# Patient Record
Sex: FEMALE | Race: AMERICAN INDIAN OR ALASKA NATIVE
[De-identification: names, ages, dates, MRNs, and addresses within clinical notes are randomized per-mention and may not be internally consistent; named-entity substitution may affect disease eponyms.]

---

## 2017-10-27 NOTE — C.PDOC
History Of Present Illness


37 year old female with a Hx of sickle cell anemia presents to the ER with a 

complaint of bilateral leg, arm, and back pain for the past 2 days. Patient 

states the pain feels like her typical sickle cell crisis pain. Denies fever, 

cough, SOB, or chest pain. Patient is on 4mg dilaudid PO at home. 


Time Seen by Provider: 10/27/17 21:10


Chief Complaint (Nursing): Pain, Chronic


History Per: Patient


History/Exam Limitations: no limitations


Onset/Duration Of Symptoms: Days


Current Symptoms Are (Timing): Still Present


Recent travel outside of the [place]: No





Past Medical History


Reviewed: Historical Data, Nursing Documentation, Vital Signs


Vital Signs: 


 Last Vital Signs











Temp  98.2 F   10/27/17 20:22


 


Pulse  110 H  10/27/17 20:22


 


Resp  18   10/27/17 20:22


 


BP  124/78   10/27/17 20:22


 


Pulse Ox  100   10/27/17 21:32














- Medical History


PMH: Anemia, Migraine, Sickle Cell Disease


Surgical History: Cholecystectomy (2004)





- [facility] Procedures








PACKED CELL TRANSFUSION (06/04/13)


TETANUS TOXOID ADMINIST (09/23/13)








Family History: States: Unknown Family Hx





- Social History


Hx Tobacco Use: No


Hx Alcohol Use: No


Hx Substance Use: No





- Immunization History


Hx Tetanus Toxoid Vaccination: No


Hx Influenza Vaccination: Yes


Hx Pneumococcal Vaccination: Yes





Review Of Systems


Except As Marked, All Systems Reviewed And Found Negative.


Constitutional: Negative for: Fever


Cardiovascular: Negative for: Chest Pain


Respiratory: Negative for: Shortness of Breath


Musculoskeletal: Positive for: Arm Pain, Back Pain, Leg Pain





Physical Exam





- Physical Exam


Additional Physical Exam Comments: 





Constitutional: No acute distress.


Head: Normocephalic. Atraumatic.


Eyes: PERRL.


ENT: Moist mucous membranes.


Neck: Supple.


Cardiovascular: Regular rate. Radial pulse 2+ bilaterally.


Chest: No tenderness.


Respiratory: Clear to auscultation bilaterally.


GI: Soft. Nontender. Nondistended.


Back: No CVA tenderness.


Musculoskeletal: No tenderness or swelling of extremities.


Skin: No rash.


Neurologic: Alert, no focal deficit.





ED Course And Treatment


O2 Sat by Pulse Oximetry: 100 (Room air)


Pulse Ox Interpretation: Normal





Medical Decision Making


Medical Decision Making: 





Plan:


* Benadryl


* Dilaudid


* Motrin


* Reglan





PO pain protocol initiated. Patient stated pain from 8 to 7 after 1 hour. 

Additional 8mg dilaudid PO administered. After another hour, patient states she 

feels better, at least a 5. She feels comfortable to go home. I advised her 

that she may return to ED for any worsening pain and to definitely return for 

chest pain, dyspnea, cough, fever.





Disposition





- Disposition


Referrals: 


Shahab Etienne MD [Staff Provider] - 


Disposition: HOME/ ROUTINE


Disposition Time: 00:38


Condition: STABLE


Instructions:  Sickle Cell Crisis (ED)


Forms:  [facility] (English)





- Clinical Impression


Clinical Impression: 


 Sickle cell pain crisis








- Scribe Statement


The provider has reviewed the documentation as recorded by the Scribe





Enio Londono





All medical record entries made by the Scribe were at my direction and 

personally dictated by me. I have reviewed the chart and agree that the record 

accurately reflects my personal performance of the history, physical exam, 

medical decision making, and the department course for this patient. I have 

also personally directed, reviewed, and agree with the discharge instructions 

and disposition.

## 2018-01-16 ENCOUNTER — HOSPITAL ENCOUNTER (INPATIENT)
Dept: HOSPITAL 31 - C.ER | Age: 38
LOS: 4 days | Discharge: HOME | DRG: 812 | End: 2018-01-20
Attending: INTERNAL MEDICINE | Admitting: INTERNAL MEDICINE
Payer: MEDICARE

## 2018-01-16 VITALS — BODY MASS INDEX: 22.4 KG/M2

## 2018-01-16 DIAGNOSIS — D57.00: Primary | ICD-10-CM

## 2018-01-16 DIAGNOSIS — Z90.49: ICD-10-CM

## 2018-01-16 DIAGNOSIS — D72.829: ICD-10-CM

## 2018-01-16 DIAGNOSIS — N39.0: ICD-10-CM

## 2018-01-16 LAB
ALBUMIN SERPL-MCNC: 3.8 G/DL (ref 3.5–5)
ALBUMIN/GLOB SERPL: 0.7 {RATIO} (ref 1–2.1)
ALT SERPL-CCNC: 110 U/L (ref 9–52)
AST SERPL-CCNC: 189 U/L (ref 14–36)
BACTERIA #/AREA URNS HPF: (no result) /[HPF]
BASOPHILS # BLD AUTO: 0.4 K/UL (ref 0–0.2)
BASOPHILS NFR BLD: 1.5 % (ref 0–2)
BILIRUB UR-MCNC: NEGATIVE MG/DL
BUN SERPL-MCNC: 33 MG/DL (ref 7–17)
CALCIUM SERPL-MCNC: 8.1 MG/DL (ref 8.6–10.4)
EOSINOPHIL # BLD AUTO: 0.8 K/UL (ref 0–0.7)
EOSINOPHIL NFR BLD: 2.9 % (ref 0–4)
ERYTHROCYTE [DISTWIDTH] IN BLOOD BY AUTOMATED COUNT: 20.8 % (ref 11.5–14.5)
GFR NON-AFRICAN AMERICAN: 42
GLUCOSE UR STRIP-MCNC: NORMAL MG/DL
HCG,QUALITATIVE URINE: NEGATIVE
HGB BLD-MCNC: 6.1 G/DL (ref 11–16)
HYPHAE YEAST: (no result) /LPF
LEUKOCYTE ESTERASE UR-ACNC: (no result) LEU/UL
LIPASE: 275 U/L (ref 23–300)
LYMPHOCYTES # BLD AUTO: 7.1 K/UL (ref 1–4.3)
LYMPHOCYTES NFR BLD AUTO: 25.1 % (ref 20–40)
MCH RBC QN AUTO: 28.9 PG (ref 27–31)
MCHC RBC AUTO-ENTMCNC: 33.6 G/DL (ref 33–37)
MCV RBC AUTO: 85.9 FL (ref 81–99)
MONOCYTES # BLD: 2.7 K/UL (ref 0–0.8)
MONOCYTES NFR BLD: 9.6 % (ref 0–10)
NEUTROPHILS # BLD: 17.3 K/UL (ref 1.8–7)
NEUTROPHILS NFR BLD AUTO: 60.9 % (ref 50–75)
NRBC BLD AUTO-RTO: 1 % (ref 0–2)
PH UR STRIP: 6 [PH] (ref 5–8)
PLATELET # BLD: 245 K/UL (ref 130–400)
PMV BLD AUTO: 9 FL (ref 7.2–11.7)
PROT UR STRIP-MCNC: (no result) MG/DL
RBC # BLD AUTO: 2.12 MIL/UL (ref 3.8–5.2)
RBC # UR STRIP: (no result) /UL
SP GR UR STRIP: 1.01 (ref 1–1.03)
SQUAMOUS EPITHIAL: 21 /HPF (ref 0–5)
URINE NITRATE: NEGATIVE
UROBILINOGEN UR-MCNC: NORMAL MG/DL (ref 0.2–1)
WBC # BLD AUTO: 28.4 K/UL (ref 4.8–10.8)

## 2018-01-16 RX ADMIN — DIPHENHYDRAMINE HYDROCHLORIDE PRN MG: 50 INJECTION INTRAMUSCULAR; INTRAVENOUS at 21:36

## 2018-01-16 NOTE — C.PDOC
History Of Present Illness


37 year old female with PMHx of sickle cell anemia presents to the ED c/o 

generalized body pains. Patient states she is not taking any medications at 

this time. Patient denies fever, chills, nausea, vomit, diarrhea. Patient's PMD 

is Dr. Bienvenido Etienne.


Time Seen by Provider: 01/16/18 18:28


Chief Complaint (Nursing): Pain, Chronic


History Per: Patient


History/Exam Limitations: no limitations


Onset/Duration Of Symptoms: Days


Current Symptoms Are (Timing): Still Present


Recent travel outside of the [place]: No


Additional History Per: Patient





Past Medical History


Reviewed: Historical Data, Nursing Documentation, Vital Signs


Vital Signs: 


 Last Vital Signs











Temp  98.2 F   01/16/18 19:45


 


Pulse  103 H  01/16/18 19:45


 


Resp  18   01/16/18 19:45


 


BP  112/78   01/16/18 19:45


 


Pulse Ox  100   01/16/18 19:45














- Medical History


PMH: Anemia, Migraine, Sickle Cell Disease


Surgical History: Cholecystectomy (2004)





- [facility] Procedures








PACKED CELL TRANSFUSION (06/04/13)


TETANUS TOXOID ADMINIST (09/23/13)








Family History: States: Unknown Family Hx





- Social History


Hx Tobacco Use: No


Hx Alcohol Use: No


Hx Substance Use: No





- Immunization History


Hx Tetanus Toxoid Vaccination: No


Hx Influenza Vaccination: Yes


Hx Pneumococcal Vaccination: Yes





Review Of Systems


Constitutional: Positive for: Weakness.  Negative for: Fever, Chills


Cardiovascular: Negative for: Chest Pain, Palpitations


Respiratory: Negative for: Cough, Shortness of Breath


Gastrointestinal: Negative for: Nausea, Vomiting, Abdominal Pain


Genitourinary: Negative for: Dysuria, Hematuria


Musculoskeletal: Negative for: Back Pain


Skin: Negative for: Rash


Neurological: Negative for: Weakness, Numbness, Headache





Physical Exam





- Physical Exam


Appears: Non-toxic, No Acute Distress


Skin: Normal Color, Warm, Dry


Head: Atraumatic, Normacephalic


Eye(s): bilateral: Normal Inspection


Nose: No Discharge, No Deformity


Oral Mucosa: Moist


Neck: Normal ROM, Supple


Chest: Symmetrical


Cardiovascular: Rhythm Regular, No Murmur


Respiratory: Normal Breath Sounds, No Rales, No Rhonchi, No Wheezing


Gastrointestinal/Abdominal: Soft, No Tenderness, No Guarding, No Rebound


Extremity: Normal ROM, No Pedal Edema, No Calf Tenderness, No Deformity, No 

Swelling


Neurological/Psych: Oriented x3, Normal Speech, Normal Cognition


Gait: Steady





ED Course And Treatment





- Laboratory Results


Result Diagrams: 


 01/16/18 18:53





 01/16/18 18:53


O2 Sat by Pulse Oximetry: 100 (On RA)


Pulse Ox Interpretation: Normal





Medical Decision Making


Medical Decision Making: 


Impression : generalized body pains


Plan:


* Labs


* EKG


* Blood culture


* UA


* IV fluids


* Rocephin 1 gm in 100 ml


* Toradol 30 mg IVP


* Tylenol/codein 1 ea PO





Spoke with Dr. Lou regarding the patient and he agreed with the 

admission. 





Disposition


Discussed With : Heber Lou


Doctor Will See Patient In The: Hospital


Counseled Patient/Family Regarding: Studies Performed, Diagnosis





- Disposition


Disposition: HOSPITALIZED


Disposition Time: 19:26


Condition: FAIR


Forms:  [facility] (English)





- Clinical Impression


Clinical Impression: 


 Sickle cell anemia with pain, UTI (urinary tract infection)








- Scribe Statement


The provider has reviewed the documentation as recorded by the Scribe





Mario Ybarra





All medical record entries made by the Scribe were at my direction and 

personally dictated by me. I have reviewed the chart and agree that the record 

accurately reflects my personal performance of the history, physical exam, 

medical decision making, and the department course for this patient. I have 

also personally directed, reviewed, and agree with the discharge instructions 

and disposition.

## 2018-01-17 LAB
BASOPHILS # BLD AUTO: 0.3 K/UL (ref 0–0.2)
BASOPHILS NFR BLD: 1.2 % (ref 0–2)
EOSINOPHIL # BLD AUTO: 1.2 K/UL (ref 0–0.7)
EOSINOPHIL NFR BLD: 4.5 % (ref 0–4)
ERYTHROCYTE [DISTWIDTH] IN BLOOD BY AUTOMATED COUNT: 21.5 % (ref 11.5–14.5)
HGB BLD-MCNC: 5.2 G/DL (ref 11–16)
LYMPHOCYTES # BLD AUTO: 9.4 K/UL (ref 1–4.3)
LYMPHOCYTES NFR BLD AUTO: 34.8 % (ref 20–40)
MCH RBC QN AUTO: 31 PG (ref 27–31)
MCHC RBC AUTO-ENTMCNC: 35.7 G/DL (ref 33–37)
MCV RBC AUTO: 86.8 FL (ref 81–99)
MONOCYTES # BLD: 2.5 K/UL (ref 0–0.8)
MONOCYTES NFR BLD: 9.1 % (ref 0–10)
NEUTROPHILS # BLD: 13.6 K/UL (ref 1.8–7)
NEUTROPHILS NFR BLD AUTO: 50.4 % (ref 50–75)
NRBC BLD AUTO-RTO: 1 % (ref 0–2)
PLATELET # BLD: 202 K/UL (ref 130–400)
PMV BLD AUTO: 9.5 FL (ref 7.2–11.7)
RBC # BLD AUTO: 1.68 MIL/UL (ref 3.8–5.2)
WBC # BLD AUTO: 27 K/UL (ref 4.8–10.8)

## 2018-01-17 PROCEDURE — 30233N1 TRANSFUSION OF NONAUTOLOGOUS RED BLOOD CELLS INTO PERIPHERAL VEIN, PERCUTANEOUS APPROACH: ICD-10-PCS | Performed by: INTERNAL MEDICINE

## 2018-01-17 RX ADMIN — DIPHENHYDRAMINE HYDROCHLORIDE PRN MG: 50 INJECTION INTRAMUSCULAR; INTRAVENOUS at 03:27

## 2018-01-17 RX ADMIN — DIPHENHYDRAMINE HYDROCHLORIDE PRN MG: 50 INJECTION INTRAMUSCULAR; INTRAVENOUS at 00:33

## 2018-01-17 RX ADMIN — DIPHENHYDRAMINE HYDROCHLORIDE PRN MG: 50 INJECTION INTRAMUSCULAR; INTRAVENOUS at 19:20

## 2018-01-17 RX ADMIN — DIPHENHYDRAMINE HYDROCHLORIDE PRN MG: 50 INJECTION INTRAMUSCULAR; INTRAVENOUS at 16:29

## 2018-01-17 RX ADMIN — DIPHENHYDRAMINE HYDROCHLORIDE PRN MG: 50 INJECTION INTRAMUSCULAR; INTRAVENOUS at 13:22

## 2018-01-17 RX ADMIN — DIPHENHYDRAMINE HYDROCHLORIDE PRN MG: 50 INJECTION INTRAMUSCULAR; INTRAVENOUS at 22:19

## 2018-01-17 RX ADMIN — DIPHENHYDRAMINE HYDROCHLORIDE PRN MG: 50 INJECTION INTRAMUSCULAR; INTRAVENOUS at 06:47

## 2018-01-17 RX ADMIN — DIPHENHYDRAMINE HYDROCHLORIDE PRN MG: 50 INJECTION INTRAMUSCULAR; INTRAVENOUS at 10:15

## 2018-01-17 NOTE — RAD
HISTORY:

cough  



COMPARISON:

Portable chest 10/12/2013.



TECHNIQUE:

Chest PA and lateral



FINDINGS:

Prior left-sided MediPort is been exchanged for a right-sided 

MediPort with the tip of the catheter terminating at the region of 

the right atrium. Stabilizing thoracic spinal rods are unchanged in 

apparent position and configuration at the thoracic spine. 



LUNGS:

No acute infiltrate is identified bilaterally. Linear atelectasis or 

fibrosis in the right base. Overall inspiratory volume is improved.



PLEURA:

No significant pleural effusion identified. No pneumothorax apparent.



CARDIOVASCULAR:

Cardiac silhouette appears upper limits normal size.  No pulmonary 

vascular derangement appreciated.



OSSEOUS STRUCTURES:

Thoracic spinal hardware as described above.



VISUALIZED UPPER ABDOMEN:

Apparent bilateral nephrostomy catheters are in identified in the 

abdomen.



OTHER FINDINGS:

None.



IMPRESSION:

One minimal linear atelectasis or fibrosis in the right base in the 

interval and MediPort axis is been exchanged for left to the right 

sides as discussed above in the interval. No acute infiltrate, 

pleural effusion or pneumothorax identified this time.  Borderline 

cardiomegaly is noted.

## 2018-01-17 NOTE — CP.PCM.CON
History of Present Illness





- History of Present Illness


History of Present Illness: 





37 year old female with a history of sickle cell anemia, admitted with sickle 

cell pain crisis.  The patient reports to increasing diffuse bone pain which 

did not improve with her oral pain meds.  She denies fevers and chills.  She 

does report to progressive fatigue but no shortness of breath.  She denies 

abnormal bleeding and bruising.





Past medical history:  Sickle cell anemia





Past surgical history:  Portacath





Family history:  Parents have the trait.





Social history:  Denies tobacco, alcohol, and illicit drug use.





Allergies:  Droperidol, tramadol





Review of systems:  All remaining review of systems including HEENT, 

cardiovascular, respiratory, gastrointestinal, genitourinary, musculoskeletal, 

dermatologic, neurologic, and psychiatric are negative unless mentioned in the 

HPI.





Past Patient History





- Past Medical History & Family History


Past Medical History?: Yes





- Past Social History


Smoking Status: Never Smoked





- NEUROLOGICAL


Hx Migraine: Yes





- HEMATOLOGICAL/ONCOLOGICAL


Hx Anemia: Yes


Hx Sickle Cell Disease: Yes





- MUSCULOSKELETAL/RHEUMATOLOGICAL


Hx Falls: No





- PSYCHIATRIC


Hx Substance Use: No





- SURGICAL HISTORY


Hx Cholecystectomy: Yes (2004)





- ANESTHESIA


Hx Anesthesia: Yes


Hx Anesthesia Reactions: No





Meds


Allergies/Adverse Reactions: 


 Allergies











Allergy/AdvReac Type Severity Reaction Status Date / Time


 


droperidol Allergy   Verified 01/16/18 16:48


 


tramadol Allergy   Verified 01/16/18 16:48


 


inapsine Allergy  RASH Uncoded 01/16/18 16:48














- Medications


Medications: 


 Current Medications





Acetaminophen (Tylenol 325mg Tab)  650 mg PO ONCE PRN


   PRN Reason: give pre-transfusion


Diphenhydramine HCl (Benadryl)  50 mg IVP Q3H PRN


   PRN Reason: given with dilaudid


   Last Admin: 01/17/18 10:15 Dose:  50 mg


Diphenhydramine HCl (Benadryl)  25 mg PO ONCE PRN


   PRN Reason: pre-transfusion


Hydromorphone HCl (Dilaudid)  2 mg IVP Q3H PRN


   PRN Reason: Pain, severe (8-10)


   Last Admin: 01/17/18 10:15 Dose:  2 mg


Sodium Chloride (Sodium Chloride 0.45%)  1,000 mls @ 150 mls/hr IV .Q6H40M CHENG


   Last Admin: 01/17/18 04:11 Dose:  150 mls/hr


Ceftriaxone Sodium 1 gm/ (Sodium Chloride)  50 mls @ 100 mls/hr IVPB DAILY CHENG


   Last Admin: 01/17/18 11:47 Dose:  100 mls/hr











Physical Exam





- Head Exam


Head Exam: ATRAUMATIC





- Eye Exam


Eye Exam: Normal appearance





- ENT Exam


ENT Exam: Mucous Membranes Dry





- Respiratory Exam


Respiratory Exam: NORMAL BREATHING PATTERN





- Cardiovascular Exam


Cardiovascular Exam: +S1, +S2





- GI/Abdominal Exam


GI & Abdominal Exam: Normal Bowel Sounds





- Extremities Exam


Extremities exam: Positive for: normal inspection





- Neurological Exam


Neurological exam: Oriented x3





- Psychiatric Exam


Psychiatric exam: Normal Affect, Normal Mood





- Skin


Skin Exam: Warm





Results





- Vital Signs


Recent Vital Signs: 


 Last Vital Signs











Temp  98.5 F   01/17/18 07:45


 


Pulse  96 H  01/17/18 07:45


 


Resp  18   01/17/18 07:45


 


BP  112/72   01/17/18 07:45


 


Pulse Ox  96   01/17/18 07:45














- Labs


Result Diagrams: 


 01/18/18 06:24





 01/18/18 06:24


Labs: 


 Laboratory Results - last 24 hr











  01/16/18 01/16/18 01/16/18





  17:58 18:53 18:53


 


WBC   28.4 H D 


 


RBC   2.12 L 


 


Hgb   6.1 L* D 


 


Hct   18.2 L 


 


MCV   85.9  D 


 


MCH   28.9 


 


MCHC   33.6 


 


RDW   20.8 H 


 


Plt Count   245 


 


MPV   9.0 


 


Neut % (Auto)   60.9 


 


Lymph % (Auto)   25.1 


 


Mono % (Auto)   9.6 


 


Eos % (Auto)   2.9 


 


Baso % (Auto)   1.5 


 


Neut #   17.3 H 


 


Lymph #   7.1 H 


 


Mono #   2.7 H 


 


Eos #   0.8 H 


 


Baso #   0.4 H 


 


Retic Count   


 


Sodium    133


 


Potassium    5.0


 


Chloride    105


 


Carbon Dioxide    20 L


 


Anion Gap    13


 


BUN    33 H


 


Creatinine    1.4 H


 


Est GFR ( Amer)    51


 


Est GFR (Non-Af Amer)    42


 


Random Glucose    87


 


Calcium    8.1 L


 


Ferritin   


 


Total Bilirubin    3.4 H


 


AST    189 H


 


ALT    110 H D


 


Alkaline Phosphatase    194 H


 


Total Protein    9.4 H


 


Albumin    3.8


 


Globulin    5.6 H


 


Albumin/Globulin Ratio    0.7 L


 


Lipase    275


 


Urine Color  Yellow  


 


Urine Clarity  Hazy  


 


Urine pH  6.0  


 


Ur Specific Gravity  1.008  


 


Urine Protein  1+ H  


 


Urine Glucose (UA)  Normal  


 


Urine Ketones  Negative  


 


Urine Blood  2+ H  


 


Urine Nitrate  Negative  


 


Urine Bilirubin  Negative  


 


Urine Urobilinogen  Normal  


 


Ur Leukocyte Esterase  3+ H  


 


Urine WBC (Auto)  862 H  


 


Urine RBC (Auto)  6 H  


 


Ur Squamous Epith Cells  21 H  


 


Urine Bacteria  Mod H  


 


Ur Yeast w Hyphae  Rare H  


 


Urine HCG, Qual  Negative  


 


Blood Type   


 


Antibody Screen   














  01/16/18 01/17/18 01/17/18





  19:37 06:18 06:18


 


WBC   27.0 H 


 


RBC   1.68 L 


 


Hgb   5.2 L* 


 


Hct   14.6 L 


 


MCV   86.8 


 


MCH   31.0 


 


MCHC   35.7 


 


RDW   21.5 H 


 


Plt Count   202 


 


MPV   9.5 


 


Neut % (Auto)   50.4 


 


Lymph % (Auto)   34.8 


 


Mono % (Auto)   9.1 


 


Eos % (Auto)   4.5 H 


 


Baso % (Auto)   1.2 


 


Neut #   13.6 H 


 


Lymph #   9.4 H 


 


Mono #   2.5 H 


 


Eos #   1.2 H 


 


Baso #   0.3 H 


 


Retic Count  11.4 H D  11.0 H 


 


Sodium   


 


Potassium   


 


Chloride   


 


Carbon Dioxide   


 


Anion Gap   


 


BUN   


 


Creatinine   


 


Est GFR ( Amer)   


 


Est GFR (Non-Af Amer)   


 


Random Glucose   


 


Calcium   


 


Ferritin    75913.0


 


Total Bilirubin   


 


AST   


 


ALT   


 


Alkaline Phosphatase   


 


Total Protein   


 


Albumin   


 


Globulin   


 


Albumin/Globulin Ratio   


 


Lipase   


 


Urine Color   


 


Urine Clarity   


 


Urine pH   


 


Ur Specific Gravity   


 


Urine Protein   


 


Urine Glucose (UA)   


 


Urine Ketones   


 


Urine Blood   


 


Urine Nitrate   


 


Urine Bilirubin   


 


Urine Urobilinogen   


 


Ur Leukocyte Esterase   


 


Urine WBC (Auto)   


 


Urine RBC (Auto)   


 


Ur Squamous Epith Cells   


 


Urine Bacteria   


 


Ur Yeast w Hyphae   


 


Urine HCG, Qual   


 


Blood Type   


 


Antibody Screen   














  01/17/18





  08:04


 


WBC 


 


RBC 


 


Hgb 


 


Hct 


 


MCV 


 


MCH 


 


MCHC 


 


RDW 


 


Plt Count 


 


MPV 


 


Neut % (Auto) 


 


Lymph % (Auto) 


 


Mono % (Auto) 


 


Eos % (Auto) 


 


Baso % (Auto) 


 


Neut # 


 


Lymph # 


 


Mono # 


 


Eos # 


 


Baso # 


 


Retic Count 


 


Sodium 


 


Potassium 


 


Chloride 


 


Carbon Dioxide 


 


Anion Gap 


 


BUN 


 


Creatinine 


 


Est GFR ( Amer) 


 


Est GFR (Non-Af Amer) 


 


Random Glucose 


 


Calcium 


 


Ferritin 


 


Total Bilirubin 


 


AST 


 


ALT 


 


Alkaline Phosphatase 


 


Total Protein 


 


Albumin 


 


Globulin 


 


Albumin/Globulin Ratio 


 


Lipase 


 


Urine Color 


 


Urine Clarity 


 


Urine pH 


 


Ur Specific Gravity 


 


Urine Protein 


 


Urine Glucose (UA) 


 


Urine Ketones 


 


Urine Blood 


 


Urine Nitrate 


 


Urine Bilirubin 


 


Urine Urobilinogen 


 


Ur Leukocyte Esterase 


 


Urine WBC (Auto) 


 


Urine RBC (Auto) 


 


Ur Squamous Epith Cells 


 


Urine Bacteria 


 


Ur Yeast w Hyphae 


 


Urine HCG, Qual 


 


Blood Type  O POSITIVE


 


Antibody Screen  Negative














Assessment & Plan


(1) Sickle cell pain crisis


Assessment and Plan: 


IV fluids, 02 via NC, folic acid, pain meds


to receive 2U PRBC


Status: Acute   





(2) Leukocytosis


Assessment and Plan: 


on antibiotics


may be reactive to sickle cell


Status: Acute   





(3) Sickle cell anemia


Assessment and Plan: 


folic acid daily


reports hydrea did not work for her


Status: Acute

## 2018-01-17 NOTE — CP.PCM.HP
Past Patient History





- Past Medical History & Family History


Past Medical History?: Yes





- Past Social History


Smoking Status: Never Smoked





- NEUROLOGICAL


Hx Migraine: Yes





- HEMATOLOGICAL/ONCOLOGICAL


Hx Anemia: Yes


Hx Sickle Cell Disease: Yes





- MUSCULOSKELETAL/RHEUMATOLOGICAL


Hx Falls: No





- PSYCHIATRIC


Hx Substance Use: No





- SURGICAL HISTORY


Hx Cholecystectomy: Yes (2004)





- ANESTHESIA


Hx Anesthesia: Yes


Hx Anesthesia Reactions: No





Meds


Allergies/Adverse Reactions: 


 Allergies











Allergy/AdvReac Type Severity Reaction Status Date / Time


 


droperidol Allergy   Verified 01/16/18 16:48


 


tramadol Allergy   Verified 01/16/18 16:48


 


inapsine Allergy  RASH Uncoded 01/16/18 16:48














Results





- Vital Signs


Recent Vital Signs: 





 Last Vital Signs











Temp  98.5 F   01/17/18 07:45


 


Pulse  96 H  01/17/18 07:45


 


Resp  18   01/17/18 07:45


 


BP  112/72   01/17/18 07:45


 


Pulse Ox  96   01/17/18 07:45














- Labs


Result Diagrams: 


 01/17/18 06:18





 01/16/18 18:53


Labs: 





 Laboratory Results - last 24 hr











  01/16/18 01/16/18 01/16/18





  17:58 18:53 18:53


 


WBC   28.4 H D 


 


RBC   2.12 L 


 


Hgb   6.1 L* D 


 


Hct   18.2 L 


 


MCV   85.9  D 


 


MCH   28.9 


 


MCHC   33.6 


 


RDW   20.8 H 


 


Plt Count   245 


 


MPV   9.0 


 


Neut % (Auto)   60.9 


 


Lymph % (Auto)   25.1 


 


Mono % (Auto)   9.6 


 


Eos % (Auto)   2.9 


 


Baso % (Auto)   1.5 


 


Neut #   17.3 H 


 


Lymph #   7.1 H 


 


Mono #   2.7 H 


 


Eos #   0.8 H 


 


Baso #   0.4 H 


 


Retic Count   


 


Sodium    133


 


Potassium    5.0


 


Chloride    105


 


Carbon Dioxide    20 L


 


Anion Gap    13


 


BUN    33 H


 


Creatinine    1.4 H


 


Est GFR ( Amer)    51


 


Est GFR (Non-Af Amer)    42


 


Random Glucose    87


 


Calcium    8.1 L


 


Ferritin   


 


Total Bilirubin    3.4 H


 


AST    189 H


 


ALT    110 H D


 


Alkaline Phosphatase    194 H


 


Total Protein    9.4 H


 


Albumin    3.8


 


Globulin    5.6 H


 


Albumin/Globulin Ratio    0.7 L


 


Lipase    275


 


Urine Color  Yellow  


 


Urine Clarity  Hazy  


 


Urine pH  6.0  


 


Ur Specific Gravity  1.008  


 


Urine Protein  1+ H  


 


Urine Glucose (UA)  Normal  


 


Urine Ketones  Negative  


 


Urine Blood  2+ H  


 


Urine Nitrate  Negative  


 


Urine Bilirubin  Negative  


 


Urine Urobilinogen  Normal  


 


Ur Leukocyte Esterase  3+ H  


 


Urine WBC (Auto)  862 H  


 


Urine RBC (Auto)  6 H  


 


Ur Squamous Epith Cells  21 H  


 


Urine Bacteria  Mod H  


 


Ur Yeast w Hyphae  Rare H  


 


Urine HCG, Qual  Negative  


 


Blood Type   


 


Antibody Screen   














  01/16/18 01/17/18 01/17/18





  19:37 06:18 06:18


 


WBC   27.0 H 


 


RBC   1.68 L 


 


Hgb   5.2 L* 


 


Hct   14.6 L 


 


MCV   86.8 


 


MCH   31.0 


 


MCHC   35.7 


 


RDW   21.5 H 


 


Plt Count   202 


 


MPV   9.5 


 


Neut % (Auto)   50.4 


 


Lymph % (Auto)   34.8 


 


Mono % (Auto)   9.1 


 


Eos % (Auto)   4.5 H 


 


Baso % (Auto)   1.2 


 


Neut #   13.6 H 


 


Lymph #   9.4 H 


 


Mono #   2.5 H 


 


Eos #   1.2 H 


 


Baso #   0.3 H 


 


Retic Count  11.4 H D  11.0 H 


 


Sodium   


 


Potassium   


 


Chloride   


 


Carbon Dioxide   


 


Anion Gap   


 


BUN   


 


Creatinine   


 


Est GFR ( Amer)   


 


Est GFR (Non-Af Amer)   


 


Random Glucose   


 


Calcium   


 


Ferritin    86716.0


 


Total Bilirubin   


 


AST   


 


ALT   


 


Alkaline Phosphatase   


 


Total Protein   


 


Albumin   


 


Globulin   


 


Albumin/Globulin Ratio   


 


Lipase   


 


Urine Color   


 


Urine Clarity   


 


Urine pH   


 


Ur Specific Gravity   


 


Urine Protein   


 


Urine Glucose (UA)   


 


Urine Ketones   


 


Urine Blood   


 


Urine Nitrate   


 


Urine Bilirubin   


 


Urine Urobilinogen   


 


Ur Leukocyte Esterase   


 


Urine WBC (Auto)   


 


Urine RBC (Auto)   


 


Ur Squamous Epith Cells   


 


Urine Bacteria   


 


Ur Yeast w Hyphae   


 


Urine HCG, Qual   


 


Blood Type   


 


Antibody Screen   














  01/17/18





  08:04


 


WBC 


 


RBC 


 


Hgb 


 


Hct 


 


MCV 


 


MCH 


 


MCHC 


 


RDW 


 


Plt Count 


 


MPV 


 


Neut % (Auto) 


 


Lymph % (Auto) 


 


Mono % (Auto) 


 


Eos % (Auto) 


 


Baso % (Auto) 


 


Neut # 


 


Lymph # 


 


Mono # 


 


Eos # 


 


Baso # 


 


Retic Count 


 


Sodium 


 


Potassium 


 


Chloride 


 


Carbon Dioxide 


 


Anion Gap 


 


BUN 


 


Creatinine 


 


Est GFR ( Amer) 


 


Est GFR (Non-Af Amer) 


 


Random Glucose 


 


Calcium 


 


Ferritin 


 


Total Bilirubin 


 


AST 


 


ALT 


 


Alkaline Phosphatase 


 


Total Protein 


 


Albumin 


 


Globulin 


 


Albumin/Globulin Ratio 


 


Lipase 


 


Urine Color 


 


Urine Clarity 


 


Urine pH 


 


Ur Specific Gravity 


 


Urine Protein 


 


Urine Glucose (UA) 


 


Urine Ketones 


 


Urine Blood 


 


Urine Nitrate 


 


Urine Bilirubin 


 


Urine Urobilinogen 


 


Ur Leukocyte Esterase 


 


Urine WBC (Auto) 


 


Urine RBC (Auto) 


 


Ur Squamous Epith Cells 


 


Urine Bacteria 


 


Ur Yeast w Hyphae 


 


Urine HCG, Qual 


 


Blood Type  O POSITIVE


 


Antibody Screen  Negative

## 2018-01-17 NOTE — CP.PCM.PN
Subjective





- Date & Time of Evaluation


Date of Evaluation: 01/17/18


Time of Evaluation: 10:16





- Subjective


Subjective: 


PT SEEN AND TO REC 2 UNITS PRBC TODAY FOR HGB 5.2.  PT STATES SHE HAS REC'D 

TRANSFUSIONS BEFORE WITHOUT ANY REACTIONS.  LAST TRANSFUSIONS GIVEN APPROX 1-2 

MONTHS AGO.  DISCUSSED RISKS AND BENEFITS OF TRANSFUSION AND PT IN AGREEMENT TO 

HAVE IT DONE HERE.  WILL PRE-MEDICATE WITH TYLENOL AND BENADRYL.  PT VERBALIZES 

UNDERSTANDING OF ALL POSS REACTIONS.  TO CONTINUE ROCEPHIN IV QD. NO FURTHER 

ORDERS AT THIS TIME.  WILL CONTINUE TO MONITOR.





Objective





- Vital Signs/Intake and Output


Vital Signs (last 24 hours): 


 











Temp Pulse Resp BP Pulse Ox


 


 98.5 F   96 H  18   112/72   96 


 


 01/17/18 07:45  01/17/18 07:45  01/17/18 07:45  01/17/18 07:45  01/17/18 07:45











- Medications


Medications: 


 Current Medications





Acetaminophen (Tylenol 325mg Tab)  650 mg PO ONCE PRN


   PRN Reason: give pre-transfusion


Diphenhydramine HCl (Benadryl)  50 mg IVP Q3H PRN


   PRN Reason: given with dilaudid


   Last Admin: 01/17/18 06:47 Dose:  50 mg


Diphenhydramine HCl (Benadryl)  25 mg PO ONCE PRN


   PRN Reason: pre-transfusion


Hydromorphone HCl (Dilaudid)  2 mg IVP Q3H PRN


   PRN Reason: Pain, severe (8-10)


   Last Admin: 01/17/18 06:47 Dose:  2 mg


Sodium Chloride (Sodium Chloride 0.45%)  1,000 mls @ 150 mls/hr IV .[id number] CHENG


   Last Admin: 01/17/18 04:11 Dose:  150 mls/hr


Ceftriaxone Sodium 1 gm/ (Sodium Chloride)  50 mls @ 100 mls/hr IVPB DAILY CHENG











- Labs


Labs: 


 





 01/17/18 06:18 





 01/16/18 18:53

## 2018-01-18 VITALS — RESPIRATION RATE: 20 BRPM

## 2018-01-18 LAB
ALBUMIN SERPL-MCNC: 3.6 G/DL (ref 3.5–5)
ALBUMIN/GLOB SERPL: 0.8 {RATIO} (ref 1–2.1)
ALT SERPL-CCNC: 78 U/L (ref 9–52)
AST SERPL-CCNC: 136 U/L (ref 14–36)
BUN SERPL-MCNC: 26 MG/DL (ref 7–17)
CALCIUM SERPL-MCNC: 7.4 MG/DL (ref 8.6–10.4)
ERYTHROCYTE [DISTWIDTH] IN BLOOD BY AUTOMATED COUNT: 17.9 % (ref 11.5–14.5)
GFR NON-AFRICAN AMERICAN: 46
HGB BLD-MCNC: 8.2 G/DL (ref 11–16)
MCH RBC QN AUTO: 29.5 PG (ref 27–31)
MCHC RBC AUTO-ENTMCNC: 34.8 G/DL (ref 33–37)
MCV RBC AUTO: 84.6 FL (ref 81–99)
PLATELET # BLD: 221 K/UL (ref 130–400)
PMV BLD AUTO: 9.4 FL (ref 7.2–11.7)
RBC # BLD AUTO: 2.77 MIL/UL (ref 3.8–5.2)
WBC # BLD AUTO: 24.9 K/UL (ref 4.8–10.8)

## 2018-01-18 RX ADMIN — DIPHENHYDRAMINE HYDROCHLORIDE PRN MG: 50 INJECTION INTRAMUSCULAR; INTRAVENOUS at 01:37

## 2018-01-18 RX ADMIN — DIPHENHYDRAMINE HYDROCHLORIDE PRN MG: 50 INJECTION INTRAMUSCULAR; INTRAVENOUS at 08:02

## 2018-01-18 RX ADMIN — DIPHENHYDRAMINE HYDROCHLORIDE PRN MG: 50 INJECTION INTRAMUSCULAR; INTRAVENOUS at 23:58

## 2018-01-18 RX ADMIN — DIPHENHYDRAMINE HYDROCHLORIDE PRN MG: 50 INJECTION INTRAMUSCULAR; INTRAVENOUS at 20:30

## 2018-01-18 RX ADMIN — DIPHENHYDRAMINE HYDROCHLORIDE PRN MG: 50 INJECTION INTRAMUSCULAR; INTRAVENOUS at 11:24

## 2018-01-18 RX ADMIN — DIPHENHYDRAMINE HYDROCHLORIDE PRN MG: 50 INJECTION INTRAMUSCULAR; INTRAVENOUS at 17:29

## 2018-01-18 RX ADMIN — DIPHENHYDRAMINE HYDROCHLORIDE PRN MG: 50 INJECTION INTRAMUSCULAR; INTRAVENOUS at 14:24

## 2018-01-18 RX ADMIN — DIPHENHYDRAMINE HYDROCHLORIDE PRN MG: 50 INJECTION INTRAMUSCULAR; INTRAVENOUS at 04:47

## 2018-01-18 NOTE — CP.PCM.PN
Subjective





- Date & Time of Evaluation


Date of Evaluation: 01/18/18


Time of Evaluation: 18:47





Objective





- Vital Signs/Intake and Output


Vital Signs (last 24 hours): 


 











Temp Pulse Resp BP Pulse Ox


 


 97.3 F L  112 H  20   112/78   96 


 


 01/18/18 08:40  01/18/18 08:40  01/18/18 08:40  01/18/18 17:29  01/18/18 08:40








Intake and Output: 


 











 01/18/18 01/18/18





 06:59 18:59


 


Intake Total 2225 2500


 


Balance 2225 2500














- Medications


Medications: 


 Current Medications





Acetaminophen (Tylenol 325mg Tab)  650 mg PO ONCE PRN


   PRN Reason: give pre-transfusion


   Last Admin: 01/17/18 13:07 Dose:  650 mg


Diphenhydramine HCl (Benadryl)  50 mg IVP Q3H PRN


   PRN Reason: given with dilaudid


   Last Admin: 01/18/18 17:29 Dose:  50 mg


Diphenhydramine HCl (Benadryl)  25 mg PO ONCE PRN


   PRN Reason: pre-transfusion


Folic Acid (Folic Acid)  1 mg PO DAILY [facility]


   Last Admin: 01/18/18 17:29 Dose:  1 mg


Hydromorphone HCl (Dilaudid)  2 mg IVP Q3H PRN


   PRN Reason: Pain, severe (8-10)


   Last Admin: 01/18/18 17:30 Dose:  2 mg


Sodium Chloride (Sodium Chloride 0.45%)  1,000 mls @ 150 mls/hr IV .Q6H40M [facility]


   Last Admin: 01/18/18 17:35 Dose:  150 mls/hr


Ceftriaxone Sodium 1 gm/ (Sodium Chloride)  50 mls @ 100 mls/hr IVPB DAILY [facility]


   Last Admin: 01/18/18 11:00 Dose:  100 mls/hr











- Labs


Labs: 


 





 01/18/18 06:24 





 01/18/18 06:24

## 2018-01-18 NOTE — CP.PCM.PN
Subjective





- Date & Time of Evaluation


Date of Evaluation: 01/18/18


Time of Evaluation: 16:00





- Subjective


Subjective: 





Has bone pain but improving





Objective





- Vital Signs/Intake and Output


Vital Signs (last 24 hours): 


 











Temp Pulse Resp BP Pulse Ox


 


 97.3 F L  112 H  20   100/65   96 


 


 01/18/18 08:40  01/18/18 08:40  01/18/18 08:40  01/18/18 08:40  01/18/18 08:40








Intake and Output: 


 











 01/18/18 01/18/18





 06:59 18:59


 


Intake Total 2225 


 


Balance 2225 














- Medications


Medications: 


 Current Medications





Acetaminophen (Tylenol 325mg Tab)  650 mg PO ONCE PRN


   PRN Reason: give pre-transfusion


   Last Admin: 01/17/18 13:07 Dose:  650 mg


Diphenhydramine HCl (Benadryl)  50 mg IVP Q3H PRN


   PRN Reason: given with dilaudid


   Last Admin: 01/18/18 14:24 Dose:  50 mg


Diphenhydramine HCl (Benadryl)  25 mg PO ONCE PRN


   PRN Reason: pre-transfusion


Folic Acid (Folic Acid)  1 mg PO DAILY [facility]


Hydromorphone HCl (Dilaudid)  2 mg IVP Q3H PRN


   PRN Reason: Pain, severe (8-10)


   Last Admin: 01/18/18 14:24 Dose:  2 mg


Sodium Chloride (Sodium Chloride 0.45%)  1,000 mls @ 150 mls/hr IV .Q6H40M [facility]


   Last Admin: 01/18/18 08:08 Dose:  150 mls/hr


Ceftriaxone Sodium 1 gm/ (Sodium Chloride)  50 mls @ 100 mls/hr IVPB DAILY [facility]


   Last Admin: 01/18/18 11:00 Dose:  100 mls/hr











- Labs


Labs: 


 





 01/18/18 06:24 





 01/18/18 06:24 











- Head Exam


Head Exam: ATRAUMATIC





- Eye Exam


Eye Exam: Normal appearance





- ENT Exam


ENT Exam: Mucous Membranes Dry





- Respiratory Exam


Respiratory Exam: NORMAL BREATHING PATTERN





- Cardiovascular Exam


Cardiovascular Exam: +S1, +S2





- GI/Abdominal Exam


GI & Abdominal Exam: Normal Bowel Sounds





- Extremities Exam


Extremities Exam: Normal Inspection





Assessment and Plan


(1) Sickle cell pain crisis


Assessment & Plan: 


IV fluids, pain meds, 02 via NC, folic acid


s/p 2U PRBC


Status: Acute   





(2) Leukocytosis


Assessment & Plan: 


on antibiotics


may be reactive to sickle cell


Status: Acute   





(3) Sickle cell anemia


Assessment & Plan: 


outpatient folic acid


Status: Acute

## 2018-01-19 LAB
ALBUMIN SERPL-MCNC: 3.6 G/DL (ref 3.5–5)
ALBUMIN/GLOB SERPL: 0.8 {RATIO} (ref 1–2.1)
ALT SERPL-CCNC: 73 U/L (ref 9–52)
AST SERPL-CCNC: 132 U/L (ref 14–36)
BASOPHILS # BLD AUTO: 0.2 K/UL (ref 0–0.2)
BASOPHILS NFR BLD: 1 % (ref 0–2)
BUN SERPL-MCNC: 28 MG/DL (ref 7–17)
CALCIUM SERPL-MCNC: 7.9 MG/DL (ref 8.6–10.4)
EOSINOPHIL # BLD AUTO: 1.2 K/UL (ref 0–0.7)
EOSINOPHIL NFR BLD: 5 % (ref 0–4)
ERYTHROCYTE [DISTWIDTH] IN BLOOD BY AUTOMATED COUNT: 18.6 % (ref 11.5–14.5)
GFR NON-AFRICAN AMERICAN: 46
HGB BLD-MCNC: 7.8 G/DL (ref 11–16)
LYMPHOCYTES # BLD AUTO: 3.9 K/UL (ref 1–4.3)
LYMPHOCYTES NFR BLD AUTO: 16.3 % (ref 20–40)
MAGNESIUM SERPL-MCNC: 1.5 MG/DL (ref 1.6–2.3)
MCH RBC QN AUTO: 29.5 PG (ref 27–31)
MCHC RBC AUTO-ENTMCNC: 34.4 G/DL (ref 33–37)
MCV RBC AUTO: 85.7 FL (ref 81–99)
MONOCYTES # BLD: 2.5 K/UL (ref 0–0.8)
MONOCYTES NFR BLD: 10.5 % (ref 0–10)
NEUTROPHILS # BLD: 16 K/UL (ref 1.8–7)
NEUTROPHILS NFR BLD AUTO: 67.2 % (ref 50–75)
NRBC BLD AUTO-RTO: 1 % (ref 0–2)
PLATELET # BLD: 152 K/UL (ref 130–400)
PMV BLD AUTO: 10.2 FL (ref 7.2–11.7)
RBC # BLD AUTO: 2.64 MIL/UL (ref 3.8–5.2)
WBC # BLD AUTO: 23.8 K/UL (ref 4.8–10.8)

## 2018-01-19 RX ADMIN — DIPHENHYDRAMINE HYDROCHLORIDE PRN MG: 50 INJECTION INTRAMUSCULAR; INTRAVENOUS at 12:29

## 2018-01-19 RX ADMIN — DIPHENHYDRAMINE HYDROCHLORIDE PRN MG: 50 INJECTION INTRAMUSCULAR; INTRAVENOUS at 18:40

## 2018-01-19 RX ADMIN — DIPHENHYDRAMINE HYDROCHLORIDE PRN MG: 50 INJECTION INTRAMUSCULAR; INTRAVENOUS at 06:00

## 2018-01-19 RX ADMIN — DIPHENHYDRAMINE HYDROCHLORIDE PRN MG: 50 INJECTION INTRAMUSCULAR; INTRAVENOUS at 09:21

## 2018-01-19 RX ADMIN — DIPHENHYDRAMINE HYDROCHLORIDE PRN MG: 50 INJECTION INTRAMUSCULAR; INTRAVENOUS at 03:00

## 2018-01-19 RX ADMIN — DIPHENHYDRAMINE HYDROCHLORIDE PRN MG: 50 INJECTION INTRAMUSCULAR; INTRAVENOUS at 21:31

## 2018-01-19 RX ADMIN — DIPHENHYDRAMINE HYDROCHLORIDE PRN MG: 50 INJECTION INTRAMUSCULAR; INTRAVENOUS at 15:32

## 2018-01-19 NOTE — CP.PCM.PN
Subjective





- Date & Time of Evaluation


Date of Evaluation: 01/19/18


Time of Evaluation: 17:44





- Subjective


Subjective: 





PATIENT WAS ADMITTED FOR SICKLE CELL CRISIS; AAOX3 DENIES PAIN; JUST 

COMPLAINING OF DISCOMFORT; DENIES CHEST PAIN, NAUSEA OR VOMITING NO SIGN OF 

DISTRESS NOTED 





Objective





- Vital Signs/Intake and Output


Vital Signs (last 24 hours): 


 











Temp Pulse Resp BP Pulse Ox


 


 98.1 F   102 H  20   125/74   95 


 


 01/19/18 16:22  01/19/18 16:22  01/19/18 16:22  01/19/18 16:22  01/19/18 16:22








Intake and Output: 


 











 01/19/18 01/19/18





 06:59 18:59


 


Intake Total 1860 1600


 


Output Total  300


 


Balance 1860 1300














- Medications


Medications: 


 Current Medications





Acetaminophen (Tylenol 325mg Tab)  650 mg PO ONCE PRN


   PRN Reason: give pre-transfusion


   Last Admin: 01/17/18 13:07 Dose:  650 mg


Diphenhydramine HCl (Benadryl)  50 mg IVP Q3H PRN


   PRN Reason: given with dilaudid


   Last Admin: 01/19/18 15:32 Dose:  50 mg


Diphenhydramine HCl (Benadryl)  25 mg PO ONCE PRN


   PRN Reason: pre-transfusion


Folic Acid (Folic Acid)  1 mg PO DAILY [facility]


   Last Admin: 01/19/18 09:25 Dose:  1 mg


Hydromorphone HCl (Dilaudid)  2 mg IVP Q3H PRN


   PRN Reason: Pain, severe (8-10)


   Last Admin: 01/19/18 15:31 Dose:  2 mg


Sodium Chloride (Sodium Chloride 0.45%)  1,000 mls @ 150 mls/hr IV .Q6H40M [facility]


   Last Admin: 01/19/18 10:00 Dose:  150 mls/hr


Ceftriaxone Sodium 1 gm/ (Sodium Chloride)  100 mls @ 100 mls/hr IVPB DAILY [facility]


   Last Admin: 01/19/18 09:27 Dose:  100 mls/hr


Magnesium Sulfate/Dextrose (Magnesium Sulfate 1 Gm/100 Ml D5w)  1 gm in 100 mls 

@ 200 mls/hr IVPB ONCE ONE


   Stop: 01/19/18 18:05











- Labs


Labs: 


 





 01/19/18 07:25 





 01/19/18 07:25 











Assessment and Plan





- Assessment and Plan (Free Text)


Assessment: 


MG WAS 1.5 REPLACE 


POTASSIUM WAS 5.2 LASIX CAUSE PATIENT REFUSE KAYEXALATE


REPEAT LAB FOR TOMORROW FOR NP ON DUTY TO FOLLOW 


DISCUSS WITH DR GUZMAN AND DR JEAN WHO AGREE WITH THE PLAN 


FOLLOW UP WITH DR MARIVEL FORBES AT HIS OFFICE FOR F/U APPOINTMENT IN 1-2 WEEKS ---

CALL FOR APPOINTMENT


OR FOLLOW UP WITH DR JEAN COVERING PHYSICIAN IF UNABLE TO REACH DR MARIVEL FORBES 

IN HIS OFFICE --CALL FOR APPOINTMENT


FOLLOW UP WITH YOUR ONCOLOGY AS OUT PATIENT 1-2 WEEKS AT HIS OFFICE ---CALL FOR 

APPOINTMENT


CONTINUE ALL YOR HOME MEDICATION AS DIRECTED 


NEW PRESCRIPTION GIVEN:


   BACTRIM DS FOR 5 DAYS 


   DILAUDID 2 MG BY MOUTH EVERY 6 HOURS FOR 5 DAYS FOR PAIN


CALL DR MARIVEL FORBES OR DR JEAN OR GO TO THE EMERGENCY ROOM IF SYMPTOMS RETURN 

OR WORSENING

## 2018-01-19 NOTE — CP.PCM.PN
Subjective





- Date & Time of Evaluation


Date of Evaluation: 01/19/18


Time of Evaluation: 15:51





Objective





- Vital Signs/Intake and Output


Vital Signs (last 24 hours): 


 











Temp Pulse Resp BP Pulse Ox


 


 99.4 F   111 H  20   112/76   95 


 


 01/19/18 07:25  01/19/18 07:25  01/19/18 07:25  01/19/18 07:25  01/19/18 07:25








Intake and Output: 


 











 01/19/18 01/19/18





 06:59 18:59


 


Intake Total 1860 1600


 


Output Total  300


 


Balance 1860 1300














- Medications


Medications: 


 Current Medications





Acetaminophen (Tylenol 325mg Tab)  650 mg PO ONCE PRN


   PRN Reason: give pre-transfusion


   Last Admin: 01/17/18 13:07 Dose:  650 mg


Diphenhydramine HCl (Benadryl)  50 mg IVP Q3H PRN


   PRN Reason: given with dilaudid


   Last Admin: 01/19/18 15:32 Dose:  50 mg


Diphenhydramine HCl (Benadryl)  25 mg PO ONCE PRN


   PRN Reason: pre-transfusion


Folic Acid (Folic Acid)  1 mg PO DAILY [facility]


   Last Admin: 01/19/18 09:25 Dose:  1 mg


Hydromorphone HCl (Dilaudid)  2 mg IVP Q3H PRN


   PRN Reason: Pain, severe (8-10)


   Last Admin: 01/19/18 15:31 Dose:  2 mg


Sodium Chloride (Sodium Chloride 0.45%)  1,000 mls @ 150 mls/hr IV .Q6H40M [facility]


   Last Admin: 01/19/18 10:00 Dose:  150 mls/hr


Ceftriaxone Sodium 1 gm/ (Sodium Chloride)  100 mls @ 100 mls/hr IVPB DAILY [facility]


   Last Admin: 01/19/18 09:27 Dose:  100 mls/hr











- Labs


Labs: 


 





 01/19/18 07:25 





 01/19/18 07:25

## 2018-01-19 NOTE — CP.PCM.PN
Subjective





- Date & Time of Evaluation


Date of Evaluation: 01/19/18


Time of Evaluation: 12:00





- Subjective


Subjective: 





Still has some bone pain but improved.





Objective





- Vital Signs/Intake and Output


Vital Signs (last 24 hours): 


 











Temp Pulse Resp BP Pulse Ox


 


 98.1 F   102 H  20   125/74   95 


 


 01/19/18 16:22  01/19/18 16:22  01/19/18 16:22  01/19/18 16:22  01/19/18 16:22








Intake and Output: 


 











 01/19/18 01/20/18





 18:59 06:59


 


Intake Total 1600 


 


Output Total 300 


 


Balance 1300 














- Medications


Medications: 


 Current Medications





Acetaminophen (Tylenol 325mg Tab)  650 mg PO ONCE PRN


   PRN Reason: give pre-transfusion


   Last Admin: 01/17/18 13:07 Dose:  650 mg


Diphenhydramine HCl (Benadryl)  50 mg IVP Q3H PRN


   PRN Reason: given with dilaudid


   Last Admin: 01/19/18 18:40 Dose:  50 mg


Diphenhydramine HCl (Benadryl)  25 mg PO ONCE PRN


   PRN Reason: pre-transfusion


Folic Acid (Folic Acid)  1 mg PO DAILY [facility]


   Last Admin: 01/19/18 09:25 Dose:  1 mg


Hydromorphone HCl (Dilaudid)  2 mg IVP Q3H PRN


   PRN Reason: Pain, severe (8-10)


   Last Admin: 01/19/18 18:40 Dose:  2 mg


Sodium Chloride (Sodium Chloride 0.45%)  1,000 mls @ 150 mls/hr IV .Q6H40M [facility]


   Last Admin: 01/19/18 10:00 Dose:  150 mls/hr


Ceftriaxone Sodium 1 gm/ (Sodium Chloride)  100 mls @ 100 mls/hr IVPB DAILY [facility]


   Last Admin: 01/19/18 09:27 Dose:  100 mls/hr











- Labs


Labs: 


 





 01/19/18 07:25 





 01/19/18 07:25 











- Head Exam


Head Exam: ATRAUMATIC





- Eye Exam


Eye Exam: Normal appearance





- ENT Exam


ENT Exam: Mucous Membranes Dry





- Respiratory Exam


Respiratory Exam: NORMAL BREATHING PATTERN





- Cardiovascular Exam


Cardiovascular Exam: +S1, +S2





- GI/Abdominal Exam


GI & Abdominal Exam: Normal Bowel Sounds





- Extremities Exam


Extremities Exam: Normal Inspection





Assessment and Plan


(1) Sickle cell pain crisis


Assessment & Plan: 


IVF, 02 via NC, folic acid, pain meds


s/p 2U PRBC


Status: Acute   





(2) Leukocytosis


Assessment & Plan: 


likely reactive to sickle cell 


on empiric antibiotics


Status: Acute   





(3) Sickle cell anemia


Assessment & Plan: 


outpatient folic acid.


Status: Acute

## 2018-01-20 VITALS
HEART RATE: 102 BPM | OXYGEN SATURATION: 96 % | SYSTOLIC BLOOD PRESSURE: 125 MMHG | DIASTOLIC BLOOD PRESSURE: 83 MMHG | TEMPERATURE: 98.6 F

## 2018-01-20 LAB
ALBUMIN SERPL-MCNC: 3.7 G/DL (ref 3.5–5)
ALBUMIN/GLOB SERPL: 0.7 {RATIO} (ref 1–2.1)
ALT SERPL-CCNC: 86 U/L (ref 9–52)
AST SERPL-CCNC: 144 U/L (ref 14–36)
BASOPHILS # BLD AUTO: 0.3 K/UL (ref 0–0.2)
BASOPHILS NFR BLD: 1.2 % (ref 0–2)
BUN SERPL-MCNC: 28 MG/DL (ref 7–17)
CALCIUM SERPL-MCNC: 7.7 MG/DL (ref 8.6–10.4)
EOSINOPHIL # BLD AUTO: 1.2 K/UL (ref 0–0.7)
EOSINOPHIL NFR BLD: 5.8 % (ref 0–4)
ERYTHROCYTE [DISTWIDTH] IN BLOOD BY AUTOMATED COUNT: 19.4 % (ref 11.5–14.5)
GFR NON-AFRICAN AMERICAN: 46
HGB BLD-MCNC: 7.7 G/DL (ref 11–16)
LYMPHOCYTES # BLD AUTO: 4.4 K/UL (ref 1–4.3)
LYMPHOCYTES NFR BLD AUTO: 21 % (ref 20–40)
MAGNESIUM SERPL-MCNC: 1.8 MG/DL (ref 1.6–2.3)
MCH RBC QN AUTO: 28.9 PG (ref 27–31)
MCHC RBC AUTO-ENTMCNC: 33.6 G/DL (ref 33–37)
MCV RBC AUTO: 86.1 FL (ref 81–99)
MONOCYTES # BLD: 1.6 K/UL (ref 0–0.8)
MONOCYTES NFR BLD: 7.6 % (ref 0–10)
NEUTROPHILS # BLD: 13.6 K/UL (ref 1.8–7)
NEUTROPHILS NFR BLD AUTO: 64.4 % (ref 50–75)
NRBC BLD AUTO-RTO: 0.3 % (ref 0–2)
PLATELET # BLD: 151 K/UL (ref 130–400)
PMV BLD AUTO: 10.4 FL (ref 7.2–11.7)
RBC # BLD AUTO: 2.65 MIL/UL (ref 3.8–5.2)
WBC # BLD AUTO: 21.2 K/UL (ref 4.8–10.8)

## 2018-01-20 RX ADMIN — DIPHENHYDRAMINE HYDROCHLORIDE PRN MG: 50 INJECTION INTRAMUSCULAR; INTRAVENOUS at 09:37

## 2018-01-20 RX ADMIN — DIPHENHYDRAMINE HYDROCHLORIDE PRN MG: 50 INJECTION INTRAMUSCULAR; INTRAVENOUS at 03:34

## 2018-01-20 RX ADMIN — DIPHENHYDRAMINE HYDROCHLORIDE PRN MG: 50 INJECTION INTRAMUSCULAR; INTRAVENOUS at 16:06

## 2018-01-20 RX ADMIN — DIPHENHYDRAMINE HYDROCHLORIDE PRN MG: 50 INJECTION INTRAMUSCULAR; INTRAVENOUS at 00:34

## 2018-01-20 RX ADMIN — DIPHENHYDRAMINE HYDROCHLORIDE PRN MG: 50 INJECTION INTRAMUSCULAR; INTRAVENOUS at 06:44

## 2018-01-20 RX ADMIN — DIPHENHYDRAMINE HYDROCHLORIDE PRN MG: 50 INJECTION INTRAMUSCULAR; INTRAVENOUS at 12:48

## 2018-01-20 NOTE — CP.PCM.PN
Subjective





- Date & Time of Evaluation


Date of Evaluation: 01/20/18


Time of Evaluation: 15:55





Objective





- Vital Signs/Intake and Output


Vital Signs (last 24 hours): 


 











Temp Pulse Resp BP Pulse Ox


 


 98.4 F   104 H  20   121/74   95 


 


 01/20/18 07:35  01/20/18 07:35  01/20/18 07:35  01/20/18 07:35  01/20/18 07:35








Intake and Output: 


 











 01/20/18 01/20/18





 06:59 18:59


 


Intake Total 1500 


 


Balance 1500 














- Medications


Medications: 


 Current Medications





Acetaminophen (Tylenol 325mg Tab)  650 mg PO ONCE PRN


   PRN Reason: give pre-transfusion


   Last Admin: 01/17/18 13:07 Dose:  650 mg


Diphenhydramine HCl (Benadryl)  50 mg IVP Q3H PRN


   PRN Reason: given with dilaudid


   Last Admin: 01/20/18 12:48 Dose:  50 mg


Diphenhydramine HCl (Benadryl)  25 mg PO ONCE PRN


   PRN Reason: pre-transfusion


Folic Acid (Folic Acid)  [facility]ILY [facility]


   Last Admin: 01/20/18 09:39 Dose:  1 mg


Hydromorphone HCl (Dilaudid)  2 mg IVP Q3H PRN


   PRN Reason: Pain, severe (8-10)


   Last Admin: 01/20/18 12:48 Dose:  2 mg


Ceftriaxone Sodium 1 gm/ (Sodium Chloride)  100 mls @ 100 mls/hr IVPB [facility]ILY [facility]


   Last Admin: 01/20/18 09:39 Dose:  100 mls/hr











- Labs


Labs: 


 





 01/20/18 07:17 





 01/20/18 07:17

## 2018-01-23 ENCOUNTER — HOSPITAL ENCOUNTER (INPATIENT)
Dept: HOSPITAL 31 - C.ER | Age: 38
LOS: 3 days | Discharge: HOME | DRG: 812 | End: 2018-01-26
Attending: INTERNAL MEDICINE | Admitting: INTERNAL MEDICINE
Payer: MEDICARE

## 2018-01-23 VITALS — BODY MASS INDEX: 22.4 KG/M2

## 2018-01-23 DIAGNOSIS — N39.0: ICD-10-CM

## 2018-01-23 DIAGNOSIS — D57.00: Primary | ICD-10-CM

## 2018-01-23 DIAGNOSIS — N31.9: ICD-10-CM

## 2018-01-23 LAB
ALBUMIN SERPL-MCNC: 3.5 G/DL (ref 3.5–5)
ALBUMIN/GLOB SERPL: 0.7 {RATIO} (ref 1–2.1)
ALT SERPL-CCNC: 72 U/L (ref 9–52)
AST SERPL-CCNC: 115 U/L (ref 14–36)
BASOPHILS # BLD AUTO: 0.4 K/UL (ref 0–0.2)
BASOPHILS NFR BLD: 2.5 % (ref 0–2)
BUN SERPL-MCNC: 22 MG/DL (ref 7–17)
CALCIUM SERPL-MCNC: 8.1 MG/DL (ref 8.6–10.4)
EOSINOPHIL # BLD AUTO: 0.6 K/UL (ref 0–0.7)
EOSINOPHIL NFR BLD: 3.8 % (ref 0–4)
ERYTHROCYTE [DISTWIDTH] IN BLOOD BY AUTOMATED COUNT: 17.8 % (ref 11.5–14.5)
GFR NON-AFRICAN AMERICAN: 56
HGB BLD-MCNC: 7.2 G/DL (ref 11–16)
LYMPHOCYTES # BLD AUTO: 3.8 K/UL (ref 1–4.3)
LYMPHOCYTES NFR BLD AUTO: 22.1 % (ref 20–40)
MCH RBC QN AUTO: 28.3 PG (ref 27–31)
MCHC RBC AUTO-ENTMCNC: 33.7 G/DL (ref 33–37)
MCV RBC AUTO: 84 FL (ref 81–99)
MONOCYTES # BLD: 1.6 K/UL (ref 0–0.8)
MONOCYTES NFR BLD: 9.4 % (ref 0–10)
NEUTROPHILS # BLD: 10.6 K/UL (ref 1.8–7)
NEUTROPHILS NFR BLD AUTO: 62.2 % (ref 50–75)
NRBC BLD AUTO-RTO: 0.1 % (ref 0–2)
PLATELET # BLD: 201 K/UL (ref 130–400)
PMV BLD AUTO: 9.3 FL (ref 7.2–11.7)
RBC # BLD AUTO: 2.54 MIL/UL (ref 3.8–5.2)
WBC # BLD AUTO: 17 K/UL (ref 4.8–10.8)

## 2018-01-23 RX ADMIN — DIPHENHYDRAMINE HYDROCHLORIDE PRN MG: 50 INJECTION INTRAMUSCULAR; INTRAVENOUS at 19:44

## 2018-01-23 RX ADMIN — DIPHENHYDRAMINE HYDROCHLORIDE PRN MG: 50 INJECTION INTRAMUSCULAR; INTRAVENOUS at 13:31

## 2018-01-23 RX ADMIN — DIPHENHYDRAMINE HYDROCHLORIDE PRN MG: 50 INJECTION INTRAMUSCULAR; INTRAVENOUS at 22:47

## 2018-01-23 RX ADMIN — DIPHENHYDRAMINE HYDROCHLORIDE PRN MG: 50 INJECTION INTRAMUSCULAR; INTRAVENOUS at 10:19

## 2018-01-23 RX ADMIN — DIPHENHYDRAMINE HYDROCHLORIDE PRN MG: 50 INJECTION INTRAMUSCULAR; INTRAVENOUS at 16:32

## 2018-01-23 NOTE — C.PDOC
History Of Present Illness





37 year old female presents to ED with complaints of sickle cell vaso-occlusive 

crisis and has a past medical history of sickle cell disease. (+) back pain and 

bilateral leg pain. (-) chest pain or SOB. Patient was recently discharged from 

[facility] x2 days ago and admits to not filling any of the prescriptions given at 

that time. Denies seeing her hematologist (Dr. VINCE Etienne). Notes that pain never 

fully resolved from this last visit.


Time Seen by Provider: 18 03:38


Chief Complaint (Nursing): Back Pain


History Per: Patient


History/Exam Limitations: no limitations


Onset/Duration Of Symptoms: Days (approximately 3 days)


Current Symptoms Are (Timing): Still Present


Quality Of Discomfort: "Pain"


Previous Symptoms: Back Pain, Other (bilateral leg pain)





Past Medical History


Reviewed: Historical Data, Nursing Documentation, Vital Signs


Vital Signs: 


 Last Vital Signs











Temp  98.2 F   18 06:06


 


Pulse  72   18 06:06


 


Resp  20   18 06:06


 


BP  119/82   18 06:06


 


Pulse Ox  100   18 07:17














- Medical History


PMH: Anemia, Migraine, Sickle Cell Disease


Surgical History: Cholecystectomy ()





- [facility] Procedures








PACKED CELL TRANSFUSION (13)


TETANUS TOXOID ADMINIST (13)


TRANSFUSE NONAUT RED BLOOD CELLS IN PERIPH VEIN, PERC (18)








Family History: States: Unknown Family Hx





- Social History


Hx Tobacco Use: No


Hx Alcohol Use: No


Hx Substance Use: No





- Immunization History


Hx Tetanus Toxoid Vaccination: No


Hx Influenza Vaccination: Yes


Hx Pneumococcal Vaccination: Yes





Review Of Systems


Except As Marked, All Systems Reviewed And Found Negative.


Cardiovascular: Negative for: Chest Pain


Respiratory: Negative for: Shortness of Breath


Musculoskeletal: Positive for: Back Pain, Leg Pain (bilateral)





Physical Exam





- Physical Exam


Appears: Well, No Acute Distress


Skin: Warm, Dry, Jaundice


Eye(s): bilateral: PERRL, EOMI, Scleral Icterus


Nose: Normal


Throat: Normal


Neck: Normal


Cardiovascular: Rhythm Regular


Respiratory: Normal Breath Sounds


Gastrointestinal/Abdominal: Normal Exam


Back: Normal Inspection


Extremity: Normal ROM, No Deformity, No Swelling


Neurological/Psych: Oriented x3, Normal Motor, Normal Sensation





ED Course And Treatment





- Laboratory Results


Result Diagrams: 


 18 05:43





 18 05:43


O2 Sat by Pulse Oximetry: 100 (RA)


Pulse Ox Interpretation: Normal





Medical Decision Making


Medical Decision Makin


Initial plan:


* Dilaudid 2mg IVP


* labs





0543


Labs reviewed: 


white count 68189


Hemoglobin 7.2





Upon re-evaluation patient is still in pain. Will administer more pain 

medication.pt turned over to Dr. Gayle at change of shift. 





Disposition


Counseled Patient/Family Regarding: Diagnosis, Need For Followup





- Disposition


Disposition: HOSPITALIZED


Disposition Time: 07:18


Condition: STABLE


Forms:  [employer] (English)





- Clinical Impression


Clinical Impression: 


 Sickle cell anemia with crisis








- Scribe Statement


Kaylyn Ugalde


Provider Attestation: 


Scribe Attestation:


Documented by Kaylyn Ugalde acting as a scribe for Lesli Hernandez MD.





MD Scribe Attestation: 


All medical record entries made by the Scribe were at my direction and 

personally dictated by me. I have reviewed the chart and agree that the record 

accurately reflects my personal performance of the history, physical exam, 

medical decision making, and the department course for this patient. I have 

also personally directed, reviewed, and agree with the discharge instructions 

and disposition.

## 2018-01-23 NOTE — CP.PCM.HP
Past Patient History





- Past Medical History & Family History


Past Medical History?: Yes





- Past Social History


Smoking Status: Never Smoked





- NEUROLOGICAL


Hx Migraine: Yes





- HEMATOLOGICAL/ONCOLOGICAL


Hx Anemia: Yes


Hx Sickle Cell Disease: Yes





- MUSCULOSKELETAL/RHEUMATOLOGICAL


Hx Falls: No





- PSYCHIATRIC


Hx Substance Use: No





- SURGICAL HISTORY


Hx Cholecystectomy: Yes (2004)





- ANESTHESIA


Hx Anesthesia: Yes


Hx Anesthesia Reactions: No





Meds


Allergies/Adverse Reactions: 


 Allergies











Allergy/AdvReac Type Severity Reaction Status Date / Time


 


droperidol Allergy   Verified 01/16/18 16:48


 


tramadol Allergy   Verified 01/16/18 16:48


 


inapsine Allergy  RASH Uncoded 01/16/18 16:48














Physical Exam





- Constitutional


Appears: Well





- Head Exam


Head Exam: ATRAUMATIC, NORMAL INSPECTION, NORMOCEPHALIC





- Eye Exam


Eye Exam: EOMI, Normal appearance, PERRL


Pupil Exam: NORMAL ACCOMODATION, PERRL





- ENT Exam


ENT Exam: Mucous Membranes Moist, Normal Exam





- Neck Exam


Neck exam: Positive for: Normal Inspection





- Respiratory Exam


Respiratory Exam: Decreased Breath Sounds





- Cardiovascular Exam


Cardiovascular Exam: REGULAR RHYTHM, +S1, +S2





- GI/Abdominal Exam


GI & Abdominal Exam: Diminished Bowel Sounds, Soft





- Rectal Exam


Rectal Exam: Deferred





Results





- Vital Signs


Recent Vital Signs: 





 Last Vital Signs











Temp  98.8 F   01/23/18 15:35


 


Pulse  107 H  01/23/18 15:35


 


Resp  18   01/23/18 15:35


 


BP  106/69   01/23/18 15:35


 


Pulse Ox  98   01/23/18 15:35














- Labs


Result Diagrams: 


 01/23/18 05:43





 01/23/18 05:43


Labs: 





 Laboratory Results - last 24 hr











  01/23/18 01/23/18 01/23/18





  05:43 05:43 05:43


 


WBC  17.0 H  


 


RBC  2.54 L  


 


Hgb  7.2 L  


 


Hct  21.3 L  


 


MCV  84.0  D  


 


MCH  28.3  


 


MCHC  33.7  


 


RDW  17.8 H  


 


Plt Count  201  


 


MPV  9.3  


 


Neut % (Auto)  62.2  


 


Lymph % (Auto)  22.1  


 


Mono % (Auto)  9.4  


 


Eos % (Auto)  3.8  


 


Baso % (Auto)  2.5 H  


 


Neut #  10.6 H  


 


Lymph #  3.8  


 


Mono #  1.6 H  


 


Eos #  0.6  


 


Baso #  0.4 H  


 


Retic Count    5.3 H D


 


Sodium   133 


 


Potassium   4.1 


 


Chloride   105 


 


Carbon Dioxide   22 


 


Anion Gap   11 


 


BUN   22 H 


 


Creatinine   1.1 


 


Est GFR ( Amer)   > 60 


 


Est GFR (Non-Af Amer)   56 


 


Random Glucose   95 


 


Calcium   8.1 L 


 


Total Bilirubin   2.2 H 


 


AST   115 H D 


 


ALT   72 H 


 


Alkaline Phosphatase   156 H 


 


Lactate Dehydrogenase   928 H 


 


Total Protein   8.9 H 


 


Albumin   3.5 


 


Globulin   5.4 H 


 


Albumin/Globulin Ratio   0.7 L

## 2018-01-24 LAB
BACTERIA #/AREA URNS HPF: (no result) /[HPF]
BASOPHILS # BLD AUTO: 0.2 K/UL (ref 0–0.2)
BASOPHILS NFR BLD: 1.2 % (ref 0–2)
BILIRUB UR-MCNC: NEGATIVE MG/DL
EOSINOPHIL # BLD AUTO: 1.1 K/UL (ref 0–0.7)
EOSINOPHIL NFR BLD: 5.8 % (ref 0–4)
ERYTHROCYTE [DISTWIDTH] IN BLOOD BY AUTOMATED COUNT: 18.1 % (ref 11.5–14.5)
GLUCOSE UR STRIP-MCNC: NORMAL MG/DL
HGB BLD-MCNC: 6.2 G/DL (ref 11–16)
LEUKOCYTE ESTERASE UR-ACNC: (no result) LEU/UL
LYMPHOCYTES # BLD AUTO: 4.6 K/UL (ref 1–4.3)
LYMPHOCYTES NFR BLD AUTO: 24 % (ref 20–40)
MCH RBC QN AUTO: 28.5 PG (ref 27–31)
MCHC RBC AUTO-ENTMCNC: 34 G/DL (ref 33–37)
MCV RBC AUTO: 83.7 FL (ref 81–99)
MONOCYTES # BLD: 2.3 K/UL (ref 0–0.8)
MONOCYTES NFR BLD: 12.1 % (ref 0–10)
NEUTROPHILS # BLD: 10.9 K/UL (ref 1.8–7)
NEUTROPHILS NFR BLD AUTO: 56.9 % (ref 50–75)
NRBC BLD AUTO-RTO: 0.1 % (ref 0–2)
PH UR STRIP: 6 [PH] (ref 5–8)
PLATELET # BLD: 185 K/UL (ref 130–400)
PMV BLD AUTO: 9 FL (ref 7.2–11.7)
PROT UR STRIP-MCNC: (no result) MG/DL
RBC # BLD AUTO: 2.19 MIL/UL (ref 3.8–5.2)
RBC # UR STRIP: (no result) /UL
SP GR UR STRIP: 1.01 (ref 1–1.03)
SQUAMOUS EPITHIAL: 2 /HPF (ref 0–5)
URINE NITRATE: NEGATIVE
UROBILINOGEN UR-MCNC: NORMAL MG/DL (ref 0.2–1)
WBC # BLD AUTO: 19.1 K/UL (ref 4.8–10.8)
WBC CLUMPS # UR AUTO: (no result) /HPF

## 2018-01-24 RX ADMIN — DIPHENHYDRAMINE HYDROCHLORIDE PRN MG: 50 INJECTION INTRAMUSCULAR; INTRAVENOUS at 04:46

## 2018-01-24 RX ADMIN — DIPHENHYDRAMINE HYDROCHLORIDE PRN MG: 50 INJECTION INTRAMUSCULAR; INTRAVENOUS at 07:49

## 2018-01-24 RX ADMIN — PANTOPRAZOLE SODIUM SCH MG: 40 TABLET, DELAYED RELEASE ORAL at 10:58

## 2018-01-24 RX ADMIN — DIPHENHYDRAMINE HYDROCHLORIDE PRN MG: 50 INJECTION INTRAMUSCULAR; INTRAVENOUS at 11:03

## 2018-01-24 RX ADMIN — ENOXAPARIN SODIUM SCH: 40 INJECTION SUBCUTANEOUS at 11:00

## 2018-01-24 RX ADMIN — DIPHENHYDRAMINE HYDROCHLORIDE PRN MG: 50 INJECTION INTRAMUSCULAR; INTRAVENOUS at 17:17

## 2018-01-24 RX ADMIN — DIPHENHYDRAMINE HYDROCHLORIDE PRN MG: 50 INJECTION INTRAMUSCULAR; INTRAVENOUS at 01:42

## 2018-01-24 RX ADMIN — DIPHENHYDRAMINE HYDROCHLORIDE PRN MG: 50 INJECTION INTRAMUSCULAR; INTRAVENOUS at 14:09

## 2018-01-24 RX ADMIN — DIPHENHYDRAMINE HYDROCHLORIDE PRN MG: 50 INJECTION INTRAMUSCULAR; INTRAVENOUS at 23:27

## 2018-01-24 RX ADMIN — DIPHENHYDRAMINE HYDROCHLORIDE PRN MG: 50 INJECTION INTRAMUSCULAR; INTRAVENOUS at 20:30

## 2018-01-24 NOTE — CP.PCM.CON
History of Present Illness





- History of Present Illness


History of Present Illness: 





admitted with sickle cell crisis


cultures sent


iv rx in progress 





Review of Systems





- Constitutional


Constitutional: As Per HPI, Anorexia





- EENT


Eyes: absent: As Per HPI, Blind Spots, Blurred Vision, Change in Vision, 

Decreased Night Vision, Diplopia, Discharge, Dry Eye, Exophthalmos, Floaters, 

Irritation, Itchy Eyes, Loss of Peripheral Vision, Pain, Photophobia, Requires 

Corrective Lenses, Sees Flashes, Spots in Vision, Tunnel Vision, Other Visual 

Disturbances, Loss of Vision, Other


Ears: absent: As Per HPI, Decreased Hearing, Ear Discharge, Ear Pain, Tinnitus, 

Abnormal Hearing, Disequilibrium, Dizziness, Other


Nose/Mouth/Throat: absent: As Per HPI, Epistaxis, Nasal Congestion, Nasal 

Discharge, Nasal Obstruction, Nasal Trauma, Nose Pain, Post Nasal Drip, Sinus 

Pain, Sinus Pressure, Bleeding Gums, Change in Voice, Dental Pain, Dry Mouth, 

Dysphagia, Halitosis, Hoarsness, Lip Swelling, Mouth Lesions, Mouth Pain, 

Odynophagia, Sore Throat, Throat Swelling, Tongue Swelling, Facial Pain, Neck 

Pain, Neck Mass, Other





- Breasts


Breasts: absent: As Per HPI, Change in Shape, Mass, Pain, Nipple Discharge, 

Nipple Inversion, Skin Changes, Swelling, Other





- Cardiovascular


Cardiovascular: absent: As Per HPI, Acrocyanosis, Chest Pain, Chest Pain at Rest

, Chest Pain with Activity, Claudication, Diaphoresis, Dyspnea, Dyspnea on 

Exertion, Edema, Irregular Heart Rhythm, Pain Radiating to Arm/Neck/Jaw, Leg 

Edema, Leg Ulcers, Lightheadedness, Orthopnea, Palpitations, Paroxysmal 

Nocturnal Dyspnea, Pedal Edema, Radiating Pain, Rapid Heart Rate, Slow Heart 

Rate, Syncope, Other





- Respiratory


Respiratory: Cough





- Gastrointestinal


Gastrointestinal: absent: As Per HPI, Abdominal Pain, Belching, Bloating, 

Change in Bowel Habits, Change in Stool Character, Coffee Ground Emesis, 

Constipation, Cramping, Diarrhea, Dyspepsia, Dysphagia, Early Satiety, 

Excessive Flatus, Fecal Incontinence, Heartburn, Hematemesis, Hematochezia, 

Loose Stools, Melena, Nausea, Odynophagia, Temesmus, Vomiting, Other





- Reproductive: Female


Reproductive:Female: absent: As Per HPI, Amenorrhea, Amenorrhea/Birth Control, 

Currently Menstual, Cycle <21 Days, Cycle >35 Days, Cycle Variable, Menses 1-7 

Days, Menses >/= 8 Days, Menses Variable, Cycle > 4 Weeks Between, No Menses 

for 6 Months, Heavy Menses, Light Menses, Normal Menses, Spotting Between Cycles

, S/P Hysterectomy, Menopausal, Post Menopausal, Premenarche, Abnormal Vaginal 

Bleeding, Dysmenorrhea, Dyspareunia, Genital Lesions, Genital Pruritis, Pelvic 

Pain, Prolapse Symptoms, Sexual Dysfunction, Vaginal Discharge, Vaginal Dryness

, Vaginal Odor, Vaginal Pruritis, Other





- Menstruation


Menstruation: absent: As Per HPI, Amenorrhea, Amenorrhea/Birth Control, 

Currently Menstual, Cycle <21 Days, Cycle >35 Days, Cycle Variable, Menses 1-7 

Days, Menses >/= 8 Days, Menses Variable, Cycle > 4 Weeks Between, No Menses 

for 6 Months, Heavy Menses, Light Menses, Normal Menses, Spotting Between Cycles

, S/P Hysterectomy, Menopausal, Post Menopausal, Premenarche, Abnormal Vaginal 

Bleeding, Dysmenorrhea, Other





- Musculoskeletal


Musculoskeletal: As Per HPI





- Integumentary


Integumentary: absent: As Per HPI, Acne, Alopecia, Bleeding Lesions, Change in 

Hair, Change in Nails, Change in Pigmentation, Changing Lesions, Dry Skin, 

Erythema, Furuncle, Hirsutism, Lesions, New Lesions, Non-Healing Lesions, 

Photosensitivity, Pruritus, Rash, Skin Pain, Skin Ulcer, Sores, Striae, Swelling

, Unusual Bruising, Wounds, Jaundice, Other





- Neurological


Neurological: absent: As Per HPI, Abnormal Gait, Abnormal Hearing, Abnormal 

Movements, Abnormal Speech, Behavioral Changes, Burning Sensations, Confusion, 

Convulsions, Disequilibrium, Dizziness, Numbness, Focal Weakness, Frequent Falls

, Headaches, Lack of Coordination, Loss of Vision, Memory Loss, Paresthesias, 

Radicular Pain, Restless Legs, Sensory Deficit, Syncope, Tingling, Tremor, 

Vertigo, Weakness, Other Visual Disturbances, Other





- Psychiatric


Psychiatric: absent: As Per HPI, Abnormal Sleep Pattern, Anhedonia, Anxiety, 

Auditory Hallucinations, Behavioral Changes, Change in Appetite, Change in 

Libido, Confusion, Depression, Difficulty Concentrating, Hallucinations, 

Homicidal Ideation, Hopelessness, Irritability, Memory Loss, Mood Swings, Panic 

Attacks, Paranoia, Suicidal Ideation, Visual Hallucinations, Tactile 

Hallucinations, Other





Past Patient History





- Past Medical History & Family History


Past Medical History?: Yes





- Past Social History


Smoking Status: Never Smoked





- NEUROLOGICAL


Hx Migraine: Yes





- HEMATOLOGICAL/ONCOLOGICAL


Hx Anemia: Yes


Hx Sickle Cell Disease: Yes





- MUSCULOSKELETAL/RHEUMATOLOGICAL


Hx Falls: No





- PSYCHIATRIC


Hx Substance Use: No





- SURGICAL HISTORY


Hx Cholecystectomy: Yes (2004)





- ANESTHESIA


Hx Anesthesia: Yes


Hx Anesthesia Reactions: No





Meds


Allergies/Adverse Reactions: 


 Allergies











Allergy/AdvReac Type Severity Reaction Status Date / Time


 


droperidol Allergy   Verified 01/16/18 16:48


 


tramadol Allergy   Verified 01/16/18 16:48


 


inapsine Allergy  RASH Uncoded 01/16/18 16:48














- Medications


Medications: 


 Current Medications





Diphenhydramine HCl (Benadryl)  25 mg IVP Q3 PRN


   PRN Reason: Itching / Pruritus


   Last Admin: 01/24/18 17:17 Dose:  25 mg


Enoxaparin Sodium (Lovenox)  40 mg SC DAILY [facility], [facility]


   Last Admin: 01/24/18 11:00 Dose:  Not Given


Folic Acid (Folic Acid)  1 mg PO DAILY [facility]


   Last Admin: 01/24/18 10:58 Dose:  1 mg


Hydromorphone HCl (Dilaudid)  2 mg IVP Q3 PRN


   PRN Reason: Pain, severe (8-10)


   Last Admin: 01/24/18 17:18 Dose:  2 mg


Pantoprazole Sodium (Protonix Ec Tab)  40 mg PO DAILY [facility]


   Last Admin: 01/24/18 10:58 Dose:  40 mg











Physical Exam





- Constitutional


Appears: Non-toxic, Chronically Ill





- Head Exam


Head Exam: NORMOCEPHALIC





- Eye Exam


Eye Exam: PERRL.  absent: Scleral icterus





- ENT Exam


ENT Exam: Mucous Membranes Dry, Normal Oropharynx





- Neck Exam


Neck exam: Negative for: Lymphadenopathy





- Respiratory Exam


Respiratory Exam: Decreased Breath Sounds





- Cardiovascular Exam


Cardiovascular Exam: REGULAR RHYTHM





- GI/Abdominal Exam


GI & Abdominal Exam: Diminished Bowel Sounds, Soft.  absent: Tenderness





- Rectal Exam


Rectal Exam: Deferred





-  Exam


 Exam: NORMAL INSPECTION





- Extremities Exam


Extremities exam: Negative for: pedal edema





- Back Exam


Back exam: absent: CVA tenderness (L), CVA tenderness (R)





- Neurological Exam


Neurological exam: Alert, CN II-XII Intact, Oriented x3, Reflexes Normal





- Psychiatric Exam


Psychiatric exam: Normal Mood





- Skin


Skin Exam: Dry





Results





- Vital Signs


Recent Vital Signs: 


 Last Vital Signs











Temp  98.3 F   01/24/18 15:16


 


Pulse  77   01/24/18 15:16


 


Resp  18   01/24/18 15:16


 


BP  115/75   01/24/18 15:16


 


Pulse Ox  98   01/24/18 15:16














- Labs


Result Diagrams: 


 01/24/18 11:00





 01/23/18 05:43


Labs: 


 Laboratory Results - last 24 hr











  01/24/18





  11:00


 


WBC  19.1 H


 


RBC  2.19 L


 


Hgb  6.2 L*


 


Hct  18.3 L


 


MCV  83.7


 


MCH  28.5


 


MCHC  34.0


 


RDW  18.1 H


 


Plt Count  185


 


MPV  9.0


 


Neut % (Auto)  56.9


 


Lymph % (Auto)  24.0


 


Mono % (Auto)  12.1 H


 


Eos % (Auto)  5.8 H


 


Baso % (Auto)  1.2


 


Neut #  10.9 H


 


Lymph #  4.6 H


 


Mono #  2.3 H


 


Eos #  1.1 H


 


Baso #  0.2














Assessment & Plan


(1) Sickle cell anemia with pain


Status: Acute   





(2) Leukocytosis


Status: Acute   





- Assessment and Plan (Free Text)


Assessment: 





send cultures


iv rocephin added

## 2018-01-24 NOTE — CP.PCM.PN
Subjective





- Date & Time of Evaluation


Date of Evaluation: 01/24/18


Time of Evaluation: 11:00





- Subjective


Subjective: 


clinically same





Objective





- Vital Signs/Intake and Output


Vital Signs (last 24 hours): 


 











Temp Pulse Resp BP Pulse Ox


 


 98.3 F   77   18   115/75   98 


 


 01/24/18 15:16  01/24/18 15:16  01/24/18 15:16  01/24/18 15:16  01/24/18 15:16











- Medications


Medications: 


 Current Medications





Diphenhydramine HCl (Benadryl)  25 mg IVP Q3 PRN


   PRN Reason: Itching / Pruritus


   Last Admin: 01/24/18 17:17 Dose:  25 mg


Enoxaparin Sodium (Lovenox)  40 mg SC DAILY [facility]


   Last Admin: 01/24/18 11:00 Dose:  Not Given


Folic Acid (Folic Acid)  1 mg PO DAILY [facility]


   Last Admin: 01/24/18 10:58 Dose:  1 mg


Hydromorphone HCl (Dilaudid)  2 mg IVP Q3 PRN


   PRN Reason: Pain, severe (8-10)


   Last Admin: 01/24/18 17:18 Dose:  2 mg


Ceftriaxone Sodium 1 gm/ (Sodium Chloride)  100 mls @ 200 mls/hr IVPB Q24H [facility]


Pantoprazole Sodium (Protonix Ec Tab)  40 mg PO DAILY [facility]


   Last Admin: 01/24/18 10:58 Dose:  40 mg











- Labs


Labs: 


 





 01/24/18 11:00 





 01/23/18 05:43

## 2018-01-24 NOTE — CP.PCM.PCO
Physician Communication Note





- Physician Communication Note


Physician Communication Note: as per Dr. MARIVEL matt no transfusion today, if hgb >

5 will transfuse

## 2018-01-25 LAB
ALBUMIN SERPL-MCNC: 3.5 G/DL (ref 3.5–5)
ALBUMIN/GLOB SERPL: 0.7 {RATIO} (ref 1–2.1)
ALT SERPL-CCNC: 68 U/L (ref 9–52)
APTT BLD: 32 SECONDS (ref 21–34)
AST SERPL-CCNC: 107 U/L (ref 14–36)
BASOPHILS # BLD AUTO: 0.3 K/UL (ref 0–0.2)
BASOPHILS NFR BLD: 1.4 % (ref 0–2)
BUN SERPL-MCNC: 26 MG/DL (ref 7–17)
CALCIUM SERPL-MCNC: 8 MG/DL (ref 8.6–10.4)
EOSINOPHIL # BLD AUTO: 1.4 K/UL (ref 0–0.7)
EOSINOPHIL NFR BLD: 6 % (ref 0–4)
ERYTHROCYTE [DISTWIDTH] IN BLOOD BY AUTOMATED COUNT: 17.9 % (ref 11.5–14.5)
GFR NON-AFRICAN AMERICAN: 46
HGB BLD-MCNC: 6.3 G/DL (ref 11–16)
INR PPP: 1.2
LYMPHOCYTES # BLD AUTO: 4.3 K/UL (ref 1–4.3)
LYMPHOCYTES NFR BLD AUTO: 18.9 % (ref 20–40)
MCH RBC QN AUTO: 29.3 PG (ref 27–31)
MCHC RBC AUTO-ENTMCNC: 34.8 G/DL (ref 33–37)
MCV RBC AUTO: 84.3 FL (ref 81–99)
MONOCYTES # BLD: 2.6 K/UL (ref 0–0.8)
MONOCYTES NFR BLD: 11.4 % (ref 0–10)
NEUTROPHILS # BLD: 14.3 K/UL (ref 1.8–7)
NEUTROPHILS NFR BLD AUTO: 62.3 % (ref 50–75)
NRBC BLD AUTO-RTO: 0.3 % (ref 0–2)
PLATELET # BLD: 180 K/UL (ref 130–400)
PMV BLD AUTO: 9.6 FL (ref 7.2–11.7)
PROTHROMBIN TIME: 14.1 SECONDS (ref 9.7–12.2)
RBC # BLD AUTO: 2.14 MIL/UL (ref 3.8–5.2)
WBC # BLD AUTO: 22.9 K/UL (ref 4.8–10.8)

## 2018-01-25 RX ADMIN — DIPHENHYDRAMINE HYDROCHLORIDE PRN MG: 50 INJECTION INTRAMUSCULAR; INTRAVENOUS at 20:58

## 2018-01-25 RX ADMIN — DIPHENHYDRAMINE HYDROCHLORIDE PRN MG: 50 INJECTION INTRAMUSCULAR; INTRAVENOUS at 02:30

## 2018-01-25 RX ADMIN — DIPHENHYDRAMINE HYDROCHLORIDE PRN MG: 50 INJECTION INTRAMUSCULAR; INTRAVENOUS at 05:45

## 2018-01-25 RX ADMIN — DIPHENHYDRAMINE HYDROCHLORIDE PRN MG: 50 INJECTION INTRAMUSCULAR; INTRAVENOUS at 08:52

## 2018-01-25 RX ADMIN — DIPHENHYDRAMINE HYDROCHLORIDE PRN MG: 50 INJECTION INTRAMUSCULAR; INTRAVENOUS at 14:51

## 2018-01-25 RX ADMIN — DIPHENHYDRAMINE HYDROCHLORIDE PRN MG: 50 INJECTION INTRAMUSCULAR; INTRAVENOUS at 17:44

## 2018-01-25 RX ADMIN — ENOXAPARIN SODIUM SCH: 40 INJECTION SUBCUTANEOUS at 09:28

## 2018-01-25 RX ADMIN — DIPHENHYDRAMINE HYDROCHLORIDE PRN MG: 50 INJECTION INTRAMUSCULAR; INTRAVENOUS at 11:54

## 2018-01-25 RX ADMIN — PANTOPRAZOLE SODIUM SCH MG: 40 TABLET, DELAYED RELEASE ORAL at 09:48

## 2018-01-25 NOTE — CP.PCM.PN
Subjective





- Date & Time of Evaluation


Date of Evaluation: 01/25/18


Time of Evaluation: 07:40





- Subjective


Subjective: 


clinically same





Objective





- Vital Signs/Intake and Output


Vital Signs (last 24 hours): 


 











Temp Pulse Resp BP Pulse Ox


 


 98.5 F   105 H  98 H  113/81   20 L


 


 01/25/18 08:00  01/25/18 08:00  01/25/18 08:00  01/25/18 08:00  01/25/18 08:00








Intake and Output: 


 











 01/25/18 01/25/18





 06:59 18:59


 


Intake Total 460 


 


Balance 460 














- Medications


Medications: 


 Current Medications





Diphenhydramine HCl (Benadryl)  25 mg IVP Q3 PRN


   PRN Reason: Itching / Pruritus


   Last Admin: 01/25/18 08:52 Dose:  25 mg


Enoxaparin Sodium (Lovenox)  40 mg SC DAILY [facility]


   Last Admin: 01/25/18 09:28 Dose:  Not Given


Folic Acid (Folic Acid)  1 mg PO DAILY [facility]


   Last Admin: 01/25/18 09:48 Dose:  1 mg


Hydromorphone HCl (Dilaudid)  2 mg IVP Q3 PRN


   PRN Reason: Pain, severe (8-10)


   Last Admin: 01/25/18 08:52 Dose:  2 mg


Ceftriaxone Sodium 1 gm/ (Sodium Chloride)  100 mls @ 200 mls/hr IVPB Q24H [facility]


   Last Admin: 01/24/18 20:47 Dose:  200 mls/hr


Pantoprazole Sodium (Protonix Ec Tab)  40 mg PO DAILY [facility]


   Last Admin: 01/25/18 09:48 Dose:  40 mg











- Labs


Labs: 


 





 01/25/18 10:07 





 01/25/18 10:07 





 











PT  14.1 SECONDS (9.7-12.2)  H  01/25/18  10:07    


 


INR  1.2   01/25/18  10:07    


 


APTT  32 SECONDS (21-34)   01/25/18  10:07    














- Constitutional


Appears: Well





- Head Exam


Head Exam: ATRAUMATIC, NORMAL INSPECTION, NORMOCEPHALIC





- Eye Exam


Eye Exam: EOMI, Normal appearance, PERRL


Pupil Exam: NORMAL ACCOMODATION, PERRL





- ENT Exam


ENT Exam: Mucous Membranes Moist, Normal Exam





- Neck Exam


Neck Exam: Full ROM, Normal Inspection.  absent: Lymphadenopathy





- Respiratory Exam


Respiratory Exam: Decreased Breath Sounds





- Cardiovascular Exam


Cardiovascular Exam: REGULAR RHYTHM, +S1, +S2





- GI/Abdominal Exam


GI & Abdominal Exam: Soft, Diminished Bowel Sounds





- Rectal Exam


Rectal Exam: Deferred

## 2018-01-25 NOTE — CP.PCM.PN
Subjective





- Date & Time of Evaluation


Date of Evaluation: 01/25/18


Time of Evaluation: 09:52





- Subjective


Subjective: 





PGY2 progress note for Dr. Etienne





37 year old female with PMHx of sickle cell anemia was admitted to hospital for 

intractable pain due to sickle cell crisis. Pt seen and examined at bedside.  

No acute events overnight.  Pt is seen walking around.  Still complaining of 

low back pain. Denies having any CP, SOB, abd pain, N/V/D/C, F/C.  Patient 

states that she has a neurogenic bladder and often requires straight cath. 

Tolerating diet.  12 point ROS negative except for the above mentioned.     





Objective





- Vital Signs/Intake and Output


Vital Signs (last 24 hours): 


 











Temp Pulse Resp BP Pulse Ox


 


 98.5 F   105 H  98 H  113/81   20 L


 


 01/25/18 08:00  01/25/18 08:00  01/25/18 08:00  01/25/18 08:00  01/25/18 08:00








Intake and Output: 


 











 01/25/18 01/25/18





 06:59 18:59


 


Intake Total 460 


 


Balance 460 














- Medications


Medications: 


 Current Medications





Diphenhydramine HCl (Benadryl)  25 mg IVP Q3 PRN


   PRN Reason: Itching / Pruritus


   Last Admin: 01/25/18 08:52 Dose:  25 mg


Enoxaparin Sodium (Lovenox)  40 mg SC DAILY [facility]


   Last Admin: 01/25/18 09:28 Dose:  Not Given


Folic Acid (Folic Acid)  1 mg PO DAILY [facility]


   Last Admin: 01/25/18 09:48 Dose:  1 mg


Hydromorphone HCl (Dilaudid)  2 mg IVP Q3 PRN


   PRN Reason: Pain, severe (8-10)


   Last Admin: 01/25/18 08:52 Dose:  2 mg


Ceftriaxone Sodium 1 gm/ (Sodium Chloride)  100 mls @ 200 mls/hr IVPB Q24H [facility]


   Last Admin: 01/24/18 20:47 Dose:  200 mls/hr


Pantoprazole Sodium (Protonix Ec Tab)  40 mg PO DAILY [facility]


   Last Admin: 01/25/18 09:48 Dose:  40 mg











- Labs


Labs: 


 





 01/24/18 11:00 





 01/23/18 05:43 











- Constitutional


Appears: Well, Non-toxic, No Acute Distress





- Head Exam


Head Exam: ATRAUMATIC





- ENT Exam


ENT Exam: Mucous Membranes Moist





- Respiratory Exam


Respiratory Exam: Clear to Ausculation Bilateral, NORMAL BREATHING PATTERN.  

absent: Rales, Rhonchi, Wheezes





- Cardiovascular Exam


Cardiovascular Exam: REGULAR RHYTHM, +S1, +S2.  absent: Gallop, Rubs, Murmur





- GI/Abdominal Exam


GI & Abdominal Exam: Soft, Normal Bowel Sounds.  absent: Distended, Firm, 

Guarding, Rigid, Tenderness, Organomegaly





- Neurological Exam


Neurological Exam: Alert, Awake, Oriented x3





- Psychiatric Exam


Psychiatric exam: Normal Affect, Normal Mood





- Skin


Skin Exam: Dry, Intact, Normal Color, Warm





Assessment and Plan





- Assessment and Plan (Free Text)


Assessment: 





37 year old female with past medical history of sickle cell anemia is admitted 

for sickle cell pain 





Sickle cell crisis


Pt is noted to have Hgb of 6.2 yesterday.  CBC from this morning is pending.  

Retic count yesterday was 5.3.  Will repeat value today


Type and scree ordered.  Patient's last transfusion of PRBC was on last 

admission 1 week ago.  


Will consider consulting heme/onc


On admission, CXR was negative


Pain management with dilaudid 1 mg IVP q3 prn


Continue folic acid 





UTI


Urinalysis on presentation was positive


ID is consulted


Pt started on rocephin 


urine and blood cultures pending 





Prophylaxis


Protonix


Lovenox 





All managements and orders per Dr. Etienne

## 2018-01-25 NOTE — CP.PCM.PN
Subjective





- Date & Time of Evaluation


Date of Evaluation: 01/25/18


Time of Evaluation: 07:00





- Subjective


Subjective: 





cultures so far neg


wbc higher


rx ordered





Objective





- Vital Signs/Intake and Output


Vital Signs (last 24 hours): 


 











Temp Pulse Resp BP Pulse Ox


 


 98.8 F   100 H  97 H  109/72   20 L


 


 01/25/18 16:00  01/25/18 16:00  01/25/18 16:00  01/25/18 16:00  01/25/18 16:00








Intake and Output: 


 











 01/25/18 01/26/18





 18:59 06:59


 


Intake Total 450 


 


Balance 450 














- Medications


Medications: 


 Current Medications





Diphenhydramine HCl (Benadryl)  25 mg IVP Q3 PRN


   PRN Reason: Itching / Pruritus


   Last Admin: 01/25/18 17:44 Dose:  25 mg


Enoxaparin Sodium (Lovenox)  40 mg SC DAILY [facility]


   Last Admin: 01/25/18 09:28 Dose:  Not Given


Folic Acid (Folic Acid)  1 mg PO DAILY [facility]


   Last Admin: 01/25/18 09:48 Dose:  1 mg


Hydromorphone HCl (Dilaudid)  1 mg IVP Q3 PRN


   PRN Reason: Pain, severe (8-10)


   Last Admin: 01/25/18 17:46 Dose:  1 mg


Ceftriaxone Sodium 1 gm/ (Sodium Chloride)  100 mls @ 200 mls/hr IVPB Q24H [facility]


   Last Admin: 01/25/18 18:06 Dose:  200 mls/hr


Sodium Chloride (Sodium Chloride 0.45%)  500 mls @ 75 mls/hr IV .Q6H40M [facility]


   Last Admin: 01/25/18 17:52 Dose:  75 mls/hr


Pantoprazole Sodium (Protonix Ec Tab)  40 mg PO DAILY [facility]


   Last Admin: 01/25/18 09:48 Dose:  40 mg











- Labs


Labs: 


 





 01/25/18 10:07 





 01/25/18 10:07 





 











PT  14.1 SECONDS (9.7-12.2)  H  01/25/18  10:07    


 


INR  1.2   01/25/18  10:07    


 


APTT  32 SECONDS (21-34)   01/25/18  10:07    














- Constitutional


Appears: Non-toxic, Chronically Ill





- Head Exam


Head Exam: NORMOCEPHALIC





- Eye Exam


Eye Exam: PERRL





- ENT Exam


ENT Exam: Mucous Membranes Dry





- Neck Exam


Neck Exam: absent: Lymphadenopathy





- Respiratory Exam


Respiratory Exam: Decreased Breath Sounds





- Cardiovascular Exam


Cardiovascular Exam: REGULAR RHYTHM





- GI/Abdominal Exam


GI & Abdominal Exam: Distended, Soft





Assessment and Plan


(1) Sickle cell anemia with pain


Status: Acute   





(2) Leukocytosis


Status: Acute

## 2018-01-25 NOTE — RAD
Chest x-ray single frontal view 



History: Pneumonia. 



Comparison: 01/16/2008 



Findings: 



Right chest wall port in stable position. 



Spinal hardware in place. 



Bilateral nephroureteral stents in place. 



Mild venous congestion. 



Right paratracheal prominence may be related to prominent 

vasculature.  Clinical correlation. 



Patchy increased markings at the right lung base. 



Deformities of several right lateral ribs. 



Tortuous aorta. 



Top normal heart size. 



Impression: 



Right chest wall port in stable position. 



Spinal hardware in place. 



Bilateral nephroureteral stents in place. 



Mild venous congestion. 



Right paratracheal prominence may be related to prominent 

vasculature.  Clinical correlation. 



Patchy increased markings at the right lung base. 



Deformities of several right lateral ribs. 



Tortuous aorta. 



Top normal heart size.

## 2018-01-26 VITALS
OXYGEN SATURATION: 97 % | HEART RATE: 106 BPM | TEMPERATURE: 98.1 F | SYSTOLIC BLOOD PRESSURE: 108 MMHG | DIASTOLIC BLOOD PRESSURE: 70 MMHG

## 2018-01-26 VITALS — RESPIRATION RATE: 20 BRPM

## 2018-01-26 LAB
ALBUMIN SERPL-MCNC: 3.5 G/DL (ref 3.5–5)
ALBUMIN/GLOB SERPL: 0.7 {RATIO} (ref 1–2.1)
ALT SERPL-CCNC: 58 U/L (ref 9–52)
AST SERPL-CCNC: 97 U/L (ref 14–36)
BASOPHILS # BLD AUTO: 0.2 K/UL (ref 0–0.2)
BASOPHILS NFR BLD: 0.8 % (ref 0–2)
BUN SERPL-MCNC: 22 MG/DL (ref 7–17)
CALCIUM SERPL-MCNC: 7.9 MG/DL (ref 8.6–10.4)
EOSINOPHIL # BLD AUTO: 1.2 K/UL (ref 0–0.7)
EOSINOPHIL NFR BLD: 5.3 % (ref 0–4)
ERYTHROCYTE [DISTWIDTH] IN BLOOD BY AUTOMATED COUNT: 18.4 % (ref 11.5–14.5)
GFR NON-AFRICAN AMERICAN: 50
HGB BLD-MCNC: 6.1 G/DL (ref 11–16)
LYMPHOCYTES # BLD AUTO: 6.1 K/UL (ref 1–4.3)
LYMPHOCYTES NFR BLD AUTO: 25.8 % (ref 20–40)
MCH RBC QN AUTO: 29.9 PG (ref 27–31)
MCHC RBC AUTO-ENTMCNC: 35.1 G/DL (ref 33–37)
MCV RBC AUTO: 85.4 FL (ref 81–99)
MONOCYTES # BLD: 2.2 K/UL (ref 0–0.8)
MONOCYTES NFR BLD: 9.4 % (ref 0–10)
NEUTROPHILS # BLD: 13.9 K/UL (ref 1.8–7)
NEUTROPHILS NFR BLD AUTO: 58.7 % (ref 50–75)
NRBC BLD AUTO-RTO: 0.3 % (ref 0–2)
PLATELET # BLD: 133 K/UL (ref 130–400)
PMV BLD AUTO: 9.8 FL (ref 7.2–11.7)
RBC # BLD AUTO: 2.04 MIL/UL (ref 3.8–5.2)
WBC # BLD AUTO: 23.7 K/UL (ref 4.8–10.8)

## 2018-01-26 RX ADMIN — ENOXAPARIN SODIUM SCH: 40 INJECTION SUBCUTANEOUS at 09:52

## 2018-01-26 RX ADMIN — PANTOPRAZOLE SODIUM SCH MG: 40 TABLET, DELAYED RELEASE ORAL at 09:51

## 2018-01-26 RX ADMIN — DIPHENHYDRAMINE HYDROCHLORIDE PRN MG: 50 INJECTION INTRAMUSCULAR; INTRAVENOUS at 09:51

## 2018-01-26 RX ADMIN — DIPHENHYDRAMINE HYDROCHLORIDE PRN MG: 50 INJECTION INTRAMUSCULAR; INTRAVENOUS at 00:25

## 2018-01-26 RX ADMIN — DIPHENHYDRAMINE HYDROCHLORIDE PRN MG: 50 INJECTION INTRAMUSCULAR; INTRAVENOUS at 03:35

## 2018-01-26 RX ADMIN — DIPHENHYDRAMINE HYDROCHLORIDE PRN MG: 50 INJECTION INTRAMUSCULAR; INTRAVENOUS at 06:40

## 2018-01-26 NOTE — CP.PCM.PN
Subjective





- Date & Time of Evaluation


Date of Evaluation: 01/26/18


Time of Evaluation: 12:02





- Subjective


Subjective: 





PGY2 progress note for Dr. Etienne





Pt seen and examined at bedside.  Pt is seen walking around without difficulty.

  Pt denies having any back pain, leg pain, CP, SOB, abd pain, N/V/D/C.  Pt 

tolerating diet.  12 point ROS negative except for above mentioned.  





Objective





- Vital Signs/Intake and Output


Vital Signs (last 24 hours): 


 











Temp Pulse Resp BP Pulse Ox


 


 98.1 F   106 H  20   108/70   97 


 


 01/26/18 08:00  01/26/18 08:00  01/26/18 08:00  01/26/18 08:00  01/26/18 08:00








Intake and Output: 


 











 01/26/18 01/26/18





 06:59 18:59


 


Intake Total 1910 


 


Balance 1910 














- Medications


Medications: 


 Current Medications





Diphenhydramine HCl (Benadryl)  25 mg IVP Q3 PRN


   PRN Reason: Itching / Pruritus


   Last Admin: 01/26/18 09:51 Dose:  25 mg


Enoxaparin Sodium (Lovenox)  40 mg SC DAILY [facility]


   Last Admin: 01/26/18 09:52 Dose:  Not Given


Folic Acid (Folic Acid)  1 mg PO DAILY [facility]


   Last Admin: 01/26/18 09:51 Dose:  1 mg


Hydromorphone HCl (Dilaudid)  1 mg IVP Q3 PRN


   PRN Reason: Pain, severe (8-10)


   Last Admin: 01/26/18 09:52 Dose:  1 mg


Ceftriaxone Sodium 1 gm/ (Sodium Chloride)  100 mls @ 200 mls/hr IVPB Q24H [facility]


   Last Admin: 01/25/18 18:06 Dose:  200 mls/hr


Sodium Chloride (Sodium Chloride 0.45%)  500 mls @ 75 mls/hr IV .Q6H40M [facility]


   Last Admin: 01/26/18 09:58 Dose:  Not Given


Pantoprazole Sodium (Protonix Ec Tab)  40 mg PO DAILY [facility]


   Last Admin: 01/26/18 09:51 Dose:  40 mg











- Labs


Labs: 


 





 01/26/18 07:12 





 01/26/18 07:12 





 











PT  14.1 SECONDS (9.7-12.2)  H  01/25/18  10:07    


 


INR  1.2   01/25/18  10:07    


 


APTT  32 SECONDS (21-34)   01/25/18  10:07    














- Constitutional


Appears: Non-toxic, No Acute Distress





- Head Exam


Head Exam: ATRAUMATIC





- ENT Exam


ENT Exam: Mucous Membranes Moist





- Respiratory Exam


Respiratory Exam: Clear to Ausculation Bilateral.  absent: Accessory Muscle Use

, Rales, Rhonchi, Wheezes, Respiratory Distress





- Cardiovascular Exam


Cardiovascular Exam: REGULAR RHYTHM, +S1, +S2.  absent: Gallop, Rubs, Murmur





- GI/Abdominal Exam


GI & Abdominal Exam: Soft, Normal Bowel Sounds.  absent: Distended, Firm, 

Guarding, Rigid, Tenderness, Organomegaly





- Extremities Exam


Extremities Exam: absent: Pedal Edema, Tenderness





- Neurological Exam


Neurological Exam: Alert, Awake, Oriented x3





- Psychiatric Exam


Psychiatric exam: Normal Affect, Normal Mood





- Skin


Skin Exam: Dry, Intact, Normal Color, Warm





Assessment and Plan





- Assessment and Plan (Free Text)


Assessment: 





37 year old female with past medical history of sickle cell anemia is admitted 

for sickle cell pain 





Sickle cell crisis


Hgb stable this morning at 6.1.  Retic count slightly increased to 6.2


Type and scree ordered.  Patient's last transfusion of PRBC was on last 

admission 1 week ago.  


On admission, CXR was negative


Pain management with dilaudid 1 mg IVP q3 prn


Continue folic acid 





UTI


Urinalysis on presentation was positive


ID is consulted


Pt started on rocephin 


urine and blood cultures pending 





Leukocytosis


Likely due to UTI


Will continue to monitor





Prophylaxis


Protonix


Lovenox 





All managements and orders per Dr. Etienne 





Patient is stable for discharge per Dr. Etienne.  


Patient is to follow up with PMD upon discharge.


Patient is to continue taking home medications as prescribed. Patient will be 

given refill for Folic acid 1 mg PO QD.


Patient will also be given Augmentin 875/125 mg PO BID x 5 days #10 tabs.

## 2018-02-02 ENCOUNTER — HOSPITAL ENCOUNTER (INPATIENT)
Dept: HOSPITAL 31 - C.ER | Age: 38
LOS: 4 days | Discharge: HOME | DRG: 812 | End: 2018-02-06
Attending: INTERNAL MEDICINE | Admitting: INTERNAL MEDICINE
Payer: MEDICARE

## 2018-02-02 VITALS — BODY MASS INDEX: 22.4 KG/M2

## 2018-02-02 DIAGNOSIS — D72.829: ICD-10-CM

## 2018-02-02 DIAGNOSIS — N31.9: ICD-10-CM

## 2018-02-02 DIAGNOSIS — N17.9: ICD-10-CM

## 2018-02-02 DIAGNOSIS — D57.00: Primary | ICD-10-CM

## 2018-02-02 LAB
ALBUMIN SERPL-MCNC: 3.4 G/DL (ref 3.5–5)
ALBUMIN/GLOB SERPL: 0.7 {RATIO} (ref 1–2.1)
ALT SERPL-CCNC: 70 U/L (ref 9–52)
AST SERPL-CCNC: 99 U/L (ref 14–36)
BACTERIA #/AREA URNS HPF: (no result) /[HPF]
BASOPHILS # BLD AUTO: 0.3 K/UL (ref 0–0.2)
BASOPHILS NFR BLD: 1.1 % (ref 0–2)
BILIRUB UR-MCNC: NEGATIVE MG/DL
BUN SERPL-MCNC: 18 MG/DL (ref 7–17)
CALCIUM SERPL-MCNC: 7.9 MG/DL (ref 8.6–10.4)
COLOR UR: YELLOW
EOSINOPHIL # BLD AUTO: 0.7 K/UL (ref 0–0.7)
EOSINOPHIL NFR BLD: 2.4 % (ref 0–4)
ERYTHROCYTE [DISTWIDTH] IN BLOOD BY AUTOMATED COUNT: 18.9 % (ref 11.5–14.5)
GFR NON-AFRICAN AMERICAN: > 60
GLUCOSE UR STRIP-MCNC: NEGATIVE MG/DL
HCG,QUALITATIVE URINE: NEGATIVE
HGB BLD-MCNC: 5.5 G/DL (ref 11–16)
LEUKOCYTE ESTERASE UR-ACNC: (no result) LEU/UL
LYMPHOCYTES # BLD AUTO: 4.9 K/UL (ref 1–4.3)
LYMPHOCYTES NFR BLD AUTO: 17.3 % (ref 20–40)
MCH RBC QN AUTO: 28.6 PG (ref 27–31)
MCHC RBC AUTO-ENTMCNC: 32.8 G/DL (ref 33–37)
MCV RBC AUTO: 87.2 FL (ref 81–99)
MONOCYTES # BLD: 2.4 K/UL (ref 0–0.8)
MONOCYTES NFR BLD: 8.6 % (ref 0–10)
NEUTROPHILS # BLD: 19.8 K/UL (ref 1.8–7)
NEUTROPHILS NFR BLD AUTO: 70.6 % (ref 50–75)
NRBC BLD AUTO-RTO: 0.5 % (ref 0–2)
PH UR STRIP: 7 [PH] (ref 5–8)
PLATELET # BLD: 220 K/UL (ref 130–400)
PMV BLD AUTO: 9 FL (ref 7.2–11.7)
PROT UR STRIP-MCNC: 30 MG/DL
RBC # BLD AUTO: 1.91 MIL/UL (ref 3.8–5.2)
RBC # UR STRIP: (no result) /UL
SP GR UR STRIP: 1.01 (ref 1–1.03)
SQUAMOUS EPITHIAL: 4 /HPF (ref 0–5)
URINE NITRATE: NEGATIVE
UROBILINOGEN UR-MCNC: 0.2 MG/DL (ref 0.2–1)
WBC # BLD AUTO: 28 K/UL (ref 4.8–10.8)
WBC CLUMPS # UR AUTO: (no result) /HPF

## 2018-02-02 PROCEDURE — 30233N1 TRANSFUSION OF NONAUTOLOGOUS RED BLOOD CELLS INTO PERIPHERAL VEIN, PERCUTANEOUS APPROACH: ICD-10-PCS | Performed by: INTERNAL MEDICINE

## 2018-02-02 RX ADMIN — DIPHENHYDRAMINE HYDROCHLORIDE PRN MG: 50 INJECTION INTRAMUSCULAR; INTRAVENOUS at 21:27

## 2018-02-02 RX ADMIN — CEFTRIAXONE SCH MLS/HR: 1 INJECTION, SOLUTION INTRAVENOUS at 17:00

## 2018-02-02 RX ADMIN — DIPHENHYDRAMINE HYDROCHLORIDE PRN MG: 50 INJECTION INTRAMUSCULAR; INTRAVENOUS at 15:12

## 2018-02-02 RX ADMIN — DIPHENHYDRAMINE HYDROCHLORIDE PRN MG: 50 INJECTION INTRAMUSCULAR; INTRAVENOUS at 18:10

## 2018-02-02 NOTE — C.PDOC
History Of Present Illness


37 year old female presents to the ER with a complaint of lower back pain and 

leg pain for the past 3 days. Patient has a Hx of sick cell anemia and reports 

the pain is similar to prior crisis. Denies chest pain, SOB, cough, fever, or 

abdominal pain.


Time Seen by Provider: 02/02/18 02:47


Chief Complaint (Nursing): Back Pain


History Per: Patient


History/Exam Limitations: no limitations


Onset/Duration Of Symptoms: Days


Current Symptoms Are (Timing): Still Present


Quality Of Discomfort: Unable To Describe


Previous Symptoms: Other (Hx of sickle cell anemia)


Associated Symptoms: None


Recent travel outside of the [place]: No





Past Medical History


Reviewed: Historical Data, Nursing Documentation, Vital Signs


Vital Signs: 


 Last Vital Signs











Temp  98.2 F   02/02/18 02:16


 


Pulse  112 H  02/02/18 02:16


 


Resp  22   02/02/18 02:16


 


BP  111/88   02/02/18 02:16


 


Pulse Ox  98   02/02/18 05:04














- Medical History


PMH: Anemia, Migraine, Sickle Cell Disease


Surgical History: Cholecystectomy (2004)





- [facility] Procedures








PACKED CELL TRANSFUSION (06/04/13)


TETANUS TOXOID ADMINIST (09/23/13)


TRANSFUSE NONAUT RED BLOOD CELLS IN PERIPH VEIN, PERC (01/16/18)








Family History: States: Unknown Family Hx





- Social History


Hx Tobacco Use: No


Hx Alcohol Use: No


Hx Substance Use: No





- Immunization History


Hx Tetanus Toxoid Vaccination: No


Hx Influenza Vaccination: Yes


Hx Pneumococcal Vaccination: Yes





Review Of Systems


Except As Marked, All Systems Reviewed And Found Negative.


Musculoskeletal: Positive for: Back Pain, Leg Pain





Physical Exam





- Physical Exam


Appears: Other (Mildly uncomfortable, Thin)


Skin: Normal Color, Warm, Dry


Head: Atraumatic, Normacephalic


Eye(s): bilateral: Normal Inspection


Oral Mucosa: Moist


Neck: Normal, Supple


Chest: No Tenderness, Other (Portacath to right upper chest)


Cardiovascular: Rhythm Regular


Respiratory: Normal Breath Sounds, No Rales, No Rhonchi, No Wheezing


Gastrointestinal/Abdominal: Soft, No Tenderness


Back: No CVA Tenderness, No Vertebral Tenderness, Paraspinal Tenderness (Lumbar)


Extremity: Normal ROM (x4), No Tenderness, Capillary Refill (<2 seconds)


Pulses: Left Dorsalis Pedis: Normal, Right Dorsalis Pedis: Normal


Neurological/Psych: Oriented x3, Normal Speech





ED Course And Treatment





- Laboratory Results


Result Diagrams: 


 02/02/18 04:09





 02/02/18 03:32


O2 Sat by Pulse Oximetry: 98 (room air)


Pulse Ox Interpretation: Normal


Progress Note: Blood work, urinalysis, and CXR ordered. IV fluids, benadryl, 

and dilaudid administered. Plan is to transfuse and admit.





- Physician Consult Information


Physician Contacted: Shahab Etienne


Outcome Of Conversation: Discussed patient with Dr. MARIVEL Etienne, patient admitted 

under his service - will be cared for by Dr. Lou because he is away for 

the weekend.  Dr. Lou aware and in agreement.





Disposition





- Disposition


Forms:  [facility] (English)





- Scribe Statement


The provider has reviewed the documentation as recorded by the Scribe





Enio Londono





All medical record entries made by the Scribe were at my direction and 

personally dictated by me. I have reviewed the chart and agree that the record 

accurately reflects my personal performance of the history, physical exam, 

medical decision making, and the department course for this patient. I have 

also personally directed, reviewed, and agree with the discharge instructions 

and disposition.

## 2018-02-02 NOTE — CP.PCM.PN
Subjective





- Date & Time of Evaluation


Date of Evaluation: 02/02/18


Time of Evaluation: 15:37





Objective





- Vital Signs/Intake and Output


Vital Signs (last 24 hours): 


 











Temp Pulse Resp BP Pulse Ox


 


 98.6 F   98 H  20   107/75   98 


 


 02/02/18 14:32  02/02/18 15:10  02/02/18 14:32  02/02/18 15:10  02/02/18 11:00








Intake and Output: 


 











 02/02/18 02/02/18





 06:59 18:59


 


Intake Total  925


 


Balance  925














- Medications


Medications: 


 Current Medications





Diphenhydramine HCl (Benadryl)  25 mg IVP Q3 PRN


   PRN Reason: Itching / Pruritus


   Last Admin: 02/02/18 15:12 Dose:  25 mg


Folic Acid (Folic Acid)  1 mg PO DAILY [facility]


   Last Admin: 02/02/18 11:01 Dose:  1 mg


Hydromorphone HCl (Dilaudid)  1 mg IVP Q3 PRN


   PRN Reason: Pain, moderate (4-7)


   Last Admin: 02/02/18 15:11 Dose:  1 mg


Sodium Chloride (Sodium Chloride 0.9%)  1,000 mls @ 100 mls/hr IV .Q10H [facility]


   Last Admin: 02/02/18 10:30 Dose:  100 mls/hr


Ceftriaxone Sodium (Rocephin Iv 1 Gm Duplex)  50 mls @ 100 mls/hr IVPB DAILY [facility]











- Labs


Labs: 


 





 02/02/18 04:09 





 02/02/18 03:32

## 2018-02-02 NOTE — RAD
PROCEDURE:  CHEST RADIOGRAPH, 1 VIEW



HISTORY:

admission



COMPARISON:

1/24/2018



FINDINGS:



LUNGS:

Opacity at right lateral base.  Possible pneumonia.  Followup 

advised. . .



PLEURA:

Blunting of right costophrenic angle may reflect pleural effusion or 

chronic pleural thickening.



CARDIOVASCULAR:

Right central venous infusion port.



OSSEOUS STRUCTURES:

[place] rods.



VISUALIZED UPPER ABDOMEN:

Normal.



OTHER FINDINGS:

None. 



IMPRESSION:

Opacity at right lateral base, possible early pneumonia.  Followup 

advised.

## 2018-02-03 RX ADMIN — DIPHENHYDRAMINE HYDROCHLORIDE PRN MG: 50 INJECTION INTRAMUSCULAR; INTRAVENOUS at 09:55

## 2018-02-03 RX ADMIN — DIPHENHYDRAMINE HYDROCHLORIDE PRN MG: 50 INJECTION INTRAMUSCULAR; INTRAVENOUS at 03:36

## 2018-02-03 RX ADMIN — CEFTRIAXONE SCH MLS/HR: 1 INJECTION, SOLUTION INTRAVENOUS at 10:00

## 2018-02-03 RX ADMIN — DIPHENHYDRAMINE HYDROCHLORIDE PRN MG: 50 INJECTION INTRAMUSCULAR; INTRAVENOUS at 22:15

## 2018-02-03 RX ADMIN — DIPHENHYDRAMINE HYDROCHLORIDE PRN MG: 50 INJECTION INTRAMUSCULAR; INTRAVENOUS at 19:15

## 2018-02-03 RX ADMIN — DIPHENHYDRAMINE HYDROCHLORIDE PRN MG: 50 INJECTION INTRAMUSCULAR; INTRAVENOUS at 13:02

## 2018-02-03 RX ADMIN — DIPHENHYDRAMINE HYDROCHLORIDE PRN MG: 50 INJECTION INTRAMUSCULAR; INTRAVENOUS at 16:14

## 2018-02-03 RX ADMIN — DIPHENHYDRAMINE HYDROCHLORIDE PRN MG: 50 INJECTION INTRAMUSCULAR; INTRAVENOUS at 00:37

## 2018-02-03 RX ADMIN — DIPHENHYDRAMINE HYDROCHLORIDE PRN MG: 50 INJECTION INTRAMUSCULAR; INTRAVENOUS at 06:34

## 2018-02-03 NOTE — CP.PCM.PN
Subjective





- Date & Time of Evaluation


Date of Evaluation: 02/03/18


Time of Evaluation: 17:47





Objective





- Vital Signs/Intake and Output


Vital Signs (last 24 hours): 


 











Temp Pulse Resp BP Pulse Ox


 


 99.5 F   100 H  20   109/72   100 


 


 02/03/18 16:00  02/03/18 16:00  02/03/18 16:00  02/03/18 16:00  02/03/18 16:00








Intake and Output: 


 











 02/03/18 02/03/18





 06:59 18:59


 


Intake Total 1280 1040


 


Balance 1280 1040














- Medications


Medications: 


 Current Medications





Diphenhydramine HCl (Benadryl)  25 mg IVP Q3 PRN


   PRN Reason: Itching / Pruritus


   Last Admin: 02/03/18 16:14 Dose:  25 mg


Folic Acid (Folic Acid)  1 mg PO DAILY [facility]


   Last Admin: 02/03/18 09:56 Dose:  1 mg


Hydromorphone HCl (Dilaudid)  1 mg IVP Q3 PRN


   PRN Reason: Pain, moderate (4-7)


   Last Admin: 02/03/18 16:14 Dose:  1 mg


Sodium Chloride (Sodium Chloride 0.9%)  1,000 mls @ 100 mls/hr IV .Q10H [facility]


   Last Admin: 02/03/18 16:43 Dose:  100 mls/hr


Ceftriaxone Sodium (Rocephin Iv 1 Gm Duplex)  50 mls @ 100 mls/hr IVPB DAILY [facility]


   Last Admin: 02/03/18 10:00 Dose:  100 mls/hr











- Labs


Labs: 


 





 02/02/18 04:09 





 02/02/18 03:32

## 2018-02-04 LAB
ERYTHROCYTE [DISTWIDTH] IN BLOOD BY AUTOMATED COUNT: 18.2 % (ref 11.5–14.5)
HGB BLD-MCNC: 6.6 G/DL (ref 11–16)
MCH RBC QN AUTO: 28.6 PG (ref 27–31)
MCHC RBC AUTO-ENTMCNC: 33.3 G/DL (ref 33–37)
MCV RBC AUTO: 86 FL (ref 81–99)
PLATELET # BLD: 118 K/UL (ref 130–400)
PMV BLD AUTO: 9.3 FL (ref 7.2–11.7)
RBC # BLD AUTO: 2.32 MIL/UL (ref 3.8–5.2)
WBC # BLD AUTO: 25.8 K/UL (ref 4.8–10.8)

## 2018-02-04 RX ADMIN — DIPHENHYDRAMINE HYDROCHLORIDE PRN MG: 50 INJECTION INTRAMUSCULAR; INTRAVENOUS at 17:30

## 2018-02-04 RX ADMIN — DIPHENHYDRAMINE HYDROCHLORIDE PRN MG: 50 INJECTION INTRAMUSCULAR; INTRAVENOUS at 01:41

## 2018-02-04 RX ADMIN — DIPHENHYDRAMINE HYDROCHLORIDE PRN MG: 50 INJECTION INTRAMUSCULAR; INTRAVENOUS at 04:53

## 2018-02-04 RX ADMIN — DIPHENHYDRAMINE HYDROCHLORIDE PRN MG: 50 INJECTION INTRAMUSCULAR; INTRAVENOUS at 14:23

## 2018-02-04 RX ADMIN — DIPHENHYDRAMINE HYDROCHLORIDE PRN MG: 50 INJECTION INTRAMUSCULAR; INTRAVENOUS at 20:30

## 2018-02-04 RX ADMIN — DIPHENHYDRAMINE HYDROCHLORIDE PRN MG: 50 INJECTION INTRAMUSCULAR; INTRAVENOUS at 08:00

## 2018-02-04 RX ADMIN — DIPHENHYDRAMINE HYDROCHLORIDE PRN MG: 50 INJECTION INTRAMUSCULAR; INTRAVENOUS at 11:13

## 2018-02-04 RX ADMIN — CEFTRIAXONE SCH MLS/HR: 1 INJECTION, SOLUTION INTRAVENOUS at 10:08

## 2018-02-04 NOTE — CP.PCM.PN
Subjective





- Date & Time of Evaluation


Date of Evaluation: 02/04/18


Time of Evaluation: 14:21





Objective





- Vital Signs/Intake and Output


Vital Signs (last 24 hours): 


 











Temp Pulse Resp BP Pulse Ox


 


 98.6 F   106 H  20   117/80   100 


 


 02/04/18 08:25  02/04/18 08:25  02/04/18 08:25  02/04/18 08:25  02/04/18 08:25








Intake and Output: 


 











 02/04/18 02/04/18





 06:59 18:59


 


Intake Total 2080 


 


Balance 2080 














- Medications


Medications: 


 Current Medications





Diphenhydramine HCl (Benadryl)  25 mg IVP Q3 PRN


   PRN Reason: Itching / Pruritus


   Last Admin: 02/04/18 11:13 Dose:  25 mg


Folic Acid (Folic Acid)  1 mg PO DAILY [facility]


   Last Admin: 02/04/18 10:09 Dose:  1 mg


Hydromorphone HCl (Dilaudid)  1 mg IVP Q3 PRN


   PRN Reason: Pain, moderate (4-7)


   Last Admin: 02/04/18 11:15 Dose:  1 mg


Sodium Chloride (Sodium Chloride 0.9%)  1,000 mls @ 100 mls/hr IV .Q10H [facility]


   Last Admin: 02/04/18 06:27 Dose:  100 mls/hr


Ceftriaxone Sodium (Rocephin Iv 1 Gm Duplex)  50 mls @ 100 mls/hr IVPB DAILY [facility]


   Last Admin: 02/04/18 10:08 Dose:  100 mls/hr











- Labs


Labs: 


 





 02/04/18 07:23 





 02/02/18 03:32

## 2018-02-05 LAB
BUN SERPL-MCNC: 28 MG/DL (ref 7–17)
CALCIUM SERPL-MCNC: 8.1 MG/DL (ref 8.6–10.4)
ERYTHROCYTE [DISTWIDTH] IN BLOOD BY AUTOMATED COUNT: 17.3 % (ref 11.5–14.5)
GFR NON-AFRICAN AMERICAN: 42
HGB BLD-MCNC: 7.8 G/DL (ref 11–16)
MCH RBC QN AUTO: 29.2 PG (ref 27–31)
MCHC RBC AUTO-ENTMCNC: 34.5 G/DL (ref 33–37)
MCV RBC AUTO: 84.7 FL (ref 81–99)
PLATELET # BLD: 191 K/UL (ref 130–400)
PMV BLD AUTO: 9.1 FL (ref 7.2–11.7)
RBC # BLD AUTO: 2.66 MIL/UL (ref 3.8–5.2)
WBC # BLD AUTO: 20.8 K/UL (ref 4.8–10.8)

## 2018-02-05 RX ADMIN — DIPHENHYDRAMINE HYDROCHLORIDE PRN MG: 50 INJECTION INTRAMUSCULAR; INTRAVENOUS at 16:29

## 2018-02-05 RX ADMIN — DIPHENHYDRAMINE HYDROCHLORIDE PRN MG: 50 INJECTION INTRAMUSCULAR; INTRAVENOUS at 10:00

## 2018-02-05 RX ADMIN — DIPHENHYDRAMINE HYDROCHLORIDE PRN MG: 50 INJECTION INTRAMUSCULAR; INTRAVENOUS at 00:21

## 2018-02-05 RX ADMIN — DIPHENHYDRAMINE HYDROCHLORIDE PRN MG: 50 INJECTION INTRAMUSCULAR; INTRAVENOUS at 22:34

## 2018-02-05 RX ADMIN — DIPHENHYDRAMINE HYDROCHLORIDE PRN MG: 50 INJECTION INTRAMUSCULAR; INTRAVENOUS at 03:17

## 2018-02-05 RX ADMIN — CEFTRIAXONE SCH MLS/HR: 1 INJECTION, SOLUTION INTRAVENOUS at 10:01

## 2018-02-05 RX ADMIN — DIPHENHYDRAMINE HYDROCHLORIDE PRN MG: 50 INJECTION INTRAMUSCULAR; INTRAVENOUS at 06:17

## 2018-02-05 RX ADMIN — DIPHENHYDRAMINE HYDROCHLORIDE PRN MG: 50 INJECTION INTRAMUSCULAR; INTRAVENOUS at 19:35

## 2018-02-05 RX ADMIN — DIPHENHYDRAMINE HYDROCHLORIDE PRN MG: 50 INJECTION INTRAMUSCULAR; INTRAVENOUS at 13:08

## 2018-02-05 NOTE — CP.PCM.PN
Subjective





- Date & Time of Evaluation


Date of Evaluation: 02/05/18


Time of Evaluation: 12:55





- Subjective


Subjective: 





PT SEEN IN BED WATCHING TV.  APPEARS COMFORTABLE, SLEEPY BUT AROUSABLE.   PT 

COMPLAINS THAT SHE STILL HAS GEN BODY PAIN.  ADMITS THAT SHE DISCONNECTS THE 

IVF FROM HER IVIS CATH 2/2 URINARY FREQUENCY AND DUE TO HER H/O NEUROGENIC 

BLADDER.  PT STATES SHE STRAIGHT CATHS HERSELF AT HOME AND IS COMFORTABLE DOING 

IT HERE.  EXAM NORMAL; AAOX3, BS CTA, HR REG, ABD SOFT, NT, ND.   LABS REVIEWED 

TODAY AND HGB 7.8  PT STATES BETWEEN 6-8 IS HER NORMAL.  


PLAN FOR TODAY IS TO CONTINUE IVFS; CONTINUE PAIN MEDICATION PRN; STRAIGHT CATH 

PRN.  WILL RE-EVAL PT TOMORROW FOR POSS D/C IF PAIN IMPROVES.  PT IN AGREEMENT 

W PLAN. 





Objective





- Vital Signs/Intake and Output


Vital Signs (last 24 hours): 


 











Temp Pulse Resp BP Pulse Ox


 


 98.9 F   105 H  20   119/78   96 


 


 02/05/18 08:35  02/05/18 09:59  02/05/18 08:35  02/05/18 09:59  02/05/18 08:35








Intake and Output: 


 











 02/05/18 02/05/18





 06:59 18:59


 


Intake Total 2125 


 


Output Total 4 


 


Balance 2121 














- Medications


Medications: 


 Current Medications





Diphenhydramine HCl (Benadryl)  25 mg IVP Q3 PRN


   PRN Reason: Itching / Pruritus


   Last Admin: 02/05/18 10:00 Dose:  25 mg


Folic Acid (Folic Acid)  1 mg PO DAILY [facility]


   Last Admin: 02/05/18 10:00 Dose:  1 mg


Hydromorphone HCl (Dilaudid)  1 mg IVP Q3 PRN


   PRN Reason: Pain, moderate (4-7)


   Last Admin: 02/05/18 10:00 Dose:  1 mg


Ceftriaxone Sodium (Rocephin Iv 1 Gm Duplex)  50 mls @ 100 mls/hr IVPB DAILY [facility]


   Last Admin: 02/05/18 10:01 Dose:  100 mls/hr


Sodium Chloride (Sodium Chloride 0.9%)  1,000 mls @ 100 mls/hr IV .Q10H [facility]











- Labs


Labs: 


 





 02/05/18 12:33 





 02/02/18 03:32

## 2018-02-05 NOTE — CP.PCM.HP
Past Patient History





- Past Medical History & Family History


Past Medical History?: Yes





- Past Social History


Smoking Status: Never Smoked





- CARDIAC


Hx Cardiac Disorders: No


Hx Angina: No


Hx Atrial Fibrillation: No


Hx Cardia Arrhythmia: No


Hx Circulatory Problems: No


Hx Congestive Heart Failure: No


Hx Heart Attack: No


Hx Heart Murmur: No


Hx Heart Transplant: No


Hx Hypercholesterolemia: No


Hx Hypertension: No


Hx Hypotension: No


Hx Internal Defibrillator: No


Hx Mitral Valve Prolapse: No


Hx Pacemaker: No


Hx Peripheral Edema: No


Hx Peripheral Vascular Disease: No





- PULMONARY


Hx Respiratory Disorders: No


Hx Asthma: No


Hx Bronchitis: No


Hx Chronic Obstructive Pulmonary Disease (COPD): No


Hx Emphysema: No


Hx Lung Cancer: No


Hx Pneumonia: No


Hx Pulmonary Edema: No


Hx Pulmonary Embolism: No


Hx Respiratory Aspiration: No


Hx Respiratory Tract Infection: No


Hx Sleep Apnea: No


Hx Tuberculosis: No





- NEUROLOGICAL


Hx Neurological Disorder: No


Hx Alzheimer's Disease: No


HX Cerebrovascular Accident: No


Hx Dementia: No


Hx Dizziness: No


Hx Meningitis: No


Hx Migraine: Yes


Hx Multiple Sclerosis: No


Hx Paralysis: No


Hx Parkinson's Disease: No


Hx Seizures: No


Hx Syncope: No


Hx Transient Ischemic Attacks (TIA): No


Hx Vertigo: No





- HEENT


Hx HEENT Problems: No


Hx Blind: No


Hx Cataracts: No


Hx Deafness: No


Hx Difficulty Chewing: No


Hx Epistaxis: No


Hx Glaucoma: No


Hx Macular Degeneration: No


Hx Sinusitis: No





- RENAL


Hx Chronic Kidney Disease: No


Hx Dialysis: No


Hx Kidney Stones: No


Hx Neurogenic Bladder: No


Hx Pyelonephritis: No


Hx Renal (Kidney) Cancer: No


Hx Renal Failure: No





- ENDOCRINE/METABOLIC


Hx Endocrine Disorders: No


Hx Adrenal Cancer: No


Hx Diabetes Insipidus: No


Hx Diabetes Mellitus Type 1: No


Hx Diabetes Mellitus Type 2: No


Hx Hyperthyroidism: No


Hx Hypothyroidism: No


Hx Systemic Lupus Erythematosus: No





- HEMATOLOGICAL/ONCOLOGICAL


Hx Blood Disorders: No


Hx AIDS: No


Hx Anemia: Yes


Hx Blood Transfusions: Yes


Hx Blood Transfusion Reaction: No


Hx Bruising: No


Hx Cancer: No


Hx Chemotherapy: No


Hx Cirrhosis: No


Hx Gum Bleeding: No


Hx Hemophilia: No


Hx Hepatitis A: No


Hx Hepatitis B: No


Hx Hepatitis C: No


Hx Human Immunodeficiency Virus (HIV): No


Hx Leukemia: No


Hx Metastesis: No


Hx Shingles: No


Hx Sickle Cell Disease: Yes


Hx Unexplained Bleeding: No


Hx von Willebrand's Disease: No





- MUSCULOSKELETAL/RHEUMATOLOGICAL


Hx Falls: No





- PSYCHIATRIC


Hx Substance Use: No





- SURGICAL HISTORY


Hx Cholecystectomy: Yes (2004)





- ANESTHESIA


Hx Anesthesia: Yes


Hx Anesthesia Reactions: No





Meds


Allergies/Adverse Reactions: 


 Allergies











Allergy/AdvReac Type Severity Reaction Status Date / Time


 


droperidol Allergy   Verified 01/16/18 16:48


 


tramadol Allergy   Verified 01/16/18 16:48


 


inapsine Allergy  RASH Uncoded 01/16/18 16:48














Results





- Vital Signs


Recent Vital Signs: 





 Last Vital Signs











Temp  98.1 F   02/05/18 15:05


 


Pulse  101 H  02/05/18 15:05


 


Resp  20   02/05/18 15:05


 


BP  114/80   02/05/18 15:05


 


Pulse Ox  100   02/05/18 15:05














- Labs


Result Diagrams: 


 02/05/18 12:33





 02/05/18 12:33


Labs: 





 Laboratory Results - last 24 hr











  02/05/18 02/05/18





  12:33 12:33


 


WBC  20.8 H 


 


RBC  2.66 L 


 


Hgb  7.8 L 


 


Hct  22.5 L 


 


MCV  84.7 


 


MCH  29.2 


 


MCHC  34.5 


 


RDW  17.3 H 


 


Plt Count  191 


 


MPV  9.1 


 


Sodium   137


 


Potassium   5.0


 


Chloride   109 H


 


Carbon Dioxide   19 L


 


Anion Gap   15


 


BUN   28 H


 


Creatinine   1.4 H


 


Est GFR ( Amer)   51


 


Est GFR (Non-Af Amer)   42


 


Random Glucose   90


 


Calcium   8.1 L

## 2018-02-06 VITALS
OXYGEN SATURATION: 100 % | SYSTOLIC BLOOD PRESSURE: 113 MMHG | HEART RATE: 77 BPM | DIASTOLIC BLOOD PRESSURE: 73 MMHG | TEMPERATURE: 97.9 F

## 2018-02-06 VITALS — RESPIRATION RATE: 18 BRPM

## 2018-02-06 RX ADMIN — DIPHENHYDRAMINE HYDROCHLORIDE PRN MG: 50 INJECTION INTRAMUSCULAR; INTRAVENOUS at 18:14

## 2018-02-06 RX ADMIN — DIPHENHYDRAMINE HYDROCHLORIDE PRN MG: 50 INJECTION INTRAMUSCULAR; INTRAVENOUS at 11:22

## 2018-02-06 RX ADMIN — DIPHENHYDRAMINE HYDROCHLORIDE PRN MG: 50 INJECTION INTRAMUSCULAR; INTRAVENOUS at 04:43

## 2018-02-06 RX ADMIN — DIPHENHYDRAMINE HYDROCHLORIDE PRN MG: 50 INJECTION INTRAMUSCULAR; INTRAVENOUS at 07:55

## 2018-02-06 RX ADMIN — CEFTRIAXONE SCH MLS/HR: 1 INJECTION, SOLUTION INTRAVENOUS at 10:31

## 2018-02-06 RX ADMIN — DIPHENHYDRAMINE HYDROCHLORIDE PRN MG: 50 INJECTION INTRAMUSCULAR; INTRAVENOUS at 14:44

## 2018-02-06 RX ADMIN — DIPHENHYDRAMINE HYDROCHLORIDE PRN MG: 50 INJECTION INTRAMUSCULAR; INTRAVENOUS at 01:34

## 2018-02-06 NOTE — CP.PCM.PN
Subjective





- Date & Time of Evaluation


Date of Evaluation: 02/06/18


Time of Evaluation: 07:23





- Subjective


Subjective: 





Medicine Progress Note- Dr. CAROL Etienne's service





Patient seen and examined in no immediate acute distress Patient states that 

she is a patient of the covering physician. Patient admits to generalized 

diffuse aches in her lower extremities bilaterally, back and stomach. Patient 

denies chest pain, sujective fevers or chills, nausea, vomiting or diarrhea at 

this time.





Objective





- Vital Signs/Intake and Output


Vital Signs (last 24 hours): 


 











Temp Pulse Resp BP Pulse Ox


 


 98.2 F   86   18   111/68   97 


 


 02/06/18 07:45  02/06/18 07:45  02/06/18 07:45  02/06/18 07:45  02/06/18 07:45








Intake and Output: 


 











 02/06/18 02/06/18





 06:59 18:59


 


Intake Total 1400 


 


Balance 1400 














- Medications


Medications: 


 Current Medications





Diphenhydramine HCl (Benadryl)  25 mg IVP Q3 PRN


   PRN Reason: Itching / Pruritus


   Last Admin: 02/06/18 07:55 Dose:  25 mg


Folic Acid (Folic Acid)  1 mg PO DAILY [facility]


   Last Admin: 02/05/18 10:00 Dose:  1 mg


Hydromorphone HCl (Dilaudid)  1 mg IVP Q3 PRN


   PRN Reason: Pain, moderate (4-7)


   Last Admin: 02/06/18 07:57 Dose:  1 mg


Ceftriaxone Sodium (Rocephin Iv 1 Gm Duplex)  50 mls @ 100 mls/hr IVPB DAILY [facility]


   Last Admin: 02/05/18 10:01 Dose:  100 mls/hr


Sodium Chloride (Sodium Chloride 0.9%)  1,000 mls @ 100 mls/hr IV .Q10H [facility]











- Labs


Labs: 


 





 02/05/18 12:33 





 02/05/18 12:33 











- Constitutional


Appears: Non-toxic, No Acute Distress





- Head Exam


Head Exam: ATRAUMATIC, NORMAL INSPECTION, NORMOCEPHALIC





- Eye Exam


Eye Exam: Normal appearance





- ENT Exam


ENT Exam: Mucous Membranes Moist





- Neck Exam


Neck Exam: Full ROM





- Respiratory Exam


Respiratory Exam: NORMAL BREATHING PATTERN.  absent: Wheezes





- Cardiovascular Exam


Cardiovascular Exam: +S1, +S2





- GI/Abdominal Exam


GI & Abdominal Exam: Soft, Normal Bowel Sounds





- Back Exam


Back Exam: Full ROM, tenderness





- Neurological Exam


Neurological Exam: Alert, Awake, Oriented x3





- Psychiatric Exam


Psychiatric exam: Normal Affect, Normal Mood





- Skin


Skin Exam: Dry, Normal Color, Warm





Assessment and Plan





- Assessment and Plan (Free Text)


Assessment: 





Sickle cell pain crisis


Assessment and Plan: 


Patient on Folic acid


IV fluids,Dilaudid, folic acid, 


oxygen therapy with sats above 92


Patient should try to eat and stay hydrated


Hgb 6.9.  Patient's Hgb is usually at a baseline of 6-8 as per patient.


Transfusions are generally not always indicated in acute pain crises; and thus 

will hold at this time.


Status: Acute   





JONATHAN


Assessment and Plan: 


Cont NS @ 100 mls/hr


Monitor


Status: Acute 





Leukocytosis


Assessment and Plan: 


Decreasing


Likely reactive to sickle cell


Status: Acute  





Prophylactic Measure


Assessment and Plan: 


Influenza and Pneumonia vaccines


SCDs 





Kindly refer to discharge planning in the chart.





Discussed with attending. Management and planning per Dr. CAROL Etienne.

## 2018-02-06 NOTE — CP.PCM.PN
Subjective





- Date & Time of Evaluation


Date of Evaluation: 02/06/18


Time of Evaluation: 15:57





- Subjective


Subjective: 





PT SEEN BY DR FORBES.  LABS REVIEWED; HGB WITHIN PT'S BASELINE.  NO NEED FOR 

FURTHER TRANSFUSION AT THIS TIME.  CLEARED FOR D/C PER DR. FORBES.  PT TAKES 

FOIC ACID AT HOME AND NEEDS NO REFILLS.  RX REFILL GIVEN FOR DILAUDID PRN.   TO 

ALSO COMPLETE AUGMENTIN RX GIVEN TO HER LAST WEEK FOR UTI.  PT TO F/U WITH DR. FORBES IN THE OFFICE ON FRIDAY.  PT ALSO GIVEN RX FOR REFILL FOR STRAIGHT CATH 

EQUIPMENT AND CATHETER ([employer] [place])--PT STRAIGHT CATHS HERSELF 2/2 NEUROGENIC 

BLADDER.  DISCUSSED ALL D/C INFORMATION, PT VERBALIZES UNDERSTANDING.  NO 

FURTHER ORDERS. 





Objective





- Vital Signs/Intake and Output


Vital Signs (last 24 hours): 


 











Temp Pulse Resp BP Pulse Ox


 


 98.2 F   93 H  18   111/73   97 


 


 02/06/18 07:45  02/06/18 14:43  02/06/18 07:45  02/06/18 14:43  02/06/18 07:45








Intake and Output: 


 











 02/06/18 02/06/18





 06:59 18:59


 


Intake Total 1400 1250


 


Balance 1400 1250














- Medications


Medications: 


 Current Medications





Diphenhydramine HCl (Benadryl)  25 mg IVP Q3 PRN


   PRN Reason: Itching / Pruritus


   Last Admin: 02/06/18 14:44 Dose:  25 mg


Folic Acid (Folic Acid)  1 mg PO DAILY [facility]


   Last Admin: 02/06/18 10:31 Dose:  1 mg


Heparin Sodium (Porcine) (Heparin Lock Flush)  100 units IVF ONCE ONE


   Stop: 02/06/18 15:57


Hydromorphone HCl (Dilaudid)  1 mg IVP Q3 PRN


   PRN Reason: Pain, moderate (4-7)


   Last Admin: 02/06/18 14:43 Dose:  1 mg


Ceftriaxone Sodium (Rocephin Iv 1 Gm Duplex)  50 mls @ 100 mls/hr IVPB DAILY [facility]


   Last Admin: 02/06/18 10:31 Dose:  100 mls/hr


Sodium Chloride (Sodium Chloride 0.9%)  1,000 mls @ 100 mls/hr IV .Q10H [facility]


   Last Admin: 02/06/18 10:33 Dose:  100 mls/hr











- Labs


Labs: 


 





 02/05/18 12:33 





 02/05/18 12:33

## 2018-02-06 NOTE — CP.PCM.PN
Subjective





- Date & Time of Evaluation


Date of Evaluation: 02/06/18


Time of Evaluation: 12:20





- Subjective


Subjective: 


clinically same





Objective





- Vital Signs/Intake and Output


Vital Signs (last 24 hours): 


 











Temp Pulse Resp BP Pulse Ox


 


 98.2 F   93 H  18   111/73   97 


 


 02/06/18 07:45  02/06/18 14:43  02/06/18 07:45  02/06/18 14:43  02/06/18 07:45








Intake and Output: 


 











 02/06/18 02/06/18





 06:59 18:59


 


Intake Total 1400 850


 


Balance 1400 850














- Medications


Medications: 


 Current Medications





Diphenhydramine HCl (Benadryl)  25 mg IVP Q3 PRN


   PRN Reason: Itching / Pruritus


   Last Admin: 02/06/18 14:44 Dose:  25 mg


Folic Acid (Folic Acid)  1 mg PO DAILY [facility]


   Last Admin: 02/06/18 10:31 Dose:  1 mg


Hydromorphone HCl (Dilaudid)  1 mg IVP Q3 PRN


   PRN Reason: Pain, moderate (4-7)


   Last Admin: 02/06/18 14:43 Dose:  1 mg


Ceftriaxone Sodium (Rocephin Iv 1 Gm Duplex)  50 mls @ 100 mls/hr IVPB DAILY [facility]


   Last Admin: 02/06/18 10:31 Dose:  100 mls/hr


Sodium Chloride (Sodium Chloride 0.9%)  1,000 mls @ 100 mls/hr IV .Q10H [facility]


   Last Admin: 02/06/18 10:33 Dose:  100 mls/hr











- Labs


Labs: 


 





 02/05/18 12:33 





 02/05/18 12:33 











- Constitutional


Appears: Well





- Head Exam


Head Exam: ATRAUMATIC, NORMAL INSPECTION, NORMOCEPHALIC





- Eye Exam


Eye Exam: EOMI, Normal appearance, PERRL


Pupil Exam: NORMAL ACCOMODATION, PERRL





- ENT Exam


ENT Exam: Mucous Membranes Moist, Normal Exam





- Neck Exam


Neck Exam: Full ROM, Normal Inspection.  absent: Lymphadenopathy





- Respiratory Exam


Respiratory Exam: Decreased Breath Sounds





- Cardiovascular Exam


Cardiovascular Exam: REGULAR RHYTHM, +S1, +S2





- GI/Abdominal Exam


GI & Abdominal Exam: Soft, Diminished Bowel Sounds





- Rectal Exam


Rectal Exam: Deferred

## 2018-02-15 ENCOUNTER — HOSPITAL ENCOUNTER (INPATIENT)
Dept: HOSPITAL 31 - C.ER | Age: 38
LOS: 4 days | Discharge: HOME | DRG: 812 | End: 2018-02-19
Attending: INTERNAL MEDICINE | Admitting: INTERNAL MEDICINE
Payer: MEDICARE

## 2018-02-15 VITALS — RESPIRATION RATE: 20 BRPM

## 2018-02-15 VITALS — BODY MASS INDEX: 22.4 KG/M2

## 2018-02-15 DIAGNOSIS — D57.00: Primary | ICD-10-CM

## 2018-02-15 DIAGNOSIS — N39.0: ICD-10-CM

## 2018-02-15 DIAGNOSIS — Z90.49: ICD-10-CM

## 2018-02-15 DIAGNOSIS — G43.909: ICD-10-CM

## 2018-02-15 LAB
ALBUMIN SERPL-MCNC: 3.5 G/DL (ref 3.5–5)
ALBUMIN/GLOB SERPL: 0.7 {RATIO} (ref 1–2.1)
ALT SERPL-CCNC: 79 U/L (ref 9–52)
APTT BLD: 44 SECONDS (ref 21–34)
AST SERPL-CCNC: 102 U/L (ref 14–36)
BACTERIA #/AREA URNS HPF: (no result) /[HPF]
BASOPHILS # BLD AUTO: 0.2 K/UL (ref 0–0.2)
BASOPHILS NFR BLD: 1.1 % (ref 0–2)
BILIRUB UR-MCNC: NEGATIVE MG/DL
BUN SERPL-MCNC: 19 MG/DL (ref 7–17)
CALCIUM SERPL-MCNC: 8.2 MG/DL (ref 8.6–10.4)
EOSINOPHIL # BLD AUTO: 0.6 K/UL (ref 0–0.7)
EOSINOPHIL NFR BLD: 3.3 % (ref 0–4)
ERYTHROCYTE [DISTWIDTH] IN BLOOD BY AUTOMATED COUNT: 17.2 % (ref 11.5–14.5)
GFR NON-AFRICAN AMERICAN: 56
GLUCOSE UR STRIP-MCNC: NORMAL MG/DL
HCG,QUALITATIVE URINE: NEGATIVE
HGB BLD-MCNC: 6.9 G/DL (ref 11–16)
INR PPP: 1.3
LEUKOCYTE ESTERASE UR-ACNC: (no result) LEU/UL
LYMPHOCYTES # BLD AUTO: 3.9 K/UL (ref 1–4.3)
LYMPHOCYTES NFR BLD AUTO: 20.2 % (ref 20–40)
MCH RBC QN AUTO: 27.7 PG (ref 27–31)
MCHC RBC AUTO-ENTMCNC: 33 G/DL (ref 33–37)
MCV RBC AUTO: 83.9 FL (ref 81–99)
MONOCYTES # BLD: 1.2 K/UL (ref 0–0.8)
MONOCYTES NFR BLD: 5.9 % (ref 0–10)
NEUTROPHILS # BLD: 13.5 K/UL (ref 1.8–7)
NEUTROPHILS NFR BLD AUTO: 69.5 % (ref 50–75)
NRBC BLD AUTO-RTO: 0.1 % (ref 0–2)
PH UR STRIP: 7 [PH] (ref 5–8)
PLATELET # BLD: 248 K/UL (ref 130–400)
PMV BLD AUTO: 9.3 FL (ref 7.2–11.7)
PROT UR STRIP-MCNC: (no result) MG/DL
PROTHROMBIN TIME: 14.8 SECONDS (ref 9.7–12.2)
RBC # BLD AUTO: 2.49 MIL/UL (ref 3.8–5.2)
RBC # UR STRIP: (no result) /UL
SP GR UR STRIP: 1.01 (ref 1–1.03)
SQUAMOUS EPITHIAL: 4 /HPF (ref 0–5)
URINE NITRATE: NEGATIVE
UROBILINOGEN UR-MCNC: 2 MG/DL (ref 0.2–1)
WBC # BLD AUTO: 19.5 K/UL (ref 4.8–10.8)
WBC CLUMPS # UR AUTO: (no result) /HPF

## 2018-02-15 RX ADMIN — DIPHENHYDRAMINE HYDROCHLORIDE PRN MG: 50 INJECTION INTRAMUSCULAR; INTRAVENOUS at 21:45

## 2018-02-15 RX ADMIN — HYDROMORPHONE HYDROCHLORIDE PRN MG: 1 INJECTION, SOLUTION INTRAMUSCULAR; INTRAVENOUS; SUBCUTANEOUS at 18:03

## 2018-02-15 RX ADMIN — HYDROMORPHONE HYDROCHLORIDE PRN MG: 1 INJECTION, SOLUTION INTRAMUSCULAR; INTRAVENOUS; SUBCUTANEOUS at 21:46

## 2018-02-15 RX ADMIN — HYDROMORPHONE HYDROCHLORIDE PRN MG: 1 INJECTION, SOLUTION INTRAMUSCULAR; INTRAVENOUS; SUBCUTANEOUS at 12:36

## 2018-02-15 NOTE — C.PDOC
History Of Present Illness


The patient, whose PMHx includes sickle cell crisis, presents to the ED for 

evaluation of generalized body aches which began around 3 days ago. Patient was 

evaluated for similar complaints two weeks ago and underwent transfusion for 

hemoglobin level of 5.5 g/dL. She denies fever, chills. 


Time Seen by Provider: 02/15/18 04:22


Chief Complaint (Nursing): Pain, Chronic


History Per: Patient


History/Exam Limitations: no limitations


Onset/Duration Of Symptoms: Days (3)


Current Symptoms Are (Timing): Still Present


Severity: Mild


Pain Scale Rating Of: 3


Recent travel outside of the [place]: No


Additional History Per: Patient





Past Medical History


Reviewed: Historical Data, Nursing Documentation, Vital Signs


Vital Signs: 


 Last Vital Signs











Temp  98.2 F   02/15/18 09:10


 


Pulse  98 H  02/15/18 09:10


 


Resp  20   02/15/18 09:10


 


BP  105/69   02/15/18 09:10


 


Pulse Ox  98   02/15/18 09:10














- Medical History


PMH: Anemia, Migraine, Sickle Cell Disease


   Denies: Alzheimer's Disease, Asthma, Atrial Fibrillation, Bronchitis, Cardia 

Arrhythmia, CHF, COPD, Dementia, Emphysema, HIV, HTN, Hypercholesterolemia, 

Hyperthyroidism, Hypothyroidism, Kidney Stones, Mitral Valve Prolapse, Multiple 

Sclerosis, Parkinson's Disease, Peripheral Edema, Pneumonia, Pulmonary Embolism

, Chronic Kidney Disease, Seizures, Sleep Apnea, TIA


Surgical History: Cholecystectomy (2004)


   Denies: Pacemaker





- [facility] Procedures








PACKED CELL TRANSFUSION (06/04/13)


TETANUS TOXOID ADMINIST (09/23/13)


TRANSFUSE NONAUT RED BLOOD CELLS IN PERIPH VEIN, PERC (02/02/18)








Family History: States: Unknown Family Hx





- Social History


Hx Tobacco Use: No


Hx Alcohol Use: No


Hx Substance Use: No





- Immunization History


Hx Tetanus Toxoid Vaccination: No


Hx Influenza Vaccination: Yes


Hx Pneumococcal Vaccination: Yes





Review Of Systems


Constitutional: Negative for: Fever, Chills


Cardiovascular: Negative for: Chest Pain, Palpitations


Respiratory: Negative for: Cough, Shortness of Breath


Gastrointestinal: Negative for: Nausea, Vomiting, Abdominal Pain, Diarrhea


Musculoskeletal: Positive for: Other (generalized body aches )


Skin: Negative for: Rash, Lesions, Bruising


Neurological: Negative for: Weakness, Numbness





Physical Exam





- Physical Exam


Appears: Non-toxic, No Acute Distress


Skin: Warm, Dry


Head: Normacephalic


Eye(s): bilateral: Normal Inspection


Oral Mucosa: Moist


Neck: Supple


Chest: Symmetrical, No Deformity, No Tenderness, Other (port-a-cath in place in 

right chest wall )


Cardiovascular: Rhythm Regular, No Murmur


Respiratory: No Rales, No Rhonchi, No Wheezing


Gastrointestinal/Abdominal: Soft, No Tenderness, No Guarding, No Rebound


Extremity: Normal ROM, Capillary Refill (less than 2 seconds )


Neurological/Psych: Oriented x3


Gait: Steady





ED Course And Treatment





- Laboratory Results


Result Diagrams: 


 02/15/18 05:54





 02/15/18 05:54


O2 Sat by Pulse Oximetry: 100 (on RA)


Pulse Ox Interpretation: Normal


Progress Note: Bloodwork and UA ordered and reviewed.  IV Fluids administered.





Disposition


Discussed With DrJan: Shahab Etienne


Comment: acceted the pt on his service and took over the care at 6:11 AM


Doctor Will See Patient In The: Hospital


Counseled Patient/Family Regarding: Studies Performed, Diagnosis





- Disposition


Disposition: HOSPITALIZED


Disposition Time: 04:22


Condition: FAIR





- POA


Present On Arrival: None





- Clinical Impression


Clinical Impression: 


 Sickle cell anemia with pain, Sickle cell pain crisis








- Scribe Statement


The provider has reviewed the documentation as recorded by the Scribe (Leela Etienne)


Provider Attestation: 








All medical record entries made by the Scribe were at my direction and 

personally dictated by me. I have reviewed the chart and agree that the record 

accurately reflects my personal performance of the history, physical exam, 

medical decision making, and the department course for this patient. I have 

also personally directed, reviewed, and agree with the discharge instructions 

and disposition.





Decision To Admit





- Pt Status Changed To:


Hospital Disposition Of: Inpatient





- Admit Certification


Admit to Inpatient:: After my assessment, the patient will require 

hospitalization for at least two midnights.  This is because of the severity of 

symptoms shown, intensity of services needed, and/or the medical risk in this 

patient being treated as an outpatient.





- InPatient:


Physician Admission Certification: I certify that this patient requires 2 or 

more midnights of care for the following reason:: After my assessment, the 

patient will require hospitalization for at least two midnights.  This is 

because of the severity of symptoms shown, intensity of services needed, and/or 

the medical risk in this patient being treated as an outpatient.





- .


Bed Request Type: Regular


Admitting Physician: Shahab Etienne


Patient Diagnosis: 


 Sickle cell anemia with pain, Sickle cell pain crisis

## 2018-02-15 NOTE — CP.PCM.CON
History of Present Illness





- History of Present Illness


History of Present Illness: 





37 year old female with a history of sickle cell anemia, admitted with sickle 

cell pain crisis.  The patient reports to increasing diffuse bone pain which 

did not improve with her oral pain meds.  She denies fevers and chills.  She 

does report to progressive fatigue but no shortness of breath.  She denies 

abnormal bleeding and bruising.





Past medical history:  Sickle cell anemia





Past surgical history:  Portacath





Family history:  Parents have the trait.





Social history:  Denies tobacco, alcohol, and illicit drug use.





Allergies:  Droperidol, tramadol





Review of systems:  All remaining review of systems including HEENT, 

cardiovascular, respiratory, gastrointestinal, genitourinary, musculoskeletal, 

dermatologic, neurologic, and psychiatric are negative unless mentioned in the 

HPI.








Past Patient History





- Past Medical History & Family History


Past Medical History?: Yes





- Past Social History


Smoking Status: Never Smoked





- CARDIAC


Hx Atrial Fibrillation: No


Hx Cardia Arrhythmia: No


Hx Congestive Heart Failure: No


Hx Hypercholesterolemia: No


Hx Hypertension: No


Hx Mitral Valve Prolapse: No


Hx Pacemaker: No


Hx Peripheral Edema: No





- PULMONARY


Hx Asthma: No


Hx Bronchitis: No


Hx Chronic Obstructive Pulmonary Disease (COPD): No


Hx Emphysema: No


Hx Pneumonia: No


Hx Pulmonary Embolism: No


Hx Sleep Apnea: No





- NEUROLOGICAL


Hx Alzheimer's Disease: No


Hx Dementia: No


Hx Migraine: Yes


Hx Multiple Sclerosis: No


Hx Parkinson's Disease: No


Hx Seizures: No


Hx Transient Ischemic Attacks (TIA): No





- HEENT


Hx HEENT Problems: No


Hx Blind: No


Hx Cataracts: No


Hx Deafness: No


Hx Difficulty Chewing: No


Hx Epistaxis: No


Hx Glaucoma: No


Hx Macular Degeneration: No





- RENAL


Hx Chronic Kidney Disease: No


Hx Kidney Stones: No





- ENDOCRINE/METABOLIC


Hx Hyperthyroidism: No


Hx Hypothyroidism: No





- HEMATOLOGICAL/ONCOLOGICAL


Hx Anemia: Yes


Hx Human Immunodeficiency Virus (HIV): No


Hx Sickle Cell Disease: Yes





- MUSCULOSKELETAL/RHEUMATOLOGICAL


Hx Falls: No





- PSYCHIATRIC


Hx Substance Use: No





- SURGICAL HISTORY


Hx Cholecystectomy: Yes (2004)





- ANESTHESIA


Hx Anesthesia: Yes


Hx Anesthesia Reactions: No





Meds


Allergies/Adverse Reactions: 


 Allergies











Allergy/AdvReac Type Severity Reaction Status Date / Time


 


droperidol Allergy   Verified 02/15/18 04:11


 


tramadol Allergy   Verified 02/15/18 04:11


 


inapsine Allergy  RASH Uncoded 02/15/18 04:11














- Medications


Medications: 


 Current Medications





Folic Acid (Folic Acid)  1 mg PO DAILY [facility]


Heparin Sodium (Porcine) (Heparin)  5,000 units SC Q12 [facility]


Hydromorphone HCl (Dilaudid)  1 mg IVP Q4H PRN


   PRN Reason: Pain, severe (8-10)


Sodium Chloride (Sodium Chloride 0.9%)  1,000 mls @ 100 mls/hr IV .Q10H [facility]











Physical Exam





- Head Exam


Head Exam: ATRAUMATIC





- Eye Exam


Eye Exam: Normal appearance





- ENT Exam


ENT Exam: Mucous Membranes Dry





- Respiratory Exam


Respiratory Exam: NORMAL BREATHING PATTERN





- Cardiovascular Exam


Cardiovascular Exam: +S1, +S2





- GI/Abdominal Exam


GI & Abdominal Exam: Normal Bowel Sounds





- Extremities Exam


Extremities exam: Positive for: normal inspection





- Neurological Exam


Neurological exam: Oriented x3





- Psychiatric Exam


Psychiatric exam: Normal Affect, Normal Mood





- Skin


Skin Exam: Warm





Results





- Vital Signs


Recent Vital Signs: 


 Last Vital Signs











Temp  98.2 F   02/15/18 09:10


 


Pulse  98 H  02/15/18 09:10


 


Resp  20   02/15/18 09:10


 


BP  105/69   02/15/18 09:10


 


Pulse Ox  98   02/15/18 09:10














- Labs


Result Diagrams: 


 02/15/18 05:54





 02/15/18 05:54


Labs: 


 Laboratory Results - last 24 hr











  02/15/18 02/15/18 02/15/18





  04:47 05:54 05:54


 


WBC   19.5 H 


 


RBC   2.49 L 


 


Hgb   6.9 L 


 


Hct   20.9 L 


 


MCV   83.9 


 


MCH   27.7 


 


MCHC   33.0 


 


RDW   17.2 H 


 


Plt Count   248 


 


MPV   9.3 


 


Neut % (Auto)   69.5 


 


Lymph % (Auto)   20.2 


 


Mono % (Auto)   5.9 


 


Eos % (Auto)   3.3 


 


Baso % (Auto)   1.1 


 


Neut # (Auto)   13.5 H 


 


Lymph # (Auto)   3.9 


 


Mono # (Auto)   1.2 H 


 


Eos # (Auto)   0.6 


 


Baso # (Auto)   0.2 


 


Retic Count   4.8 H D 


 


PT    14.8 H


 


INR    1.3


 


APTT    44 H


 


Sodium   


 


Potassium   


 


Chloride   


 


Carbon Dioxide   


 


Anion Gap   


 


BUN   


 


Creatinine   


 


Est GFR ( Amer)   


 


Est GFR (Non-Af Amer)   


 


Random Glucose   


 


Calcium   


 


Total Bilirubin   


 


AST   


 


ALT   


 


Alkaline Phosphatase   


 


Total Creatine Kinase   


 


Total Protein   


 


Albumin   


 


Globulin   


 


Albumin/Globulin Ratio   


 


Urine Color  Yellow  


 


Urine Clarity  Hazy  


 


Urine pH  7.0  


 


Ur Specific Gravity  1.006  


 


Urine Protein  1+ H  


 


Urine Glucose (UA)  Normal  


 


Urine Ketones  Negative  


 


Urine Blood  1+ H  


 


Urine Nitrate  Negative  


 


Urine Bilirubin  Negative  


 


Urine Urobilinogen  2.0 H  


 


Ur Leukocyte Esterase  3+ H  


 


Urine WBC (Auto)  796 H  


 


Urine RBC (Auto)  6 H  


 


Urine WBC Clumps (Auto)  Few H  


 


Ur Squamous Epith Cells  4  


 


Urine Bacteria  Mod H  


 


Urine Yeast (Budding)  Few H  


 


Urine HCG, Qual  Negative  














  02/15/18





  05:54


 


WBC 


 


RBC 


 


Hgb 


 


Hct 


 


MCV 


 


MCH 


 


MCHC 


 


RDW 


 


Plt Count 


 


MPV 


 


Neut % (Auto) 


 


Lymph % (Auto) 


 


Mono % (Auto) 


 


Eos % (Auto) 


 


Baso % (Auto) 


 


Neut # (Auto) 


 


Lymph # (Auto) 


 


Mono # (Auto) 


 


Eos # (Auto) 


 


Baso # (Auto) 


 


Retic Count 


 


PT 


 


INR 


 


APTT 


 


Sodium  133


 


Potassium  4.5


 


Chloride  103


 


Carbon Dioxide  23


 


Anion Gap  12


 


BUN  19 H


 


Creatinine  1.1


 


Est GFR ( Amer)  > 60


 


Est GFR (Non-Af Amer)  56


 


Random Glucose  86


 


Calcium  8.2 L


 


Total Bilirubin  2.0 H


 


AST  102 H


 


ALT  79 H


 


Alkaline Phosphatase  204 H


 


Total Creatine Kinase  27 L


 


Total Protein  9.0 H


 


Albumin  3.5


 


Globulin  5.4 H


 


Albumin/Globulin Ratio  0.7 L


 


Urine Color 


 


Urine Clarity 


 


Urine pH 


 


Ur Specific Gravity 


 


Urine Protein 


 


Urine Glucose (UA) 


 


Urine Ketones 


 


Urine Blood 


 


Urine Nitrate 


 


Urine Bilirubin 


 


Urine Urobilinogen 


 


Ur Leukocyte Esterase 


 


Urine WBC (Auto) 


 


Urine RBC (Auto) 


 


Urine WBC Clumps (Auto) 


 


Ur Squamous Epith Cells 


 


Urine Bacteria 


 


Urine Yeast (Budding) 


 


Urine HCG, Qual 














Assessment & Plan


(1) Sickle cell pain crisis


Assessment and Plan: 


IV fluids, pain meds, folic acid, 02 via NC


no current transfusion indication


Status: Acute   





(2) Leukocytosis


Assessment and Plan: 


on antibiotics


Status: Acute   





(3) Sickle cell anemia


Assessment and Plan: 


folic acid and hydrea





Thank you for this interesting consult.


Status: Acute

## 2018-02-15 NOTE — RAD
HISTORY:

LEUCOCYTOSIS  



COMPARISON:

Chest x-ray performed 2/2/18 



TECHNIQUE:

Chest PA and lateral



FINDINGS:

Right-sided MediPort with distal tip obscured by [place] tomi.



LUNGS:

Hypoinflation. Mild interstitial prominence may reflect infection or 

edema. Patchy right lower lobe infiltrate. 



Please note that chest x-ray has limited sensitivity for the 

detection of pulmonary masses.



PLEURA:

No significant pleural effusion identified. No definite pneumothorax .



CARDIOVASCULAR:

Borderline cardiomegaly.



OSSEOUS STRUCTURES:

 [place] rods. Osseous demineralization.  Kyphosis. Multilevel 

degenerative changes. 



VISUALIZED UPPER ABDOMEN:

Partially imaged bilateral ureteral stents. Right upper quadrant 

surgical clips. 



OTHER FINDINGS:

None.



IMPRESSION:

Right-sided MediPort. Hypoinflation. Mild interstitial prominence may 

reflect infection or edema. Patchy infiltrate, right lung base.  

Additional findings as above.

## 2018-02-16 LAB
ALBUMIN SERPL-MCNC: 3 G/DL (ref 3.5–5)
ALBUMIN/GLOB SERPL: 0.7 {RATIO} (ref 1–2.1)
ALT SERPL-CCNC: 63 U/L (ref 9–52)
AST SERPL-CCNC: 104 U/L (ref 14–36)
BASOPHILS # BLD AUTO: 0.2 K/UL (ref 0–0.2)
BASOPHILS NFR BLD: 1.1 % (ref 0–2)
BUN SERPL-MCNC: 15 MG/DL (ref 7–17)
CALCIUM SERPL-MCNC: 8 MG/DL (ref 8.6–10.4)
EOSINOPHIL # BLD AUTO: 0.7 K/UL (ref 0–0.7)
EOSINOPHIL NFR BLD: 4 % (ref 0–4)
ERYTHROCYTE [DISTWIDTH] IN BLOOD BY AUTOMATED COUNT: 16.9 % (ref 11.5–14.5)
GFR NON-AFRICAN AMERICAN: > 60
HGB BLD-MCNC: 5.8 G/DL (ref 11–16)
LYMPHOCYTES # BLD AUTO: 4.8 K/UL (ref 1–4.3)
LYMPHOCYTES NFR BLD AUTO: 29.2 % (ref 20–40)
MCH RBC QN AUTO: 28.7 PG (ref 27–31)
MCHC RBC AUTO-ENTMCNC: 33.9 G/DL (ref 33–37)
MCV RBC AUTO: 84.6 FL (ref 81–99)
MONOCYTES # BLD: 1.1 K/UL (ref 0–0.8)
MONOCYTES NFR BLD: 6.6 % (ref 0–10)
NEUTROPHILS # BLD: 9.7 K/UL (ref 1.8–7)
NEUTROPHILS NFR BLD AUTO: 59.1 % (ref 50–75)
NRBC BLD AUTO-RTO: 0.1 % (ref 0–2)
PLATELET # BLD: 178 K/UL (ref 130–400)
PMV BLD AUTO: 9 FL (ref 7.2–11.7)
RBC # BLD AUTO: 2.04 MIL/UL (ref 3.8–5.2)
WBC # BLD AUTO: 16.4 K/UL (ref 4.8–10.8)

## 2018-02-16 RX ADMIN — HYDROMORPHONE HYDROCHLORIDE PRN MG: 1 INJECTION, SOLUTION INTRAMUSCULAR; INTRAVENOUS; SUBCUTANEOUS at 19:31

## 2018-02-16 RX ADMIN — DIPHENHYDRAMINE HYDROCHLORIDE PRN MG: 50 INJECTION INTRAMUSCULAR; INTRAVENOUS at 00:49

## 2018-02-16 RX ADMIN — HYDROMORPHONE HYDROCHLORIDE PRN MG: 1 INJECTION, SOLUTION INTRAMUSCULAR; INTRAVENOUS; SUBCUTANEOUS at 10:00

## 2018-02-16 RX ADMIN — HYDROMORPHONE HYDROCHLORIDE PRN MG: 1 INJECTION, SOLUTION INTRAMUSCULAR; INTRAVENOUS; SUBCUTANEOUS at 22:47

## 2018-02-16 RX ADMIN — HYDROMORPHONE HYDROCHLORIDE PRN MG: 1 INJECTION, SOLUTION INTRAMUSCULAR; INTRAVENOUS; SUBCUTANEOUS at 04:03

## 2018-02-16 RX ADMIN — DIPHENHYDRAMINE HYDROCHLORIDE PRN MG: 50 INJECTION INTRAMUSCULAR; INTRAVENOUS at 06:55

## 2018-02-16 RX ADMIN — DIPHENHYDRAMINE HYDROCHLORIDE PRN MG: 50 INJECTION INTRAMUSCULAR; INTRAVENOUS at 16:30

## 2018-02-16 RX ADMIN — HYDROMORPHONE HYDROCHLORIDE PRN MG: 1 INJECTION, SOLUTION INTRAMUSCULAR; INTRAVENOUS; SUBCUTANEOUS at 00:49

## 2018-02-16 RX ADMIN — DIPHENHYDRAMINE HYDROCHLORIDE PRN MG: 50 INJECTION INTRAMUSCULAR; INTRAVENOUS at 22:50

## 2018-02-16 RX ADMIN — DIPHENHYDRAMINE HYDROCHLORIDE PRN MG: 50 INJECTION INTRAMUSCULAR; INTRAVENOUS at 19:30

## 2018-02-16 RX ADMIN — DIPHENHYDRAMINE HYDROCHLORIDE PRN MG: 50 INJECTION INTRAMUSCULAR; INTRAVENOUS at 09:58

## 2018-02-16 RX ADMIN — HYDROMORPHONE HYDROCHLORIDE PRN MG: 1 INJECTION, SOLUTION INTRAMUSCULAR; INTRAVENOUS; SUBCUTANEOUS at 16:27

## 2018-02-16 RX ADMIN — DIPHENHYDRAMINE HYDROCHLORIDE PRN MG: 50 INJECTION INTRAMUSCULAR; INTRAVENOUS at 13:06

## 2018-02-16 RX ADMIN — HYDROMORPHONE HYDROCHLORIDE PRN MG: 1 INJECTION, SOLUTION INTRAMUSCULAR; INTRAVENOUS; SUBCUTANEOUS at 13:07

## 2018-02-16 RX ADMIN — HYDROMORPHONE HYDROCHLORIDE PRN MG: 1 INJECTION, SOLUTION INTRAMUSCULAR; INTRAVENOUS; SUBCUTANEOUS at 07:00

## 2018-02-16 NOTE — CP.PCM.PN
Subjective





- Date & Time of Evaluation


Date of Evaluation: 02/16/18


Time of Evaluation: 19:00





- Subjective


Subjective: 





Has pain in legs


s/p 1U PRBC





Objective





- Vital Signs/Intake and Output


Vital Signs (last 24 hours): 


 











Temp Pulse Resp BP Pulse Ox


 


 98.9 F   92 H  20   125/81   98 


 


 02/16/18 20:14  02/16/18 20:14  02/16/18 20:14  02/16/18 20:14  02/16/18 17:07








Intake and Output: 


 











 02/16/18 02/17/18





 18:59 06:59


 


Intake Total 2780 350


 


Balance 2780 350














- Medications


Medications: 


 Current Medications





Diphenhydramine HCl (Benadryl)  25 mg IVP Q3 PRN


   PRN Reason: Itching / Pruritus


   Last Admin: 02/16/18 19:30 Dose:  25 mg


Folic Acid (Folic Acid)  1 mg PO DAILY [facility]


   Last Admin: 02/16/18 10:05 Dose:  1 mg


Heparin Sodium (Porcine) (Heparin)  5,000 units SC Q12 [facility]


   Last Admin: 02/16/18 10:07 Dose:  Not Given


Hydromorphone HCl (Dilaudid)  1 mg IVP Q3 PRN


   PRN Reason: Pain, severe (8-10)


   Last Admin: 02/16/18 19:31 Dose:  1 mg


Sodium Chloride (Sodium Chloride 0.9%)  1,000 mls @ 100 mls/hr IV .Q10H [facility]


   Last Admin: 02/16/18 19:33 Dose:  Not Given


Ceftriaxone Sodium 1 gm/ (Sodium Chloride)  100 mls @ 100 mls/hr IVPB Q24H [facility]


   Last Admin: 02/16/18 13:11 Dose:  100 mls/hr











- Labs


Labs: 


 





 02/16/18 07:50 





 02/16/18 07:50 





 











PT  14.8 SECONDS (9.7-12.2)  H  02/15/18  05:54    


 


INR  1.3   02/15/18  05:54    


 


APTT  44 SECONDS (21-34)  H  02/15/18  05:54    














- Head Exam


Head Exam: ATRAUMATIC





- ENT Exam


ENT Exam: Mucous Membranes Dry





- Respiratory Exam


Respiratory Exam: NORMAL BREATHING PATTERN





- Cardiovascular Exam


Cardiovascular Exam: +S1, +S2





- GI/Abdominal Exam


GI & Abdominal Exam: Normal Bowel Sounds





Assessment and Plan


(1) Sickle cell pain crisis


Assessment & Plan: 


IV fluids, pain meds, folic acid, 02 via NC


s/p 1 U PRBC


Status: Acute   





(2) Leukocytosis


Assessment & Plan: 


on antibiotics


Status: Acute   





(3) Sickle cell anemia


Status: Acute

## 2018-02-16 NOTE — CP.PCM.HP
Past Patient History





- Past Medical History & Family History


Past Medical History?: Yes





- Past Social History


Smoking Status: Never Smoked





- CARDIAC


Hx Atrial Fibrillation: No


Hx Cardia Arrhythmia: No


Hx Congestive Heart Failure: No


Hx Hypercholesterolemia: No


Hx Hypertension: No


Hx Mitral Valve Prolapse: No


Hx Pacemaker: No


Hx Peripheral Edema: No





- PULMONARY


Hx Asthma: No


Hx Bronchitis: No


Hx Chronic Obstructive Pulmonary Disease (COPD): No


Hx Emphysema: No


Hx Pneumonia: No


Hx Pulmonary Embolism: No


Hx Sleep Apnea: No





- NEUROLOGICAL


Hx Alzheimer's Disease: No


Hx Dementia: No


Hx Migraine: Yes


Hx Multiple Sclerosis: No


Hx Parkinson's Disease: No


Hx Seizures: No


Hx Transient Ischemic Attacks (TIA): No





- HEENT


Hx HEENT Problems: No


Hx Blind: No


Hx Cataracts: No


Hx Deafness: No


Hx Difficulty Chewing: No


Hx Epistaxis: No


Hx Glaucoma: No


Hx Macular Degeneration: No





- RENAL


Hx Chronic Kidney Disease: No


Hx Kidney Stones: No





- ENDOCRINE/METABOLIC


Hx Hyperthyroidism: No


Hx Hypothyroidism: No





- HEMATOLOGICAL/ONCOLOGICAL


Hx Anemia: Yes


Hx Human Immunodeficiency Virus (HIV): No


Hx Sickle Cell Disease: Yes





- MUSCULOSKELETAL/RHEUMATOLOGICAL


Hx Falls: No





- PSYCHIATRIC


Hx Substance Use: No





- SURGICAL HISTORY


Hx Cholecystectomy: Yes (2004)





- ANESTHESIA


Hx Anesthesia: Yes


Hx Anesthesia Reactions: No





Meds


Allergies/Adverse Reactions: 


 Allergies











Allergy/AdvReac Type Severity Reaction Status Date / Time


 


droperidol Allergy   Verified 02/15/18 04:11


 


tramadol Allergy   Verified 02/15/18 04:11


 


inapsine Allergy  RASH Uncoded 02/15/18 04:11














Physical Exam





- Constitutional


Appears: Well





- Head Exam


Head Exam: ATRAUMATIC, NORMAL INSPECTION, NORMOCEPHALIC





- Eye Exam


Eye Exam: EOMI, Normal appearance, PERRL


Pupil Exam: NORMAL ACCOMODATION, PERRL





- ENT Exam


ENT Exam: Mucous Membranes Moist, Normal Exam





- Neck Exam


Neck exam: Positive for: Normal Inspection





- Respiratory Exam


Respiratory Exam: Decreased Breath Sounds





- Cardiovascular Exam


Cardiovascular Exam: REGULAR RHYTHM, +S1, +S2





- GI/Abdominal Exam


GI & Abdominal Exam: Diminished Bowel Sounds, Soft





- Rectal Exam


Rectal Exam: Deferred





Results





- Vital Signs


Recent Vital Signs: 





 Last Vital Signs











Temp  98.5 F   02/16/18 17:22


 


Pulse  94 H  02/16/18 17:22


 


Resp  20   02/16/18 17:22


 


BP  122/84   02/16/18 17:22


 


Pulse Ox  98   02/16/18 17:07














- Labs


Result Diagrams: 


 02/16/18 07:50





 02/16/18 07:50


Labs: 





 Laboratory Results - last 24 hr











  02/16/18 02/16/18 02/16/18





  07:50 07:50 07:50


 


WBC  16.4 H  


 


RBC  2.04 L  


 


Hgb  5.8 L*  


 


Hct  17.2 L  


 


MCV  84.6  


 


MCH  28.7  


 


MCHC  33.9  


 


RDW  16.9 H  


 


Plt Count  178  


 


MPV  9.0  


 


Neut % (Auto)  59.1  


 


Lymph % (Auto)  29.2  


 


Mono % (Auto)  6.6  


 


Eos % (Auto)  4.0  


 


Baso % (Auto)  1.1  


 


Neut # (Auto)  9.7 H  


 


Lymph # (Auto)  4.8 H  


 


Mono # (Auto)  1.1 H  


 


Eos # (Auto)  0.7  


 


Baso # (Auto)  0.2  


 


Retic Count    3.6 H D


 


Sodium   137 


 


Potassium   4.3 


 


Chloride   107 


 


Carbon Dioxide   23 


 


Anion Gap   11 


 


BUN   15 


 


Creatinine   1.0 


 


Est GFR ( Amer)   > 60 


 


Est GFR (Non-Af Amer)   > 60 


 


Random Glucose   90 


 


Calcium   8.0 L 


 


Total Bilirubin   1.6 H 


 


AST   104 H 


 


ALT   63 H D 


 


Alkaline Phosphatase   167 H 


 


Total Protein   7.7 


 


Albumin   3.0 L 


 


Globulin   4.6 H 


 


Albumin/Globulin Ratio   0.7 L 


 


Blood Type   


 


Antibody Screen   














  02/16/18





  13:25


 


WBC 


 


RBC 


 


Hgb 


 


Hct 


 


MCV 


 


MCH 


 


MCHC 


 


RDW 


 


Plt Count 


 


MPV 


 


Neut % (Auto) 


 


Lymph % (Auto) 


 


Mono % (Auto) 


 


Eos % (Auto) 


 


Baso % (Auto) 


 


Neut # (Auto) 


 


Lymph # (Auto) 


 


Mono # (Auto) 


 


Eos # (Auto) 


 


Baso # (Auto) 


 


Retic Count 


 


Sodium 


 


Potassium 


 


Chloride 


 


Carbon Dioxide 


 


Anion Gap 


 


BUN 


 


Creatinine 


 


Est GFR ( Amer) 


 


Est GFR (Non-Af Amer) 


 


Random Glucose 


 


Calcium 


 


Total Bilirubin 


 


AST 


 


ALT 


 


Alkaline Phosphatase 


 


Total Protein 


 


Albumin 


 


Globulin 


 


Albumin/Globulin Ratio 


 


Blood Type  O POSITIVE


 


Antibody Screen  Negative

## 2018-02-17 LAB
BASOPHILS # BLD AUTO: 0.2 K/UL (ref 0–0.2)
BASOPHILS NFR BLD: 0.9 % (ref 0–2)
EOSINOPHIL # BLD AUTO: 0.9 K/UL (ref 0–0.7)
EOSINOPHIL NFR BLD: 4.1 % (ref 0–4)
ERYTHROCYTE [DISTWIDTH] IN BLOOD BY AUTOMATED COUNT: 16.3 % (ref 11.5–14.5)
HGB BLD-MCNC: 6.9 G/DL (ref 11–16)
LYMPHOCYTES # BLD AUTO: 4.3 K/UL (ref 1–4.3)
LYMPHOCYTES NFR BLD AUTO: 19.7 % (ref 20–40)
MCH RBC QN AUTO: 29.1 PG (ref 27–31)
MCHC RBC AUTO-ENTMCNC: 33.8 G/DL (ref 33–37)
MCV RBC AUTO: 86 FL (ref 81–99)
MONOCYTES # BLD: 1.8 K/UL (ref 0–0.8)
MONOCYTES NFR BLD: 8 % (ref 0–10)
NEUTROPHILS # BLD: 14.8 K/UL (ref 1.8–7)
NEUTROPHILS NFR BLD AUTO: 67.3 % (ref 50–75)
NRBC BLD AUTO-RTO: 0.2 % (ref 0–2)
PLATELET # BLD: 145 K/UL (ref 130–400)
PMV BLD AUTO: 9.5 FL (ref 7.2–11.7)
RBC # BLD AUTO: 2.39 MIL/UL (ref 3.8–5.2)
WBC # BLD AUTO: 22 K/UL (ref 4.8–10.8)

## 2018-02-17 PROCEDURE — 30233N1 TRANSFUSION OF NONAUTOLOGOUS RED BLOOD CELLS INTO PERIPHERAL VEIN, PERCUTANEOUS APPROACH: ICD-10-PCS | Performed by: INTERNAL MEDICINE

## 2018-02-17 RX ADMIN — DIPHENHYDRAMINE HYDROCHLORIDE PRN MG: 50 INJECTION INTRAMUSCULAR; INTRAVENOUS at 23:24

## 2018-02-17 RX ADMIN — DIPHENHYDRAMINE HYDROCHLORIDE PRN MG: 50 INJECTION INTRAMUSCULAR; INTRAVENOUS at 08:07

## 2018-02-17 RX ADMIN — DIPHENHYDRAMINE HYDROCHLORIDE PRN MG: 50 INJECTION INTRAMUSCULAR; INTRAVENOUS at 20:27

## 2018-02-17 RX ADMIN — DIPHENHYDRAMINE HYDROCHLORIDE PRN MG: 50 INJECTION INTRAMUSCULAR; INTRAVENOUS at 04:58

## 2018-02-17 RX ADMIN — DIPHENHYDRAMINE HYDROCHLORIDE PRN MG: 50 INJECTION INTRAMUSCULAR; INTRAVENOUS at 11:06

## 2018-02-17 RX ADMIN — DIPHENHYDRAMINE HYDROCHLORIDE PRN MG: 50 INJECTION INTRAMUSCULAR; INTRAVENOUS at 17:23

## 2018-02-17 RX ADMIN — DIPHENHYDRAMINE HYDROCHLORIDE PRN MG: 50 INJECTION INTRAMUSCULAR; INTRAVENOUS at 14:18

## 2018-02-17 RX ADMIN — DIPHENHYDRAMINE HYDROCHLORIDE PRN MG: 50 INJECTION INTRAMUSCULAR; INTRAVENOUS at 01:46

## 2018-02-17 NOTE — CP.PCM.PN
Subjective





- Date & Time of Evaluation


Date of Evaluation: 02/17/18


Time of Evaluation: 08:00





- Subjective


Subjective: 


clinically same





Objective





- Vital Signs/Intake and Output


Vital Signs (last 24 hours): 


 











Temp Pulse Resp BP Pulse Ox


 


 98.4 F   100 H  20   132/70   100 


 


 02/17/18 16:00  02/17/18 16:00  02/17/18 16:00  02/17/18 16:00  02/17/18 16:00








Intake and Output: 


 











 02/17/18 02/17/18





 06:59 18:59


 


Intake Total 1150 2200


 


Balance 1150 2200














- Medications


Medications: 


 Current Medications





Diphenhydramine HCl (Benadryl)  25 mg IVP Q3 PRN


   PRN Reason: Itching / Pruritus


   Last Admin: 02/17/18 17:23 Dose:  25 mg


Folic Acid (Folic Acid)  1 mg PO DAILY [facility]


   Last Admin: 02/17/18 11:05 Dose:  1 mg


Heparin Sodium (Porcine) (Heparin)  5,000 units SC Q12 [facility]


   Last Admin: 02/17/18 11:05 Dose:  Not Given


Hydromorphone HCl (Dilaudid)  2 mg IVP Q3 PRN


   PRN Reason: Pain, severe (8-10)


   Last Admin: 02/17/18 17:24 Dose:  2 mg


Sodium Chloride (Sodium Chloride 0.9%)  1,000 mls @ 100 mls/hr IV .Q10H [facility]


   Last Admin: 02/16/18 19:33 Dose:  Not Given


Ceftriaxone Sodium 1 gm/ (Sodium Chloride)  100 mls @ 100 mls/hr IVPB Q24H [facility]


   Last Admin: 02/17/18 12:26 Dose:  100 mls/hr











- Labs


Labs: 


 





 02/17/18 07:09 





 02/16/18 07:50 





 











PT  14.8 SECONDS (9.7-12.2)  H  02/15/18  05:54    


 


INR  1.3   02/15/18  05:54    


 


APTT  44 SECONDS (21-34)  H  02/15/18  05:54    














- Constitutional


Appears: Well





- Head Exam


Head Exam: ATRAUMATIC, NORMAL INSPECTION, NORMOCEPHALIC





- Eye Exam


Eye Exam: EOMI, Normal appearance, PERRL


Pupil Exam: NORMAL ACCOMODATION, PERRL





- ENT Exam


ENT Exam: Mucous Membranes Moist, Normal Exam





- Neck Exam


Neck Exam: Full ROM, Normal Inspection.  absent: Lymphadenopathy





- Respiratory Exam


Respiratory Exam: Decreased Breath Sounds





- Cardiovascular Exam


Cardiovascular Exam: REGULAR RHYTHM, +S1, +S2





- GI/Abdominal Exam


GI & Abdominal Exam: Soft, Diminished Bowel Sounds





- Rectal Exam


Rectal Exam: Deferred

## 2018-02-18 RX ADMIN — DIPHENHYDRAMINE HYDROCHLORIDE PRN MG: 50 INJECTION INTRAMUSCULAR; INTRAVENOUS at 18:05

## 2018-02-18 RX ADMIN — DIPHENHYDRAMINE HYDROCHLORIDE PRN MG: 50 INJECTION INTRAMUSCULAR; INTRAVENOUS at 15:05

## 2018-02-18 RX ADMIN — DIPHENHYDRAMINE HYDROCHLORIDE PRN MG: 50 INJECTION INTRAMUSCULAR; INTRAVENOUS at 21:36

## 2018-02-18 RX ADMIN — DIPHENHYDRAMINE HYDROCHLORIDE PRN MG: 50 INJECTION INTRAMUSCULAR; INTRAVENOUS at 02:29

## 2018-02-18 RX ADMIN — DIPHENHYDRAMINE HYDROCHLORIDE PRN MG: 50 INJECTION INTRAMUSCULAR; INTRAVENOUS at 08:34

## 2018-02-18 RX ADMIN — DIPHENHYDRAMINE HYDROCHLORIDE PRN MG: 50 INJECTION INTRAMUSCULAR; INTRAVENOUS at 05:32

## 2018-02-18 RX ADMIN — DIPHENHYDRAMINE HYDROCHLORIDE PRN MG: 50 INJECTION INTRAMUSCULAR; INTRAVENOUS at 11:39

## 2018-02-18 NOTE — CP.PCM.PN
Subjective





- Date & Time of Evaluation


Date of Evaluation: 02/18/18


Time of Evaluation: 08:00





- Subjective


Subjective: 


clinically same





Objective





- Vital Signs/Intake and Output


Vital Signs (last 24 hours): 


 











Temp Pulse Resp BP Pulse Ox


 


 98.3 F   97 H  20   117/75   97 


 


 02/18/18 08:40  02/18/18 08:40  02/18/18 08:40  02/18/18 08:40  02/18/18 08:40








Intake and Output: 


 











 02/18/18 02/18/18





 06:59 18:59


 


Intake Total 540 


 


Balance 540 














- Medications


Medications: 


 Current Medications





Diphenhydramine HCl (Benadryl)  25 mg IVP Q3 PRN


   PRN Reason: Itching / Pruritus


   Last Admin: 02/18/18 11:39 Dose:  25 mg


Folic Acid (Folic Acid)  1 mg PO DAILY [facility]


   Last Admin: 02/18/18 09:30 Dose:  1 mg


Heparin Sodium (Porcine) (Heparin)  5,000 units SC Q12 [facility]


   Last Admin: 02/18/18 09:24 Dose:  Not Given


Hydromorphone HCl (Dilaudid)  2 mg IVP Q3 PRN


   PRN Reason: Pain, severe (8-10)


   Last Admin: 02/18/18 11:38 Dose:  2 mg


Sodium Chloride (Sodium Chloride 0.9%)  1,000 mls @ 100 mls/hr IV .Q10H [facility]


   Last Admin: 02/18/18 09:24 Dose:  Not Given


Ceftriaxone Sodium 1 gm/ (Sodium Chloride)  100 mls @ 100 mls/hr IVPB Q24H [facility]


   Last Admin: 02/17/18 12:26 Dose:  100 mls/hr











- Labs


Labs: 


 





 02/17/18 07:09 





 02/16/18 07:50 





 











PT  14.8 SECONDS (9.7-12.2)  H  02/15/18  05:54    


 


INR  1.3   02/15/18  05:54    


 


APTT  44 SECONDS (21-34)  H  02/15/18  05:54    














- Constitutional


Appears: Well





- Head Exam


Head Exam: ATRAUMATIC, NORMAL INSPECTION, NORMOCEPHALIC





- Eye Exam


Eye Exam: EOMI, Normal appearance, PERRL


Pupil Exam: NORMAL ACCOMODATION, PERRL





- ENT Exam


ENT Exam: Mucous Membranes Moist, Normal Exam





- Neck Exam


Neck Exam: Full ROM, Normal Inspection.  absent: Lymphadenopathy





- Respiratory Exam


Respiratory Exam: Decreased Breath Sounds





- Cardiovascular Exam


Cardiovascular Exam: REGULAR RHYTHM, +S1, +S2





- GI/Abdominal Exam


GI & Abdominal Exam: Soft, Diminished Bowel Sounds





- Rectal Exam


Rectal Exam: Deferred

## 2018-02-19 VITALS
SYSTOLIC BLOOD PRESSURE: 108 MMHG | OXYGEN SATURATION: 98 % | TEMPERATURE: 98.6 F | HEART RATE: 91 BPM | DIASTOLIC BLOOD PRESSURE: 71 MMHG

## 2018-02-19 LAB
ALBUMIN SERPL-MCNC: 3.3 G/DL (ref 3.5–5)
ALBUMIN/GLOB SERPL: 0.7 {RATIO} (ref 1–2.1)
ALT SERPL-CCNC: 52 U/L (ref 9–52)
AST SERPL-CCNC: 71 U/L (ref 14–36)
BASOPHILS # BLD AUTO: 0.2 K/UL (ref 0–0.2)
BASOPHILS NFR BLD: 1.1 % (ref 0–2)
BUN SERPL-MCNC: 20 MG/DL (ref 7–17)
CALCIUM SERPL-MCNC: 8.2 MG/DL (ref 8.6–10.4)
EOSINOPHIL # BLD AUTO: 0.7 K/UL (ref 0–0.7)
EOSINOPHIL NFR BLD: 4.1 % (ref 0–4)
ERYTHROCYTE [DISTWIDTH] IN BLOOD BY AUTOMATED COUNT: 17.3 % (ref 11.5–14.5)
GFR NON-AFRICAN AMERICAN: 56
HGB BLD-MCNC: 6.9 G/DL (ref 11–16)
LYMPHOCYTES # BLD AUTO: 2.7 K/UL (ref 1–4.3)
LYMPHOCYTES NFR BLD AUTO: 15.1 % (ref 20–40)
MCH RBC QN AUTO: 28.6 PG (ref 27–31)
MCHC RBC AUTO-ENTMCNC: 33.5 G/DL (ref 33–37)
MCV RBC AUTO: 85.5 FL (ref 81–99)
MONOCYTES # BLD: 1.3 K/UL (ref 0–0.8)
MONOCYTES NFR BLD: 7.4 % (ref 0–10)
NEUTROPHILS # BLD: 13 K/UL (ref 1.8–7)
NEUTROPHILS NFR BLD AUTO: 72.3 % (ref 50–75)
NRBC BLD AUTO-RTO: 0.1 % (ref 0–2)
PLATELET # BLD: 188 K/UL (ref 130–400)
PMV BLD AUTO: 8.4 FL (ref 7.2–11.7)
RBC # BLD AUTO: 2.42 MIL/UL (ref 3.8–5.2)
WBC # BLD AUTO: 18 K/UL (ref 4.8–10.8)

## 2018-02-19 RX ADMIN — DIPHENHYDRAMINE HYDROCHLORIDE PRN MG: 50 INJECTION INTRAMUSCULAR; INTRAVENOUS at 08:56

## 2018-02-19 RX ADMIN — DIPHENHYDRAMINE HYDROCHLORIDE PRN MG: 50 INJECTION INTRAMUSCULAR; INTRAVENOUS at 05:57

## 2018-02-19 RX ADMIN — DIPHENHYDRAMINE HYDROCHLORIDE PRN MG: 50 INJECTION INTRAMUSCULAR; INTRAVENOUS at 18:48

## 2018-02-19 RX ADMIN — HYDROMORPHONE HYDROCHLORIDE PRN MG: 1 INJECTION, SOLUTION INTRAMUSCULAR; INTRAVENOUS; SUBCUTANEOUS at 18:48

## 2018-02-19 RX ADMIN — DIPHENHYDRAMINE HYDROCHLORIDE PRN MG: 50 INJECTION INTRAMUSCULAR; INTRAVENOUS at 12:13

## 2018-02-19 RX ADMIN — DIPHENHYDRAMINE HYDROCHLORIDE PRN MG: 50 INJECTION INTRAMUSCULAR; INTRAVENOUS at 00:47

## 2018-02-19 RX ADMIN — DIPHENHYDRAMINE HYDROCHLORIDE PRN MG: 50 INJECTION INTRAMUSCULAR; INTRAVENOUS at 15:24

## 2018-02-19 RX ADMIN — HYDROMORPHONE HYDROCHLORIDE PRN MG: 1 INJECTION, SOLUTION INTRAMUSCULAR; INTRAVENOUS; SUBCUTANEOUS at 15:25

## 2018-02-19 NOTE — CP.PCM.PN
Subjective





- Date & Time of Evaluation


Date of Evaluation: 02/19/18


Time of Evaluation: 11:00





- Subjective


Subjective: 





Alert, orientedx3, NAD.





Objective





- Vital Signs/Intake and Output


Vital Signs (last 24 hours): 


 











Temp Pulse Resp BP Pulse Ox


 


 98.6 F   91 H  20   108/71   98 


 


 02/19/18 16:00  02/19/18 16:00  02/19/18 16:00  02/19/18 16:00  02/19/18 16:00








Intake and Output: 


 











 02/19/18 02/19/18





 06:59 18:59


 


Intake Total 1450 1000


 


Balance 1450 1000














- Medications


Medications: 


 Current Medications





Diphenhydramine HCl (Benadryl)  25 mg IVP Q3 PRN


   PRN Reason: Itching / Pruritus


   Last Admin: 02/19/18 15:24 Dose:  25 mg


Folic Acid (Folic Acid)  1 mg PO DAILY [facility]


   Last Admin: 02/19/18 09:00 Dose:  1 mg


Hydromorphone HCl (Dilaudid)  1 mg IVP Q3H PRN


   PRN Reason: Pain, severe (8-10)


   Last Admin: 02/19/18 15:25 Dose:  1 mg


Sodium Chloride (Sodium Chloride 0.9%)  1,000 mls @ 100 mls/hr IV .Q10H [facility]


   Last Admin: 02/19/18 15:50 Dose:  Not Given


Ceftriaxone Sodium 1 gm/ (Sodium Chloride)  100 mls @ 100 mls/hr IVPB Q24H [facility]


   Last Admin: 02/19/18 12:49 Dose:  100 mls/hr


Meropenem 500 mg/ Sodium (Chloride)  100 mls @ 100 mls/hr IVPB Q8 [facility]


   Last Admin: 02/19/18 13:54 Dose:  100 mls/hr











- Labs


Labs: 


 





 02/19/18 12:38 





 02/19/18 12:38 





 











PT  14.8 SECONDS (9.7-12.2)  H  02/15/18  05:54    


 


INR  1.3   02/15/18  05:54    


 


APTT  44 SECONDS (21-34)  H  02/15/18  05:54    














Assessment and Plan





- Assessment and Plan (Free Text)


Assessment: 





Patient is seen and examined. Feeling much better today. Plan to discharge home 

by DR MARIVEL Etienne on macrobid po for UTI and her home medications. Advised to 

follow up in the office this week.

## 2018-02-19 NOTE — CP.PCM.PN
Subjective





- Date & Time of Evaluation


Date of Evaluation: 02/19/18





Objective





- Vital Signs/Intake and Output


Vital Signs (last 24 hours): 


 











Temp Pulse Resp BP Pulse Ox


 


 98.6 F   91 H  20   108/71   98 


 


 02/19/18 16:00  02/19/18 16:00  02/19/18 16:00  02/19/18 16:00  02/19/18 16:00








Intake and Output: 


 











 02/19/18 02/20/18





 18:59 06:59


 


Intake Total 1000 


 


Balance 1000 














- Medications


Medications: 


 Current Medications





Diphenhydramine HCl (Benadryl)  25 mg IVP Q3 PRN


   PRN Reason: Itching / Pruritus


   Last Admin: 02/19/18 18:48 Dose:  25 mg


Folic Acid (Folic Acid)  1 mg PO DAILY [facility]


   Last Admin: 02/19/18 09:00 Dose:  1 mg


Hydromorphone HCl (Dilaudid)  1 mg IVP Q3H PRN


   PRN Reason: Pain, severe (8-10)


   Last Admin: 02/19/18 18:48 Dose:  1 mg


Sodium Chloride (Sodium Chloride 0.9%)  1,000 mls @ 100 mls/hr IV .Q10H [facility]


   Last Admin: 02/19/18 15:50 Dose:  Not Given


Ceftriaxone Sodium 1 gm/ (Sodium Chloride)  100 mls @ 100 mls/hr IVPB Q24H [facility]


   Last Admin: 02/19/18 12:49 Dose:  100 mls/hr


Meropenem 500 mg/ Sodium (Chloride)  100 mls @ 100 mls/hr IVPB Q8 [facility]


   Last Admin: 02/19/18 13:54 Dose:  100 mls/hr











- Labs


Labs: 


 





 02/19/18 12:38 





 02/19/18 12:38 





 











PT  14.8 SECONDS (9.7-12.2)  H  02/15/18  05:54    


 


INR  1.3   02/15/18  05:54    


 


APTT  44 SECONDS (21-34)  H  02/15/18  05:54

## 2018-02-19 NOTE — CP.PCM.PN
Subjective





- Date & Time of Evaluation


Date of Evaluation: 02/19/18


Time of Evaluation: 18:30





- Subjective


Subjective: 





Feeling better





Objective





- Vital Signs/Intake and Output


Vital Signs (last 24 hours): 


 











Temp Pulse Resp BP Pulse Ox


 


 98.6 F   91 H  20   108/71   98 


 


 02/19/18 16:00  02/19/18 16:00  02/19/18 16:00  02/19/18 16:00  02/19/18 16:00








Intake and Output: 


 











 02/19/18 02/20/18





 18:59 06:59


 


Intake Total 1000 700


 


Balance 1000 700














- Labs


Labs: 


 





 02/19/18 12:38 





 02/19/18 12:38 





 











PT  14.8 SECONDS (9.7-12.2)  H  02/15/18  05:54    


 


INR  1.3   02/15/18  05:54    


 


APTT  44 SECONDS (21-34)  H  02/15/18  05:54    














- Head Exam


Head Exam: ATRAUMATIC





- Eye Exam


Eye Exam: Normal appearance





- ENT Exam


ENT Exam: Mucous Membranes Dry





- Respiratory Exam


Respiratory Exam: NORMAL BREATHING PATTERN





- Cardiovascular Exam


Cardiovascular Exam: +S1, +S2





- GI/Abdominal Exam


GI & Abdominal Exam: Normal Bowel Sounds





- Extremities Exam


Extremities Exam: Normal Inspection





Assessment and Plan


(1) Sickle cell pain crisis


Assessment & Plan: 


IV fluids, pain meds, folic acid, 02 via NC


s/p PRBC transfusion


Status: Acute   





(2) Leukocytosis


Assessment & Plan: 


likely reactive to sickle cell


Status: Acute   





(3) Sickle cell anemia


Assessment & Plan: 


folic acid and hydrea


Status: Acute

## 2018-02-26 ENCOUNTER — HOSPITAL ENCOUNTER (INPATIENT)
Dept: HOSPITAL 31 - C.ER | Age: 38
LOS: 10 days | Discharge: HOME | DRG: 812 | End: 2018-03-08
Attending: INTERNAL MEDICINE | Admitting: INTERNAL MEDICINE
Payer: MEDICAID

## 2018-02-26 VITALS — BODY MASS INDEX: 22.4 KG/M2

## 2018-02-26 DIAGNOSIS — J44.9: ICD-10-CM

## 2018-02-26 DIAGNOSIS — G43.909: ICD-10-CM

## 2018-02-26 DIAGNOSIS — N31.9: ICD-10-CM

## 2018-02-26 DIAGNOSIS — Z90.49: ICD-10-CM

## 2018-02-26 DIAGNOSIS — N39.0: ICD-10-CM

## 2018-02-26 DIAGNOSIS — D57.00: Primary | ICD-10-CM

## 2018-02-26 LAB
ALBUMIN SERPL-MCNC: 3.3 G/DL (ref 3.5–5)
ALBUMIN/GLOB SERPL: 0.7 {RATIO} (ref 1–2.1)
ALT SERPL-CCNC: 73 U/L (ref 9–52)
APTT BLD: 39 SECONDS (ref 21–34)
AST SERPL-CCNC: 97 U/L (ref 14–36)
BACTERIA #/AREA URNS HPF: (no result) /[HPF]
BASOPHILS # BLD AUTO: 0.3 K/UL (ref 0–0.2)
BASOPHILS NFR BLD: 1.3 % (ref 0–2)
BILIRUB UR-MCNC: NEGATIVE MG/DL
BUN SERPL-MCNC: 19 MG/DL (ref 7–17)
CALCIUM SERPL-MCNC: 7.4 MG/DL (ref 8.6–10.4)
EOSINOPHIL # BLD AUTO: 0.6 K/UL (ref 0–0.7)
EOSINOPHIL NFR BLD: 2.9 % (ref 0–4)
ERYTHROCYTE [DISTWIDTH] IN BLOOD BY AUTOMATED COUNT: 17 % (ref 11.5–14.5)
GFR NON-AFRICAN AMERICAN: 56
GLUCOSE UR STRIP-MCNC: NORMAL MG/DL
HCG,QUALITATIVE URINE: NEGATIVE
HGB BLD-MCNC: 6.5 G/DL (ref 11–16)
INR PPP: 1.3
LEUKOCYTE ESTERASE UR-ACNC: (no result) LEU/UL
LYMPHOCYTES # BLD AUTO: 4.2 K/UL (ref 1–4.3)
LYMPHOCYTES NFR BLD AUTO: 19.7 % (ref 20–40)
MCH RBC QN AUTO: 28.2 PG (ref 27–31)
MCHC RBC AUTO-ENTMCNC: 33.3 G/DL (ref 33–37)
MCV RBC AUTO: 84.7 FL (ref 81–99)
MONOCYTES # BLD: 2.2 K/UL (ref 0–0.8)
MONOCYTES NFR BLD: 10.2 % (ref 0–10)
NEUTROPHILS # BLD: 14.1 K/UL (ref 1.8–7)
NEUTROPHILS NFR BLD AUTO: 65.9 % (ref 50–75)
NRBC BLD AUTO-RTO: 0.1 % (ref 0–2)
PH UR STRIP: 6 [PH] (ref 5–8)
PLATELET # BLD: 246 K/UL (ref 130–400)
PMV BLD AUTO: 9 FL (ref 7.2–11.7)
PROT UR STRIP-MCNC: (no result) MG/DL
PROTHROMBIN TIME: 14.6 SECONDS (ref 9.7–12.2)
RBC # BLD AUTO: 2.3 MIL/UL (ref 3.8–5.2)
RBC # UR STRIP: (no result) /UL
SP GR UR STRIP: 1.01 (ref 1–1.03)
SQUAMOUS EPITHIAL: 12 /HPF (ref 0–5)
UROBILINOGEN UR-MCNC: NORMAL MG/DL (ref 0.2–1)
WBC # BLD AUTO: 21.4 K/UL (ref 4.8–10.8)

## 2018-02-26 RX ADMIN — DIPHENHYDRAMINE HYDROCHLORIDE PRN MG: 50 INJECTION INTRAMUSCULAR; INTRAVENOUS at 15:59

## 2018-02-26 RX ADMIN — ENOXAPARIN SODIUM SCH: 40 INJECTION SUBCUTANEOUS at 11:11

## 2018-02-26 RX ADMIN — DIPHENHYDRAMINE HYDROCHLORIDE PRN MG: 50 INJECTION INTRAMUSCULAR; INTRAVENOUS at 20:03

## 2018-02-26 RX ADMIN — DIPHENHYDRAMINE HYDROCHLORIDE PRN MG: 50 INJECTION INTRAMUSCULAR; INTRAVENOUS at 06:49

## 2018-02-26 RX ADMIN — DEXTROSE AND SODIUM CHLORIDE SCH MLS/HR: 5; 450 INJECTION, SOLUTION INTRAVENOUS at 04:17

## 2018-02-26 RX ADMIN — DIPHENHYDRAMINE HYDROCHLORIDE PRN MG: 50 INJECTION INTRAMUSCULAR; INTRAVENOUS at 11:16

## 2018-02-26 RX ADMIN — DEXTROSE AND SODIUM CHLORIDE SCH MLS/HR: 5; 450 INJECTION, SOLUTION INTRAVENOUS at 20:52

## 2018-02-26 NOTE — C.PDOC
History Of Present Illness





37 year old female with history of sickle cell disease presents to the ED 

complaining of generalized body aches and weakness x2 days. Patient reports 

that symptoms are typical of past sickle cell crises. Patient was admitted 10 

days ago for similar complaints. 


Chief Complaint (Nursing): Medical Clearance


History Per: Patient


History/Exam Limitations: no limitations


Onset/Duration Of Symptoms: Days


Current Symptoms Are (Timing): Still Present


Reports Recently: Hospitalized


Recent travel outside of the [place]: No





Past Medical History


Reviewed: Historical Data, Nursing Documentation, Vital Signs


Vital Signs: 


 Last Vital Signs











Temp  98.2 F   02/26/18 01:34


 


Pulse  92 H  02/26/18 02:42


 


Resp  18   02/26/18 02:42


 


BP  122/69   02/26/18 02:42


 


Pulse Ox  98   02/26/18 04:04














- Medical History


PMH: Anemia, Migraine, Sickle Cell Disease


   Denies: Alzheimer's Disease, Asthma, Atrial Fibrillation, Bronchitis, Cardia 

Arrhythmia, CHF, COPD, Dementia, Emphysema, HIV, HTN, Hypercholesterolemia, 

Hyperthyroidism, Hypothyroidism, Kidney Stones, Mitral Valve Prolapse, Multiple 

Sclerosis, Parkinson's Disease, Peripheral Edema, Pneumonia, Pulmonary Embolism

, Chronic Kidney Disease, Seizures, Sleep Apnea, TIA


Surgical History: Cholecystectomy (2004)


   Denies: Pacemaker





- [facility] Procedures








PACKED CELL TRANSFUSION (06/04/13)


TETANUS TOXOID ADMINIST (09/23/13)


TRANSFUSE NONAUT RED BLOOD CELLS IN PERIPH VEIN, PERC (02/15/18)








Family History: States: Unknown Family Hx





- Social History


Hx Tobacco Use: No


Hx Alcohol Use: No


Hx Substance Use: No





- Immunization History


Hx Tetanus Toxoid Vaccination: No


Hx Influenza Vaccination: Yes


Hx Pneumococcal Vaccination: Yes





Review Of Systems


Except As Marked, All Systems Reviewed And Found Negative.


Constitutional: Positive for: Weakness


Musculoskeletal: Positive for: Other (Generalized body aches )





Physical Exam





- Physical Exam


Appears: Non-toxic, No Acute Distress


Skin: Warm, Dry


Eye(s): bilateral: Conjunctiva Pale


Oral Mucosa: Moist


Throat: Normal


Neck: Normal, Normal ROM, Supple


Chest: Symmetrical


Cardiovascular: Rhythm Regular (Rate Regular )


Respiratory: Normal Breath Sounds, No Rales, No Rhonchi, No Wheezing


Gastrointestinal/Abdominal: Soft, No Tenderness


Back: Normal Inspection


Extremity: Normal ROM, No Deformity


Extremity: Bilateral: Atraumatic


Neurological/Psych: Oriented x3, Normal Speech


Gait: Steady





ED Course And Treatment





- Laboratory Results


Result Diagrams: 


 02/26/18 02:26





 02/26/18 03:24


O2 Sat by Pulse Oximetry: 98





Disposition


Discussed With : Shahab Etienne


Doctor Will See Patient In The: Hospital


Counseled Patient/Family Regarding: Diagnosis





- Disposition


Disposition: HOSPITALIZED


Disposition Time: 03:43


Condition: STABLE


Forms:  [facility] (English)





- POA


Present On Arrival: None





- Clinical Impression


Clinical Impression: 


 Sickle cell crisis, Severe anemia








- Scribe Statement


The provider has reviewed the documentation as recorded by the Scribe (Issac Engel)


Provider Attestation: 





All medical record entries made by the Scribe were at my direction and 

personally dictated by me. I have reviewed the chart and agree that the record 

accurately reflects my personal performance of the history, physical exam, 

medical decision making, and the department course for this patient. I have 

also personally directed, reviewed, and agree with the discharge instructions 

and disposition.

## 2018-02-26 NOTE — CP.PCM.HP
History of Present Illness





- History of Present Illness


History of Present Illness: 





38 yo F with PMH of Sickle cell disease, anemia, migraines presents to ED with c

/o generalized bodyaches and weakness since 2 days. Pt with similar complaints 

10 days prior and s/p recent admission for sickle cell crisis. Pt reports 

symptoms are similar to past episodes of sickle cell crises. Pt admitted for 

management of sickle cell crisis and also noted to have UTI on presentation. 

Denies fever, chills, dyspnea, n/v/d. 





Present on Admission





- Present on Admission


Any Indicators Present on Admission: No





Review of Systems





- Constitutional


Constitutional: Weakness





- Genitourinary


Genitourinary: Dysuria





- Musculoskeletal


Musculoskeletal: As Per HPI





Past Patient History





- Past Medical History & Family History


Past Medical History?: Yes





- Past Social History


Smoking Status: Never Smoked





- CARDIAC


Hx Atrial Fibrillation: No


Hx Cardia Arrhythmia: No


Hx Congestive Heart Failure: No


Hx Hypercholesterolemia: No


Hx Hypertension: No


Hx Mitral Valve Prolapse: No


Hx Pacemaker: No


Hx Peripheral Edema: No





- PULMONARY


Hx Asthma: No


Hx Bronchitis: No


Hx Chronic Obstructive Pulmonary Disease (COPD): No


Hx Emphysema: No


Hx Pneumonia: No


Hx Pulmonary Embolism: No


Hx Sleep Apnea: No





- NEUROLOGICAL


Hx Alzheimer's Disease: No


Hx Dementia: No


Hx Migraine: Yes


Hx Multiple Sclerosis: No


Hx Parkinson's Disease: No


Hx Seizures: No


Hx Transient Ischemic Attacks (TIA): No





- HEENT


Hx HEENT Problems: No


Hx Blind: No


Hx Cataracts: No


Hx Deafness: No


Hx Difficulty Chewing: No


Hx Epistaxis: No


Hx Glaucoma: No


Hx Macular Degeneration: No





- RENAL


Hx Chronic Kidney Disease: No


Hx Kidney Stones: No





- ENDOCRINE/METABOLIC


Hx Hyperthyroidism: No


Hx Hypothyroidism: No





- HEMATOLOGICAL/ONCOLOGICAL


Hx Anemia: Yes


Hx Human Immunodeficiency Virus (HIV): No


Hx Sickle Cell Disease: Yes





- MUSCULOSKELETAL/RHEUMATOLOGICAL


Hx Falls: No





- PSYCHIATRIC


Hx Substance Use: No





- SURGICAL HISTORY


Hx Cholecystectomy: Yes (2004)





- ANESTHESIA


Hx Anesthesia: Yes


Hx Anesthesia Reactions: No





Meds


Allergies/Adverse Reactions: 


 Allergies











Allergy/AdvReac Type Severity Reaction Status Date / Time


 


droperidol Allergy   Verified 02/26/18 01:43


 


tramadol Allergy   Verified 02/26/18 01:43


 


inapsine Allergy  RASH Uncoded 02/26/18 01:43














Physical Exam





- Constitutional


Appears: No Acute Distress





- Head Exam


Head Exam: ATRAUMATIC, NORMAL INSPECTION, NORMOCEPHALIC





- Eye Exam


Eye Exam: EOMI, Normal appearance, PERRL


Pupil Exam: NORMAL ACCOMODATION, PERRL





- ENT Exam


ENT Exam: Mucous Membranes Moist





- Neck Exam


Neck exam: Positive for: Full Rom





- Respiratory Exam


Respiratory Exam: Decreased Breath Sounds





- Cardiovascular Exam


Cardiovascular Exam: REGULAR RHYTHM, +S1, +S2





- GI/Abdominal Exam


GI & Abdominal Exam: Diminished Bowel Sounds, Soft





- Rectal Exam


Rectal Exam: Deferred





- Neurological Exam


Neurological exam: Alert, Oriented x3





Results





- Vital Signs


Recent Vital Signs: 





 Last Vital Signs











Temp  98.5 F   02/26/18 15:09


 


Pulse  95 H  02/26/18 15:09


 


Resp  18   02/26/18 15:09


 


BP  120/79   02/26/18 15:09


 


Pulse Ox  100   02/26/18 15:09














- Labs


Result Diagrams: 


 02/26/18 02:26





 02/26/18 03:24


Labs: 





 Laboratory Results - last 24 hr











  02/26/18 02/26/18 02/26/18





  02:26 02:26 02:26


 


WBC  21.4 H  


 


RBC  2.30 L  


 


Hgb  6.5 L*  


 


Hct  19.5 L  


 


MCV  84.7  


 


MCH  28.2  


 


MCHC  33.3  


 


RDW  17.0 H  


 


Plt Count  246  


 


MPV  9.0  


 


Neut % (Auto)  65.9  


 


Lymph % (Auto)  19.7 L  


 


Mono % (Auto)  10.2 H  


 


Eos % (Auto)  2.9  


 


Baso % (Auto)  1.3  


 


Neut # (Auto)  14.1 H  


 


Lymph # (Auto)  4.2  


 


Mono # (Auto)  2.2 H  


 


Eos # (Auto)  0.6  


 


Baso # (Auto)  0.3 H  


 


Retic Count   


 


PT   14.6 H 


 


INR   1.3 


 


APTT   39 H 


 


Sodium    Cancelled


 


Potassium    Cancelled


 


Chloride    Cancelled


 


Carbon Dioxide    Cancelled


 


Anion Gap    Cancelled


 


BUN    Cancelled


 


Creatinine    Cancelled


 


Est GFR ( Amer)    Cancelled


 


Est GFR (Non-Af Amer)    Cancelled


 


Random Glucose    Cancelled


 


Calcium    Cancelled


 


Total Bilirubin    Cancelled


 


AST    Cancelled


 


ALT    Cancelled


 


Alkaline Phosphatase    Cancelled


 


Total Protein    Cancelled


 


Albumin    Cancelled


 


Globulin    Cancelled


 


Albumin/Globulin Ratio    Cancelled


 


Urine Color   


 


Urine Clarity   


 


Urine pH   


 


Ur Specific Gravity   


 


Urine Protein   


 


Urine Glucose (UA)   


 


Urine Ketones   


 


Urine Blood   


 


Urine Nitrate   


 


Urine Bilirubin   


 


Urine Urobilinogen   


 


Ur Leukocyte Esterase   


 


Urine WBC (Auto)   


 


Urine RBC (Auto)   


 


Ur Squamous Epith Cells   


 


Urine Bacteria   


 


Urine HCG, Qual   


 


Blood Type   


 


Antibody Screen   














  02/26/18 02/26/18 02/26/18





  02:26 02:26 03:24


 


WBC   


 


RBC   


 


Hgb   


 


Hct   


 


MCV   


 


MCH   


 


MCHC   


 


RDW   


 


Plt Count   


 


MPV   


 


Neut % (Auto)   


 


Lymph % (Auto)   


 


Mono % (Auto)   


 


Eos % (Auto)   


 


Baso % (Auto)   


 


Neut # (Auto)   


 


Lymph # (Auto)   


 


Mono # (Auto)   


 


Eos # (Auto)   


 


Baso # (Auto)   


 


Retic Count  3.5 H D  


 


PT   


 


INR   


 


APTT   


 


Sodium   


 


Potassium   


 


Chloride   


 


Carbon Dioxide   


 


Anion Gap   


 


BUN   


 


Creatinine   


 


Est GFR ( Amer)   


 


Est GFR (Non-Af Amer)   


 


Random Glucose   


 


Calcium   


 


Total Bilirubin   


 


AST   


 


ALT   


 


Alkaline Phosphatase   


 


Total Protein   


 


Albumin   


 


Globulin   


 


Albumin/Globulin Ratio   


 


Urine Color    Yellow


 


Urine Clarity    Turbid


 


Urine pH    6.0


 


Ur Specific Gravity    1.010


 


Urine Protein    2+ H


 


Urine Glucose (UA)    Normal


 


Urine Ketones    Negative


 


Urine Blood    2+ H


 


Urine Nitrate    Positive H


 


Urine Bilirubin    Negative


 


Urine Urobilinogen    Normal


 


Ur Leukocyte Esterase    3+ H


 


Urine WBC (Auto)    4586 H


 


Urine RBC (Auto)    15 H


 


Ur Squamous Epith Cells    12 H


 


Urine Bacteria    Many H


 


Urine HCG, Qual    Negative


 


Blood Type   O POSITIVE 


 


Antibody Screen   Negative 














  02/26/18





  03:24


 


WBC 


 


RBC 


 


Hgb 


 


Hct 


 


MCV 


 


MCH 


 


MCHC 


 


RDW 


 


Plt Count 


 


MPV 


 


Neut % (Auto) 


 


Lymph % (Auto) 


 


Mono % (Auto) 


 


Eos % (Auto) 


 


Baso % (Auto) 


 


Neut # (Auto) 


 


Lymph # (Auto) 


 


Mono # (Auto) 


 


Eos # (Auto) 


 


Baso # (Auto) 


 


Retic Count 


 


PT 


 


INR 


 


APTT 


 


Sodium  136


 


Potassium  3.9


 


Chloride  109 H


 


Carbon Dioxide  18 L


 


Anion Gap  13


 


BUN  19 H


 


Creatinine  1.1


 


Est GFR ( Amer)  > 60


 


Est GFR (Non-Af Amer)  56


 


Random Glucose  88


 


Calcium  7.4 L


 


Total Bilirubin  1.7 H


 


AST  97 H D


 


ALT  73 H D


 


Alkaline Phosphatase  175 H


 


Total Protein  8.3


 


Albumin  3.3 L


 


Globulin  5.0 H


 


Albumin/Globulin Ratio  0.7 L


 


Urine Color 


 


Urine Clarity 


 


Urine pH 


 


Ur Specific Gravity 


 


Urine Protein 


 


Urine Glucose (UA) 


 


Urine Ketones 


 


Urine Blood 


 


Urine Nitrate 


 


Urine Bilirubin 


 


Urine Urobilinogen 


 


Ur Leukocyte Esterase 


 


Urine WBC (Auto) 


 


Urine RBC (Auto) 


 


Ur Squamous Epith Cells 


 


Urine Bacteria 


 


Urine HCG, Qual 


 


Blood Type 


 


Antibody Screen 














Assessment & Plan


(1) Severe anemia


Status: Acute   





(2) Sickle cell anemia with pain


Status: Acute   





(3) Animal bite wound


Status: Acute   





(4) Leg pain


Status: Acute   





(5) Leukocytosis


Status: Acute   





(6) Myalgia


Status: Acute   





(7) Pain


Status: Acute   





(8) Sickle cell anemia


Status: Acute   





(9) Sickle cell pain crisis


Status: Acute   





(10) UTI (urinary tract infection)


Status: Acute   





- Assessment and Plan (Free Text)


Plan: 





pain medicine


dilaudid


protonix


lovenox


IVF


monitor cbc


admitted for sickle cell crisis


pt also with UTI started on nitrofurantoin

## 2018-02-27 RX ADMIN — DEXTROSE AND SODIUM CHLORIDE SCH: 5; 450 INJECTION, SOLUTION INTRAVENOUS at 12:54

## 2018-02-27 RX ADMIN — DEXTROSE AND SODIUM CHLORIDE SCH MLS/HR: 5; 450 INJECTION, SOLUTION INTRAVENOUS at 16:50

## 2018-02-27 RX ADMIN — DIPHENHYDRAMINE HYDROCHLORIDE PRN MG: 50 INJECTION INTRAMUSCULAR; INTRAVENOUS at 18:15

## 2018-02-27 RX ADMIN — DIPHENHYDRAMINE HYDROCHLORIDE PRN MG: 50 INJECTION INTRAMUSCULAR; INTRAVENOUS at 07:58

## 2018-02-27 RX ADMIN — ENOXAPARIN SODIUM SCH: 40 INJECTION SUBCUTANEOUS at 09:13

## 2018-02-27 RX ADMIN — DIPHENHYDRAMINE HYDROCHLORIDE PRN MG: 50 INJECTION INTRAMUSCULAR; INTRAVENOUS at 04:23

## 2018-02-27 RX ADMIN — DIPHENHYDRAMINE HYDROCHLORIDE PRN MG: 50 INJECTION INTRAMUSCULAR; INTRAVENOUS at 12:04

## 2018-02-27 RX ADMIN — DIPHENHYDRAMINE HYDROCHLORIDE PRN MG: 50 INJECTION INTRAMUSCULAR; INTRAVENOUS at 00:35

## 2018-02-27 RX ADMIN — DIPHENHYDRAMINE HYDROCHLORIDE PRN MG: 50 INJECTION INTRAMUSCULAR; INTRAVENOUS at 21:18

## 2018-02-27 RX ADMIN — DIPHENHYDRAMINE HYDROCHLORIDE PRN MG: 50 INJECTION INTRAMUSCULAR; INTRAVENOUS at 15:09

## 2018-02-27 NOTE — CP.PCM.CON
History of Present Illness





- History of Present Illness


History of Present Illness: 


Infectious Disease Consult'


HPI;


36 yo F with PMH of Sickle cell disease, anemia, migraines presents to ED with c

/o generalized bodyaches and weakness since 2 days. Pt with similar complaints 

10 days prior and s/p recent admission for sickle cell crisis. Pt reports 

symptoms are similar to past episodes of sickle cell crises. Pt admitted for 

management of sickle cell crisis and also noted to have UTI on presentation. 

Denies fever, chills, dyspnea, n/v/d. 


ON ADMISSION PATIENT WAS FOUND TO HAVE LEUKOCYTOSIS WITH wbc COUNT OF 21.4, 

HEMOGLOBIN OF 6.5, AND RETICULOCYTE COUNT OF 3.5.


pATIENT IS PRESENTLY GETTING mACROBID 1 TABLET BY MOUTH TWICE A DAY.


U/A IS HAZY WITH 3+ LEUKOCYTE ESTRASE WBC  OF 4000 AND MORE AND MANY BACTERIA.


Patient admits to frequency and dysuria.DENIES ANY HISTORY OF KIDNEY STONES.





PMH: Anemia, Migraine, Sickle Cell Disease


   


Surgical History: Cholecystectomy (2004)


   Denies: Pacemaker


Allergy; droperidol, tramadol.





- [facility] Procedures








PACKED CELL TRANSFUSION (06/04/13)


TETANUS TOXOID ADMINIST (09/23/13)


TRANSFUSE NON AUT RED BLOOD CELLS IN PERIPH VEIN, PERC (02/15/18)








Family History: States: Unknown Family Hx





- Social History


Hx Tobacco Use: No


Hx Alcohol Use: No


Hx Substance Use: No





- Immunization History


Hx Tetanus Toxoid Vaccination: No


Hx Influenza Vaccination: Yes


Hx Pneumococcal Vaccination: Yes








Review of Systems





- Constitutional


Constitutional: As Per HPI.  absent: Chills, Fever, Headache





- EENT


Eyes: Photophobia.  absent: Discharge


Nose/Mouth/Throat: absent: Nasal Congestion, Mouth Lesions





- Breasts


Breasts: absent: Change in Shape





- Cardiovascular


Cardiovascular: absent: Chest Pain





- Respiratory


Respiratory: Cough, Wheezing





- Gastrointestinal


Gastrointestinal: Abdominal Pain (LOWER ABDOMINAL DISCOMFORT.).  absent: 

Constipation, Diarrhea





- Genitourinary


Genitourinary: Dysuria, Flank Pain, Freq UTI





- Musculoskeletal


Musculoskeletal: Arthralgias, Back Pain, Myalgias, Radiating Pain into Limb





- Neurological


Neurological: absent: Paresthesias





- Psychiatric


Psychiatric: Anxiety





- Hematologic/Lymphatic


Hematologic: As Per HPI.  absent: Lymphadenopathy





Past Patient History





- Past Medical History & Family History


Past Medical History?: Yes





- Past Social History


Smoking Status: Never Smoked





- CARDIAC


Hx Atrial Fibrillation: No


Hx Cardia Arrhythmia: No


Hx Congestive Heart Failure: No


Hx Hypercholesterolemia: No


Hx Hypertension: No


Hx Mitral Valve Prolapse: No


Hx Pacemaker: No


Hx Peripheral Edema: No





- PULMONARY


Hx Asthma: No


Hx Bronchitis: No


Hx Chronic Obstructive Pulmonary Disease (COPD): No


Hx Emphysema: No


Hx Pneumonia: No


Hx Pulmonary Embolism: No


Hx Sleep Apnea: No





- NEUROLOGICAL


Hx Alzheimer's Disease: No


Hx Dementia: No


Hx Migraine: Yes


Hx Multiple Sclerosis: No


Hx Parkinson's Disease: No


Hx Seizures: No


Hx Transient Ischemic Attacks (TIA): No





- HEENT


Hx HEENT Problems: No


Hx Blind: No


Hx Cataracts: No


Hx Deafness: No


Hx Difficulty Chewing: No


Hx Epistaxis: No


Hx Glaucoma: No


Hx Macular Degeneration: No





- RENAL


Hx Chronic Kidney Disease: No


Hx Kidney Stones: No





- ENDOCRINE/METABOLIC


Hx Hyperthyroidism: No


Hx Hypothyroidism: No





- HEMATOLOGICAL/ONCOLOGICAL


Hx Anemia: Yes


Hx Human Immunodeficiency Virus (HIV): No


Hx Sickle Cell Disease: Yes





- INTEGUMENTARY


Hx Dermatological Problems: No





- MUSCULOSKELETAL/RHEUMATOLOGICAL


Hx Falls: No





- GASTROINTESTINAL


Hx Gastrointestinal Disorders: No





- GENITOURINARY/GYNECOLOGICAL


Hx Genitourinary Disorders: No





- PSYCHIATRIC


Hx Substance Use: No





- SURGICAL HISTORY


Hx Surgeries: Yes


Hx Cholecystectomy: Yes (2004)





- ANESTHESIA


Hx Anesthesia: Yes


Hx Anesthesia Reactions: No


Hx Malignant Hyperthermia: No


Has any member of the family had a problem w/ anesthesia?: No





Meds


Allergies/Adverse Reactions: 


 Allergies











Allergy/AdvReac Type Severity Reaction Status Date / Time


 


droperidol Allergy   Verified 02/26/18 01:43


 


tramadol Allergy   Verified 02/26/18 01:43


 


inapsine Allergy  RASH Uncoded 02/26/18 01:43














- Medications


Medications: 


 Current Medications





Diphenhydramine HCl (Benadryl)  25 mg IVP Q3 PRN


   PRN Reason: Pain, moderate (4-7)


   Last Admin: 02/27/18 15:09 Dose:  25 mg


Enoxaparin Sodium (Lovenox)  40 mg SC DAILY [facility]


   Last Admin: 02/27/18 09:13 Dose:  Not Given


Folic Acid (Folic Acid)  2 mg PO DAILY [facility]


   Last Admin: 02/27/18 09:12 Dose:  2 mg


Hydromorphone HCl (Dilaudid)  2 mg IVP Q3 PRN


   PRN Reason: Pain, moderate (4-7)


   Last Admin: 02/27/18 15:09 Dose:  2 mg


Dextrose/Sodium Chloride (Dextrose 5%/0.45% Ns 1000 Ml)  1,000 mls @ 60 mls/hr 

IV .[id number] [facility]


   Last Admin: 02/27/18 16:50 Dose:  60 mls/hr


Cefepime HCl 1 gm/ Sodium (Chloride)  100 mls @ 100 mls/hr IVPB Q12H [facility]


   Last Admin: 02/27/18 16:51 Dose:  100 mls/hr











Physical Exam





- Constitutional


Appears: No Acute Distress





- Head Exam


Head Exam: NORMAL INSPECTION





- Eye Exam


Eye Exam: EOMI, PERRL





- ENT Exam


ENT Exam: Normal Oropharynx





- Neck Exam


Neck exam: Positive for: Normal Inspection





- Respiratory Exam


Respiratory Exam: Clear to Auscultation Bilateral, NORMAL BREATHING PATTERN





- Cardiovascular Exam


Cardiovascular Exam: Tachycardia, REGULAR RHYTHM, +S1, +S2





- GI/Abdominal Exam


GI & Abdominal Exam: Normal Bowel Sounds, Soft.  absent: Tenderness





- Extremities Exam


Extremities exam: Positive for: pedal pulses present.  Negative for: calf 

tenderness, pedal edema





- Neurological Exam


Neurological exam: Alert, CN II-XII Intact, Oriented x3, Reflexes Normal





- Psychiatric Exam


Psychiatric exam: Normal Mood





- Skin


Skin Exam: Pallor





Results





- Vital Signs


Recent Vital Signs: 


 Last Vital Signs











Temp  98.9 F   02/27/18 16:50


 


Pulse  111 H  02/27/18 16:50


 


Resp  20   02/27/18 16:50


 


BP  107/73   02/27/18 16:50


 


Pulse Ox  97   02/27/18 16:50














- Labs


Result Diagrams: 


 02/26/18 02:26





 02/26/18 03:24





Assessment & Plan


(1) UTI (urinary tract infection)


Status: Acute   





(2) Leukocytosis


Status: Acute   





(3) Severe anemia


Status: Acute   





(4) Sickle cell anemia with pain


Status: Acute   





- Assessment and Plan (Free Text)


Plan: 





PLAN;


PANCULTURES


UA/ URINE CULTURES


DC PO MACROBID.


START IV MERREM 1GM IVPB  MG EVERY 8 HOURLY.2/27/18


ANALGESICS AS PER PMD.


HEMATOLOGY EVALUATION FOR SEVERE ANEMIA.


FOLLOW CULTURES TO ADJUST ANTIBIOTICS.


THANK YOU VERY MUCH FOR ALLOWING ME TO PARTICIPATE IN THE CARE OF YOUR PATIENT.

## 2018-02-27 NOTE — CP.PCM.PN
Subjective





- Date & Time of Evaluation


Date of Evaluation: 02/27/18


Time of Evaluation: 09:00





- Subjective


Subjective: 


clinically same





Objective





- Vital Signs/Intake and Output


Vital Signs (last 24 hours): 


 











Temp Pulse Resp BP Pulse Ox


 


 98.9 F   111 H  20   107/73   97 


 


 02/27/18 16:50  02/27/18 16:50  02/27/18 16:50  02/27/18 16:50  02/27/18 16:50








Intake and Output: 


 











 02/27/18 02/27/18





 06:59 18:59


 


Intake Total  880


 


Balance  880














- Medications


Medications: 


 Current Medications





Diphenhydramine HCl (Benadryl)  25 mg IVP Q3 PRN


   PRN Reason: Pain, moderate (4-7)


   Last Admin: 02/27/18 15:09 Dose:  25 mg


Enoxaparin Sodium (Lovenox)  40 mg SC DAILY [facility]


   Last Admin: 02/27/18 09:13 Dose:  Not Given


Folic Acid (Folic Acid)  2 mg PO DAILY [facility]


   Last Admin: 02/27/18 09:12 Dose:  2 mg


Hydromorphone HCl (Dilaudid)  2 mg IVP Q3 PRN


   PRN Reason: Pain, moderate (4-7)


   Last Admin: 02/27/18 15:09 Dose:  2 mg


Dextrose/Sodium Chloride (Dextrose 5%/0.45% Ns 1000 Ml)  1,000 mls @ 60 mls/hr 

IV .[id number] [facility]


   Last Admin: 02/27/18 16:50 Dose:  60 mls/hr


Cefepime HCl 1 gm/ Sodium (Chloride)  100 mls @ 100 mls/hr IVPB Q12H [facility]


   Last Admin: 02/27/18 16:51 Dose:  100 mls/hr











- Labs


Labs: 


 





 02/26/18 02:26 





 02/26/18 03:24 





 











PT  14.6 SECONDS (9.7-12.2)  H  02/26/18  02:26    


 


INR  1.3   02/26/18  02:26    


 


APTT  39 SECONDS (21-34)  H  02/26/18  02:26    














- Constitutional


Appears: Well





- Head Exam


Head Exam: ATRAUMATIC, NORMAL INSPECTION, NORMOCEPHALIC





- Eye Exam


Eye Exam: EOMI, Normal appearance, PERRL


Pupil Exam: NORMAL ACCOMODATION, PERRL





- ENT Exam


ENT Exam: Mucous Membranes Moist, Normal Exam





- Neck Exam


Neck Exam: Full ROM, Normal Inspection.  absent: Lymphadenopathy





- Respiratory Exam


Respiratory Exam: Decreased Breath Sounds





- Cardiovascular Exam


Cardiovascular Exam: REGULAR RHYTHM, +S1, +S2





- GI/Abdominal Exam


GI & Abdominal Exam: Soft, Diminished Bowel Sounds





- Rectal Exam


Rectal Exam: Deferred





Assessment and Plan


(1) Severe anemia


Status: Acute   





(2) Sickle cell anemia with pain


Status: Acute   





(3) Animal bite wound


Status: Acute   





(4) Leg pain


Status: Acute   





(5) Leukocytosis


Status: Acute   





(6) Myalgia


Status: Acute   





(7) Pain


Status: Acute   





(8) Sickle cell anemia


Status: Acute   





(9) Sickle cell pain crisis


Status: Acute   





(10) UTI (urinary tract infection)


Status: Acute

## 2018-02-28 LAB
ERYTHROCYTE [DISTWIDTH] IN BLOOD BY AUTOMATED COUNT: 17.3 % (ref 11.5–14.5)
HGB BLD-MCNC: 5.8 G/DL (ref 11–16)
MCH RBC QN AUTO: 28.6 PG (ref 27–31)
MCHC RBC AUTO-ENTMCNC: 33.2 G/DL (ref 33–37)
MCV RBC AUTO: 86 FL (ref 81–99)
PLATELET # BLD: 223 K/UL (ref 130–400)
PMV BLD AUTO: 9.2 FL (ref 7.2–11.7)
RBC # BLD AUTO: 2.03 MIL/UL (ref 3.8–5.2)
WBC # BLD AUTO: 23 K/UL (ref 4.8–10.8)

## 2018-02-28 RX ADMIN — DIPHENHYDRAMINE HYDROCHLORIDE PRN MG: 50 INJECTION INTRAMUSCULAR; INTRAVENOUS at 00:16

## 2018-02-28 RX ADMIN — DIPHENHYDRAMINE HYDROCHLORIDE PRN MG: 50 INJECTION INTRAMUSCULAR; INTRAVENOUS at 21:22

## 2018-02-28 RX ADMIN — DIPHENHYDRAMINE HYDROCHLORIDE PRN MG: 50 INJECTION INTRAMUSCULAR; INTRAVENOUS at 15:16

## 2018-02-28 RX ADMIN — DEXTROSE AND SODIUM CHLORIDE SCH MLS/HR: 5; 450 INJECTION, SOLUTION INTRAVENOUS at 22:42

## 2018-02-28 RX ADMIN — DIPHENHYDRAMINE HYDROCHLORIDE PRN MG: 50 INJECTION INTRAMUSCULAR; INTRAVENOUS at 12:20

## 2018-02-28 RX ADMIN — DIPHENHYDRAMINE HYDROCHLORIDE PRN MG: 50 INJECTION INTRAMUSCULAR; INTRAVENOUS at 03:17

## 2018-02-28 RX ADMIN — DIPHENHYDRAMINE HYDROCHLORIDE PRN MG: 50 INJECTION INTRAMUSCULAR; INTRAVENOUS at 06:15

## 2018-02-28 RX ADMIN — ENOXAPARIN SODIUM SCH: 40 INJECTION SUBCUTANEOUS at 12:08

## 2018-02-28 RX ADMIN — DIPHENHYDRAMINE HYDROCHLORIDE PRN MG: 50 INJECTION INTRAMUSCULAR; INTRAVENOUS at 09:21

## 2018-02-28 RX ADMIN — DIPHENHYDRAMINE HYDROCHLORIDE PRN MG: 50 INJECTION INTRAMUSCULAR; INTRAVENOUS at 18:20

## 2018-02-28 RX ADMIN — DEXTROSE AND SODIUM CHLORIDE SCH: 5; 450 INJECTION, SOLUTION INTRAVENOUS at 06:13

## 2018-02-28 NOTE — CP.PCM.PN
Subjective





- Date & Time of Evaluation


Date of Evaluation: 02/28/18


Time of Evaluation: 21:57





- Subjective


Subjective: 





CHIEF COMPLAINTS TODAY :


AFEBRILE.


C/O generalized pain


Less dysuria.


tachycardic





Seen by hematology for sickle cell crisis.


and severe anemia h/h dropped to 5.8.








ROS.


HEENT :  N.


Resp :       No cough, wheezing ,pleuritic CP ,or hemoptysis 


Cardio :     No anginal  CP, PND, orthopnea, palpitation 


GI :           No abd.pain, n/v ,diarrhea or GI bleeding .


CNS : No headache, vertigo, focal deficit.


Musculoskel :  No joint swelling ,


Derm :        No rash 


Psych :     Normal affect.


Ext :  No  swelling ,calf pain 





PE.


Pt. is alert awake in no distress.


V.S  As noted in the chart 


Head ,ear nose,throat and eyes : Normal.


Neck : Supple with normal carotids.


Lungs: Clear air entry.


Heart : S1 & S2 normal with S4. No murmur.


Abd : Soft non tender with normal bowel sounds.


Neuro : Moves all ext. with no localized deficit.


Ext : No edema with intact pulses.Non tender calves 


Derm : No rashes or decubitus ulcer.





LABS/RADIOLOGY:


wbc 23.0 increasing


urine culture 2/26/18 +ve GNR.





ASSESSMENT.


LEUKOCYTOSIS /SEPSIS ? UROSEPSIS


SEVERE ANEMIA


SICKLE CELL CRISIS


GENERALIZED PAIN





/PLAN : 


CONTINUE iv MERREM 1 G EVERY 8  HOURLY 2/27/18


 ADD iv GENTAMICIN 130 MILLIGRAMS iv PIGGYBACK ONE DOSE 2/28/18


ANALGESICS


aS PER HEMATOLOGY PATIENT FOR BLOOD TRANSFUSION.





Objective





- Vital Signs/Intake and Output


Vital Signs (last 24 hours): 


 











Temp Pulse Resp BP Pulse Ox


 


 97.4 F L  101 H  20   112/65   96 


 


 02/28/18 15:15  02/28/18 15:15  02/28/18 15:15  02/28/18 15:15  02/28/18 15:15











- Medications


Medications: 


 Current Medications





Diphenhydramine HCl (Benadryl)  25 mg IVP Q3 PRN


   PRN Reason: Pain, moderate (4-7)


   Last Admin: 02/28/18 21:22 Dose:  25 mg


Enoxaparin Sodium (Lovenox)  40 mg SC DAILY [facility]


   Last Admin: 02/28/18 12:08 Dose:  Not Given


Folic Acid (Folic Acid)  2 mg PO DAILY [facility]


   Last Admin: 02/28/18 09:28 Dose:  2 mg


Hydromorphone HCl (Dilaudid)  2 mg IVP Q3 PRN


   PRN Reason: Pain, moderate (4-7)


   Last Admin: 02/28/18 21:23 Dose:  2 mg


Dextrose/Sodium Chloride (Dextrose 5%/0.45% Ns 1000 Ml)  1,000 mls @ 60 mls/hr 

IV .[id number] [facility]


   Last Admin: 02/28/18 06:13 Dose:  Not Given


Meropenem 1 gm/ Sodium (Chloride)  100 mls @ 100 mls/hr IVPB Q8 [facility]


   Last Admin: 02/28/18 21:22 Dose:  100 mls/hr











- Labs


Labs: 


 





 02/28/18 06:57 





 02/26/18 03:24 





 











PT  14.6 SECONDS (9.7-12.2)  H  02/26/18  02:26    


 


INR  1.3   02/26/18  02:26    


 


APTT  39 SECONDS (21-34)  H  02/26/18  02:26    














Assessment and Plan


(1) UTI (urinary tract infection)


Status: Acute   





(2) Leukocytosis


Status: Acute   





(3) Severe anemia


Status: Acute   





(4) Sickle cell anemia with pain


Status: Acute

## 2018-02-28 NOTE — CP.PCM.CON
History of Present Illness





- History of Present Illness


History of Present Illness: 





37 year old female with a history of sickle cell anemia, admitted with sickle 

cell pain crisis.  The patient reports to increasing diffuse bone pain which 

did not improve with her oral pain meds.  She denies fevers and chills.  She 

does report to progressive fatigue but no shortness of breath.  She denies 

abnormal bleeding and bruising.





Past medical history:  Sickle cell anemia





Past surgical history:  Portacath





Family history:  Parents have the trait.





Social history:  Denies tobacco, alcohol, and illicit drug use.





Allergies:  Droperidol, tramadol





Review of systems:  All remaining review of systems including HEENT, 

cardiovascular, respiratory, gastrointestinal, genitourinary, musculoskeletal, 

dermatologic, neurologic, and psychiatric are negative unless mentioned in the 

HPI.








Past Patient History





- Past Medical History & Family History


Past Medical History?: Yes





- Past Social History


Smoking Status: Never Smoked





- CARDIAC


Hx Atrial Fibrillation: No


Hx Cardia Arrhythmia: No


Hx Congestive Heart Failure: No


Hx Hypercholesterolemia: No


Hx Hypertension: No


Hx Mitral Valve Prolapse: No


Hx Pacemaker: No


Hx Peripheral Edema: No





- PULMONARY


Hx Asthma: No


Hx Bronchitis: No


Hx Chronic Obstructive Pulmonary Disease (COPD): No


Hx Emphysema: No


Hx Pneumonia: No


Hx Pulmonary Embolism: No


Hx Sleep Apnea: No





- NEUROLOGICAL


Hx Alzheimer's Disease: No


Hx Dementia: No


Hx Migraine: Yes


Hx Multiple Sclerosis: No


Hx Parkinson's Disease: No


Hx Seizures: No


Hx Transient Ischemic Attacks (TIA): No





- HEENT


Hx HEENT Problems: No


Hx Blind: No


Hx Cataracts: No


Hx Deafness: No


Hx Difficulty Chewing: No


Hx Epistaxis: No


Hx Glaucoma: No


Hx Macular Degeneration: No





- RENAL


Hx Chronic Kidney Disease: No


Hx Kidney Stones: No





- ENDOCRINE/METABOLIC


Hx Hyperthyroidism: No


Hx Hypothyroidism: No





- HEMATOLOGICAL/ONCOLOGICAL


Hx Anemia: Yes


Hx Human Immunodeficiency Virus (HIV): No


Hx Sickle Cell Disease: Yes





- INTEGUMENTARY


Hx Dermatological Problems: No





- MUSCULOSKELETAL/RHEUMATOLOGICAL


Hx Falls: No





- GASTROINTESTINAL


Hx Gastrointestinal Disorders: No





- GENITOURINARY/GYNECOLOGICAL


Hx Genitourinary Disorders: No





- PSYCHIATRIC


Hx Substance Use: No





- SURGICAL HISTORY


Hx Surgeries: Yes


Hx Cholecystectomy: Yes (2004)





- ANESTHESIA


Hx Anesthesia: Yes


Hx Anesthesia Reactions: No


Hx Malignant Hyperthermia: No


Has any member of the family had a problem w/ anesthesia?: No





Meds


Allergies/Adverse Reactions: 


 Allergies











Allergy/AdvReac Type Severity Reaction Status Date / Time


 


droperidol Allergy   Verified 02/26/18 01:43


 


tramadol Allergy   Verified 02/26/18 01:43


 


inapsine Allergy  RASH Uncoded 02/26/18 01:43














- Medications


Medications: 


 Current Medications





Diphenhydramine HCl (Benadryl)  25 mg IVP Q3 PRN


   PRN Reason: Pain, moderate (4-7)


   Last Admin: 02/28/18 12:20 Dose:  25 mg


Enoxaparin Sodium (Lovenox)  40 mg SC DAILY [facility]


   Last Admin: 02/28/18 12:08 Dose:  Not Given


Folic Acid (Folic Acid)  2 mg PO DAILY [facility]


   Last Admin: 02/28/18 09:28 Dose:  2 mg


Hydromorphone HCl (Dilaudid)  2 mg IVP Q3 PRN


   PRN Reason: Pain, moderate (4-7)


   Last Admin: 02/28/18 12:21 Dose:  2 mg


Dextrose/Sodium Chloride (Dextrose 5%/0.45% Ns 1000 Ml)  1,000 mls @ 60 mls/hr 

IV .[id number] [facility]


   Last Admin: 02/28/18 06:13 Dose:  Not Given


Meropenem 1 gm/ Sodium (Chloride)  100 mls @ 100 mls/hr IVPB Q8 [facility]


   Last Admin: 02/28/18 06:14 Dose:  100 mls/hr











Physical Exam





- Head Exam


Head Exam: ATRAUMATIC





- Eye Exam


Eye Exam: Normal appearance





- ENT Exam


ENT Exam: Mucous Membranes Dry





- Respiratory Exam


Respiratory Exam: NORMAL BREATHING PATTERN





- Cardiovascular Exam


Cardiovascular Exam: +S1, +S2





- GI/Abdominal Exam


GI & Abdominal Exam: Normal Bowel Sounds





- Extremities Exam


Extremities exam: Positive for: normal inspection





- Neurological Exam


Neurological exam: Oriented x3





- Psychiatric Exam


Psychiatric exam: Normal Affect, Normal Mood





- Skin


Skin Exam: Warm





Results





- Vital Signs


Recent Vital Signs: 


 Last Vital Signs











Temp  98.3 F   02/28/18 09:09


 


Pulse  97 H  02/28/18 09:09


 


Resp  20   02/28/18 09:09


 


BP  109/70   02/28/18 09:09


 


Pulse Ox  100   02/28/18 09:09














- Labs


Result Diagrams: 


 02/28/18 06:57





 02/26/18 03:24


Labs: 


 Laboratory Results - last 24 hr











  02/28/18





  06:57


 


WBC  23.0 H


 


RBC  2.03 L


 


Hgb  5.8 L*


 


Hct  17.5 L


 


MCV  86.0


 


MCH  28.6


 


MCHC  33.2


 


RDW  17.3 H


 


Plt Count  223


 


MPV  9.2


 


Differential Comment  














Assessment & Plan


(1) Sickle cell anemia with pain


Assessment and Plan: 


IV fluids, pain meds, folic acid, 02 via NC


recommend PRBC transfusion


Status: Acute   





(2) Leukocytosis


Assessment and Plan: 


on antibiotics


may have reactive component from sickle cell 


Status: Acute   





(3) Sickle cell anemia


Assessment and Plan: 


folic acid, outpatient f/u with primary hematologist





Thank you for this interesting consult.


Status: Acute

## 2018-03-01 LAB
ALBUMIN SERPL-MCNC: 3.2 G/DL (ref 3.5–5)
ALBUMIN/GLOB SERPL: 0.7 {RATIO} (ref 1–2.1)
ALT SERPL-CCNC: 69 U/L (ref 9–52)
AST SERPL-CCNC: 78 U/L (ref 14–36)
BASOPHILS # BLD AUTO: 0.2 K/UL (ref 0–0.2)
BASOPHILS NFR BLD: 1.1 % (ref 0–2)
BILIRUB DIRECT SERPL-MCNC: 0.4 MG/DL (ref 0–0.4)
BUN SERPL-MCNC: 18 MG/DL (ref 7–17)
CALCIUM SERPL-MCNC: 7.7 MG/DL (ref 8.6–10.4)
EOSINOPHIL # BLD AUTO: 0.9 K/UL (ref 0–0.7)
EOSINOPHIL NFR BLD: 4.3 % (ref 0–4)
ERYTHROCYTE [DISTWIDTH] IN BLOOD BY AUTOMATED COUNT: 17.3 % (ref 11.5–14.5)
GFR NON-AFRICAN AMERICAN: 56
HGB BLD-MCNC: 5.4 G/DL (ref 11–16)
LYMPHOCYTES # BLD AUTO: 4.5 K/UL (ref 1–4.3)
LYMPHOCYTES NFR BLD AUTO: 21.6 % (ref 20–40)
MCH RBC QN AUTO: 28.4 PG (ref 27–31)
MCHC RBC AUTO-ENTMCNC: 32.9 G/DL (ref 33–37)
MCV RBC AUTO: 86.2 FL (ref 81–99)
MONOCYTES # BLD: 1.7 K/UL (ref 0–0.8)
MONOCYTES NFR BLD: 8.2 % (ref 0–10)
NEUTROPHILS # BLD: 13.4 K/UL (ref 1.8–7)
NEUTROPHILS NFR BLD AUTO: 64.8 % (ref 50–75)
NRBC BLD AUTO-RTO: 0.5 % (ref 0–2)
PLATELET # BLD: 205 K/UL (ref 130–400)
PMV BLD AUTO: 9.1 FL (ref 7.2–11.7)
RBC # BLD AUTO: 1.9 MIL/UL (ref 3.8–5.2)
WBC # BLD AUTO: 20.8 K/UL (ref 4.8–10.8)

## 2018-03-01 RX ADMIN — DIPHENHYDRAMINE HYDROCHLORIDE PRN MG: 50 INJECTION INTRAMUSCULAR; INTRAVENOUS at 19:39

## 2018-03-01 RX ADMIN — DIPHENHYDRAMINE HYDROCHLORIDE PRN MG: 50 INJECTION INTRAMUSCULAR; INTRAVENOUS at 06:50

## 2018-03-01 RX ADMIN — DIPHENHYDRAMINE HYDROCHLORIDE PRN MG: 50 INJECTION INTRAMUSCULAR; INTRAVENOUS at 13:03

## 2018-03-01 RX ADMIN — ENOXAPARIN SODIUM SCH: 40 INJECTION SUBCUTANEOUS at 10:03

## 2018-03-01 RX ADMIN — DIPHENHYDRAMINE HYDROCHLORIDE PRN MG: 50 INJECTION INTRAMUSCULAR; INTRAVENOUS at 00:35

## 2018-03-01 RX ADMIN — DIPHENHYDRAMINE HYDROCHLORIDE PRN MG: 50 INJECTION INTRAMUSCULAR; INTRAVENOUS at 22:41

## 2018-03-01 RX ADMIN — DIPHENHYDRAMINE HYDROCHLORIDE PRN MG: 50 INJECTION INTRAMUSCULAR; INTRAVENOUS at 09:40

## 2018-03-01 RX ADMIN — DIPHENHYDRAMINE HYDROCHLORIDE PRN MG: 50 INJECTION INTRAMUSCULAR; INTRAVENOUS at 16:12

## 2018-03-01 RX ADMIN — DIPHENHYDRAMINE HYDROCHLORIDE PRN MG: 50 INJECTION INTRAMUSCULAR; INTRAVENOUS at 03:45

## 2018-03-01 RX ADMIN — DEXTROSE AND SODIUM CHLORIDE SCH MLS/HR: 5; 450 INJECTION, SOLUTION INTRAVENOUS at 22:41

## 2018-03-01 NOTE — CP.PCM.PN
Subjective





- Date & Time of Evaluation


Date of Evaluation: 03/01/18


Time of Evaluation: 23:48





- Subjective


Subjective: 





CHIEF COMPLAINTS TODAY :


AFEBRILE.


C/O generalized pain


Less dysuria








ROS.


HEENT :  N.


Resp :       No cough, wheezing ,pleuritic CP ,or hemoptysis 


Cardio :     No anginal  CP, PND, orthopnea, palpitation 


GI :           No abd.pain, n/v ,diarrhea or GI bleeding .


CNS : No headache, vertigo, focal deficit.


Musculoskel :  No joint swelling ,


Derm :        No rash 


Psych :     Normal affect.


Ext :  No  swelling ,calf pain 





PE.


Pt. is alert awake in no distress.


V.S  As noted in the chart 


Head ,ear nose,throat and eyes : Normal.


Neck : Supple with normal carotids.


Lungs: Clear air entry.


Heart : S1 & S2 normal with S4. No murmur.


Abd : Soft non tender with normal bowel sounds.


Neuro : Moves all ext. with no localized deficit.


Ext : No edema with intact pulses.Non tender calves 


Derm : No rashes or decubitus ulcer.





LABS/RADIOLOGY:


wbc 20.8  H/H 5.4


RETIC 6.3


urine culture 2/26/18 +ve GNR.-P IDENTIFICATION





ASSESSMENT.


LEUKOCYTOSIS /SEPSIS ? UROSEPSIS


SEVERE ANEMIA


SICKLE CELL CRISIS


GENERALIZED PAIN





/PLAN : 


CONTINUE iv MERREM 1 G EVERY 8  HOURLY 2/27/18


 GOT  iv GENTAMICIN 130 MILLIGRAMS iv PIGGYBACK ONE DOSE 2/28/18


F/U CULTURES TO ADJUST ABX.


ANALGESICS


aS PER HEMATOLOGY.





Objective





- Vital Signs/Intake and Output


Vital Signs (last 24 hours): 


 











Temp Pulse Resp BP Pulse Ox


 


 98.4 F   101 H  20   115/66   97 


 


 03/01/18 18:34  03/01/18 18:34  03/01/18 18:34  03/01/18 18:34  03/01/18 18:34








Intake and Output: 


 











 03/01/18 03/02/18





 18:59 06:59


 


Intake Total  770


 


Balance  770














- Medications


Medications: 


 Current Medications





Diphenhydramine HCl (Benadryl)  25 mg IVP Q3 PRN


   PRN Reason: Pain, moderate (4-7)


   Last Admin: 03/01/18 22:41 Dose:  25 mg


Enoxaparin Sodium (Lovenox)  40 mg SC DAILY [facility]


   Last Admin: 03/01/18 10:03 Dose:  Not Given


Folic Acid (Folic Acid)  2 mg PO DAILY [facility]


   Last Admin: 03/01/18 10:01 Dose:  2 mg


Hydromorphone HCl (Dilaudid)  2 mg IVP Q3 PRN


   PRN Reason: Pain, moderate (4-7)


   Last Admin: 03/01/18 22:42 Dose:  2 mg


Dextrose/Sodium Chloride (Dextrose 5%/0.45% Ns 1000 Ml)  1,000 mls @ 60 mls/hr 

IV .[id number] [facility]


   Last Admin: 03/01/18 22:41 Dose:  60 mls/hr


Meropenem 1 gm/ Sodium (Chloride)  100 mls @ 100 mls/hr IVPB Q8 [facility]


   Last Admin: 03/01/18 21:07 Dose:  100 mls/hr











- Labs


Labs: 


 





 03/01/18 07:16 





 03/01/18 07:16 





 











PT  14.6 SECONDS (9.7-12.2)  H  02/26/18  02:26    


 


INR  1.3   02/26/18  02:26    


 


APTT  39 SECONDS (21-34)  H  02/26/18  02:26    














Assessment and Plan


(1) UTI (urinary tract infection)


Status: Acute   





(2) Leukocytosis


Status: Acute   





(3) Severe anemia


Status: Acute   





(4) Sickle cell anemia with pain


Status: Acute

## 2018-03-01 NOTE — CP.PCM.PN
Subjective





- Date & Time of Evaluation


Date of Evaluation: 03/01/18


Time of Evaluation: 12:30





- Subjective


Subjective: 





Has pain in legs





Objective





- Vital Signs/Intake and Output


Vital Signs (last 24 hours): 


 











Temp Pulse Resp BP Pulse Ox


 


 98.4 F   101 H  20   115/66   97 


 


 03/01/18 18:34  03/01/18 18:34  03/01/18 18:34  03/01/18 18:34  03/01/18 18:34











- Medications


Medications: 


 Current Medications





Diphenhydramine HCl (Benadryl)  25 mg IVP Q3 PRN


   PRN Reason: Pain, moderate (4-7)


   Last Admin: 03/01/18 19:39 Dose:  25 mg


Enoxaparin Sodium (Lovenox)  40 mg SC DAILY [facility]


   Last Admin: 03/01/18 10:03 Dose:  Not Given


Folic Acid (Folic Acid)  2 mg PO DAILY [facility]


   Last Admin: 03/01/18 10:01 Dose:  2 mg


Hydromorphone HCl (Dilaudid)  2 mg IVP Q3 PRN


   PRN Reason: Pain, moderate (4-7)


   Last Admin: 03/01/18 19:40 Dose:  2 mg


Dextrose/Sodium Chloride (Dextrose 5%/0.45% Ns 1000 Ml)  1,000 mls @ 60 mls/hr 

IV .[id number] [facility]


   Last Admin: 02/28/18 22:42 Dose:  60 mls/hr


Meropenem 1 gm/ Sodium (Chloride)  100 mls @ 100 mls/hr IVPB Q8 [facility]


   Last Admin: 03/01/18 21:07 Dose:  100 mls/hr











- Labs


Labs: 


 





 03/01/18 07:16 





 03/01/18 07:16 





 











PT  14.6 SECONDS (9.7-12.2)  H  02/26/18  02:26    


 


INR  1.3   02/26/18  02:26    


 


APTT  39 SECONDS (21-34)  H  02/26/18  02:26    














- Head Exam


Head Exam: ATRAUMATIC





- Eye Exam


Eye Exam: Normal appearance





- ENT Exam


ENT Exam: Mucous Membranes Dry





- Respiratory Exam


Respiratory Exam: NORMAL BREATHING PATTERN





- Cardiovascular Exam


Cardiovascular Exam: +S1, +S2





- GI/Abdominal Exam


GI & Abdominal Exam: Normal Bowel Sounds





- Extremities Exam


Extremities Exam: Normal Inspection





Assessment and Plan


(1) Sickle cell anemia with pain


Assessment & Plan: 


IV fluids, pain meds, folic acid, 02 via NC


recommend transfusion support


Status: Acute   





(2) Leukocytosis


Assessment & Plan: 


on antibiotics


Status: Acute   





(3) Sickle cell anemia


Assessment & Plan: 


outpatient folic acid


Status: Acute

## 2018-03-01 NOTE — CP.PCM.PN
Subjective





- Date & Time of Evaluation


Date of Evaluation: 03/01/18


Time of Evaluation: 07:50





- Subjective


Subjective: 


clinically same





Objective





- Vital Signs/Intake and Output


Vital Signs (last 24 hours): 


 











Temp Pulse Resp BP Pulse Ox


 


 98.9 F   88   20   120/60   99 


 


 03/01/18 09:02  03/01/18 09:02  03/01/18 09:02  03/01/18 09:02  03/01/18 09:02








Intake and Output: 


 











 03/01/18 03/01/18





 06:59 18:59


 


Intake Total 1630 


 


Balance 1630 














- Medications


Medications: 


 Current Medications





Diphenhydramine HCl (Benadryl)  25 mg IVP Q3 PRN


   PRN Reason: Pain, moderate (4-7)


   Last Admin: 03/01/18 13:03 Dose:  25 mg


Enoxaparin Sodium (Lovenox)  40 mg SC DAILY [facility]


   Last Admin: 03/01/18 10:03 Dose:  Not Given


Folic Acid (Folic Acid)  2 mg PO DAILY [facility]


   Last Admin: 03/01/18 10:01 Dose:  2 mg


Hydromorphone HCl (Dilaudid)  2 mg IVP Q3 PRN


   PRN Reason: Pain, moderate (4-7)


   Last Admin: 03/01/18 13:03 Dose:  2 mg


Dextrose/Sodium Chloride (Dextrose 5%/0.45% Ns 1000 Ml)  1,000 mls @ 60 mls/hr 

IV .[id number] [facility]


   Last Admin: 02/28/18 22:42 Dose:  60 mls/hr


Meropenem 1 gm/ Sodium (Chloride)  100 mls @ 100 mls/hr IVPB Q8 [facility]


   Last Admin: 03/01/18 13:10 Dose:  100 mls/hr











- Labs


Labs: 


 





 03/01/18 07:16 





 03/01/18 07:16 





 











PT  14.6 SECONDS (9.7-12.2)  H  02/26/18  02:26    


 


INR  1.3   02/26/18  02:26    


 


APTT  39 SECONDS (21-34)  H  02/26/18  02:26    














- Constitutional


Appears: Well





- Head Exam


Head Exam: ATRAUMATIC, NORMAL INSPECTION, NORMOCEPHALIC





- Eye Exam


Eye Exam: EOMI, Normal appearance, PERRL


Pupil Exam: NORMAL ACCOMODATION, PERRL





- ENT Exam


ENT Exam: Mucous Membranes Moist, Normal Exam





- Neck Exam


Neck Exam: Full ROM, Normal Inspection.  absent: Lymphadenopathy





- Respiratory Exam


Respiratory Exam: Decreased Breath Sounds





- Cardiovascular Exam


Cardiovascular Exam: REGULAR RHYTHM, +S1, +S2





- GI/Abdominal Exam


GI & Abdominal Exam: Soft, Diminished Bowel Sounds





- Rectal Exam


Rectal Exam: Deferred





Assessment and Plan


(1) Severe anemia


Status: Acute   





(2) Sickle cell anemia with pain


Status: Acute   





(3) Animal bite wound


Status: Acute   





(4) Leg pain


Status: Acute   





(5) Leukocytosis


Status: Acute   





(6) Myalgia


Status: Acute   





(7) Pain


Status: Acute   





(8) Sickle cell anemia


Status: Acute   





(9) Sickle cell pain crisis


Status: Acute   





(10) UTI (urinary tract infection)


Status: Acute

## 2018-03-02 LAB
ALBUMIN SERPL-MCNC: 3.6 G/DL (ref 3.5–5)
ALBUMIN/GLOB SERPL: 0.7 {RATIO} (ref 1–2.1)
ALT SERPL-CCNC: 62 U/L (ref 9–52)
AST SERPL-CCNC: 80 U/L (ref 14–36)
BASOPHILS # BLD AUTO: 0.3 K/UL (ref 0–0.2)
BASOPHILS NFR BLD: 1.2 % (ref 0–2)
BUN SERPL-MCNC: 19 MG/DL (ref 7–17)
CALCIUM SERPL-MCNC: 7.9 MG/DL (ref 8.6–10.4)
EOSINOPHIL # BLD AUTO: 1.3 K/UL (ref 0–0.7)
EOSINOPHIL NFR BLD: 6 % (ref 0–4)
ERYTHROCYTE [DISTWIDTH] IN BLOOD BY AUTOMATED COUNT: 17.2 % (ref 11.5–14.5)
GFR NON-AFRICAN AMERICAN: 50
HGB BLD-MCNC: 5.7 G/DL (ref 11–16)
LYMPHOCYTES # BLD AUTO: 5.1 K/UL (ref 1–4.3)
LYMPHOCYTES NFR BLD AUTO: 23.5 % (ref 20–40)
MCH RBC QN AUTO: 29.6 PG (ref 27–31)
MCHC RBC AUTO-ENTMCNC: 34.4 G/DL (ref 33–37)
MCV RBC AUTO: 86 FL (ref 81–99)
MONOCYTES # BLD: 1.7 K/UL (ref 0–0.8)
MONOCYTES NFR BLD: 7.6 % (ref 0–10)
NEUTROPHILS # BLD: 13.4 K/UL (ref 1.8–7)
NEUTROPHILS NFR BLD AUTO: 61.7 % (ref 50–75)
NRBC BLD AUTO-RTO: 0.3 % (ref 0–2)
PLATELET # BLD: 217 K/UL (ref 130–400)
PMV BLD AUTO: 9.1 FL (ref 7.2–11.7)
RBC # BLD AUTO: 1.91 MIL/UL (ref 3.8–5.2)
WBC # BLD AUTO: 21.7 K/UL (ref 4.8–10.8)

## 2018-03-02 RX ADMIN — DIPHENHYDRAMINE HYDROCHLORIDE PRN MG: 50 INJECTION INTRAMUSCULAR; INTRAVENOUS at 04:40

## 2018-03-02 RX ADMIN — DIPHENHYDRAMINE HYDROCHLORIDE PRN MG: 50 INJECTION INTRAMUSCULAR; INTRAVENOUS at 20:57

## 2018-03-02 RX ADMIN — DIPHENHYDRAMINE HYDROCHLORIDE PRN MG: 50 INJECTION INTRAMUSCULAR; INTRAVENOUS at 07:40

## 2018-03-02 RX ADMIN — ENOXAPARIN SODIUM SCH: 40 INJECTION SUBCUTANEOUS at 09:44

## 2018-03-02 RX ADMIN — DIPHENHYDRAMINE HYDROCHLORIDE PRN MG: 50 INJECTION INTRAMUSCULAR; INTRAVENOUS at 11:40

## 2018-03-02 RX ADMIN — DEXTROSE AND SODIUM CHLORIDE SCH MLS/HR: 5; 450 INJECTION, SOLUTION INTRAVENOUS at 23:50

## 2018-03-02 RX ADMIN — DIPHENHYDRAMINE HYDROCHLORIDE PRN MG: 50 INJECTION INTRAMUSCULAR; INTRAVENOUS at 23:50

## 2018-03-02 RX ADMIN — DEXTROSE AND SODIUM CHLORIDE SCH: 5; 450 INJECTION, SOLUTION INTRAVENOUS at 08:43

## 2018-03-02 RX ADMIN — DIPHENHYDRAMINE HYDROCHLORIDE PRN MG: 50 INJECTION INTRAMUSCULAR; INTRAVENOUS at 01:40

## 2018-03-02 RX ADMIN — DIPHENHYDRAMINE HYDROCHLORIDE PRN MG: 50 INJECTION INTRAMUSCULAR; INTRAVENOUS at 14:40

## 2018-03-02 RX ADMIN — DIPHENHYDRAMINE HYDROCHLORIDE PRN MG: 50 INJECTION INTRAMUSCULAR; INTRAVENOUS at 17:48

## 2018-03-02 NOTE — CP.PCM.PN
Subjective





- Date & Time of Evaluation


Date of Evaluation: 03/02/18


Time of Evaluation: 13:00





- Subjective


Subjective: 





Has pain.





Objective





- Vital Signs/Intake and Output


Vital Signs (last 24 hours): 


 











Temp Pulse Resp BP Pulse Ox


 


 98.1 F   105 H  20   107/73   99 


 


 03/02/18 16:00  03/02/18 16:00  03/02/18 16:00  03/02/18 16:00  03/02/18 16:00








Intake and Output: 


 











 03/02/18 03/03/18





 18:59 06:59


 


Intake Total 1240 


 


Balance 1240 














- Medications


Medications: 


 Current Medications





Diphenhydramine HCl (Benadryl)  25 mg IVP Q3 PRN


   PRN Reason: Pain, moderate (4-7)


   Last Admin: 03/02/18 20:57 Dose:  25 mg


Enoxaparin Sodium (Lovenox)  40 mg SC DAILY [facility]


   Last Admin: 03/02/18 09:44 Dose:  Not Given


Folic Acid (Folic Acid)  2 mg PO DAILY [facility]


   Last Admin: 03/02/18 09:44 Dose:  2 mg


Hydromorphone HCl (Dilaudid)  2 mg IVP Q3 PRN


   PRN Reason: Pain, moderate (4-7)


   Last Admin: 03/02/18 20:57 Dose:  2 mg


Dextrose/Sodium Chloride (Dextrose 5%/0.45% Ns 1000 Ml)  1,000 mls @ 60 mls/hr 

IV .[id number] [facility]


   Last Admin: 03/02/18 08:43 Dose:  Not Given


Meropenem 1 gm/ Sodium (Chloride)  100 mls @ 100 mls/hr IVPB Q8 [facility]


   Last Admin: 03/02/18 21:01 Dose:  100 mls/hr


Gentamicin Sulfate 80 mg/ (Sodium Chloride)  102 mls @ 100 mls/hr IVPB Q24H [facility]


   Stop: 03/03/18 14:32


   Last Admin: 03/02/18 13:56 Dose:  100 mls/hr











- Labs


Labs: 


 





 03/02/18 07:47 





 03/02/18 07:47 





 











PT  14.6 SECONDS (9.7-12.2)  H  02/26/18  02:26    


 


INR  1.3   02/26/18  02:26    


 


APTT  39 SECONDS (21-34)  H  02/26/18  02:26    














- Head Exam


Head Exam: ATRAUMATIC





- Eye Exam


Eye Exam: Normal appearance





- ENT Exam


ENT Exam: Mucous Membranes Dry





- Respiratory Exam


Respiratory Exam: NORMAL BREATHING PATTERN





- Cardiovascular Exam


Cardiovascular Exam: +S1, +S2





- GI/Abdominal Exam


GI & Abdominal Exam: Normal Bowel Sounds





Assessment and Plan


(1) Sickle cell anemia with pain


Assessment & Plan: 


IV fluids, pain meds, folic acid, 02 via NC


Status: Acute   





(2) Leukocytosis


Assessment & Plan: 


on antibiotics


likely reactive component from sickle cell


Status: Acute   





(3) Sickle cell anemia


Assessment & Plan: 


folic acid


Status: Acute

## 2018-03-02 NOTE — [email or web address]
Subjective





- Date & Time of Evaluation


Date of Evaluation: 03/02/18


Time of Evaluation: 07:20





- Subjective


Subjective: 


clinically same





Objective





- Vital Signs/Intake and Output


Vital Signs (last 24 hours): 


 











Temp Pulse Resp BP Pulse Ox


 


 98.4 F   110 H  22   117/74   97 


 


 03/02/18 08:00  03/02/18 08:00  03/02/18 08:00  03/02/18 08:00  03/02/18 08:00








Intake and Output: 


 











 03/02/18 03/02/18





 06:59 18:59


 


Intake Total 1850 1240


 


Balance 1850 1240














- Medications


Medications: 


 Current Medications





Diphenhydramine HCl (Benadryl)  25 mg IVP Q3 PRN


   PRN Reason: Pain, moderate (4-7)


   Last Admin: 03/02/18 14:40 Dose:  25 mg


Enoxaparin Sodium (Lovenox)  40 mg SC DAILY [facility]


   Last Admin: 03/02/18 09:44 Dose:  Not Given


Folic Acid (Folic Acid)  2 mg PO DAILY [facility]


   Last Admin: 03/02/18 09:44 Dose:  2 mg


Hydromorphone HCl (Dilaudid)  2 mg IVP Q3 PRN


   PRN Reason: Pain, moderate (4-7)


   Last Admin: 03/02/18 14:40 Dose:  2 mg


Dextrose/Sodium Chloride (Dextrose 5%/0.45% Ns 1000 Ml)  1,000 mls @ 60 mls/hr 

IV .[id number] [facility]


   Last Admin: 03/02/18 08:43 Dose:  Not Given


Meropenem 1 gm/ Sodium (Chloride)  100 mls @ 100 mls/hr IVPB Q8 [facility]


   Last Admin: 03/02/18 13:53 Dose:  100 mls/hr


Gentamicin Sulfate 80 mg/ (Sodium Chloride)  102 mls @ 100 mls/hr IVPB Q24H [facility]


   Stop: 03/03/18 14:32


   Last Admin: 03/02/18 13:56 Dose:  100 mls/hr











- Labs


Labs: 


 





 03/02/18 07:47 





 03/02/18 07:47 





 











PT  14.6 SECONDS (9.7-12.2)  H  02/26/18  02:26    


 


INR  1.3   02/26/18  02:26    


 


APTT  39 SECONDS (21-34)  H  02/26/18  02:26    














- Constitutional


Appears: Well





- Head Exam


Head Exam: ATRAUMATIC, NORMAL INSPECTION, NORMOCEPHALIC





- Eye Exam


Eye Exam: EOMI, Normal appearance, PERRL


Pupil Exam: NORMAL ACCOMODATION, PERRL





- ENT Exam


ENT Exam: Mucous Membranes Moist, Normal Exam





- Neck Exam


Neck Exam: Full ROM, Normal Inspection.  absent: Lymphadenopathy





- Respiratory Exam


Respiratory Exam: Decreased Breath Sounds





- Cardiovascular Exam


Cardiovascular Exam: REGULAR RHYTHM, +S1, +S2





- GI/Abdominal Exam


GI & Abdominal Exam: Soft, Diminished Bowel Sounds





- Rectal Exam


Rectal Exam: Deferred





Assessment and Plan


(1) Severe anemia


Status: Acute   





(2) Sickle cell anemia with pain


Status: Acute   





(3) Animal bite wound


Status: Acute   





(4) Leg pain


Status: Acute   





(5) Leukocytosis


Status: Acute   





(6) Myalgia


Status: Acute   





(7) Pain


Status: Acute   





(8) Sickle cell anemia


Status: Acute   





(9) Sickle cell pain crisis


Status: Acute   





(10) UTI (urinary tract infection)


Status: Acute

## 2018-03-02 NOTE — CP.PCM.PN
Subjective





- Date & Time of Evaluation


Date of Evaluation: 03/02/18


Time of Evaluation: 12:40





- Subjective


Subjective: 





CHIEF COMPLAINTS TODAY :


AFEBRILE.


C/O generalized pain


Less dysuria








ROS.


HEENT :  N.


Resp :       No cough, wheezing ,pleuritic CP ,or hemoptysis 


Cardio :     No anginal  CP, PND, orthopnea, palpitation 


GI :           No abd.pain, n/v ,diarrhea or GI bleeding .


CNS : No headache, vertigo, focal deficit.


Musculoskel :  No joint swelling ,


Derm :        No rash 


Psych :     Normal affect.


Ext :  No  swelling ,calf pain 





PE.


Pt. is alert awake in no distress.


V.S  As noted in the chart 


Head ,ear nose,throat and eyes : Normal.


Neck : Supple with normal carotids.


Lungs: Clear air entry.


Heart : S1 & S2 normal with S4. No murmur.


Abd : Soft non tender with normal bowel sounds.


Neuro : Moves all ext. with no localized deficit.


Ext : No edema with intact pulses.Non tender calves 


Derm : No rashes or decubitus ulcer.





LABS/RADIOLOGY:


wbc 20.8  H/H 5.4


RETIC 6.3


urine culture 2/26/18 +veESBL positive Escherichia coli s-2 Merrem and 

gentamicin.





ASSESSMENT.


LEUKOCYTOSIS /SEPSIS ? UROSEPSIS


SEVERE ANEMIA


SICKLE CELL CRISIS


GENERALIZED PAIN





/PLAN : 


CONTINUE iv MERREM 1 G EVERY 8  HOURLY 2/27/18


 GOT  iv GENTAMICIN 130 MILLIGRAMS iv PIGGYBACK ONE DOSE 2/28/18


add iv gentamicin  80 mg IV piggyback to 24 hourly 2 doses for synergy 3/2/18, 

3/3/18.


F/U RENAL FUNCTION CLOSELY.


ANALGESICS


aS PER HEMATOLOGY.








Objective





- Vital Signs/Intake and Output


Vital Signs (last 24 hours): 


 











Temp Pulse Resp BP Pulse Ox


 


 98.4 F   110 H  22   117/74   97 


 


 03/02/18 08:00  03/02/18 08:00  03/02/18 08:00  03/02/18 08:00  03/02/18 08:00








Intake and Output: 


 











 03/02/18 03/02/18





 06:59 18:59


 


Intake Total 1850 


 


Balance 1850 














- Medications


Medications: 


 Current Medications





Diphenhydramine HCl (Benadryl)  25 mg IVP Q3 PRN


   PRN Reason: Pain, moderate (4-7)


   Last Admin: 03/02/18 11:40 Dose:  25 mg


Enoxaparin Sodium (Lovenox)  40 mg SC DAILY [facility]


   Last Admin: 03/02/18 09:44 Dose:  Not Given


Folic Acid (Folic Acid)  2 mg PO DAILY [facility]


   Last Admin: 03/02/18 09:44 Dose:  2 mg


Hydromorphone HCl (Dilaudid)  2 mg IVP Q3 PRN


   PRN Reason: Pain, moderate (4-7)


   Last Admin: 03/02/18 11:40 Dose:  2 mg


Dextrose/Sodium Chloride (Dextrose 5%/0.45% Ns 1000 Ml)  1,000 mls @ 60 mls/hr 

IV .[id number] [facility]


   Last Admin: 03/02/18 08:43 Dose:  Not Given


Meropenem 1 gm/ Sodium (Chloride)  100 mls @ 100 mls/hr IVPB Q8 [facility]


   Last Admin: 03/02/18 04:59 Dose:  100 mls/hr


Gentamicin Sulfate 80 mg/ (Sodium Chloride)  102 mls @ 100 mls/hr IVPB Q24H [facility]


   Stop: 03/03/18 14:32











- Labs


Labs: 


 





 03/02/18 07:47 





 03/02/18 07:47 





 











PT  14.6 SECONDS (9.7-12.2)  H  02/26/18  02:26    


 


INR  1.3   02/26/18  02:26    


 


APTT  39 SECONDS (21-34)  H  02/26/18  02:26    














Assessment and Plan


(1) UTI (urinary tract infection)


Status: Acute   





(2) Leukocytosis


Status: Acute   





(3) Severe anemia


Status: Acute   





(4) Sickle cell anemia with pain


Status: Acute

## 2018-03-03 LAB
BASOPHILS # BLD AUTO: 0.2 K/UL (ref 0–0.2)
BASOPHILS NFR BLD: 1 % (ref 0–2)
EOSINOPHIL # BLD AUTO: 1.4 K/UL (ref 0–0.7)
EOSINOPHIL NFR BLD: 5.9 % (ref 0–4)
ERYTHROCYTE [DISTWIDTH] IN BLOOD BY AUTOMATED COUNT: 17.5 % (ref 11.5–14.5)
HGB BLD-MCNC: 5.5 G/DL (ref 11–16)
LYMPHOCYTES # BLD AUTO: 6.1 K/UL (ref 1–4.3)
LYMPHOCYTES NFR BLD AUTO: 24.9 % (ref 20–40)
MCH RBC QN AUTO: 28.6 PG (ref 27–31)
MCHC RBC AUTO-ENTMCNC: 33.3 G/DL (ref 33–37)
MCV RBC AUTO: 86 FL (ref 81–99)
MONOCYTES # BLD: 2 K/UL (ref 0–0.8)
MONOCYTES NFR BLD: 8.2 % (ref 0–10)
NEUTROPHILS # BLD: 14.6 K/UL (ref 1.8–7)
NEUTROPHILS NFR BLD AUTO: 60 % (ref 50–75)
NRBC BLD AUTO-RTO: 0.5 % (ref 0–2)
PLATELET # BLD: 165 K/UL (ref 130–400)
PMV BLD AUTO: 9.3 FL (ref 7.2–11.7)
RBC # BLD AUTO: 1.91 MIL/UL (ref 3.8–5.2)
WBC # BLD AUTO: 24.3 K/UL (ref 4.8–10.8)

## 2018-03-03 RX ADMIN — DIPHENHYDRAMINE HYDROCHLORIDE PRN MG: 50 INJECTION INTRAMUSCULAR; INTRAVENOUS at 05:58

## 2018-03-03 RX ADMIN — DIPHENHYDRAMINE HYDROCHLORIDE PRN MG: 50 INJECTION INTRAMUSCULAR; INTRAVENOUS at 23:06

## 2018-03-03 RX ADMIN — HYDROMORPHONE HYDROCHLORIDE PRN MG: 1 INJECTION, SOLUTION INTRAMUSCULAR; INTRAVENOUS; SUBCUTANEOUS at 23:07

## 2018-03-03 RX ADMIN — DEXTROSE AND SODIUM CHLORIDE SCH: 5; 450 INJECTION, SOLUTION INTRAVENOUS at 20:10

## 2018-03-03 RX ADMIN — HYDROMORPHONE HYDROCHLORIDE PRN MG: 1 INJECTION, SOLUTION INTRAMUSCULAR; INTRAVENOUS; SUBCUTANEOUS at 20:07

## 2018-03-03 RX ADMIN — HYDROMORPHONE HYDROCHLORIDE PRN MG: 1 INJECTION, SOLUTION INTRAMUSCULAR; INTRAVENOUS; SUBCUTANEOUS at 15:59

## 2018-03-03 RX ADMIN — DIPHENHYDRAMINE HYDROCHLORIDE PRN MG: 50 INJECTION INTRAMUSCULAR; INTRAVENOUS at 15:58

## 2018-03-03 RX ADMIN — DIPHENHYDRAMINE HYDROCHLORIDE PRN MG: 50 INJECTION INTRAMUSCULAR; INTRAVENOUS at 03:09

## 2018-03-03 RX ADMIN — DIPHENHYDRAMINE HYDROCHLORIDE PRN MG: 50 INJECTION INTRAMUSCULAR; INTRAVENOUS at 09:35

## 2018-03-03 RX ADMIN — HYDROMORPHONE HYDROCHLORIDE PRN MG: 1 INJECTION, SOLUTION INTRAMUSCULAR; INTRAVENOUS; SUBCUTANEOUS at 09:36

## 2018-03-03 RX ADMIN — DIPHENHYDRAMINE HYDROCHLORIDE PRN MG: 50 INJECTION INTRAMUSCULAR; INTRAVENOUS at 12:47

## 2018-03-03 RX ADMIN — DIPHENHYDRAMINE HYDROCHLORIDE PRN MG: 50 INJECTION INTRAMUSCULAR; INTRAVENOUS at 20:06

## 2018-03-03 RX ADMIN — HYDROMORPHONE HYDROCHLORIDE PRN MG: 1 INJECTION, SOLUTION INTRAMUSCULAR; INTRAVENOUS; SUBCUTANEOUS at 12:47

## 2018-03-03 RX ADMIN — ENOXAPARIN SODIUM SCH: 40 INJECTION SUBCUTANEOUS at 09:35

## 2018-03-03 NOTE — CP.PCM.PN
Subjective





- Date & Time of Evaluation


Date of Evaluation: 03/03/18


Time of Evaluation: 23:32





- Subjective


Subjective: 





CHIEF COMPLAINTS TODAY :


AFEBRILE.


C/O  BACK PAIN











ROS.


HEENT :  N.


Resp :       No cough, wheezing ,pleuritic CP ,or hemoptysis 


Cardio :     No anginal  CP, PND, orthopnea, palpitation 


GI :           No abd.pain, n/v ,diarrhea or GI bleeding .


CNS : No headache, vertigo, focal deficit.


Musculoskel :  No joint swelling ,


Derm :        No rash 


Psych :     Normal affect.


Ext :  No  swelling ,calf pain 





PE.


Pt. is alert awake in no distress.


V.S  As noted in the chart 


Head ,ear nose,throat and eyes : Normal.


Neck : Supple with normal carotids.


Lungs: Clear air entry.


Heart : S1 & S2 normal with S4. No murmur.


Abd : Soft non tender with normal bowel sounds.


Neuro : Moves all ext. with no localized deficit.


Ext : No edema with intact pulses.Non tender calves 


Derm : No rashes or decubitus ulcer.





LABS/RADIOLOGY:


REVIEWED


LEUKOCYTOSIS 


urine culture 2/26/18 +veESBL positive Escherichia coli s-2 Merrem and 

gentamicin.





ASSESSMENT.


LEUKOCYTOSIS /SEPSIS  UROSEPSIS/REACTIVE 


SEVERE ANEMIA


SICKLE CELL CRISIS


GENERALIZED PAIN





/PLAN : 


CONTINUE iv MERREM 1 G EVERY 8  HOURLY 2/27/18


 GOT  iv GENTAMICIN 130 MILLIGRAMS iv PIGGYBACK ONE DOSE 2/28/18


add iv gentamicin  80 mg IV piggyback to 24 hourly 2 doses for synergy 3/2/18, 

3/3/18.


F/U RENAL FUNCTION CLOSELY.


ANALGESICS


aS PER HEMATOLOGY.





Objective





- Vital Signs/Intake and Output


Vital Signs (last 24 hours): 


 











Temp Pulse Resp BP Pulse Ox


 


 98.0 F   108 H  20   110/75   99 


 


 03/03/18 16:46  03/03/18 16:46  03/03/18 16:46  03/03/18 16:46  03/03/18 16:46








Intake and Output: 


 











 03/03/18 03/04/18





 18:59 06:59


 


Intake Total 650 920


 


Balance 650 920














- Medications


Medications: 


 Current Medications





Diphenhydramine HCl (Benadryl)  25 mg IVP Q3 PRN


   PRN Reason: Pain, moderate (4-7)


   Last Admin: 03/03/18 23:06 Dose:  25 mg


Enoxaparin Sodium (Lovenox)  40 mg SC DAILY [facility]


   Last Admin: 03/03/18 09:35 Dose:  Not Given


Folic Acid (Folic Acid)  2 mg PO DAILY [facility]


   Last Admin: 03/03/18 09:35 Dose:  2 mg


Hydromorphone HCl (Dilaudid)  2 mg IVP Q3 PRN


   PRN Reason: Pain, moderate (4-7)


   Last Admin: 03/03/18 23:07 Dose:  2 mg


Dextrose/Sodium Chloride (Dextrose 5%/0.45% Ns 1000 Ml)  1,000 mls @ 60 mls/hr 

IV .[id number] [facility]


   Last Admin: 03/03/18 20:10 Dose:  Not Given


Meropenem 1 gm/ Sodium (Chloride)  100 mls @ 100 mls/hr IVPB Q8 [facility]


   Last Admin: 03/03/18 21:38 Dose:  100 mls/hr











- Labs


Labs: 


 





 03/03/18 13:56 





 03/02/18 07:47 





 











PT  14.6 SECONDS (9.7-12.2)  H  02/26/18  02:26    


 


INR  1.3   02/26/18  02:26    


 


APTT  39 SECONDS (21-34)  H  02/26/18  02:26    














Assessment and Plan


(1) UTI (urinary tract infection)


Status: Acute   





(2) Leukocytosis


Status: Acute   





(3) Severe anemia


Status: Acute   





(4) Sickle cell anemia with pain


Status: Acute

## 2018-03-03 NOTE — CP.PCM.PN
Subjective





- Date & Time of Evaluation


Date of Evaluation: 03/03/18


Time of Evaluation: 07:20





- Subjective


Subjective: 


clinically same





Objective





- Vital Signs/Intake and Output


Vital Signs (last 24 hours): 


 











Temp Pulse Resp BP Pulse Ox


 


 98.0 F   108 H  20   110/75   99 


 


 03/03/18 16:46  03/03/18 16:46  03/03/18 16:46  03/03/18 16:46  03/03/18 16:46








Intake and Output: 


 











 03/03/18 03/03/18





 06:59 18:59


 


Intake Total 1400 650


 


Balance 1400 650














- Medications


Medications: 


 Current Medications





Diphenhydramine HCl (Benadryl)  25 mg IVP Q3 PRN


   PRN Reason: Pain, moderate (4-7)


   Last Admin: 03/03/18 15:58 Dose:  25 mg


Enoxaparin Sodium (Lovenox)  40 mg SC DAILY [facility]


   Last Admin: 03/03/18 09:35 Dose:  Not Given


Folic Acid (Folic Acid)  2 mg PO DAILY [facility]


   Last Admin: 03/03/18 09:35 Dose:  2 mg


Hydromorphone HCl (Dilaudid)  2 mg IVP Q3 PRN


   PRN Reason: Pain, moderate (4-7)


   Last Admin: 03/03/18 15:59 Dose:  2 mg


Dextrose/Sodium Chloride (Dextrose 5%/0.45% Ns 1000 Ml)  1,000 mls @ 60 mls/hr 

IV .[id number] [facility]


   Last Admin: 03/02/18 23:50 Dose:  60 mls/hr


Meropenem 1 gm/ Sodium (Chloride)  100 mls @ 100 mls/hr IVPB Q8 [facility]


   Last Admin: 03/03/18 15:18 Dose:  100 mls/hr











- Labs


Labs: 


 





 03/03/18 13:56 





 03/02/18 07:47 





 











PT  14.6 SECONDS (9.7-12.2)  H  02/26/18  02:26    


 


INR  1.3   02/26/18  02:26    


 


APTT  39 SECONDS (21-34)  H  02/26/18  02:26    














Assessment and Plan


(1) Severe anemia


Status: Acute   





(2) Sickle cell anemia with pain


Status: Acute   





(3) Animal bite wound


Status: Acute   





(4) Leg pain


Status: Acute   





(5) Leukocytosis


Status: Acute   





(6) Myalgia


Status: Acute   





(7) Pain


Status: Acute   





(8) Sickle cell anemia


Status: Acute   





(9) Sickle cell pain crisis


Status: Acute   





(10) UTI (urinary tract infection)


Status: Acute   





- Assessment and Plan (Free Text)


Plan: 





Continue IV pain medications


Continue meropenem


Continue ID consult


Monitor hemoglobin hematocrit


Follow-up with the hematology 


We will transfuse the patient if the hemoglobin goes below 5


Monitor the WBC

## 2018-03-03 NOTE — CP.PCM.PN
Subjective





- Date & Time of Evaluation


Date of Evaluation: 03/03/18


Time of Evaluation: 19:00





- Subjective


Subjective: 





Has back pain.





Objective





- Vital Signs/Intake and Output


Vital Signs (last 24 hours): 


 











Temp Pulse Resp BP Pulse Ox


 


 98.0 F   108 H  20   110/75   99 


 


 03/03/18 16:46  03/03/18 16:46  03/03/18 16:46  03/03/18 16:46  03/03/18 16:46








Intake and Output: 


 











 03/03/18 03/04/18





 18:59 06:59


 


Intake Total 650 


 


Balance 650 














- Medications


Medications: 


 Current Medications





Diphenhydramine HCl (Benadryl)  25 mg IVP Q3 PRN


   PRN Reason: Pain, moderate (4-7)


   Last Admin: 03/03/18 20:06 Dose:  25 mg


Enoxaparin Sodium (Lovenox)  40 [facility]


   Last Admin: 03/03/18 09:35 Dose:  Not Given


Folic Acid (Folic Acid)  2 mg PO DAILY [facility]


   Last Admin: 03/03/18 09:35 Dose:  2 mg


Hydromorphone HCl (Dilaudid)  2 mg IVP Q3 PRN


   PRN Reason: Pain, moderate (4-7)


   Last Admin: 03/03/18 20:07 Dose:  2 mg


Dextrose/Sodium Chloride (Dextrose 5%/0.45% Ns 1000 Ml)  1,000 mls @ 60 mls/hr 

IV .[id number] [facility]


   Last Admin: 03/03/18 20:10 Dose:  Not Given


Meropenem 1 gm/ Sodium (Chloride)  100 mls @ 100 mls/hr IVPB Q8 [facility]


   Last Admin: 03/03/18 15:18 Dose:  100 mls/hr











- Labs


Labs: 


 





 03/03/18 13:56 





 03/02/18 07:47 





 











PT  14.6 SECONDS (9.7-12.2)  H  02/26/18  02:26    


 


INR  1.3   02/26/18  02:26    


 


APTT  39 SECONDS (21-34)  H  02/26/18  02:26    














- Head Exam


Head Exam: ATRAUMATIC





- Eye Exam


Eye Exam: Normal appearance





- ENT Exam


ENT Exam: Mucous Membranes Dry





- Respiratory Exam


Respiratory Exam: NORMAL BREATHING PATTERN





- Cardiovascular Exam


Cardiovascular Exam: +S1, +S2





- GI/Abdominal Exam


GI & Abdominal Exam: Normal Bowel Sounds





Assessment and Plan


(1) Sickle cell anemia with pain


Assessment & Plan: 


IV fluids, pain meds, folic acid, 02 via NC


recommend PRBC transfusion


Status: Acute   





(2) Leukocytosis


Assessment & Plan: 


on antibiotics


likely reactive component to sickle cell


Status: Acute   





(3) Sickle cell anemia


Assessment & Plan: 


folic acid


Status: Acute

## 2018-03-04 LAB
BACTERIA #/AREA URNS HPF: (no result) /[HPF]
BASOPHILS # BLD AUTO: 0.2 K/UL (ref 0–0.2)
BASOPHILS NFR BLD: 0.9 % (ref 0–2)
BILIRUB UR-MCNC: NEGATIVE MG/DL
EOSINOPHIL # BLD AUTO: 1.7 K/UL (ref 0–0.7)
EOSINOPHIL NFR BLD: 6.8 % (ref 0–4)
ERYTHROCYTE [DISTWIDTH] IN BLOOD BY AUTOMATED COUNT: 17.2 % (ref 11.5–14.5)
GLUCOSE UR STRIP-MCNC: NORMAL MG/DL
HGB BLD-MCNC: 5.4 G/DL (ref 11–16)
LEUKOCYTE ESTERASE UR-ACNC: (no result) LEU/UL
LYMPHOCYTES # BLD AUTO: 5.4 K/UL (ref 1–4.3)
LYMPHOCYTES NFR BLD AUTO: 22 % (ref 20–40)
MCH RBC QN AUTO: 29.5 PG (ref 27–31)
MCHC RBC AUTO-ENTMCNC: 34.3 G/DL (ref 33–37)
MCV RBC AUTO: 86 FL (ref 81–99)
MONOCYTES # BLD: 2.1 K/UL (ref 0–0.8)
MONOCYTES NFR BLD: 8.6 % (ref 0–10)
NEUTROPHILS # BLD: 15.2 K/UL (ref 1.8–7)
NEUTROPHILS NFR BLD AUTO: 61.7 % (ref 50–75)
NRBC BLD AUTO-RTO: 0.5 % (ref 0–2)
PH UR STRIP: 6 [PH] (ref 5–8)
PLATELET # BLD: 178 K/UL (ref 130–400)
PMV BLD AUTO: 9.1 FL (ref 7.2–11.7)
PROT UR STRIP-MCNC: (no result) MG/DL
RBC # BLD AUTO: 1.83 MIL/UL (ref 3.8–5.2)
RBC # UR STRIP: (no result) /UL
SP GR UR STRIP: 1.01 (ref 1–1.03)
SQUAMOUS EPITHIAL: 1 /HPF (ref 0–5)
UROBILINOGEN UR-MCNC: 2 MG/DL (ref 0.2–1)
WBC # BLD AUTO: 24.7 K/UL (ref 4.8–10.8)
WBC CLUMPS # UR AUTO: (no result) /HPF

## 2018-03-04 RX ADMIN — DIPHENHYDRAMINE HYDROCHLORIDE PRN MG: 50 INJECTION INTRAMUSCULAR; INTRAVENOUS at 05:17

## 2018-03-04 RX ADMIN — DIPHENHYDRAMINE HYDROCHLORIDE PRN MG: 50 INJECTION INTRAMUSCULAR; INTRAVENOUS at 17:51

## 2018-03-04 RX ADMIN — DIPHENHYDRAMINE HYDROCHLORIDE PRN MG: 50 INJECTION INTRAMUSCULAR; INTRAVENOUS at 08:40

## 2018-03-04 RX ADMIN — DIPHENHYDRAMINE HYDROCHLORIDE PRN MG: 50 INJECTION INTRAMUSCULAR; INTRAVENOUS at 21:05

## 2018-03-04 RX ADMIN — DEXTROSE AND SODIUM CHLORIDE SCH: 5; 450 INJECTION, SOLUTION INTRAVENOUS at 10:03

## 2018-03-04 RX ADMIN — DIPHENHYDRAMINE HYDROCHLORIDE PRN MG: 50 INJECTION INTRAMUSCULAR; INTRAVENOUS at 11:42

## 2018-03-04 RX ADMIN — HYDROMORPHONE HYDROCHLORIDE PRN MG: 1 INJECTION, SOLUTION INTRAMUSCULAR; INTRAVENOUS; SUBCUTANEOUS at 05:16

## 2018-03-04 RX ADMIN — ENOXAPARIN SODIUM SCH: 40 INJECTION SUBCUTANEOUS at 10:04

## 2018-03-04 RX ADMIN — HYDROMORPHONE HYDROCHLORIDE PRN MG: 1 INJECTION, SOLUTION INTRAMUSCULAR; INTRAVENOUS; SUBCUTANEOUS at 02:02

## 2018-03-04 RX ADMIN — DIPHENHYDRAMINE HYDROCHLORIDE PRN MG: 50 INJECTION INTRAMUSCULAR; INTRAVENOUS at 02:03

## 2018-03-04 RX ADMIN — DIPHENHYDRAMINE HYDROCHLORIDE PRN MG: 50 INJECTION INTRAMUSCULAR; INTRAVENOUS at 14:44

## 2018-03-04 RX ADMIN — DEXTROSE AND SODIUM CHLORIDE SCH MLS/HR: 5; 450 INJECTION, SOLUTION INTRAVENOUS at 05:16

## 2018-03-04 NOTE — CP.PCM.PN
Subjective





- Date & Time of Evaluation


Date of Evaluation: 03/04/18


Time of Evaluation: 07:20





- Subjective


Subjective: 


clinically same





Objective





- Vital Signs/Intake and Output


Vital Signs (last 24 hours): 


 











Temp Pulse Resp BP Pulse Ox


 


 98.7 F   106 H  20   113/67   100 


 


 03/04/18 08:00  03/04/18 08:00  03/04/18 08:00  03/04/18 08:00  03/04/18 08:00








Intake and Output: 


 











 03/04/18 03/04/18





 06:59 18:59


 


Intake Total 1740 550


 


Balance 1740 550














- Medications


Medications: 


 Current Medications





Diphenhydramine HCl (Benadryl)  25 mg IVP Q3 PRN


   PRN Reason: Pain, moderate (4-7)


   Last Admin: 03/04/18 14:44 Dose:  25 mg


Enoxaparin Sodium (Lovenox)  40 mg SC DAILY [facility]


   Last Admin: 03/04/18 10:04 Dose:  Not Given


Folic Acid (Folic Acid)  2 mg PO DAILY [facility]


   Last Admin: 03/04/18 10:03 Dose:  2 mg


Hydromorphone HCl (Dilaudid)  2 mg IVP Q3 PRN


   PRN Reason: Pain, moderate (4-7)


   Last Admin: 03/04/18 14:44 Dose:  2 mg


Dextrose/Sodium Chloride (Dextrose 5%/0.45% Ns 1000 Ml)  1,000 mls @ 60 mls/hr 

IV .[id number] [facility]


   Last Admin: 03/04/18 10:03 Dose:  Not Given


Meropenem 1 gm/ Sodium (Chloride)  100 mls @ 100 mls/hr IVPB Q8 [facility]


   Last Admin: 03/04/18 14:43 Dose:  100 mls/hr











- Labs


Labs: 


 





 03/04/18 14:08 





 03/02/18 07:47 





 











PT  14.6 SECONDS (9.7-12.2)  H  02/26/18  02:26    


 


INR  1.3   02/26/18  02:26    


 


APTT  39 SECONDS (21-34)  H  02/26/18  02:26    














- Constitutional


Appears: Well





- Head Exam


Head Exam: ATRAUMATIC, NORMAL INSPECTION, NORMOCEPHALIC





- Eye Exam


Eye Exam: EOMI, Normal appearance, PERRL


Pupil Exam: NORMAL ACCOMODATION, PERRL





- ENT Exam


ENT Exam: Mucous Membranes Moist, Normal Exam





- Neck Exam


Neck Exam: Full ROM, Normal Inspection.  absent: Lymphadenopathy





- Respiratory Exam


Respiratory Exam: Decreased Breath Sounds





- Cardiovascular Exam


Cardiovascular Exam: REGULAR RHYTHM, +S1, +S2





- GI/Abdominal Exam


GI & Abdominal Exam: Soft, Diminished Bowel Sounds





- Rectal Exam


Rectal Exam: Deferred





Assessment and Plan


(1) Severe anemia


Status: Acute   





(2) Sickle cell anemia with pain


Status: Acute   





(3) Animal bite wound


Status: Acute   





(4) Leg pain


Status: Acute   





(5) Leukocytosis


Status: Acute   





(6) Myalgia


Status: Acute   





(7) Pain


Status: Acute   





(8) Sickle cell anemia


Status: Acute   





(9) Sickle cell pain crisis


Status: Acute   





(10) UTI (urinary tract infection)


Status: Acute   





- Assessment and Plan (Free Text)


Plan: 





Follow-up with the CBC


We will transfuse if hemoglobin goes below 5


Continue meropenem


Continue pain medications diluted every 3 and Benadryl to 3


Follow-up with the Heme/onc


Follow-up with ID

## 2018-03-04 NOTE — CP.PCM.PN
Subjective





- Date & Time of Evaluation


Date of Evaluation: 03/04/18


Time of Evaluation: 20:15





- Subjective


Subjective: 





Has back pain





Objective





- Vital Signs/Intake and Output


Vital Signs (last 24 hours): 


 











Temp Pulse Resp BP Pulse Ox


 


 98.0 F   111 H  20   112/72   95 


 


 03/04/18 15:44  03/04/18 15:44  03/04/18 15:44  03/04/18 15:44  03/04/18 15:44








Intake and Output: 


 











 03/04/18 03/05/18





 18:59 06:59


 


Intake Total 550 


 


Balance 550 














- Medications


Medications: 


 Current Medications





Diphenhydramine HCl (Benadryl)  25 mg IVP Q3 PRN


   PRN Reason: Pain, moderate (4-7)


   Last Admin: 03/04/18 21:05 Dose:  25 mg


Enoxaparin Sodium (Lovenox)  40 mg SC DAILY [facility]


   Last Admin: 03/04/18 10:04 Dose:  Not Given


Folic Acid (Folic Acid)  2 mg PO DAILY [facility]


   Last Admin: 03/04/18 10:03 Dose:  2 mg


Hydromorphone HCl (Dilaudid)  2 mg IVP Q3 PRN


   PRN Reason: Pain, moderate (4-7)


   Last Admin: 03/04/18 21:06 Dose:  2 mg


Dextrose/Sodium Chloride (Dextrose 5%/0.45% Ns 1000 Ml)  1,000 mls @ 60 mls/hr 

IV .[id number] [facility]


   Last Admin: 03/04/18 10:03 Dose:  Not Given


Meropenem 1 gm/ Sodium (Chloride)  100 mls @ 100 mls/hr IVPB Q8 [facility]


   Last Admin: 03/04/18 21:07 Dose:  100 mls/hr











- Labs


Labs: 


 





 03/04/18 14:08 





 03/02/18 07:47 





 











PT  14.6 SECONDS (9.7-12.2)  H  02/26/18  02:26    


 


INR  1.3   02/26/18  02:26    


 


APTT  39 SECONDS (21-34)  H  02/26/18  02:26    














- Head Exam


Head Exam: ATRAUMATIC





- Eye Exam


Eye Exam: Normal appearance





- ENT Exam


ENT Exam: Mucous Membranes Dry





- Respiratory Exam


Respiratory Exam: NORMAL BREATHING PATTERN





- Cardiovascular Exam


Cardiovascular Exam: +S1, +S2





- GI/Abdominal Exam


GI & Abdominal Exam: Normal Bowel Sounds





Assessment and Plan


(1) Sickle cell anemia with pain


Assessment & Plan: 


IV fluids, 02 via NC, folic acid, pain meds


recommend transfusion support


Status: Acute   





(2) Leukocytosis


Assessment & Plan: 


on antibiotics


reactive component from sickle cell


Status: Acute   





(3) Sickle cell anemia


Assessment & Plan: 


folic acid


Status: Acute

## 2018-03-05 VITALS — RESPIRATION RATE: 20 BRPM

## 2018-03-05 LAB
BASOPHILS # BLD AUTO: 0.2 K/UL (ref 0–0.2)
BASOPHILS NFR BLD: 0.9 % (ref 0–2)
EOSINOPHIL # BLD AUTO: 1.4 K/UL (ref 0–0.7)
EOSINOPHIL NFR BLD: 5.9 % (ref 0–4)
ERYTHROCYTE [DISTWIDTH] IN BLOOD BY AUTOMATED COUNT: 17.8 % (ref 11.5–14.5)
HGB BLD-MCNC: 4.8 G/DL (ref 11–16)
LYMPHOCYTES # BLD AUTO: 6.4 K/UL (ref 1–4.3)
LYMPHOCYTES NFR BLD AUTO: 26.2 % (ref 20–40)
MCH RBC QN AUTO: 29.1 PG (ref 27–31)
MCHC RBC AUTO-ENTMCNC: 33.2 G/DL (ref 33–37)
MCV RBC AUTO: 87.5 FL (ref 81–99)
MONOCYTES # BLD: 1.9 K/UL (ref 0–0.8)
MONOCYTES NFR BLD: 7.8 % (ref 0–10)
NEUTROPHILS # BLD: 14.4 K/UL (ref 1.8–7)
NEUTROPHILS NFR BLD AUTO: 59.2 % (ref 50–75)
NRBC BLD AUTO-RTO: 0.6 % (ref 0–2)
PLATELET # BLD: 145 K/UL (ref 130–400)
PMV BLD AUTO: 8.8 FL (ref 7.2–11.7)
RBC # BLD AUTO: 1.64 MIL/UL (ref 3.8–5.2)
WBC # BLD AUTO: 24.4 K/UL (ref 4.8–10.8)

## 2018-03-05 RX ADMIN — DIPHENHYDRAMINE HYDROCHLORIDE PRN MG: 50 INJECTION INTRAMUSCULAR; INTRAVENOUS at 18:30

## 2018-03-05 RX ADMIN — DIPHENHYDRAMINE HYDROCHLORIDE PRN MG: 50 INJECTION INTRAMUSCULAR; INTRAVENOUS at 03:20

## 2018-03-05 RX ADMIN — DIPHENHYDRAMINE HYDROCHLORIDE PRN MG: 50 INJECTION INTRAMUSCULAR; INTRAVENOUS at 15:10

## 2018-03-05 RX ADMIN — DIPHENHYDRAMINE HYDROCHLORIDE PRN MG: 50 INJECTION INTRAMUSCULAR; INTRAVENOUS at 09:15

## 2018-03-05 RX ADMIN — DIPHENHYDRAMINE HYDROCHLORIDE PRN MG: 50 INJECTION INTRAMUSCULAR; INTRAVENOUS at 21:30

## 2018-03-05 RX ADMIN — ENOXAPARIN SODIUM SCH: 40 INJECTION SUBCUTANEOUS at 09:23

## 2018-03-05 RX ADMIN — DIPHENHYDRAMINE HYDROCHLORIDE PRN MG: 50 INJECTION INTRAMUSCULAR; INTRAVENOUS at 06:15

## 2018-03-05 RX ADMIN — DEXTROSE AND SODIUM CHLORIDE SCH: 5; 450 INJECTION, SOLUTION INTRAVENOUS at 19:32

## 2018-03-05 RX ADMIN — DIPHENHYDRAMINE HYDROCHLORIDE PRN MG: 50 INJECTION INTRAMUSCULAR; INTRAVENOUS at 00:15

## 2018-03-05 RX ADMIN — DEXTROSE AND SODIUM CHLORIDE SCH MLS/HR: 5; 450 INJECTION, SOLUTION INTRAVENOUS at 03:20

## 2018-03-05 RX ADMIN — DIPHENHYDRAMINE HYDROCHLORIDE PRN MG: 50 INJECTION INTRAMUSCULAR; INTRAVENOUS at 12:17

## 2018-03-05 NOTE — CP.PCM.PN
Subjective





- Date & Time of Evaluation


Date of Evaluation: 03/05/18


Time of Evaluation: 08:50





Objective





- Vital Signs/Intake and Output


Vital Signs (last 24 hours): 


 











Temp Pulse Resp BP Pulse Ox


 


 97.8 F   105 H  20   112/74   98 


 


 03/05/18 16:12  03/05/18 16:12  03/05/18 16:12  03/05/18 16:12  03/05/18 16:00








Intake and Output: 


 











 03/05/18 03/06/18





 18:59 06:59


 


Intake Total 1565 


 


Balance 1565 














- Medications


Medications: 


 Current Medications





Diphenhydramine HCl (Benadryl)  25 mg IVP Q3 PRN


   PRN Reason: Pain, moderate (4-7)


   Last Admin: 03/05/18 18:30 Dose:  25 mg


Enoxaparin Sodium (Lovenox)  40 mg SC DAILY [facility]


   Last Admin: 03/05/18 09:23 Dose:  Not Given


Folic Acid (Folic Acid)  2 mg PO DAILY [facility]


   Last Admin: 03/05/18 09:20 Dose:  2 mg


Hydromorphone HCl (Dilaudid)  2 mg IVP Q3 PRN


   PRN Reason: Pain, moderate (4-7)


   Last Admin: 03/05/18 18:30 Dose:  2 mg


Dextrose/Sodium Chloride (Dextrose 5%/0.45% Ns 1000 Ml)  1,000 mls @ 60 mls/hr 

IV .[id number] [facility]


   Last Admin: 03/05/18 03:20 Dose:  60 mls/hr


Meropenem 1 gm/ Sodium (Chloride)  100 mls @ 100 mls/hr IVPB Q8 [facility]


   Last Admin: 03/05/18 16:09 Dose:  100 mls/hr


Pantoprazole Sodium (Protonix Inj)  40 mg IVP DAILY [facility]











- Labs


Labs: 


 





 03/05/18 06:34 





 03/02/18 07:47 





 











PT  14.6 SECONDS (9.7-12.2)  H  02/26/18  02:26    


 


INR  1.3   02/26/18  02:26    


 


APTT  39 SECONDS (21-34)  H  02/26/18  02:26    














Assessment and Plan


(1) Severe anemia


Status: Acute   





(2) Sickle cell anemia with pain


Status: Acute   





(3) Animal bite wound


Status: Acute   





(4) Leg pain


Status: Acute   





(5) Leukocytosis


Status: Acute   





(6) Myalgia


Status: Acute   





(7) Pain


Status: Acute   





(8) Sickle cell anemia


Status: Acute   





(9) Sickle cell pain crisis


Status: Acute   





(10) UTI (urinary tract infection)


Status: Acute   





- Assessment and Plan (Free Text)


Plan: 





. Sickle cell anemia








-continue D5 1/2 NS 60 cc/hr


-folic acid daily


-dilaudid 2 IV q 3 hrs prn. 


-lovenox 40 daily


-transfusions as necessary


-today HGB below 5


-will type and cross match


-transfuse 1 unit


-recheck cbc afterwards


-as pt is at risk for ischemia, will transfer to tele














2. Tachycardia





-cardiology consult. Dr. Concepcion. recs appreciated.


-transfer to telemetry.











3. Urinary tract infecton








-urine culture 2/26 grew E. Coli


-ua shows 3 plus LE


-meropenem 1 g IV q 8 hrs.


-ID consult. Dr. Sagar Stanton. recs appreciated. 








4. leukocytosis





-likely secondary to UTI








5. GI/DVT ppx





-lovenox 40 daily


-protonix daily

## 2018-03-05 NOTE — CP.PCM.PN
Subjective





- Date & Time of Evaluation


Date of Evaluation: 03/05/18


Time of Evaluation: 19:00





- Subjective


Subjective: 





Feeling better after PRBC transfusion





Objective





- Vital Signs/Intake and Output


Vital Signs (last 24 hours): 


 











Temp Pulse Resp BP Pulse Ox


 


 97.8 F   105 H  20   112/74   98 


 


 03/05/18 16:12  03/05/18 16:12  03/05/18 16:12  03/05/18 16:12  03/05/18 16:00








Intake and Output: 


 











 03/05/18 03/06/18





 18:59 06:59


 


Intake Total 1565 


 


Balance 1565 














- Medications


Medications: 


 Current Medications





Diphenhydramine HCl (Benadryl)  25 mg IVP Q3 PRN


   PRN Reason: Pain, moderate (4-7)


   Last Admin: 03/05/18 18:30 Dose:  25 mg


Enoxaparin Sodium (Lovenox)  40 mg SC DAILY [facility]


   Last Admin: 03/05/18 09:23 Dose:  Not Given


Folic Acid (Folic Acid)  2 mg PO DAILY [facility]


   Last Admin: 03/05/18 09:20 Dose:  2 mg


Hydromorphone HCl (Dilaudid)  2 mg IVP Q3 PRN


   PRN Reason: Pain, moderate (4-7)


   Last Admin: 03/05/18 18:30 Dose:  2 mg


Dextrose/Sodium Chloride (Dextrose 5%/0.45% Ns 1000 Ml)  1,000 mls @ 60 mls/hr 

IV .[id number] [facility]


   Last Admin: 03/05/18 03:20 Dose:  60 mls/hr


Meropenem 1 gm/ Sodium (Chloride)  100 mls @ 100 mls/hr IVPB Q8 [facility]


   Last Admin: 03/05/18 21:27 Dose:  100 mls/hr


Pantoprazole Sodium (Protonix Inj)  40 mg IVP DAILY [facility]











- Labs


Labs: 


 





 03/05/18 06:34 





 03/02/18 07:47 





 











PT  14.6 SECONDS (9.7-12.2)  H  02/26/18  02:26    


 


INR  1.3   02/26/18  02:26    


 


APTT  39 SECONDS (21-34)  H  02/26/18  02:26    














- Head Exam


Head Exam: ATRAUMATIC





- Eye Exam


Eye Exam: Normal appearance





- ENT Exam


ENT Exam: Mucous Membranes Dry





- Respiratory Exam


Respiratory Exam: NORMAL BREATHING PATTERN





- Cardiovascular Exam


Cardiovascular Exam: +S1, +S2





- GI/Abdominal Exam


GI & Abdominal Exam: Normal Bowel Sounds





Assessment and Plan


(1) Sickle cell anemia with pain


Assessment & Plan: 


IV fluids, pain meds, folic acid, 02 via NC


s/p transfusion support


Status: Acute   





(2) Leukocytosis


Assessment & Plan: 


on antibiotics


reactive component from sickle cell


Status: Acute   





(3) Sickle cell anemia


Assessment & Plan: 


folic acid


Status: Acute

## 2018-03-05 NOTE — CP.PCM.PN
Subjective





- Date & Time of Evaluation


Date of Evaluation: 03/05/18


Time of Evaluation: 23:59





- Subjective


Subjective: 





CHIEF COMPLAINTS TODAY :


AFEBRILE.


C/O  BACK PAIN


S/P PRBC TRANSFUSION











ROS.


HEENT :  N.


Resp :       No cough, wheezing ,pleuritic CP ,or hemoptysis 


Cardio :     No anginal  CP, PND, orthopnea, palpitation 


GI :           No abd.pain, n/v ,diarrhea or GI bleeding .


CNS : No headache, vertigo, focal deficit.


Musculoskel :  No joint swelling ,


Derm :        No rash 


Psych :     Normal affect.


Ext :  No  swelling ,calf pain 





PE.


Pt. is alert awake in no distress.


V.S  As noted in the chart 


Head ,ear nose,throat and eyes : Normal.


Neck : Supple with normal carotids.


Lungs: Clear air entry.


Heart : S1 & S2 normal with S4. No murmur.


Abd : Soft non tender with normal bowel sounds.


Neuro : Moves all ext. with no localized deficit.


Ext : No edema with intact pulses.Non tender calves 


Derm : No rashes or decubitus ulcer.





LABS/RADIOLOGY:


REVIEWED


LEUKOCYTOSIS 


H/H 4.8


urine culture 2/26/18 +veESBL positive Escherichia coli  -S  Merrem and 

gentamicin.





ASSESSMENT.


LEUKOCYTOSIS /SEPSIS  UROSEPSIS/ REACTIVE


SEVERE ANEMIA


SICKLE CELL CRISIS


GENERALIZED PAIN





/PLAN : 


CONTINUE iv MERREM 1 G EVERY 8  HOURLY 2/27/18


 GOT  iv GENTAMICIN 130 MILLIGRAMS iv PIGGYBACK ONE DOSE 2/28/18


GOT  iv gentamicin  80 mg IV piggyback to 24 hourly 2 doses for synergy 3/2/18

, 3/3/18.


F/U RENAL FUNCTION CLOSELY.


ANALGESICS


aS PER HEMATOLOGY PRBC TRANSFUSION.








Objective





- Vital Signs/Intake and Output


Vital Signs (last 24 hours): 


 











Temp Pulse Resp BP Pulse Ox


 


 97.8 F   105 H  20   112/74   98 


 


 03/05/18 16:12  03/05/18 16:12  03/05/18 16:12  03/05/18 16:12  03/05/18 16:00








Intake and Output: 


 











 03/05/18 03/06/18





 18:59 06:59


 


Intake Total 1565 1200


 


Balance 1565 1200














- Medications


Medications: 


 Current Medications





Diphenhydramine HCl (Benadryl)  25 mg IVP Q3 PRN


   PRN Reason: Pain, moderate (4-7)


   Last Admin: 03/05/18 21:30 Dose:  25 mg


Enoxaparin Sodium (Lovenox)  40 mg SC DAILY [facility]


   Last Admin: 03/05/18 09:23 Dose:  Not Given


Folic Acid (Folic Acid)  2 mg PO DAILY [facility]


   Last Admin: 03/05/18 09:20 Dose:  2 mg


Hydromorphone HCl (Dilaudid)  2 mg IVP Q3 PRN


   PRN Reason: Pain, moderate (4-7)


   Last Admin: 03/05/18 21:30 Dose:  2 mg


Dextrose/Sodium Chloride (Dextrose 5%/0.45% Ns 1000 Ml)  1,000 mls @ 60 mls/hr 

IV .[id number] [facility]


   Last Admin: 03/05/18 19:32 Dose:  Not Given


Meropenem 1 gm/ Sodium (Chloride)  100 mls @ 100 mls/hr IVPB Q8 [facility]


   Last Admin: 03/05/18 21:27 Dose:  100 mls/hr


Pantoprazole Sodium (Protonix Inj)  40 mg IVP DAILY [facility]











- Labs


Labs: 


 





 03/05/18 06:34 





 03/02/18 07:47 





 











PT  14.6 SECONDS (9.7-12.2)  H  02/26/18  02:26    


 


INR  1.3   02/26/18  02:26    


 


APTT  39 SECONDS (21-34)  H  02/26/18  02:26    














Assessment and Plan


(1) UTI (urinary tract infection)


Status: Acute   





(2) Leukocytosis


Status: Acute   





(3) Severe anemia


Status: Acute   





(4) Sickle cell anemia with pain


Status: Acute

## 2018-03-05 NOTE — CP.PCM.PN
<Rasta Paredes - Last Filed: 03/05/18 13:10>





Subjective





- Date & Time of Evaluation


Date of Evaluation: 03/05/18


Time of Evaluation: 13:00





- Subjective


Subjective: 














Progress note.








Attending: Dr. Bienvenido Etienne.








Pt seen and examined at bedside. No acute distress. Pt is tachycardic and 

significant anemia. Will type and crossmatch, transfuse 1 unit. Sending pt to 

telemetry. 





Objective





- Vital Signs/Intake and Output


Vital Signs (last 24 hours): 


 











Temp Pulse Resp BP Pulse Ox


 


 98.6 F   126 H  22   122/74   98 


 


 03/05/18 12:45  03/05/18 12:45  03/05/18 12:45  03/05/18 12:45  03/05/18 08:38








Intake and Output: 


 











 03/05/18 03/05/18





 06:59 18:59


 


Intake Total 1020 0


 


Balance 1020 0














- Medications


Medications: 


 Current Medications





Diphenhydramine HCl (Benadryl)  25 mg IVP Q3 PRN


   PRN Reason: Pain, moderate (4-7)


   Last Admin: 03/05/18 12:17 Dose:  25 mg


Enoxaparin Sodium (Lovenox)  40 mg SC DAILY [facility]


   Last Admin: 03/05/18 09:23 Dose:  Not Given


Folic Acid (Folic Acid)  2 mg PO DAILY [facility]


   Last Admin: 03/05/18 09:20 Dose:  2 mg


Hydromorphone HCl (Dilaudid)  2 mg IVP Q3 PRN


   PRN Reason: Pain, moderate (4-7)


   Last Admin: 03/05/18 12:19 Dose:  2 mg


Dextrose/Sodium Chloride (Dextrose 5%/0.45% Ns 1000 Ml)  1,000 mls @ 60 mls/hr 

IV .[id number] [facility]


   Last Admin: 03/05/18 03:20 Dose:  60 mls/hr


Meropenem 1 gm/ Sodium (Chloride)  100 mls @ 100 mls/hr IVPB Q8 [facility]


   Last Admin: 03/05/18 05:23 Dose:  100 mls/hr











- Labs


Labs: 


 





 03/05/18 06:34 





 03/02/18 07:47 





 











PT  14.6 SECONDS (9.7-12.2)  H  02/26/18  02:26    


 


INR  1.3   02/26/18  02:26    


 


APTT  39 SECONDS (21-34)  H  02/26/18  02:26    














- Constitutional


Appears: Chronically Ill





- Head Exam


Head Exam: ATRAUMATIC, NORMAL INSPECTION, NORMOCEPHALIC





- Eye Exam


Eye Exam: EOMI





- ENT Exam


ENT Exam: Mucous Membranes Moist





- Neck Exam


Neck Exam: Full ROM, Normal Inspection





- Respiratory Exam


Respiratory Exam: NORMAL BREATHING PATTERN.  absent: Respiratory Distress





- Cardiovascular Exam


Cardiovascular Exam: Tachycardia, +S1, +S2





- GI/Abdominal Exam


GI & Abdominal Exam: Soft, Normal Bowel Sounds.  absent: Tenderness





- Extremities Exam


Extremities Exam: Full ROM, Normal Inspection





- Back Exam


Back Exam: NORMAL INSPECTION





- Neurological Exam


Neurological Exam: Alert, Awake, CN II-XII Intact, Oriented x3





- Psychiatric Exam


Psychiatric exam: Normal Affect, Normal Mood





- Skin


Skin Exam: Dry, Intact, Normal Color, Warm





Assessment and Plan





- Assessment and Plan (Free Text)


Assessment: 

















This is a 38 yo female with











1. Sickle cell anemia








-continue D5 1/2 NS 60 cc/hr


-folic acid daily


-dilaudid 2 IV q 3 hrs prn. 


-lovenox 40 daily


-transfusions as necessary


-today HGB below 5


-will type and cross match


-transfuse 1 unit


-recheck cbc afterwards


-as pt is at risk for ischemia, will transfer to tele














2. Tachycardia





-cardiology consult. Dr. Concepcion. recs appreciated.


-transfer to telemetry.











3. Urinary tract infecton








-urine culture 2/26 grew E. Coli


-ua shows 3 plus LE


-meropenem 1 g IV q 8 hrs.


-ID consult. Dr. Sagar Stanton. recs appreciated. 








4. leukocytosis





-likely secondary to UTI








5. GI/DVT ppx





-lovenox 40 daily


-protonix daily














discussed with Dr. Etienne. 





<Shahab Etienne - Last Filed: 03/07/18 09:21>





Objective





- Vital Signs/Intake and Output


Vital Signs (last 24 hours): 


 











Temp Pulse Resp BP Pulse Ox


 


 97.9 F   88   20   113/73   97 


 


 03/07/18 08:14  03/07/18 08:14  03/07/18 08:14  03/07/18 08:14  03/07/18 08:14








Intake and Output: 


 











 03/07/18 03/07/18





 06:59 18:59


 


Intake Total  600


 


Balance  600














- Medications


Medications: 


 Current Medications





Diphenhydramine HCl (Benadryl)  25 mg IVP Q3 PRN


   PRN Reason: Pain, moderate (4-7)


   Last Admin: 03/07/18 09:07 Dose:  25 mg


Enoxaparin Sodium (Lovenox)  40 mg SC DAILY [facility]


   Last Admin: 03/07/18 09:14 Dose:  Not Given


Folic Acid (Folic Acid)  2 mg PO DAILY [facility]


   Last Admin: 03/07/18 09:05 Dose:  2 mg


Hydromorphone HCl (Dilaudid)  2 mg IVP Q3 PRN


   PRN Reason: Pain, moderate (4-7)


   Last Admin: 03/07/18 09:08 Dose:  2 mg


Dextrose/Sodium Chloride (Dextrose 5%/0.45% Ns 1000 Ml)  1,000 mls @ 60 mls/hr 

IV .[id number] [facility]


   Last Admin: 03/05/18 19:32 Dose:  Not Given


Meropenem 1 gm/ Sodium (Chloride)  100 mls @ 100 mls/hr IVPB Q8 [facility]


   Last Admin: 03/07/18 06:22 Dose:  100 mls/hr


Pantoprazole Sodium (Protonix Inj)  40 mg IVP DAILY [facility]


   Last Admin: 03/07/18 09:06 Dose:  40 mg











- Labs


Labs: 


 





 03/07/18 06:50 





 03/07/18 06:50 





 











PT  14.6 SECONDS (9.7-12.2)  H  02/26/18  02:26    


 


INR  1.3   02/26/18  02:26    


 


APTT  39 SECONDS (21-34)  H  02/26/18  02:26    














Assessment and Plan


(1) Severe anemia


Status: Acute   





(2) Sickle cell anemia with pain


Status: Acute   





(3) Animal bite wound


Status: Acute   





(4) Leg pain


Status: Acute   





(5) Leukocytosis


Status: Acute   





(6) Myalgia


Status: Acute   





(7) Pain


Status: Acute   





(8) Sickle cell anemia


Status: Acute   





(9) Sickle cell pain crisis


Status: Acute   





(10) UTI (urinary tract infection)


Status: Acute   





- Assessment and Plan (Free Text)


Plan: 





Discussed instituting with the staff patient on IV antibiotic pain medicine 

follow-up as needed with Dr. STOKES and cardiology discussed with the staff





Attending/Attestation





- Attestation


I have personally seen and examined this patient.: Yes


I have fully participated in the care of the patient.: Yes


I have reviewed all pertinent clinical information, including history, physical 

exam and plan: Yes

## 2018-03-06 LAB
ALBUMIN SERPL-MCNC: 3.4 G/DL (ref 3.5–5)
ALBUMIN/GLOB SERPL: 0.7 {RATIO} (ref 1–2.1)
ALT SERPL-CCNC: 52 U/L (ref 9–52)
AST SERPL-CCNC: 94 U/L (ref 14–36)
BASOPHILS # BLD AUTO: 0.3 K/UL (ref 0–0.2)
BASOPHILS NFR BLD: 1.3 % (ref 0–2)
BUN SERPL-MCNC: 20 MG/DL (ref 7–17)
CALCIUM SERPL-MCNC: 8.3 MG/DL (ref 8.6–10.4)
EOSINOPHIL # BLD AUTO: 1.5 K/UL (ref 0–0.7)
EOSINOPHIL NFR BLD: 6.8 % (ref 0–4)
ERYTHROCYTE [DISTWIDTH] IN BLOOD BY AUTOMATED COUNT: 16.7 % (ref 11.5–14.5)
GFR NON-AFRICAN AMERICAN: > 60
HGB BLD-MCNC: 7 G/DL (ref 11–16)
LYMPHOCYTES # BLD AUTO: 5.6 K/UL (ref 1–4.3)
LYMPHOCYTES NFR BLD AUTO: 24.4 % (ref 20–40)
MCH RBC QN AUTO: 29.9 PG (ref 27–31)
MCHC RBC AUTO-ENTMCNC: 34.6 G/DL (ref 33–37)
MCV RBC AUTO: 86.3 FL (ref 81–99)
MONOCYTES # BLD: 1.8 K/UL (ref 0–0.8)
MONOCYTES NFR BLD: 7.7 % (ref 0–10)
NEUTROPHILS # BLD: 13.7 K/UL (ref 1.8–7)
NEUTROPHILS NFR BLD AUTO: 59.8 % (ref 50–75)
NRBC BLD AUTO-RTO: 0.9 % (ref 0–2)
PLATELET # BLD: 140 K/UL (ref 130–400)
PMV BLD AUTO: 9.1 FL (ref 7.2–11.7)
RBC # BLD AUTO: 2.34 MIL/UL (ref 3.8–5.2)
WBC # BLD AUTO: 22.9 K/UL (ref 4.8–10.8)

## 2018-03-06 RX ADMIN — DIPHENHYDRAMINE HYDROCHLORIDE PRN MG: 50 INJECTION INTRAMUSCULAR; INTRAVENOUS at 20:55

## 2018-03-06 RX ADMIN — ENOXAPARIN SODIUM SCH: 40 INJECTION SUBCUTANEOUS at 13:48

## 2018-03-06 RX ADMIN — DIPHENHYDRAMINE HYDROCHLORIDE PRN MG: 50 INJECTION INTRAMUSCULAR; INTRAVENOUS at 07:44

## 2018-03-06 RX ADMIN — DIPHENHYDRAMINE HYDROCHLORIDE PRN MG: 50 INJECTION INTRAMUSCULAR; INTRAVENOUS at 17:05

## 2018-03-06 RX ADMIN — DIPHENHYDRAMINE HYDROCHLORIDE PRN MG: 50 INJECTION INTRAMUSCULAR; INTRAVENOUS at 13:44

## 2018-03-06 RX ADMIN — DIPHENHYDRAMINE HYDROCHLORIDE PRN MG: 50 INJECTION INTRAMUSCULAR; INTRAVENOUS at 03:35

## 2018-03-06 RX ADMIN — DIPHENHYDRAMINE HYDROCHLORIDE PRN MG: 50 INJECTION INTRAMUSCULAR; INTRAVENOUS at 00:30

## 2018-03-06 RX ADMIN — DIPHENHYDRAMINE HYDROCHLORIDE PRN MG: 50 INJECTION INTRAMUSCULAR; INTRAVENOUS at 10:43

## 2018-03-06 NOTE — CP.PCM.PN
Subjective





- Date & Time of Evaluation


Date of Evaluation: 03/06/18


Time of Evaluation: 06:30





- Subjective


Subjective: 





for discharge tomorrow am 





Objective





- Vital Signs/Intake and Output


Vital Signs (last 24 hours): 


 











Temp Pulse Resp BP Pulse Ox


 


 98.2 F   96 H  20   113/65   97 


 


 03/06/18 15:46  03/06/18 15:46  03/06/18 15:46  03/06/18 15:46  03/06/18 15:46








Intake and Output: 


 











 03/06/18 03/07/18





 18:59 06:59


 


Intake Total 1760 


 


Balance 1760 














- Medications


Medications: 


 Current Medications





Diphenhydramine HCl (Benadryl)  25 mg IVP Q3 PRN


   PRN Reason: Pain, moderate (4-7)


   Last Admin: 03/06/18 17:05 Dose:  25 mg


Enoxaparin Sodium (Lovenox)  40 mg SC DAILY [facility]


   Last Admin: 03/06/18 13:48 Dose:  Not Given


Folic Acid (Folic Acid)  2 mg PO DAILY [facility]


   Last Admin: 03/06/18 09:57 Dose:  2 mg


Hydromorphone HCl (Dilaudid)  2 mg IVP Q3 PRN


   PRN Reason: Pain, moderate (4-7)


   Last Admin: 03/06/18 17:05 Dose:  2 mg


Dextrose/Sodium Chloride (Dextrose 5%/0.45% Ns 1000 Ml)  1,000 mls @ 60 mls/hr 

IV .[id number] [facility]


   Last Admin: 03/05/18 19:32 Dose:  Not Given


Meropenem 1 gm/ Sodium (Chloride)  100 mls @ 100 mls/hr IVPB Q8 [facility]


   Last Admin: 03/06/18 13:47 Dose:  100 mls/hr


Pantoprazole Sodium (Protonix Inj)  40 mg IVP DAILY [facility]











- Labs


Labs: 


 





 03/06/18 07:08 





 03/06/18 07:08 





 











PT  14.6 SECONDS (9.7-12.2)  H  02/26/18  02:26    


 


INR  1.3   02/26/18  02:26    


 


APTT  39 SECONDS (21-34)  H  02/26/18  02:26    














Assessment and Plan


(1) Severe anemia


Status: Acute   





(2) Sickle cell anemia with pain


Status: Acute   





(3) Animal bite wound


Status: Acute   





(4) Leg pain


Status: Acute   





(5) Leukocytosis


Status: Acute   





(6) Myalgia


Status: Acute   





(7) Pain


Status: Acute   





(8) Sickle cell anemia


Status: Acute   





(9) Sickle cell pain crisis


Status: Acute   





(10) UTI (urinary tract infection)


Status: Acute   





- Assessment and Plan (Free Text)


Plan: 





. Sickle cell anemia








-continue D5 1/2 NS 60 cc/hr


-folic acid daily


-dilaudid 2 IV q 3 hrs prn. 


-lovenox 40 daily


-transfusions as necessary


-HGB improved today to 7


-will continue to monitor 


-as pt is at risk for ischemia, will transfer to tele














2. Tachycardia





-cardiology consult. Dr. Concepcion. recs appreciated.


-transfer to telemetry.











3. Urinary tract infecton








-urine culture 2/26 grew E. Coli


-ua shows 3 plus LE


-meropenem 1 g IV q 8 hrs.


-ID consult. Dr. Sagar Stanton. recs appreciated. 








4. leukocytosis





-likely secondary to UTI

## 2018-03-06 NOTE — CP.PCM.PN
<Rasta Paredes - Last Filed: 03/06/18 11:37>





Subjective





- Date & Time of Evaluation


Date of Evaluation: 03/06/18


Time of Evaluation: 11:35





- Subjective


Subjective: 














PROGRESS NOTE.











Attending: Bienvenido Etienne

















Pt seen and examined at bedside. No acute distress. No fevers, chills, vomiting 

diarrhea. Pt feels generally unwell. HGB improved, will continue to monitor. 





Objective





- Vital Signs/Intake and Output


Vital Signs (last 24 hours): 


 











Temp Pulse Resp BP Pulse Ox


 


 98.1 F   82   20   116/71   100 


 


 03/06/18 08:02  03/06/18 08:02  03/06/18 08:02  03/06/18 08:02  03/06/18 08:02








Intake and Output: 


 











 03/06/18 03/06/18





 06:59 18:59


 


Intake Total 1200 840


 


Balance 1200 840














- Medications


Medications: 


 Current Medications





Diphenhydramine HCl (Benadryl)  25 mg IVP Q3 PRN


   PRN Reason: Pain, moderate (4-7)


   Last Admin: 03/06/18 10:43 Dose:  25 mg


Enoxaparin Sodium (Lovenox)  40 mg SC DAILY [facility]


   Last Admin: 03/05/18 09:23 Dose:  Not Given


Folic Acid (Folic Acid)  2 mg PO DAILY [facility]


   Last Admin: 03/06/18 09:57 Dose:  2 mg


Hydromorphone HCl (Dilaudid)  2 mg IVP Q3 PRN


   PRN Reason: Pain, moderate (4-7)


   Last Admin: 03/06/18 10:44 Dose:  2 mg


Dextrose/Sodium Chloride (Dextrose 5%/0.45% Ns 1000 Ml)  1,000 mls @ 60 mls/hr 

IV .[id number] [facility]


   Last Admin: 03/05/18 19:32 Dose:  Not Given


Meropenem 1 gm/ Sodium (Chloride)  100 mls @ 100 mls/hr IVPB Q8 [facility]


   Last Admin: 03/06/18 05:53 Dose:  100 mls/hr


Pantoprazole Sodium (Protonix Inj)  40 mg IVP DAILY [facility]











- Labs


Labs: 


 





 03/06/18 07:08 





 03/06/18 07:08 





 











PT  14.6 SECONDS (9.7-12.2)  H  02/26/18  02:26    


 


INR  1.3   02/26/18  02:26    


 


APTT  39 SECONDS (21-34)  H  02/26/18  02:26    














- Constitutional


Appears: Chronically Ill





- Head Exam


Head Exam: ATRAUMATIC, NORMAL INSPECTION, NORMOCEPHALIC





- Eye Exam


Eye Exam: EOMI





- ENT Exam


ENT Exam: Mucous Membranes Moist





- Neck Exam


Neck Exam: Full ROM, Normal Inspection





- Respiratory Exam


Respiratory Exam: NORMAL BREATHING PATTERN.  absent: Respiratory Distress





- Cardiovascular Exam


Cardiovascular Exam: +S1, +S2





- GI/Abdominal Exam


GI & Abdominal Exam: Soft, Normal Bowel Sounds.  absent: Tenderness





- Extremities Exam


Extremities Exam: Full ROM, Normal Inspection





- Neurological Exam


Neurological Exam: Alert, Awake, Oriented x3





- Psychiatric Exam


Psychiatric exam: Normal Affect, Normal Mood





- Skin


Skin Exam: Dry, Intact, Normal Color, Warm





Assessment and Plan





- Assessment and Plan (Free Text)


Assessment: 














This is a 36 yo female with











1. Sickle cell anemia








-continue D5 1/2 NS 60 cc/hr


-folic acid daily


-dilaudid 2 IV q 3 hrs prn. 


-lovenox 40 daily


-transfusions as necessary


-HGB improved today to 7


-will continue to monitor 


-as pt is at risk for ischemia, will transfer to tele














2. Tachycardia





-cardiology consult. Dr. Concepcion. recs appreciated.


-transfer to telemetry.











3. Urinary tract infecton








-urine culture 2/26 grew E. Coli


-ua shows 3 plus LE


-meropenem 1 g IV q 8 hrs.


-ID consult. Dr. Sagar Stanton. recs appreciated. 








4. leukocytosis





-likely secondary to UTI








5. GI/DVT ppx





-lovenox 40 daily


-protonix daily














discussed with Dr. Etienne. 





<Shahab Etienne S - Last Filed: 03/06/18 19:03>





Objective





- Vital Signs/Intake and Output


Vital Signs (last 24 hours): 


 











Temp Pulse Resp BP Pulse Ox


 


 98.2 F   96 H  20   113/65   97 


 


 03/06/18 15:46  03/06/18 15:46  03/06/18 15:46  03/06/18 15:46  03/06/18 15:46








Intake and Output: 


 











 03/06/18 03/07/18





 18:59 06:59


 


Intake Total 1760 


 


Balance 1760 














- Medications


Medications: 


 Current Medications





Diphenhydramine HCl (Benadryl)  25 mg IVP Q3 PRN


   PRN Reason: Pain, moderate (4-7)


   Last Admin: 03/06/18 17:05 Dose:  25 mg


Enoxaparin Sodium (Lovenox)  40 mg SC DAILY [facility]


   Last Admin: 03/06/18 13:48 Dose:  Not Given


Folic Acid (Folic Acid)  2 mg PO DAILY [facility]


   Last Admin: 03/06/18 09:57 Dose:  2 mg


Hydromorphone HCl (Dilaudid)  2 mg IVP Q3 PRN


   PRN Reason: Pain, moderate (4-7)


   Last Admin: 03/06/18 17:05 Dose:  2 mg


Dextrose/Sodium Chloride (Dextrose 5%/0.45% Ns 1000 Ml)  1,000 mls @ 60 mls/hr 

IV .[id number] [facility]


   Last Admin: 03/05/18 19:32 Dose:  Not Given


Meropenem 1 gm/ Sodium (Chloride)  100 mls @ 100 mls/hr IVPB Q8 [facility]


   Last Admin: 03/06/18 13:47 Dose:  100 mls/hr


Pantoprazole Sodium (Protonix Inj)  40 mg IVP DAILY [facility]











- Labs


Labs: 


 





 03/06/18 07:08 





 03/06/18 07:08 





 











PT  14.6 SECONDS (9.7-12.2)  H  02/26/18  02:26    


 


INR  1.3   02/26/18  02:26    


 


APTT  39 SECONDS (21-34)  H  02/26/18  02:26    














Assessment and Plan


(1) Severe anemia


Status: Acute   





(2) Sickle cell anemia with pain


Status: Acute   





(3) Animal bite wound


Status: Acute   





(4) Leg pain


Status: Acute   





(5) Leukocytosis


Status: Acute   





(6) Myalgia


Status: Acute   





(7) Pain


Status: Acute   





(8) Sickle cell anemia


Status: Acute   





(9) Sickle cell pain crisis


Status: Acute   





(10) UTI (urinary tract infection)


Status: Acute   





- Assessment and Plan (Free Text)


Plan: 





. Sickle cell anemia








-continue D5 1/2 NS 60 cc/hr


-folic acid daily


-dilaudid 2 IV q 3 hrs prn. 


-lovenox 40 daily


-transfusions as necessary


-HGB improved today to 7


-will continue to monitor 


-as pt is at risk for ischemia, will transfer to tele














2. Tachycardia





-cardiology consult. Dr. Concepcion. recs appreciated.


-transfer to telemetry.











3. Urinary tract infecton








-urine culture 2/26 grew E. Coli


-ua shows 3 plus LE


-meropenem 1 g IV q 8 hrs.


-ID consult. Dr. Sagar Stanton. recs appreciated. 








4. leukocytosis





-likely secondary to UTI

## 2018-03-06 NOTE — CP.PCM.PN
Subjective





- Date & Time of Evaluation


Date of Evaluation: 03/06/18


Time of Evaluation: 19:10





- Subjective


Subjective: 





Feeling better





Objective





- Vital Signs/Intake and Output


Vital Signs (last 24 hours): 


 











Temp Pulse Resp BP Pulse Ox


 


 98.2 F   96 H  20   113/65   97 


 


 03/06/18 15:46  03/06/18 15:46  03/06/18 15:46  03/06/18 15:46  03/06/18 15:46








Intake and Output: 


 











 03/06/18 03/07/18





 18:59 06:59


 


Intake Total 1760 


 


Balance 1760 














- Medications


Medications: 


 Current Medications





Diphenhydramine HCl (Benadryl)  25 mg IVP Q3 PRN


   PRN Reason: Pain, moderate (4-7)


   Last Admin: 03/06/18 20:55 Dose:  25 mg


Enoxaparin Sodium (Lovenox)  40 mg SC DAILY [facility]


   Last Admin: 03/06/18 13:48 Dose:  Not Given


Folic Acid (Folic Acid)  2 mg PO DAILY [facility]


   Last Admin: 03/06/18 09:57 Dose:  2 mg


Hydromorphone HCl (Dilaudid)  2 mg IVP Q3 PRN


   PRN Reason: Pain, moderate (4-7)


   Last Admin: 03/06/18 20:55 Dose:  2 mg


Dextrose/Sodium Chloride (Dextrose 5%/0.45% Ns 1000 Ml)  1,000 mls @ 60 mls/hr 

IV .[id number] [facility]


   Last Admin: 03/05/18 19:32 Dose:  Not Given


Meropenem 1 gm/ Sodium (Chloride)  100 mls @ 100 mls/hr IVPB Q8 [facility]


   Last Admin: 03/06/18 21:01 Dose:  100 mls/hr


Pantoprazole Sodium (Protonix Inj)  40 mg IVP DAILY [facility]











- Labs


Labs: 


 





 03/06/18 07:08 





 03/06/18 07:08 





 











PT  14.6 SECONDS (9.7-12.2)  H  02/26/18  02:26    


 


INR  1.3   02/26/18  02:26    


 


APTT  39 SECONDS (21-34)  H  02/26/18  02:26    














- Head Exam


Head Exam: ATRAUMATIC





- Eye Exam


Eye Exam: Normal appearance





- ENT Exam


ENT Exam: Mucous Membranes Dry





- Respiratory Exam


Respiratory Exam: NORMAL BREATHING PATTERN





- Cardiovascular Exam


Cardiovascular Exam: +S1, +S2





- GI/Abdominal Exam


GI & Abdominal Exam: Normal Bowel Sounds





Assessment and Plan


(1) Sickle cell anemia with pain


Assessment & Plan: 


IV fluids, pain meds, folic acid, 02 via NC


s/p PRBC transfusion


Status: Acute   





(2) Leukocytosis


Assessment & Plan: 


on antibiotics


reactive component from sickle cell


Status: Acute   





(3) Sickle cell anemia


Assessment & Plan: 


folic acid


Status: Acute

## 2018-03-06 NOTE — CP.PCM.PN
Subjective





- Date & Time of Evaluation


Date of Evaluation: 03/06/18


Time of Evaluation: 22:36





- Subjective


Subjective: 





CHIEF COMPLAINTS TODAY :


AFEBRILE.


FEELING BETTER





H/H IMPROVED S/P BLOOD TRANSFUSION.


 HGB 7.0/HEMATOCRIT 20.2











ROS.


HEENT :  N.


Resp :       No cough, wheezing ,pleuritic CP ,or hemoptysis 


Cardio :     No anginal  CP, PND, orthopnea, palpitation 


GI :           No abd.pain, n/v ,diarrhea or GI bleeding .


CNS : No headache, vertigo, focal deficit.


Musculoskel :  No joint swelling ,


Derm :        No rash 


Psych :     Normal affect.


Ext :  No  swelling ,calf pain 





PE.


Pt. is alert awake in no distress.


V.S  As noted in the chart 


Head ,ear nose,throat and eyes : Normal.


Neck : Supple with normal carotids.


Lungs: Clear air entry.


Heart : S1 & S2 normal with S4. No murmur.


Abd : Soft non tender with normal bowel sounds.


Neuro : Moves all ext. with no localized deficit.


Ext : No edema with intact pulses.Non tender calves 


Derm : No rashes or decubitus ulcer.





LABS/RADIOLOGY:


REVIEWED


wbc 22.1


H/H 4.8--> 7.0


CREAT 1.0/BUN 20


urine culture 2/26/18 +veESBL positive Escherichia coli  -S  Merrem and 

gentamicin.





ASSESSMENT.


LEUKOCYTOSIS /SEPSIS  UROSEPSIS/ REACTIVE


SEVERE ANEMIA


SICKLE CELL CRISIS


GENERALIZED PAIN





/PLAN : 


REPEAT ua AND URINE CULTURE CLEAN CATCH 3/7/18


CONTINUE iv MERREM 1 G EVERY 8  HOURLY 2/27/18-DAY8


 GOT  iv GENTAMICIN 130 MILLIGRAMS iv PIGGYBACK ONE DOSE 2/28/18


GOT  iv gentamicin  80 mg IV piggyback to 24 hourly 2 doses for synergy 3/2/18

, 3/3/18.


F/U RENAL FUNCTION CLOSELY.


ANALGESICS


aS PER HEMATOLOGY.











Objective





- Vital Signs/Intake and Output


Vital Signs (last 24 hours): 


 











Temp Pulse Resp BP Pulse Ox


 


 98.2 F   96 H  20   113/65   97 


 


 03/06/18 15:46  03/06/18 15:46  03/06/18 15:46  03/06/18 15:46  03/06/18 15:46








Intake and Output: 


 











 03/06/18 03/07/18





 18:59 06:59


 


Intake Total 1760 


 


Balance 1760 














- Medications


Medications: 


 Current Medications





Diphenhydramine HCl (Benadryl)  25 mg IVP Q3 PRN


   PRN Reason: Pain, moderate (4-7)


   Last Admin: 03/06/18 20:55 Dose:  25 mg


Enoxaparin Sodium (Lovenox)  40 mg SC DAILY [facility]


   Last Admin: 03/06/18 13:48 Dose:  Not Given


Folic Acid (Folic Acid)  2 mg PO DAILY [facility]


   Last Admin: 03/06/18 09:57 Dose:  2 mg


Hydromorphone HCl (Dilaudid)  2 mg IVP Q3 PRN


   PRN Reason: Pain, moderate (4-7)


   Last Admin: 03/06/18 20:55 Dose:  2 mg


Dextrose/Sodium Chloride (Dextrose 5%/0.45% Ns 1000 Ml)  1,000 mls @ 60 mls/hr 

IV .[id number] [facility]


   Last Admin: 03/05/18 19:32 Dose:  Not Given


Meropenem 1 gm/ Sodium (Chloride)  100 mls @ 100 mls/hr IVPB Q8 [facility]


   Last Admin: 03/06/18 21:01 Dose:  100 mls/hr


Pantoprazole Sodium (Protonix Inj)  40 mg IVP DAILY [facility]











- Labs


Labs: 


 





 03/06/18 07:08 





 03/06/18 07:08 





 











PT  14.6 SECONDS (9.7-12.2)  H  02/26/18  02:26    


 


INR  1.3   02/26/18  02:26    


 


APTT  39 SECONDS (21-34)  H  02/26/18  02:26    














Assessment and Plan


(1) UTI (urinary tract infection)


Status: Acute   





(2) Leukocytosis


Status: Acute   





(3) Severe anemia


Status: Acute   





(4) Sickle cell anemia with pain


Status: Acute

## 2018-03-07 LAB
ALBUMIN SERPL-MCNC: 3.5 G/DL (ref 3.5–5)
ALBUMIN/GLOB SERPL: 0.7 {RATIO} (ref 1–2.1)
ALT SERPL-CCNC: 56 U/L (ref 9–52)
AST SERPL-CCNC: 99 U/L (ref 14–36)
BACTERIA #/AREA URNS HPF: (no result) /[HPF]
BASOPHILS # BLD AUTO: 0.3 K/UL (ref 0–0.2)
BASOPHILS NFR BLD: 1.4 % (ref 0–2)
BILIRUB UR-MCNC: NEGATIVE MG/DL
BUN SERPL-MCNC: 22 MG/DL (ref 7–17)
CALCIUM SERPL-MCNC: 8.3 MG/DL (ref 8.6–10.4)
EOSINOPHIL # BLD AUTO: 1.5 K/UL (ref 0–0.7)
EOSINOPHIL NFR BLD: 6.8 % (ref 0–4)
ERYTHROCYTE [DISTWIDTH] IN BLOOD BY AUTOMATED COUNT: 17.3 % (ref 11.5–14.5)
GFR NON-AFRICAN AMERICAN: > 60
GLUCOSE UR STRIP-MCNC: NORMAL MG/DL
HGB BLD-MCNC: 7.3 G/DL (ref 11–16)
LEUKOCYTE ESTERASE UR-ACNC: (no result) LEU/UL
LYMPHOCYTES # BLD AUTO: 5.2 K/UL (ref 1–4.3)
LYMPHOCYTES NFR BLD AUTO: 23.4 % (ref 20–40)
MCH RBC QN AUTO: 29.8 PG (ref 27–31)
MCHC RBC AUTO-ENTMCNC: 34.2 G/DL (ref 33–37)
MCV RBC AUTO: 87 FL (ref 81–99)
MONOCYTES # BLD: 2 K/UL (ref 0–0.8)
MONOCYTES NFR BLD: 9.1 % (ref 0–10)
NEUTROPHILS # BLD: 13.1 K/UL (ref 1.8–7)
NEUTROPHILS NFR BLD AUTO: 59.3 % (ref 50–75)
NRBC BLD AUTO-RTO: 0.3 % (ref 0–2)
PH UR STRIP: 6 [PH] (ref 5–8)
PLATELET # BLD: 170 K/UL (ref 130–400)
PMV BLD AUTO: 8.5 FL (ref 7.2–11.7)
PROT UR STRIP-MCNC: (no result) MG/DL
RBC # BLD AUTO: 2.44 MIL/UL (ref 3.8–5.2)
RBC # UR STRIP: (no result) /UL
SP GR UR STRIP: 1.01 (ref 1–1.03)
SQUAMOUS EPITHIAL: < 1 /HPF (ref 0–5)
UROBILINOGEN UR-MCNC: NORMAL MG/DL (ref 0.2–1)
WBC # BLD AUTO: 22.1 K/UL (ref 4.8–10.8)

## 2018-03-07 RX ADMIN — DIPHENHYDRAMINE HYDROCHLORIDE PRN MG: 50 INJECTION INTRAMUSCULAR; INTRAVENOUS at 00:05

## 2018-03-07 RX ADMIN — ENOXAPARIN SODIUM SCH: 40 INJECTION SUBCUTANEOUS at 09:14

## 2018-03-07 RX ADMIN — DIPHENHYDRAMINE HYDROCHLORIDE PRN MG: 50 INJECTION INTRAMUSCULAR; INTRAVENOUS at 21:10

## 2018-03-07 RX ADMIN — DIPHENHYDRAMINE HYDROCHLORIDE PRN MG: 50 INJECTION INTRAMUSCULAR; INTRAVENOUS at 12:02

## 2018-03-07 RX ADMIN — DIPHENHYDRAMINE HYDROCHLORIDE PRN MG: 50 INJECTION INTRAMUSCULAR; INTRAVENOUS at 18:09

## 2018-03-07 RX ADMIN — DIPHENHYDRAMINE HYDROCHLORIDE PRN MG: 50 INJECTION INTRAMUSCULAR; INTRAVENOUS at 06:13

## 2018-03-07 RX ADMIN — DIPHENHYDRAMINE HYDROCHLORIDE PRN MG: 50 INJECTION INTRAMUSCULAR; INTRAVENOUS at 03:05

## 2018-03-07 RX ADMIN — DIPHENHYDRAMINE HYDROCHLORIDE PRN MG: 50 INJECTION INTRAMUSCULAR; INTRAVENOUS at 15:03

## 2018-03-07 RX ADMIN — DIPHENHYDRAMINE HYDROCHLORIDE PRN MG: 50 INJECTION INTRAMUSCULAR; INTRAVENOUS at 09:07

## 2018-03-07 NOTE — CP.PCM.PN
<Rasta Paredes - Last Filed: 03/07/18 14:44>





Subjective





- Date & Time of Evaluation


Date of Evaluation: 03/07/18


Time of Evaluation: 14:35





- Subjective


Subjective: 








Progress Note.














Attending: Dr. Etienne.

















Pt seen and examined at bedside. No acute distress. No events overnight. No 

fevers, chills, vomiting, diarrhea.





Objective





- Vital Signs/Intake and Output


Vital Signs (last 24 hours): 


 











Temp Pulse Resp BP Pulse Ox


 


 97.9 F   88   20   113/73   97 


 


 03/07/18 08:14  03/07/18 08:14  03/07/18 08:14  03/07/18 08:14  03/07/18 08:14








Intake and Output: 


 











 03/07/18 03/07/18





 06:59 18:59


 


Intake Total  1400


 


Balance  1400














- Medications


Medications: 


 Current Medications





Diphenhydramine HCl (Benadryl)  25 mg IVP Q3 PRN


   PRN Reason: Pain, moderate (4-7)


   Last Admin: 03/07/18 12:02 Dose:  25 mg


Enoxaparin Sodium (Lovenox)  40 mg SC DAILY [facility]


   Last Admin: 03/07/18 09:14 Dose:  Not Given


Folic Acid (Folic Acid)  2 mg PO DAILY [facility]


   Last Admin: 03/07/18 09:05 Dose:  2 mg


Hydromorphone HCl (Dilaudid)  2 mg IVP Q3 PRN


   PRN Reason: Pain, moderate (4-7)


   Last Admin: 03/07/18 12:03 Dose:  2 mg


Dextrose/Sodium Chloride (Dextrose 5%/0.45% Ns 1000 Ml)  1,000 mls @ 60 mls/hr 

IV .[id number] [facility]


   Last Admin: 03/05/18 19:32 Dose:  Not Given


Meropenem 1 gm/ Sodium (Chloride)  100 mls @ 100 mls/hr IVPB Q8 [facility]


   Last Admin: 03/07/18 13:49 Dose:  100 mls/hr


Pantoprazole Sodium (Protonix Inj)  40 mg IVP DAILY [facility]


   Last Admin: 03/07/18 09:06 Dose:  40 mg











- Labs


Labs: 


 





 03/07/18 06:50 





 03/07/18 06:50 





 











PT  14.6 SECONDS (9.7-12.2)  H  02/26/18  02:26    


 


INR  1.3   02/26/18  02:26    


 


APTT  39 SECONDS (21-34)  H  02/26/18  02:26    














- Constitutional


Appears: Non-toxic, No Acute Distress





- Head Exam


Head Exam: ATRAUMATIC, NORMAL INSPECTION, NORMOCEPHALIC





- Eye Exam


Eye Exam: EOMI





- ENT Exam


ENT Exam: Mucous Membranes Moist





- Neck Exam


Neck Exam: Full ROM





- Respiratory Exam


Respiratory Exam: NORMAL BREATHING PATTERN.  absent: Respiratory Distress





- Cardiovascular Exam


Cardiovascular Exam: +S1, +S2





- GI/Abdominal Exam


GI & Abdominal Exam: Soft, Normal Bowel Sounds.  absent: Tenderness





- Extremities Exam


Extremities Exam: Full ROM, Normal Inspection





- Back Exam


Back Exam: NORMAL INSPECTION





- Neurological Exam


Neurological Exam: Alert, Awake, CN II-XII Intact, Oriented x3





- Psychiatric Exam


Psychiatric exam: Normal Affect, Normal Mood





- Skin


Skin Exam: Dry, Intact, Normal Color, Warm





Assessment and Plan





- Assessment and Plan (Free Text)


Assessment: 











This is a 38 yo female with











1. Sickle cell anemia








-continue D5 1/2 NS 60 cc/hr


-folic acid daily


-dilaudid 2 IV q 3 hrs prn. 


-lovenox 40 daily


-transfusions as necessary


-HGB improved today as well. 


-will continue to monitor 


-as pt is at risk for ischemia, will transfer to tele














2. Tachycardia





-cardiology consult. Dr. Concepcion. recs appreciated.


-transfer to telemetry.











3. Urinary tract infecton








-urine culture 2/26 grew E. Coli


-ua shows 3 plus LE


-meropenem 1 g IV q 8 hrs.


-ID consult. Dr. Sagar Stanton. recs appreciated. 








4. leukocytosis





-likely secondary to UTI








5. GI/DVT ppx





-lovenox 40 daily


-protonix daily














discussed with Dr. Etienne. 








<Shahab Etienne - Last Filed: 03/07/18 18:40>





Objective





- Vital Signs/Intake and Output


Vital Signs (last 24 hours): 


 











Temp Pulse Resp BP Pulse Ox


 


 98.5 F   111 H  20   115/78   100 


 


 03/07/18 16:20  03/07/18 16:20  03/07/18 16:20  03/07/18 16:20  03/07/18 16:20








Intake and Output: 


 











 03/07/18 03/07/18





 06:59 18:59


 


Intake Total  1400


 


Balance  1400














- Medications


Medications: 


 Current Medications





Diphenhydramine HCl (Benadryl)  25 mg IVP Q3 PRN


   PRN Reason: Pain, moderate (4-7)


   Last Admin: 03/07/18 18:09 Dose:  25 mg


Enoxaparin Sodium (Lovenox)  40 mg SC DAILY [facility]


   Last Admin: 03/07/18 09:14 Dose:  Not Given


Folic Acid (Folic Acid)  2 mg PO DAILY [facility]


   Last Admin: 03/07/18 09:05 Dose:  2 mg


Hydromorphone HCl (Dilaudid)  2 mg IVP Q3 PRN


   PRN Reason: Pain, moderate (4-7)


   Last Admin: 03/07/18 18:10 Dose:  2 mg


Dextrose/Sodium Chloride (Dextrose 5%/0.45% Ns 1000 Ml)  1,000 mls @ 60 mls/hr 

IV .[id number] [facility]


   Last Admin: 03/05/18 19:32 Dose:  Not Given


Meropenem 1 gm/ Sodium (Chloride)  100 mls @ 100 mls/hr IVPB Q8 [facility]


   Last Admin: 03/07/18 13:49 Dose:  100 mls/hr


Pantoprazole Sodium (Protonix Inj)  40 mg IVP DAILY [facility]


   Last Admin: 03/07/18 09:06 Dose:  40 mg











- Labs


Labs: 


 





 03/07/18 06:50 





 03/07/18 06:50 





 











PT  14.6 SECONDS (9.7-12.2)  H  02/26/18  02:26    


 


INR  1.3   02/26/18  02:26    


 


APTT  39 SECONDS (21-34)  H  02/26/18  02:26    














Assessment and Plan


(1) Severe anemia


Status: Acute   





(2) Sickle cell anemia with pain


Status: Acute   





(3) Animal bite wound


Status: Acute   





(4) Leg pain


Status: Acute   





(5) Leukocytosis


Status: Acute   





(6) Myalgia


Status: Acute   





(7) Pain


Status: Acute   





(8) Sickle cell anemia


Status: Acute   





(9) Sickle cell pain crisis


Status: Acute   





(10) UTI (urinary tract infection)


Status: Acute   





Attending/Attestation





- Attestation


I have personally seen and examined this patient.: Yes


I have fully participated in the care of the patient.: Yes


I have reviewed all pertinent clinical information, including history, physical 

exam and plan: Yes


Notes (Text): 





03/07/18 18:40


Case seen and seen and discussed with the staff ID and ID consult IV antibiotic 

pain medications

## 2018-03-07 NOTE — CP.PCM.PN
Subjective





- Date & Time of Evaluation


Date of Evaluation: 03/07/18


Time of Evaluation: 13:13





- Subjective


Subjective: 





DISCUSSED D/C PLAN WITH DR. GUZMAN AND HE CLEARED, SHE MAY F/U AS OP.  ALSO 

DISCUSSED WITH DR. MARK ROSENTHAL AND SHE IS NOT CLEARING THE PT UNTIL TOMORROW.  PER 

DR. ROSENTHAL CONTINUE 1 MORE DAY OF MERREM IV AND STARTING TOMORROW PT MAY BE D/C'D 

HOME WITH BACTRIM DS 1 TAB PO BID X5 DAYS.  RX HAS ALREADY BEEN SENT TO PT'S 

PHARMACY AND SHE MAY PICK IT UP TOMORROW AFTER DISCHARGE.   DR. MARIVEL FORBES AWARE 

OF D/C PLAN FOR TOMORROW.  PT AWARE AND IN AGREEMENT. NO FURTHER ORDER.S 





Objective





- Vital Signs/Intake and Output


Vital Signs (last 24 hours): 


 











Temp Pulse Resp BP Pulse Ox


 


 97.9 F   88   20   113/73   97 


 


 03/07/18 08:14  03/07/18 08:14  03/07/18 08:14  03/07/18 08:14  03/07/18 08:14








Intake and Output: 


 











 03/07/18 03/07/18





 06:59 18:59


 


Intake Total  600


 


Balance  600














- Medications


Medications: 


 Current Medications





Diphenhydramine HCl (Benadryl)  25 mg IVP Q3 PRN


   PRN Reason: Pain, moderate (4-7)


   Last Admin: 03/07/18 12:02 Dose:  25 mg


Enoxaparin Sodium (Lovenox)  40 mg SC DAILY [facility]


   Last Admin: 03/07/18 09:14 Dose:  Not Given


Folic Acid (Folic Acid)  2 mg PO DAILY [facility]


   Last Admin: 03/07/18 09:05 Dose:  2 mg


Hydromorphone HCl (Dilaudid)  2 mg IVP Q3 PRN


   PRN Reason: Pain, moderate (4-7)


   Last Admin: 03/07/18 12:03 Dose:  2 mg


Dextrose/Sodium Chloride (Dextrose 5%/0.45% Ns 1000 Ml)  1,000 mls @ 60 mls/hr 

IV .[id number] [facility]


   Last Admin: 03/05/18 19:32 Dose:  Not Given


Meropenem 1 gm/ Sodium (Chloride)  100 mls @ 100 mls/hr IVPB Q8 [facility]


   Last Admin: 03/07/18 06:22 Dose:  100 mls/hr


Pantoprazole Sodium (Protonix Inj)  40 mg IVP DAILY [facility]


   Last Admin: 03/07/18 09:06 Dose:  40 mg











- Labs


Labs: 


 





 03/07/18 06:50 





 03/07/18 06:50 





 











PT  14.6 SECONDS (9.7-12.2)  H  02/26/18  02:26    


 


INR  1.3   02/26/18  02:26    


 


APTT  39 SECONDS (21-34)  H  02/26/18  02:26

## 2018-03-07 NOTE — CP.PCM.PN
Subjective





- Date & Time of Evaluation


Date of Evaluation: 03/07/18





Objective





- Vital Signs/Intake and Output


Vital Signs (last 24 hours): 


 











Temp Pulse Resp BP Pulse Ox


 


 97.9 F   88   20   113/73   97 


 


 03/07/18 08:14  03/07/18 08:14  03/07/18 08:14  03/07/18 08:14  03/07/18 08:14








Intake and Output: 


 











 03/07/18 03/07/18





 06:59 18:59


 


Intake Total  600


 


Balance  600














- Medications


Medications: 


 Current Medications





Diphenhydramine HCl (Benadryl)  25 mg IVP Q3 PRN


   PRN Reason: Pain, moderate (4-7)


   Last Admin: 03/07/18 09:07 Dose:  25 mg


Enoxaparin Sodium (Lovenox)  40 mg SC DAILY [facility]


   Last Admin: 03/07/18 09:14 Dose:  Not Given


Folic Acid (Folic Acid)  2 mg PO DAILY [facility]


   Last Admin: 03/07/18 09:05 Dose:  2 mg


Hydromorphone HCl (Dilaudid)  2 mg IVP Q3 PRN


   PRN Reason: Pain, moderate (4-7)


   Last Admin: 03/07/18 09:08 Dose:  2 mg


Dextrose/Sodium Chloride (Dextrose 5%/0.45% Ns 1000 Ml)  1,000 mls @ 60 mls/hr 

IV .[id number] [facility]


   Last Admin: 03/05/18 19:32 Dose:  Not Given


Meropenem 1 gm/ Sodium (Chloride)  100 mls @ 100 mls/hr IVPB Q8 [facility]


   Last Admin: 03/07/18 06:22 Dose:  100 mls/hr


Pantoprazole Sodium (Protonix Inj)  40 mg IVP DAILY [facility]


   Last Admin: 03/07/18 09:06 Dose:  40 mg











- Labs


Labs: 


 





 03/07/18 06:50 





 03/07/18 06:50 





 











PT  14.6 SECONDS (9.7-12.2)  H  02/26/18  02:26    


 


INR  1.3   02/26/18  02:26    


 


APTT  39 SECONDS (21-34)  H  02/26/18  02:26    














Assessment and Plan


(1) Severe anemia


Status: Acute   





(2) Sickle cell anemia with pain


Status: Acute   





(3) Animal bite wound


Status: Acute   





(4) Leg pain


Status: Acute   





(5) Leukocytosis


Status: Acute   





(6) Myalgia


Status: Acute   





(7) Pain


Status: Acute   





(8) Sickle cell anemia


Status: Acute   





(9) Sickle cell pain crisis


Status: Acute   





(10) UTI (urinary tract infection)


Status: Acute   





- Assessment and Plan (Free Text)


Plan: 





Continue pain medicine possible discharge plan continue IV Dilaudid and IV 

Benadryl


Continue meropenem


WBCs still high


Patient has a chronic ongoing UTI


Discharge plan


is is a 36 yo female with











1. Sickle cell anemia








-continue D5 1/2 NS 60 cc/hr


-folic acid daily


-dilaudid 2 IV q 3 hrs prn. 


-lovenox 40 daily


-transfusions as necessary


-HGB improved today as well. 


-will continue to monitor 


-as pt is at risk for ischemia, will transfer to tele














2. Tachycardia





-cardiology consult. Dr. Concepcion. recs appreciated.


-transfer to telemetry.











3. Urinary tract infecton








-urine culture 2/26 grew E. Coli


-ua shows 3 plus LE


-meropenem 1 g IV q 8 hrs.


-ID consult. Dr. Sagar Stanton. recs appreciated. 








4. leukocytosis





-likely secondary to UTI








5. GI/DVT ppx





-lovenox 40 daily


-protonix daily

## 2018-03-07 NOTE — CP.PCM.PN
Subjective





- Date & Time of Evaluation


Date of Evaluation: 03/07/18


Time of Evaluation: 11:00





- Subjective


Subjective: 





Feeling better.





Objective





- Vital Signs/Intake and Output


Vital Signs (last 24 hours): 


 











Temp Pulse Resp BP Pulse Ox


 


 97.9 F   88   20   113/73   97 


 


 03/07/18 08:14  03/07/18 08:14  03/07/18 08:14  03/07/18 08:14  03/07/18 08:14








Intake and Output: 


 











 03/07/18 03/07/18





 06:59 18:59


 


Intake Total  1400


 


Balance  1400














- Medications


Medications: 


 Current Medications





Diphenhydramine HCl (Benadryl)  25 mg IVP Q3 PRN


   PRN Reason: Pain, moderate (4-7)


   Last Admin: 03/07/18 15:03 Dose:  25 mg


Enoxaparin Sodium (Lovenox)  40 mg SC DAILY [facility]


   Last Admin: 03/07/18 09:14 Dose:  Not Given


Folic Acid (Folic Acid)  2 mg PO DAILY [facility]


   Last Admin: 03/07/18 09:05 Dose:  2 mg


Hydromorphone HCl (Dilaudid)  2 mg IVP Q3 PRN


   PRN Reason: Pain, moderate (4-7)


   Last Admin: 03/07/18 15:04 Dose:  2 mg


Dextrose/Sodium Chloride (Dextrose 5%/0.45% Ns 1000 Ml)  1,000 mls @ 60 mls/hr 

IV .[id number] [facility]


   Last Admin: 03/05/18 19:32 Dose:  Not Given


Meropenem 1 gm/ Sodium (Chloride)  100 mls @ 100 mls/hr IVPB Q8 [facility]


   Last Admin: 03/07/18 13:49 Dose:  100 mls/hr


Pantoprazole Sodium (Protonix Inj)  40 mg IVP DAILY [facility]


   Last Admin: 03/07/18 09:06 Dose:  40 mg











- Labs


Labs: 


 





 03/07/18 06:50 





 03/07/18 06:50 





 











PT  14.6 SECONDS (9.7-12.2)  H  02/26/18  02:26    


 


INR  1.3   02/26/18  02:26    


 


APTT  39 SECONDS (21-34)  H  02/26/18  02:26    














- Head Exam


Head Exam: ATRAUMATIC





- Eye Exam


Eye Exam: Normal appearance





- ENT Exam


ENT Exam: Mucous Membranes Dry





- Respiratory Exam


Respiratory Exam: NORMAL BREATHING PATTERN





- Cardiovascular Exam


Cardiovascular Exam: +S1, +S2





- GI/Abdominal Exam


GI & Abdominal Exam: Normal Bowel Sounds





Assessment and Plan


(1) Sickle cell anemia with pain


Assessment & Plan: 


IV fluids, pain meds, 02 via NC, folic acid


s/p PRBC transfusion


Status: Acute   





(2) Leukocytosis


Assessment & Plan: 


on antibiotics


likely reactive component


Status: Acute   





(3) Sickle cell anemia


Assessment & Plan: 


folic acid


Status: Acute

## 2018-03-08 VITALS — OXYGEN SATURATION: 100 % | SYSTOLIC BLOOD PRESSURE: 135 MMHG | DIASTOLIC BLOOD PRESSURE: 81 MMHG | HEART RATE: 100 BPM

## 2018-03-08 VITALS — TEMPERATURE: 98 F

## 2018-03-08 LAB
ALBUMIN SERPL-MCNC: 3.6 G/DL (ref 3.5–5)
ALBUMIN/GLOB SERPL: 0.7 {RATIO} (ref 1–2.1)
ALT SERPL-CCNC: 50 U/L (ref 9–52)
AST SERPL-CCNC: 89 U/L (ref 14–36)
BASOPHILS # BLD AUTO: 0.2 K/UL (ref 0–0.2)
BASOPHILS NFR BLD: 0.9 % (ref 0–2)
BUN SERPL-MCNC: 24 MG/DL (ref 7–17)
CALCIUM SERPL-MCNC: 8.1 MG/DL (ref 8.6–10.4)
EOSINOPHIL # BLD AUTO: 1.7 K/UL (ref 0–0.7)
EOSINOPHIL NFR BLD: 7.3 % (ref 0–4)
ERYTHROCYTE [DISTWIDTH] IN BLOOD BY AUTOMATED COUNT: 17.3 % (ref 11.5–14.5)
GFR NON-AFRICAN AMERICAN: > 60
HGB BLD-MCNC: 7 G/DL (ref 11–16)
LYMPHOCYTES # BLD AUTO: 6.2 K/UL (ref 1–4.3)
LYMPHOCYTES NFR BLD AUTO: 26.8 % (ref 20–40)
MCH RBC QN AUTO: 30 PG (ref 27–31)
MCHC RBC AUTO-ENTMCNC: 34.4 G/DL (ref 33–37)
MCV RBC AUTO: 87.1 FL (ref 81–99)
MONOCYTES # BLD: 2 K/UL (ref 0–0.8)
MONOCYTES NFR BLD: 8.5 % (ref 0–10)
NEUTROPHILS # BLD: 13 K/UL (ref 1.8–7)
NEUTROPHILS NFR BLD AUTO: 56.5 % (ref 50–75)
NRBC BLD AUTO-RTO: 0.2 % (ref 0–2)
PLATELET # BLD: 122 K/UL (ref 130–400)
PMV BLD AUTO: 8.6 FL (ref 7.2–11.7)
RBC # BLD AUTO: 2.34 MIL/UL (ref 3.8–5.2)
WBC # BLD AUTO: 23 K/UL (ref 4.8–10.8)

## 2018-03-08 RX ADMIN — ENOXAPARIN SODIUM SCH: 40 INJECTION SUBCUTANEOUS at 09:14

## 2018-03-08 RX ADMIN — DIPHENHYDRAMINE HYDROCHLORIDE PRN MG: 50 INJECTION INTRAMUSCULAR; INTRAVENOUS at 09:08

## 2018-03-08 RX ADMIN — DIPHENHYDRAMINE HYDROCHLORIDE PRN MG: 50 INJECTION INTRAMUSCULAR; INTRAVENOUS at 00:16

## 2018-03-08 RX ADMIN — DIPHENHYDRAMINE HYDROCHLORIDE PRN MG: 50 INJECTION INTRAMUSCULAR; INTRAVENOUS at 15:32

## 2018-03-08 RX ADMIN — DIPHENHYDRAMINE HYDROCHLORIDE PRN MG: 50 INJECTION INTRAMUSCULAR; INTRAVENOUS at 06:18

## 2018-03-08 RX ADMIN — DIPHENHYDRAMINE HYDROCHLORIDE PRN MG: 50 INJECTION INTRAMUSCULAR; INTRAVENOUS at 12:47

## 2018-03-08 RX ADMIN — DIPHENHYDRAMINE HYDROCHLORIDE PRN MG: 50 INJECTION INTRAMUSCULAR; INTRAVENOUS at 03:16

## 2018-03-08 RX ADMIN — DIPHENHYDRAMINE HYDROCHLORIDE PRN MG: 50 INJECTION INTRAMUSCULAR; INTRAVENOUS at 18:36

## 2018-03-08 NOTE — CP.PCM.PN
Subjective





- Date & Time of Evaluation


Date of Evaluation: 03/08/18


Time of Evaluation: 15:20





- Subjective


Subjective: 





Has some back pain.





Objective





- Vital Signs/Intake and Output


Vital Signs (last 24 hours): 


 











Temp Pulse Resp BP Pulse Ox


 


 98 F   100 H  20   135/81   100 


 


 03/08/18 15:56  03/08/18 15:56  03/08/18 15:56  03/08/18 15:56  03/08/18 15:56








Intake and Output: 


 











 03/08/18 03/08/18





 06:59 18:59


 


Intake Total  960


 


Balance  960














- Medications


Medications: 


 Current Medications





Diphenhydramine HCl (Benadryl)  25 mg IVP Q3 PRN


   PRN Reason: Pain, moderate (4-7)


   Last Admin: 03/08/18 15:32 Dose:  25 mg


Enoxaparin Sodium (Lovenox)  40 mg SC DAILY [facility]


   Last Admin: 03/08/18 09:14 Dose:  Not Given


Folic Acid (Folic Acid)  2 mg PO DAILY [facility]


   Last Admin: 03/08/18 09:06 Dose:  2 mg


Hydromorphone HCl (Dilaudid)  2 mg IVP Q3 PRN


   PRN Reason: Pain, moderate (4-7)


   Last Admin: 03/08/18 15:33 Dose:  2 mg


Meropenem 1 gm/ Sodium (Chloride)  100 mls @ 100 mls/hr IVPB Q8 [facility]


   Last Admin: 03/08/18 13:49 Dose:  100 mls/hr


Pantoprazole Sodium (Protonix Inj)  40 mg IVP DAILY [facility]


   Last Admin: 03/08/18 09:07 Dose:  40 mg











- Labs


Labs: 


 





 03/08/18 06:58 





 03/08/18 06:58 





 











PT  14.6 SECONDS (9.7-12.2)  H  02/26/18  02:26    


 


INR  1.3   02/26/18  02:26    


 


APTT  39 SECONDS (21-34)  H  02/26/18  02:26    














- Head Exam


Head Exam: ATRAUMATIC





- Eye Exam


Eye Exam: Normal appearance





- ENT Exam


ENT Exam: Mucous Membranes Dry





- Respiratory Exam


Respiratory Exam: NORMAL BREATHING PATTERN





- Cardiovascular Exam


Cardiovascular Exam: +S1, +S2





- GI/Abdominal Exam


GI & Abdominal Exam: Normal Bowel Sounds





Assessment and Plan


(1) Sickle cell anemia with pain


Assessment & Plan: 


IV fluids, pain meds, folic acid, 02 via NC


s/p PRBC transfusion


Status: Acute   





(2) Leukocytosis


Assessment & Plan: 


on antibiotics


reactive component from sickle cell


Status: Acute   





(3) Sickle cell anemia


Assessment & Plan: 


folic acid


Status: Acute

## 2018-03-08 NOTE — CP.PCM.PN
Subjective





- Date & Time of Evaluation


Date of Evaluation: 03/08/18


Time of Evaluation: 15:02





- Subjective


Subjective: 


PGY2 Medicine Note for Dr. MARIVEL matt; all management as per Dr. MARIVEL matt





Patient states she feels better today; states she has neurogenic bladder for 

which she straight caths at home and has not been able to urinate well since 

being here; denies fevers/chills, HA, CP, SOB, abdominal pain, N/V/D, lower 

extremity pain/swelling. 





Objective





- Vital Signs/Intake and Output


Vital Signs (last 24 hours): 


 











Temp Pulse Resp BP Pulse Ox


 


 98 F   90   20   117/76   96 


 


 03/08/18 08:08  03/08/18 08:08  03/08/18 08:08  03/08/18 08:08  03/08/18 08:08








Intake and Output: 


 











 03/08/18 03/08/18





 06:59 18:59


 


Intake Total  960


 


Balance  960














- Medications


Medications: 


 Current Medications





Diphenhydramine HCl (Benadryl)  25 mg IVP Q3 PRN


   PRN Reason: Pain, moderate (4-7)


   Last Admin: 03/08/18 12:47 Dose:  25 mg


Enoxaparin Sodium (Lovenox)  40 mg SC DAILY [facility]


   Last Admin: 03/08/18 09:14 Dose:  Not Given


Folic Acid (Folic Acid)  2 mg PO DAILY [facility]


   Last Admin: 03/08/18 09:06 Dose:  2 mg


Hydromorphone HCl (Dilaudid)  2 mg IVP Q3 PRN


   PRN Reason: Pain, moderate (4-7)


   Last Admin: 03/08/18 12:47 Dose:  2 mg


Meropenem 1 gm/ Sodium (Chloride)  100 mls @ 100 mls/hr IVPB Q8 [facility]


   Last Admin: 03/08/18 13:49 Dose:  100 mls/hr


Pantoprazole Sodium (Protonix Inj)  40 mg IVP DAILY [facility]


   Last Admin: 03/08/18 09:07 Dose:  40 mg











- Labs


Labs: 


 





 03/08/18 06:58 





 03/08/18 06:58 





 











PT  14.6 SECONDS (9.7-12.2)  H  02/26/18  02:26    


 


INR  1.3   02/26/18  02:26    


 


APTT  39 SECONDS (21-34)  H  02/26/18  02:26    














- Head Exam


Additional comments: 


Appears: Non-toxic, No Acute Distress





- Head Exam


Head Exam: ATRAUMATIC, NORMAL INSPECTION, NORMOCEPHALIC





- Eye Exam


Eye Exam: EOMI





- ENT Exam


ENT Exam: Mucous Membranes Moist





- Neck Exam


Neck Exam: Full ROM





- Respiratory Exam


Respiratory Exam: NORMAL BREATHING PATTERN.  absent: Respiratory Distress





- Cardiovascular Exam


Cardiovascular Exam: +S1, +S2





- GI/Abdominal Exam


GI & Abdominal Exam: Soft, Normal Bowel Sounds.  absent: Tenderness





- Extremities Exam


Extremities Exam: Full ROM, Normal Inspection





- Back Exam


Back Exam: NORMAL INSPECTION





- Neurological Exam


Neurological Exam: Alert, Awake, CN II-XII Intact, Oriented x3





- Psychiatric Exam


Psychiatric exam: Normal Affect, Normal Mood





- Skin


Skin Exam: Dry, Intact, Normal Color, Warm





Assessment and Plan





- Assessment and Plan (Free Text)


Assessment: 


This is a 36 yo female admitted for sickle cell crisis 





Sickle cell Crisis


-continue D5 1/2 NS 60 cc/hr


-folic acid daily


-dilaudid 2 IV q 3 hrs prn. 


-lovenox 40 daily


-transfusions as necessary; avoid iron overload


-HGB stable at 7.0 which is patients baseline 


-will continue to monitor 


-as pt is at risk for ischemia, will transfer to tele








Tachycardia


-cardiology consult. Dr. Concepcion. recs appreciated.


most likely 2/2 to extreme anemia 





Urinary tract infection


-urine culture 2/26 grew E. Coli


-ua shows 3 plus LE


-meropenem 1 g IV q 8 hrs.


-ID consult. Dr. Sagar Stanton. recs appreciated. 


-patient states she straight caths at home; reiterated sterile technique to 

patient 





Neurogenic bladder


-straight cath PRN; patient is able to do herself 








leukocytosis


-likely secondary to UTI


-remains elevated


-will monitor


-afebrile 








GI/DVT ppx


-lovenox 40 daily


-protonix daily





All management as per Dr. MARIVEL Matt

## 2018-03-08 NOTE — CP.PCM.PN
Subjective





- Date & Time of Evaluation


Date of Evaluation: 03/08/18





Objective





- Vital Signs/Intake and Output


Vital Signs (last 24 hours): 


 











Temp Pulse Resp BP Pulse Ox


 


 98 F   100 H  20   135/81   100 


 


 03/08/18 15:56  03/08/18 15:56  03/08/18 15:56  03/08/18 15:56  03/08/18 15:56








Intake and Output: 


 











 03/08/18 03/09/18





 18:59 06:59


 


Intake Total 960 


 


Balance 960 














- Labs


Labs: 


 





 03/08/18 06:58 





 03/08/18 06:58 





 











PT  14.6 SECONDS (9.7-12.2)  H  02/26/18  02:26    


 


INR  1.3   02/26/18  02:26    


 


APTT  39 SECONDS (21-34)  H  02/26/18  02:26    














Assessment and Plan


(1) Severe anemia


Status: Acute   





(2) Sickle cell anemia with pain


Status: Acute   





(3) Animal bite wound


Status: Acute   





(4) Leg pain


Status: Acute   





(5) Leukocytosis


Status: Acute   





(6) Myalgia


Status: Acute   





(7) Pain


Status: Acute   





(8) Sickle cell anemia


Status: Acute   





(9) Sickle cell pain crisis


Status: Acute   





(10) UTI (urinary tract infection)


Status: Acute

## 2018-03-11 ENCOUNTER — HOSPITAL ENCOUNTER (INPATIENT)
Dept: HOSPITAL 31 - C.ER | Age: 38
LOS: 4 days | Discharge: HOME | DRG: 812 | End: 2018-03-15
Attending: INTERNAL MEDICINE | Admitting: INTERNAL MEDICINE
Payer: MEDICARE

## 2018-03-11 VITALS — BODY MASS INDEX: 22.4 KG/M2

## 2018-03-11 DIAGNOSIS — Z90.49: ICD-10-CM

## 2018-03-11 DIAGNOSIS — B96.20: ICD-10-CM

## 2018-03-11 DIAGNOSIS — I69.398: ICD-10-CM

## 2018-03-11 DIAGNOSIS — D57.00: Primary | ICD-10-CM

## 2018-03-11 DIAGNOSIS — N39.0: ICD-10-CM

## 2018-03-11 DIAGNOSIS — Z87.440: ICD-10-CM

## 2018-03-11 DIAGNOSIS — Z16.21: ICD-10-CM

## 2018-03-11 DIAGNOSIS — N92.6: ICD-10-CM

## 2018-03-11 DIAGNOSIS — N31.9: ICD-10-CM

## 2018-03-11 LAB
ALBUMIN SERPL-MCNC: 3.5 G/DL (ref 3.5–5)
ALBUMIN/GLOB SERPL: 0.7 {RATIO} (ref 1–2.1)
ALT SERPL-CCNC: 96 U/L (ref 9–52)
APTT BLD: 41 SECONDS (ref 21–34)
AST SERPL-CCNC: 164 U/L (ref 14–36)
BACTERIA #/AREA URNS HPF: (no result) /[HPF]
BASOPHILS # BLD AUTO: 0.3 K/UL (ref 0–0.2)
BASOPHILS NFR BLD: 1.2 % (ref 0–2)
BILIRUB UR-MCNC: NEGATIVE MG/DL
BUN SERPL-MCNC: 23 MG/DL (ref 7–17)
CALCIUM SERPL-MCNC: 8 MG/DL (ref 8.6–10.4)
EOSINOPHIL # BLD AUTO: 0.9 K/UL (ref 0–0.7)
EOSINOPHIL NFR BLD: 3.7 % (ref 0–4)
ERYTHROCYTE [DISTWIDTH] IN BLOOD BY AUTOMATED COUNT: 17 % (ref 11.5–14.5)
GFR NON-AFRICAN AMERICAN: 56
GLUCOSE UR STRIP-MCNC: NORMAL MG/DL
HCG,QUALITATIVE URINE: NEGATIVE
HGB BLD-MCNC: 6.9 G/DL (ref 11–16)
INR PPP: 1.4
LEUKOCYTE ESTERASE UR-ACNC: (no result) LEU/UL
LYMPHOCYTES # BLD AUTO: 7.5 K/UL (ref 1–4.3)
LYMPHOCYTES NFR BLD AUTO: 29.7 % (ref 20–40)
MCH RBC QN AUTO: 29.1 PG (ref 27–31)
MCHC RBC AUTO-ENTMCNC: 34 G/DL (ref 33–37)
MCV RBC AUTO: 85.7 FL (ref 81–99)
MONOCYTES # BLD: 2.4 K/UL (ref 0–0.8)
MONOCYTES NFR BLD: 9.4 % (ref 0–10)
NEUTROPHILS # BLD: 14.1 K/UL (ref 1.8–7)
NEUTROPHILS NFR BLD AUTO: 56 % (ref 50–75)
NRBC BLD AUTO-RTO: 0.2 % (ref 0–2)
PH UR STRIP: 6 [PH] (ref 5–8)
PLATELET # BLD: 194 K/UL (ref 130–400)
PMV BLD AUTO: 8.1 FL (ref 7.2–11.7)
PROT UR STRIP-MCNC: (no result) MG/DL
PROTHROMBIN TIME: 15.3 SECONDS (ref 9.7–12.2)
RBC # BLD AUTO: 2.37 MIL/UL (ref 3.8–5.2)
RBC # UR STRIP: (no result) /UL
SP GR UR STRIP: 1.01 (ref 1–1.03)
SQUAMOUS EPITHIAL: 14 /HPF (ref 0–5)
UROBILINOGEN UR-MCNC: NORMAL MG/DL (ref 0.2–1)
WBC # BLD AUTO: 25.2 K/UL (ref 4.8–10.8)

## 2018-03-11 RX ADMIN — DIPHENHYDRAMINE HYDROCHLORIDE SCH MG: 50 INJECTION INTRAMUSCULAR; INTRAVENOUS at 23:56

## 2018-03-11 RX ADMIN — DIPHENHYDRAMINE HYDROCHLORIDE SCH MG: 50 INJECTION INTRAMUSCULAR; INTRAVENOUS at 20:00

## 2018-03-11 NOTE — C.PDOC
History Of Present Illness


Patient is a 38 y/o female with a PMHx of sickle cell disease, who presents to 

the ER complaining of 3 days of sickle cell crisis. She reports generalized 

bodyaches. No other complaints offered at this time. Denies any cough, 

shortness of breath, or fever. 





PMD: MARIVEL Etienne 


Time Seen by Provider: 03/11/18 19:22


Chief Complaint (Nursing): Pain, Chronic


History Per: Patient


History/Exam Limitations: no limitations


Onset/Duration Of Symptoms: Days (x3)


Current Symptoms Are (Timing): Still Present





Past Medical History


Reviewed: Historical Data, Nursing Documentation, Vital Signs


Vital Signs: 


 Last Vital Signs











Temp  97 F L  03/11/18 17:55


 


Pulse  96 H  03/11/18 19:54


 


Resp  20   03/11/18 19:54


 


BP  121/83   03/11/18 19:54


 


Pulse Ox  99   03/11/18 20:16














- Medical History


PMH: Anemia, Migraine, Sickle Cell Disease


   Denies: Alzheimer's Disease, Asthma, Atrial Fibrillation, Bronchitis, Cardia 

Arrhythmia, CHF, COPD, Dementia, Emphysema, HIV, HTN, Hypercholesterolemia, 

Hyperthyroidism, Hypothyroidism, Kidney Stones, Mitral Valve Prolapse, Multiple 

Sclerosis, Parkinson's Disease, Peripheral Edema, Pneumonia, Pulmonary Embolism

, Chronic Kidney Disease, Seizures, Sleep Apnea, TIA


Surgical History: Cholecystectomy (2004)


   Denies: Pacemaker





- [facility] Procedures








PACKED CELL TRANSFUSION (06/04/13)


TETANUS TOXOID ADMINIST (09/23/13)


TRANSFUSE NONAUT RED BLOOD CELLS IN PERIPH VEIN, PERC (02/15/18)








Family History: States: Unknown Family Hx





- Social History


Hx Tobacco Use: No


Hx Alcohol Use: No


Hx Substance Use: No





- Immunization History


Hx Tetanus Toxoid Vaccination: Yes


Hx Influenza Vaccination: Yes


Hx Pneumococcal Vaccination: Yes





Review Of Systems


Except As Marked, All Systems Reviewed And Found Negative.


Constitutional: Negative for: Fever


Respiratory: Negative for: Cough, Shortness of Breath


Musculoskeletal: Positive for: Other (generalized bodyaches consistent with 

sickle cell crisis)





Physical Exam





- Physical Exam


Appears: Non-toxic, No Acute Distress


Skin: Normal Color, Warm, Dry


Head: Atraumatic, Normacephalic


Eye(s): bilateral: Normal Inspection, PERRL, EOMI


Nose: Normal


Oral Mucosa: Moist


Neck: Normal ROM, Supple


Chest: Symmetrical


Cardiovascular: Rhythm Regular, No Murmur


Respiratory: Normal Breath Sounds, No Accessory Muscle Use


Gastrointestinal/Abdominal: Soft, No Tenderness, No Distention


Extremity: Normal ROM, No Tenderness, No Deformity, No Swelling


Neurological/Psych: Oriented x3, Normal Speech, Normal Motor, Normal Sensation





ED Course And Treatment





- Laboratory Results


Result Diagrams: 


 03/11/18 19:46





 03/11/18 19:46


O2 Sat by Pulse Oximetry: 99 (RA)


Pulse Ox Interpretation: Normal





Medical Decision Making


Medical Decision Making: 





Time: 19:26


Initial Plan: 


* CMP


* CBC


* PTT


* Prothrombin time


* Reticulocyte count


* HCG, qualitative urine


* Dilaudid 8 mg PO


* Urinalysis 





20:16  Labs reviewed, and appear to be at baseline for patient. no cough lungs 

cta speaking full sentences, 








Disposition





- Disposition


Disposition: HOSPITALIZED


Disposition Time: 21:16


Condition: STABLE





- Clinical Impression


Clinical Impression: 


 Sickle cell pain crisis








- Scribe Statement


The provider has reviewed the documentation as recorded by the Coretta Del Real





Provider Attestation: 





All medical record entries made by the Anaibagnieszka were at my direction and 

personally dictated by me. I have reviewed the chart and agree that the record 

accurately reflects my personal performance of the history, physical exam, 

medical decision making, and the department course for this patient. I have 

also personally directed, reviewed, and agree with the discharge instructions 

and disposition.





Decision To Admit





- Pt Status Changed To:


Hospital Disposition Of: Inpatient





- Admit Certification


Admit to Inpatient:: After my assessment, the patient will require 

hospitalization for at least two midnights.  This is because of the severity of 

symptoms shown, intensity of services needed, and/or the medical risk in this 

patient being treated as an outpatient.





- InPatient:


Physician Admission Certification: I certify that this patient requires 2 or 

more midnights of care for the following reason:: sickle cell needs pain meds





- .


Bed Request Type: Regular


Admitting Physician: Shahab Etienne


Patient Diagnosis: 


 Sickle cell pain crisis

## 2018-03-12 RX ADMIN — DIPHENHYDRAMINE HYDROCHLORIDE SCH: 50 INJECTION INTRAMUSCULAR; INTRAVENOUS at 07:24

## 2018-03-12 RX ADMIN — ENOXAPARIN SODIUM SCH: 40 INJECTION SUBCUTANEOUS at 10:46

## 2018-03-12 RX ADMIN — LINEZOLID SCH MLS/HR: 600 INJECTION, SOLUTION INTRAVENOUS at 14:12

## 2018-03-12 RX ADMIN — DIPHENHYDRAMINE HYDROCHLORIDE SCH MG: 50 INJECTION INTRAMUSCULAR; INTRAVENOUS at 17:11

## 2018-03-12 RX ADMIN — DIPHENHYDRAMINE HYDROCHLORIDE SCH MG: 50 INJECTION INTRAMUSCULAR; INTRAVENOUS at 20:11

## 2018-03-12 RX ADMIN — DIPHENHYDRAMINE HYDROCHLORIDE SCH MG: 50 INJECTION INTRAMUSCULAR; INTRAVENOUS at 10:45

## 2018-03-12 RX ADMIN — DIPHENHYDRAMINE HYDROCHLORIDE SCH MG: 50 INJECTION INTRAMUSCULAR; INTRAVENOUS at 23:20

## 2018-03-12 RX ADMIN — DIPHENHYDRAMINE HYDROCHLORIDE SCH MG: 50 INJECTION INTRAMUSCULAR; INTRAVENOUS at 03:20

## 2018-03-12 RX ADMIN — PANTOPRAZOLE SODIUM SCH MG: 40 TABLET, DELAYED RELEASE ORAL at 10:46

## 2018-03-12 RX ADMIN — DIPHENHYDRAMINE HYDROCHLORIDE SCH MG: 50 INJECTION INTRAMUSCULAR; INTRAVENOUS at 14:11

## 2018-03-12 RX ADMIN — DIPHENHYDRAMINE HYDROCHLORIDE SCH MG: 50 INJECTION INTRAMUSCULAR; INTRAVENOUS at 07:30

## 2018-03-12 NOTE — CP.PCM.CON
History of Present Illness





- History of Present Illness


History of Present Illness: 





Infectious Disease Consult'


HPI;


36 yo F with PMH of Sickle cell disease, anemia, migraines presents to ED with c

/o generalized bodyaches and weakness since 3 days. Pt with similar complaints 

10 days prior and s/p recent admission for sickle cell crisis. Pt reports 

symptoms are similar to past episodes of sickle cell crises. Pt admitted for 

management of sickle cell crisis and also noted to have UTI with VRE ON REPEAT 

URINE SENT DURING LAST ADMISSION 0F 3/7/18. 


PT. denies fever, chills, dyspnea, n/v/d. 





ON ADMISSION PATIENT WAS FOUND TO HAVE LEUKOCYTOSIS WITH wbc COUNT OF 25.6, 

HEMOGLOBIN OF 6.9, AND RETICULOCYTE COUNT OF 7.6


patient was recently discharged on po bactrim.





U/A IS HAZY WITH 3+ LEUKOCYTE ESTRASE WBC   AND MANY BACTERIA.


Patient admits to frequencybut denies any hematuria.


ON FURTHER QUESTIONING PATIENT GIVES HISTORY OF BILATERAL STENTS  PLACED 2016 

FOR [place]DRONEPHROSIS IN [facility] [place]  pATIENT STATES SHE ALSO HAS HX OF NEUROGENIC 

BLADDER, AND GETS RECURRENT UTIS.


PATIENT IS SINGLE.





LMP 4 MARCH 2018.,LATELY PERIODS ARE IRREGULAR,BUT LAST FOR 5 DAYS.





PMH: Anemia, Migraine, Sickle Cell Disease


   


Surgical History: Cholecystectomy (2004), B/L INTERNAL STENTS FOR 

HYDRONEPHROSIS IN 2016.


   Denies: Pacemaker


Allergy; droperidol, tramadol. 








Social History


Hx Tobacco Use: No


Hx Alcohol Use: No


Hx Substance Use: No





- Immunization History


Hx Tetanus Toxoid Vaccination: Yes


Hx Influenza Vaccination: Yes


Hx Pneumococcal Vaccination: Yes





Review of Systems





- Constitutional


Constitutional: Lethargy, Weakness.  absent: Chills, Fever





- EENT


Eyes: absent: Change in Vision


Nose/Mouth/Throat: absent: Mouth Lesions, Sore Throat





- Cardiovascular


Cardiovascular: absent: Chest Pain, Dyspnea





- Respiratory


Respiratory: absent: Cough





- Gastrointestinal


Gastrointestinal: absent: Abdominal Pain, Diarrhea, Nausea, Vomiting





- Genitourinary


Genitourinary: Pyuria, Urinary Frequency, Voiding Freq/Small Amts, Freq UTI





- Menstruation


Menstruation: Menses Variable





- Musculoskeletal


Musculoskeletal: Back Pain, Myalgias





- Integumentary


Integumentary: absent: Rash





- Hematologic/Lymphatic


Hematologic: As Per HPI.  absent: Lymphadenopathy





Past Patient History





- Past Medical History & Family History


Past Medical History?: Yes





- Past Social History


Smoking Status: Never Smoked





- CARDIAC


Hx Atrial Fibrillation: No


Hx Cardia Arrhythmia: No


Hx Congestive Heart Failure: No


Hx Hypercholesterolemia: No


Hx Hypertension: No


Hx Mitral Valve Prolapse: No


Hx Pacemaker: No


Hx Peripheral Edema: No





- PULMONARY


Hx Asthma: No


Hx Bronchitis: No


Hx Chronic Obstructive Pulmonary Disease (COPD): No


Hx Emphysema: No


Hx Pneumonia: No


Hx Pulmonary Embolism: No


Hx Sleep Apnea: No





- NEUROLOGICAL


Hx Alzheimer's Disease: No


Hx Dementia: No


Hx Migraine: Yes


Hx Multiple Sclerosis: No


Hx Parkinson's Disease: No


Hx Seizures: No


Hx Transient Ischemic Attacks (TIA): No





- HEENT


Hx HEENT Problems: No


Hx Blind: No


Hx Cataracts: No


Hx Deafness: No


Hx Difficulty Chewing: No


Hx Epistaxis: No


Hx Glaucoma: No


Hx Macular Degeneration: No





- RENAL


Hx Chronic Kidney Disease: No


Hx Kidney Stones: No





- ENDOCRINE/METABOLIC


Hx Hyperthyroidism: No


Hx Hypothyroidism: No





- HEMATOLOGICAL/ONCOLOGICAL


Hx Anemia: Yes


Hx Human Immunodeficiency Virus (HIV): No


Hx Sickle Cell Disease: Yes





- INTEGUMENTARY


Hx Dermatological Problems: No





- MUSCULOSKELETAL/RHEUMATOLOGICAL


Hx Falls: No





- GASTROINTESTINAL


Hx Gastrointestinal Disorders: No





- GENITOURINARY/GYNECOLOGICAL


Hx Genitourinary Disorders: No





- PSYCHIATRIC


Hx Substance Use: No





- SURGICAL HISTORY


Hx Surgeries: Yes


Hx Cholecystectomy: Yes (2004)





- ANESTHESIA


Hx Anesthesia: Yes


Hx Anesthesia Reactions: No


Hx Malignant Hyperthermia: No


Has any member of the family had a problem w/ anesthesia?: No





Meds


Allergies/Adverse Reactions: 


 Allergies











Allergy/AdvReac Type Severity Reaction Status Date / Time


 


droperidol Allergy   Verified 03/11/18 17:54


 


tramadol Allergy   Verified 03/11/18 17:54


 


inapsine Allergy  RASH Uncoded 03/11/18 17:54














- Medications


Medications: 


 Current Medications





Diphenhydramine HCl (Benadryl)  50 mg IVP Q3 [facility]


   Last Admin: 03/12/18 10:45 Dose:  50 mg


Enoxaparin Sodium (Lovenox)  40 mg SC DAILY [facility]


   Last Admin: 03/12/18 10:46 Dose:  Not Given


Hydromorphone HCl (Dilaudid)  3 mg IVP Q3H [facility]


   Last Admin: 03/12/18 10:45 Dose:  3 mg


Sodium Chloride (Sodium Chloride 0.9%)  1,000 mls @ 50 mls/hr IV .Q20H [facility]


   Stop: 03/12/18 16:19


   Last Admin: 03/11/18 20:27 Dose:  50 mls/hr


Linezolid (Zyvox 600mg/300ml D5w)  600 mg in 300 mls @ 200 mls/hr IVPB Q12H [facility]


   PRN Reason: Protocol


Pantoprazole Sodium (Protonix Ec Tab)  40 mg PO DAILY [facility]


   Last Admin: 03/12/18 10:46 Dose:  40 mg











Physical Exam





- Constitutional


Appears: No Acute Distress





- Head Exam


Head Exam: NORMAL INSPECTION





- Eye Exam


Eye Exam: EOMI, PERRL, Scleral icterus





- ENT Exam


ENT Exam: Normal Oropharynx





- Neck Exam


Neck exam: Positive for: Normal Inspection





- Respiratory Exam


Respiratory Exam: Clear to Auscultation Bilateral





- Cardiovascular Exam


Cardiovascular Exam: REGULAR RHYTHM, +S1, +S2





- GI/Abdominal Exam


GI & Abdominal Exam: Normal Bowel Sounds, Soft, Tenderness (.)





- Extremities Exam


Extremities exam: Positive for: normal capillary refill, pedal pulses present.  

Negative for: calf tenderness, pedal edema





- Neurological Exam


Neurological exam: Alert, CN II-XII Intact, Oriented x3, Reflexes Normal





- Psychiatric Exam


Psychiatric exam: Normal Mood





- Skin


Skin Exam: Dry, Pallor





Results





- Vital Signs


Recent Vital Signs: 


 Last Vital Signs











Temp  98.2 F   03/11/18 22:23


 


Pulse  79   03/12/18 07:16


 


Resp  18   03/12/18 07:16


 


BP  111/79   03/12/18 07:16


 


Pulse Ox  100   03/12/18 07:16














- Labs


Result Diagrams: 


 03/11/18 19:46





 03/11/18 19:46


Labs: 


 Laboratory Results - last 24 hr











  03/11/18 03/11/18 03/11/18





  19:46 19:46 19:46


 


WBC  25.2 H  


 


RBC  2.37 L  


 


Hgb  6.9 L  


 


Hct  20.3 L  


 


MCV  85.7  


 


MCH  29.1  


 


MCHC  34.0  


 


RDW  17.0 H  


 


Plt Count  194  


 


MPV  8.1  


 


Neut % (Auto)  56.0  


 


Lymph % (Auto)  29.7  


 


Mono % (Auto)  9.4  


 


Eos % (Auto)  3.7  


 


Baso % (Auto)  1.2  


 


Neut # (Auto)  14.1 H  


 


Lymph # (Auto)  7.5 H  


 


Mono # (Auto)  2.4 H  


 


Eos # (Auto)  0.9 H  


 


Baso # (Auto)  0.3 H  


 


Retic Count   


 


PT   15.3 H 


 


INR   1.4 


 


APTT   41 H 


 


Sodium    138


 


Potassium    4.7


 


Chloride    108 H


 


Carbon Dioxide    20 L


 


Anion Gap    15


 


BUN    23 H


 


Creatinine    1.1


 


Est GFR ( Amer)    > 60


 


Est GFR (Non-Af Amer)    56


 


Random Glucose    97


 


Calcium    8.0 L


 


Total Bilirubin    2.6 H


 


AST    164 H D


 


ALT    96 H D


 


Alkaline Phosphatase    168 H


 


Total Protein    8.9 H


 


Albumin    3.5


 


Globulin    5.4 H


 


Albumin/Globulin Ratio    0.7 L


 


Urine Color   


 


Urine Clarity   


 


Urine pH   


 


Ur Specific Gravity   


 


Urine Protein   


 


Urine Glucose (UA)   


 


Urine Ketones   


 


Urine Blood   


 


Urine Nitrate   


 


Urine Bilirubin   


 


Urine Urobilinogen   


 


Ur Leukocyte Esterase   


 


Urine WBC (Auto)   


 


Urine RBC (Auto)   


 


Ur Squamous Epith Cells   


 


Urine Bacteria   


 


Urine HCG, Qual   














  03/11/18 03/11/18





  19:46 19:46


 


WBC  


 


RBC  


 


Hgb  


 


Hct  


 


MCV  


 


MCH  


 


MCHC  


 


RDW  


 


Plt Count  


 


MPV  


 


Neut % (Auto)  


 


Lymph % (Auto)  


 


Mono % (Auto)  


 


Eos % (Auto)  


 


Baso % (Auto)  


 


Neut # (Auto)  


 


Lymph # (Auto)  


 


Mono # (Auto)  


 


Eos # (Auto)  


 


Baso # (Auto)  


 


Retic Count   7.6 H D


 


PT  


 


INR  


 


APTT  


 


Sodium  


 


Potassium  


 


Chloride  


 


Carbon Dioxide  


 


Anion Gap  


 


BUN  


 


Creatinine  


 


Est GFR ( Amer)  


 


Est GFR (Non-Af Amer)  


 


Random Glucose  


 


Calcium  


 


Total Bilirubin  


 


AST  


 


ALT  


 


Alkaline Phosphatase  


 


Total Protein  


 


Albumin  


 


Globulin  


 


Albumin/Globulin Ratio  


 


Urine Color  Yellow 


 


Urine Clarity  Hazy 


 


Urine pH  6.0 


 


Ur Specific Gravity  1.009 


 


Urine Protein  1+ H 


 


Urine Glucose (UA)  Normal 


 


Urine Ketones  Negative 


 


Urine Blood  2+ H 


 


Urine Nitrate  Negative 


 


Urine Bilirubin  Negative 


 


Urine Urobilinogen  Normal 


 


Ur Leukocyte Esterase  3+ H 


 


Urine WBC (Auto)  540 H 


 


Urine RBC (Auto)  19 H 


 


Ur Squamous Epith Cells  14 H 


 


Urine Bacteria  Many H 


 


Urine HCG, Qual  Negative 














Assessment & Plan


(1) Sickle cell pain crisis


Assessment and Plan: 


wbc 25.2


H/H 6.9/20.3.





AS PER PMD .


Status: Acute   





(2) Leukocytosis


Assessment and Plan: 


WBC 25.2


H/H 6.9/20.3. .





START IV ZYVOX 600MG IVPB Q 12HRL FOR VRE IN URINE 3/12/18.





RENAL US /PELVIC SONOTO EVALUATE FOR B/L STENTS AND HYDRNEPHROSIS.


 EVALUATION.








Status: Acute   





(3) UTI (urinary tract infection)


Assessment and Plan: 


URINE CULTURE +VE VRE 


Status: Acute   





(4) Severe anemia


Status: Acute   





(5) Neurogenic bladder


Status: Acute

## 2018-03-12 NOTE — CP.PCM.HP
FOLLOW UP   ALLERGIES:   Allergen Reactions   • Cat Dander HIVES   • Codeine      n/v   • Lisinopril-Hctz Cough       MEDICATIONS: reviewed and are up to date  TOBACCO:  reviewed and are up to date    Primary medical doctor: Rosina Webb MD      Patient is here for Left knee medial meniscus arthroscopy DOS: 2013. Still with pain up stairs sometimes.     Refills needed: No     PREVIOUS TREATMENT: medications including NSAID, acetaminophen    Do you have any other health questions or concerns that we haven't covered?  NO    Is there anything else in your medical history that you feel is important that we haven't covered? NO     Past Patient History





- Past Medical History & Family History


Past Medical History?: Yes





- Past Social History


Smoking Status: Never Smoked





- CARDIAC


Hx Atrial Fibrillation: No


Hx Cardia Arrhythmia: No


Hx Congestive Heart Failure: No


Hx Hypercholesterolemia: No


Hx Hypertension: No


Hx Mitral Valve Prolapse: No


Hx Pacemaker: No


Hx Peripheral Edema: No





- PULMONARY


Hx Asthma: No


Hx Bronchitis: No


Hx Chronic Obstructive Pulmonary Disease (COPD): No


Hx Emphysema: No


Hx Pneumonia: No


Hx Pulmonary Embolism: No


Hx Sleep Apnea: No





- NEUROLOGICAL


Hx Alzheimer's Disease: No


Hx Dementia: No


Hx Migraine: Yes


Hx Multiple Sclerosis: No


Hx Parkinson's Disease: No


Hx Seizures: No


Hx Transient Ischemic Attacks (TIA): No





- HEENT


Hx HEENT Problems: No


Hx Blind: No


Hx Cataracts: No


Hx Deafness: No


Hx Difficulty Chewing: No


Hx Epistaxis: No


Hx Glaucoma: No


Hx Macular Degeneration: No





- RENAL


Hx Chronic Kidney Disease: No


Hx Kidney Stones: No





- ENDOCRINE/METABOLIC


Hx Hyperthyroidism: No


Hx Hypothyroidism: No





- HEMATOLOGICAL/ONCOLOGICAL


Hx Anemia: Yes


Hx Human Immunodeficiency Virus (HIV): No


Hx Sickle Cell Disease: Yes





- INTEGUMENTARY


Hx Dermatological Problems: No





- MUSCULOSKELETAL/RHEUMATOLOGICAL


Hx Falls: No





- GASTROINTESTINAL


Hx Gastrointestinal Disorders: No





- GENITOURINARY/GYNECOLOGICAL


Hx Genitourinary Disorders: No





- PSYCHIATRIC


Hx Substance Use: No





- SURGICAL HISTORY


Hx Surgeries: Yes


Hx Cholecystectomy: Yes (2004)





- ANESTHESIA


Hx Anesthesia: Yes


Hx Anesthesia Reactions: No


Hx Malignant Hyperthermia: No


Has any member of the family had a problem w/ anesthesia?: No





Meds


Allergies/Adverse Reactions: 


 Allergies











Allergy/AdvReac Type Severity Reaction Status Date / Time


 


droperidol Allergy   Verified 03/11/18 17:54


 


tramadol Allergy   Verified 03/11/18 17:54


 


inapsine Allergy  RASH Uncoded 03/11/18 17:54














Physical Exam





- Constitutional


Appears: Well





- Head Exam


Head Exam: ATRAUMATIC, NORMAL INSPECTION, NORMOCEPHALIC





- Eye Exam


Eye Exam: EOMI, Normal appearance, PERRL


Pupil Exam: NORMAL ACCOMODATION, PERRL





- ENT Exam


ENT Exam: Mucous Membranes Moist, Normal Exam





- Neck Exam


Neck exam: Positive for: Normal Inspection





- Respiratory Exam


Respiratory Exam: Decreased Breath Sounds





- Cardiovascular Exam


Cardiovascular Exam: REGULAR RHYTHM, +S1, +S2





- GI/Abdominal Exam


GI & Abdominal Exam: Diminished Bowel Sounds, Soft





- Rectal Exam


Rectal Exam: Deferred





Results





- Vital Signs


Recent Vital Signs: 





 Last Vital Signs











Temp  98.2 F   03/11/18 22:23


 


Pulse  79   03/12/18 07:16


 


Resp  18   03/12/18 07:16


 


BP  111/79   03/12/18 07:16


 


Pulse Ox  100   03/12/18 07:16














- Labs


Result Diagrams: 


 03/11/18 19:46





 03/11/18 19:46


Labs: 





 Laboratory Results - last 24 hr











  03/11/18 03/11/18 03/11/18





  19:46 19:46 19:46


 


WBC  25.2 H  


 


RBC  2.37 L  


 


Hgb  6.9 L  


 


Hct  20.3 L  


 


MCV  85.7  


 


MCH  29.1  


 


MCHC  34.0  


 


RDW  17.0 H  


 


Plt Count  194  


 


MPV  8.1  


 


Neut % (Auto)  56.0  


 


Lymph % (Auto)  29.7  


 


Mono % (Auto)  9.4  


 


Eos % (Auto)  3.7  


 


Baso % (Auto)  1.2  


 


Neut # (Auto)  14.1 H  


 


Lymph # (Auto)  7.5 H  


 


Mono # (Auto)  2.4 H  


 


Eos # (Auto)  0.9 H  


 


Baso # (Auto)  0.3 H  


 


Retic Count   


 


PT   15.3 H 


 


INR   1.4 


 


APTT   41 H 


 


Sodium    138


 


Potassium    4.7


 


Chloride    108 H


 


Carbon Dioxide    20 L


 


Anion Gap    15


 


BUN    23 H


 


Creatinine    1.1


 


Est GFR ( Amer)    > 60


 


Est GFR (Non-Af Amer)    56


 


Random Glucose    97


 


Calcium    8.0 L


 


Total Bilirubin    2.6 H


 


AST    164 H D


 


ALT    96 H D


 


Alkaline Phosphatase    168 H


 


Total Protein    8.9 H


 


Albumin    3.5


 


Globulin    5.4 H


 


Albumin/Globulin Ratio    0.7 L


 


Urine Color   


 


Urine Clarity   


 


Urine pH   


 


Ur Specific Gravity   


 


Urine Protein   


 


Urine Glucose (UA)   


 


Urine Ketones   


 


Urine Blood   


 


Urine Nitrate   


 


Urine Bilirubin   


 


Urine Urobilinogen   


 


Ur Leukocyte Esterase   


 


Urine WBC (Auto)   


 


Urine RBC (Auto)   


 


Ur Squamous Epith Cells   


 


Urine Bacteria   


 


Urine HCG, Qual   














  03/11/18 03/11/18





  19:46 19:46


 


WBC  


 


RBC  


 


Hgb  


 


Hct  


 


MCV  


 


MCH  


 


MCHC  


 


RDW  


 


Plt Count  


 


MPV  


 


Neut % (Auto)  


 


Lymph % (Auto)  


 


Mono % (Auto)  


 


Eos % (Auto)  


 


Baso % (Auto)  


 


Neut # (Auto)  


 


Lymph # (Auto)  


 


Mono # (Auto)  


 


Eos # (Auto)  


 


Baso # (Auto)  


 


Retic Count   7.6 H D


 


PT  


 


INR  


 


APTT  


 


Sodium  


 


Potassium  


 


Chloride  


 


Carbon Dioxide  


 


Anion Gap  


 


BUN  


 


Creatinine  


 


Est GFR ( Amer)  


 


Est GFR (Non-Af Amer)  


 


Random Glucose  


 


Calcium  


 


Total Bilirubin  


 


AST  


 


ALT  


 


Alkaline Phosphatase  


 


Total Protein  


 


Albumin  


 


Globulin  


 


Albumin/Globulin Ratio  


 


Urine Color  Yellow 


 


Urine Clarity  Hazy 


 


Urine pH  6.0 


 


Ur Specific Gravity  1.009 


 


Urine Protein  1+ H 


 


Urine Glucose (UA)  Normal 


 


Urine Ketones  Negative 


 


Urine Blood  2+ H 


 


Urine Nitrate  Negative 


 


Urine Bilirubin  Negative 


 


Urine Urobilinogen  Normal 


 


Ur Leukocyte Esterase  3+ H 


 


Urine WBC (Auto)  540 H 


 


Urine RBC (Auto)  19 H 


 


Ur Squamous Epith Cells  14 H 


 


Urine Bacteria  Many H 


 


Urine HCG, Qual  Negative

## 2018-03-13 LAB
ALBUMIN SERPL-MCNC: 3.6 G/DL (ref 3.5–5)
ALBUMIN/GLOB SERPL: 0.8 {RATIO} (ref 1–2.1)
ALT SERPL-CCNC: 98 U/L (ref 9–52)
AST SERPL-CCNC: 164 U/L (ref 14–36)
BASOPHILS # BLD AUTO: 0.3 K/UL (ref 0–0.2)
BASOPHILS NFR BLD: 1.1 % (ref 0–2)
BUN SERPL-MCNC: 27 MG/DL (ref 7–17)
CALCIUM SERPL-MCNC: 7.9 MG/DL (ref 8.6–10.4)
EOSINOPHIL # BLD AUTO: 1 K/UL (ref 0–0.7)
EOSINOPHIL NFR BLD: 3.9 % (ref 0–4)
ERYTHROCYTE [DISTWIDTH] IN BLOOD BY AUTOMATED COUNT: 16.9 % (ref 11.5–14.5)
GFR NON-AFRICAN AMERICAN: 34
HGB BLD-MCNC: 5.9 G/DL (ref 11–16)
LYMPHOCYTES # BLD AUTO: 7.1 K/UL (ref 1–4.3)
LYMPHOCYTES NFR BLD AUTO: 27.9 % (ref 20–40)
MCH RBC QN AUTO: 28.9 PG (ref 27–31)
MCHC RBC AUTO-ENTMCNC: 33.8 G/DL (ref 33–37)
MCV RBC AUTO: 85.7 FL (ref 81–99)
MONOCYTES # BLD: 2.5 K/UL (ref 0–0.8)
MONOCYTES NFR BLD: 9.7 % (ref 0–10)
NEUTROPHILS # BLD: 14.7 K/UL (ref 1.8–7)
NEUTROPHILS NFR BLD AUTO: 57.4 % (ref 50–75)
NRBC BLD AUTO-RTO: 0.4 % (ref 0–2)
PLATELET # BLD: 174 K/UL (ref 130–400)
PMV BLD AUTO: 9.1 FL (ref 7.2–11.7)
RBC # BLD AUTO: 2.06 MIL/UL (ref 3.8–5.2)
WBC # BLD AUTO: 25.6 K/UL (ref 4.8–10.8)

## 2018-03-13 RX ADMIN — DIPHENHYDRAMINE HYDROCHLORIDE SCH MG: 50 INJECTION INTRAMUSCULAR; INTRAVENOUS at 19:50

## 2018-03-13 RX ADMIN — LINEZOLID SCH MLS/HR: 600 INJECTION, SOLUTION INTRAVENOUS at 14:05

## 2018-03-13 RX ADMIN — DIPHENHYDRAMINE HYDROCHLORIDE SCH MG: 50 INJECTION INTRAMUSCULAR; INTRAVENOUS at 06:26

## 2018-03-13 RX ADMIN — DIPHENHYDRAMINE HYDROCHLORIDE SCH: 50 INJECTION INTRAMUSCULAR; INTRAVENOUS at 04:24

## 2018-03-13 RX ADMIN — LINEZOLID SCH MLS/HR: 600 INJECTION, SOLUTION INTRAVENOUS at 02:40

## 2018-03-13 RX ADMIN — DIPHENHYDRAMINE HYDROCHLORIDE SCH MG: 50 INJECTION INTRAMUSCULAR; INTRAVENOUS at 09:58

## 2018-03-13 RX ADMIN — DIPHENHYDRAMINE HYDROCHLORIDE SCH MG: 50 INJECTION INTRAMUSCULAR; INTRAVENOUS at 02:40

## 2018-03-13 RX ADMIN — DIPHENHYDRAMINE HYDROCHLORIDE SCH: 50 INJECTION INTRAMUSCULAR; INTRAVENOUS at 23:37

## 2018-03-13 RX ADMIN — DIPHENHYDRAMINE HYDROCHLORIDE SCH MG: 50 INJECTION INTRAMUSCULAR; INTRAVENOUS at 13:08

## 2018-03-13 RX ADMIN — DIPHENHYDRAMINE HYDROCHLORIDE SCH MG: 50 INJECTION INTRAMUSCULAR; INTRAVENOUS at 17:02

## 2018-03-13 RX ADMIN — PANTOPRAZOLE SODIUM SCH MG: 40 TABLET, DELAYED RELEASE ORAL at 09:57

## 2018-03-13 RX ADMIN — ENOXAPARIN SODIUM SCH: 40 INJECTION SUBCUTANEOUS at 10:04

## 2018-03-13 NOTE — CP.PCM.PN
Subjective





- Date & Time of Evaluation


Date of Evaluation: 03/13/18


Time of Evaluation: 09:10





- Subjective


Subjective: 


clinically same





Objective





- Vital Signs/Intake and Output


Vital Signs (last 24 hours): 


 











Temp Pulse Resp BP Pulse Ox


 


 97.6 F   94 H  20   100/62   98 


 


 03/13/18 15:44  03/13/18 15:44  03/13/18 15:44  03/13/18 15:44  03/13/18 15:44








Intake and Output: 


 











 03/13/18 03/13/18





 06:59 18:59


 


Intake Total 500 600


 


Balance 500 600














- Medications


Medications: 


 Current Medications





Diphenhydramine HCl (Benadryl)  50 mg IVP Q3 [facility]


   Last Admin: 03/13/18 17:02 Dose:  50 mg


Enoxaparin Sodium (Lovenox)  40 mg SC DAILY [facility]


   Last Admin: 03/13/18 10:04 Dose:  Not Given


Folic Acid (Folic Acid)  1 mg PO DAILY [facility]


Hydromorphone HCl (Dilaudid)  3 mg IVP Q3H [facility]


   Last Admin: 03/13/18 17:03 Dose:  3 mg


Linezolid (Zyvox 600mg/300ml D5w)  600 mg in 300 mls @ 200 mls/hr IVPB Q12H [facility]


   PRN Reason: Protocol


   Last Admin: 03/13/18 14:05 Dose:  200 mls/hr


Pantoprazole Sodium (Protonix Ec Tab)  40 mg PO DAILY [facility]


   Last Admin: 03/13/18 09:57 Dose:  40 mg











- Labs


Labs: 


 





 03/13/18 07:10 





 03/13/18 07:10 





 











PT  15.3 SECONDS (9.7-12.2)  H  03/11/18  19:46    


 


INR  1.4   03/11/18  19:46    


 


APTT  41 SECONDS (21-34)  H  03/11/18  19:46    














- Constitutional


Appears: Well





- Head Exam


Head Exam: ATRAUMATIC, NORMAL INSPECTION, NORMOCEPHALIC





- Eye Exam


Eye Exam: EOMI, Normal appearance, PERRL


Pupil Exam: NORMAL ACCOMODATION, PERRL





- ENT Exam


ENT Exam: Mucous Membranes Moist, Normal Exam





- Neck Exam


Neck Exam: Full ROM, Normal Inspection.  absent: Lymphadenopathy





- Respiratory Exam


Respiratory Exam: Decreased Breath Sounds





- Cardiovascular Exam


Cardiovascular Exam: REGULAR RHYTHM, +S1, +S2





- GI/Abdominal Exam


GI & Abdominal Exam: Soft, Diminished Bowel Sounds





- Rectal Exam


Rectal Exam: Deferred





Assessment and Plan





- Assessment and Plan (Free Text)


Plan: 





Casein and discussed with staff and resident


Continue same


Management as ordered





Sickle cell Crisis


-folic acid 1mg PO daily


-dilaudid 3mg IV q3hrs prn 


-lovenox 40mg sc daily


-HGB below baseline (7.0) today at 5.9


   will continue to monitor


   Transfusion recommendation from previous admissions suggests transfusion 

threshold of 5.7





Urinary tract infection


-ID consult. Dr. Sagar Stanton. recs appreciated. 


   -Zyvox 600mg IV q12hr


-Uro consult. Dr. JAZZY Chacko. Recs appreciated.


-Bladder u/s- B/L ureteral stents with moderate hydronephrosis R>L


-Ucx (3/7/18)- vancomycin resistant E. faecium


-WBC 25.6


-afebrile





Neurogenic bladder


-straight cath PRN; patient is able to do herself 





leukocytosis


-WBC- 25.6


-likely secondary to UTI


-will monitor


-afebrile 





GI/DVT ppx


-lovenox 40mg SC daily


-protonix 40mg PO daily

## 2018-03-13 NOTE — US
PROCEDURE:  Ultrasound of the Kidneys and urinary bladder 



HISTORY:

UTI /B/L STENTS



COMPARISON:

Abdominal ultrasound performed 6/7/13 



TECHNIQUE:

Sonogram of the kidneys.



FINDINGS:



RIGHT KIDNEY:

Measures: 12.3 x 5.7 x 5.3 cm. 



Right ureteral stent. Moderate hydronephrosis 



LEFT KIDNEY:

Measures: 13.1 x 7.5 x 7.3 cm. 



Left ureteral stent. Moderate hydronephrosis. 



OTHER FINDINGS:

Prevoid urinary bladder volume 922.7 cc. 



Postvoid urinary bladder volume 817.3 cc.  



Bilateral ureteral jets are not identified.



IMPRESSION:

Bilateral ureteral stents and moderate hydronephrosis (right greater 

than left). 



Prevoid urinary bladder volume 922.7 cc. 



Postvoid urinary bladder volume 817.3 cc.

## 2018-03-13 NOTE — CP.PCM.PN
<Behzad Gamboa S - Last Filed: 03/13/18 13:11>





Subjective





- Date & Time of Evaluation


Date of Evaluation: 03/13/18


Time of Evaluation: 10:02





- Subjective


Subjective: 








Progress Note for Dr. Etienne's Service





Pt seen and examined at bedside. She is laying comfortably. She was seen at 

bedside with Dr. Etienne who explains that the patient has a longstanding hx of 

recurrent UTIs. This patient has a neurogenic bladder and requires straight cath

, which lends to the frequent UTIs. She denies any acute complaints or changes 

overnight.  





Objective





- Vital Signs/Intake and Output


Vital Signs (last 24 hours): 


 











Temp Pulse Resp BP Pulse Ox


 


 97.8 F   91 H  20   97/67 L  99 


 


 03/13/18 07:54  03/13/18 07:54  03/13/18 07:54  03/13/18 07:54  03/13/18 07:54








Intake and Output: 


 











 03/13/18 03/13/18





 06:59 18:59


 


Intake Total 500 


 


Balance 500 














- Medications


Medications: 


 Current Medications





Diphenhydramine HCl (Benadryl)  50 mg IVP Q3 [facility]


   Last Admin: 03/13/18 09:58 Dose:  50 mg


Enoxaparin Sodium (Lovenox)  40 mg SC DAILY [facility]


   Last Admin: 03/12/18 10:46 Dose:  Not Given


Hydromorphone HCl (Dilaudid)  3 mg IVP Q3H [facility]


   Last Admin: 03/13/18 09:58 Dose:  3 mg


Linezolid (Zyvox 600mg/300ml D5w)  600 mg in 300 mls @ 200 mls/hr IVPB Q12H [facility]


   PRN Reason: Protocol


   Last Admin: 03/13/18 02:40 Dose:  200 mls/hr


Pantoprazole Sodium (Protonix Ec Tab)  40 mg PO DAILY [facility]


   Last Admin: 03/13/18 09:57 Dose:  40 mg











- Labs


Labs: 


 





 03/13/18 07:10 





 03/13/18 07:10 





 











PT  15.3 SECONDS (9.7-12.2)  H  03/11/18  19:46    


 


INR  1.4   03/11/18  19:46    


 


APTT  41 SECONDS (21-34)  H  03/11/18  19:46    














- Head Exam


Head Exam: ATRAUMATIC, NORMOCEPHALIC





- Eye Exam


Eye Exam: EOMI, Normal appearance





- ENT Exam


ENT Exam: Mucous Membranes Moist





- Respiratory Exam


Respiratory Exam: Clear to Ausculation Bilateral, NORMAL BREATHING PATTERN





- Cardiovascular Exam


Cardiovascular Exam: REGULAR RHYTHM, +S1, +S2





- GI/Abdominal Exam


GI & Abdominal Exam: Soft, Tenderness





- Neurological Exam


Neurological Exam: Alert, Awake, Oriented x3





- Skin


Skin Exam: Dry, Warm





Assessment and Plan





- Assessment and Plan (Free Text)


Plan: 





Sickle cell Crisis


-folic acid 1mg PO daily


-dilaudid 3mg IV q3hrs prn 


-lovenox 40mg sc daily


-HGB below baseline (7.0) today at 5.9


   will continue to monitor


   Transfusion recommendation from previous admissions suggests transfusion 

threshold of 5.7





Urinary tract infection


-ID consult. Dr. Sagar Stanton. recs appreciated. 


   -Zyvox 600mg IV q12hr


-Uro consult. Dr. JAZZY Chacko. Recs appreciated.


-Bladder u/s- B/L ureteral stents with moderate hydronephrosis R>L


-Ucx (3/7/18)- vancomycin resistant E. faecium


-WBC 25.6


-afebrile





Neurogenic bladder


-straight cath PRN; patient is able to do herself 





leukocytosis


-WBC- 25.6


-likely secondary to UTI


-will monitor


-afebrile 





GI/DVT ppx


-lovenox 40mg SC daily


-protonix 40mg PO daily





Case discussed with Dr. Etienne.


All management as per Dr. Etienne.





<Shahab Etienne - Last Filed: 03/13/18 17:29>





Objective





- Vital Signs/Intake and Output


Vital Signs (last 24 hours): 


 











Temp Pulse Resp BP Pulse Ox


 


 97.6 F   94 H  20   100/62   98 


 


 03/13/18 15:44  03/13/18 15:44  03/13/18 15:44  03/13/18 15:44  03/13/18 15:44








Intake and Output: 


 











 03/13/18 03/13/18





 06:59 18:59


 


Intake Total 500 600


 


Balance 500 600














- Medications


Medications: 


 Current Medications





Diphenhydramine HCl (Benadryl)  50 mg IVP Q3 [facility]


   Last Admin: 03/13/18 17:02 Dose:  50 mg


Enoxaparin Sodium (Lovenox)  40 mg SC DAILY [facility]


   Last Admin: 03/13/18 10:04 Dose:  Not Given


Folic Acid (Folic Acid)  1 mg PO DAILY [facility]


Hydromorphone HCl (Dilaudid)  3 mg IVP Q3H [facility]


   Last Admin: 03/13/18 17:03 Dose:  3 mg


Linezolid (Zyvox 600mg/300ml D5w)  600 mg in 300 mls @ 200 mls/hr IVPB Q12H [facility]


   PRN Reason: Protocol


   Last Admin: 03/13/18 14:05 Dose:  200 mls/hr


Pantoprazole Sodium (Protonix Ec Tab)  40 mg PO DAILY [facility]


   Last Admin: 03/13/18 09:57 Dose:  40 mg











- Labs


Labs: 


 





 03/13/18 07:10 





 03/13/18 07:10 





 











PT  15.3 SECONDS (9.7-12.2)  H  03/11/18  19:46    


 


INR  1.4   03/11/18  19:46    


 


APTT  41 SECONDS (21-34)  H  03/11/18  19:46    














Attending/Attestation





- Attestation


I have personally seen and examined this patient.: Yes


I have fully participated in the care of the patient.: Yes


I have reviewed all pertinent clinical information, including history, physical 

exam and plan: Yes

## 2018-03-13 NOTE — CP.PCM.PN
Subjective





- Date & Time of Evaluation


Date of Evaluation: 03/13/18


Time of Evaluation: 22:56





- Subjective


Subjective: 





CHIEF COMPLAINTS TODAY :


AFEBRILE


STATES FEELS CRAPPY.


REPORTS NEUROGENIC BLADDER FROM OLD CVA.





DOES INTERMITTENT ST. CATHETERIZATION HERSELF 2 OR 3TIMES /DAY


?RECURRENT UTI (DIFFERENT ORGS )





HX OF B/L STENTS.








ROS.


HEENT :  N.


Resp :       No cough, wheezing ,pleuritic CP ,or hemoptysis 


Cardio :     No anginal  CP, PND, orthopnea, palpitation 


GI :           No abd.pain, n/v ,diarrhea or GI bleeding .


CNS : No headache, vertigo, focal deficit.


Musculoskel :  No joint swelling ,


Derm :        No rash 


Psych :     Normal affect.


Ext :  No  swelling ,calf pain 





PE.


Pt. is alert awake in no distress.


V.S  As noted in the chart 


Head ,ear nose,throat and eyes : Normal.


Neck : Supple with normal carotids.


Lungs: Clear air entry.


Heart : S1 & S2 normal with S4. No murmur.


Abd : Soft non tender with normal bowel sounds.


Neuro : Moves all ext. with no localized deficit.


Ext : No edema with intact pulses.Non tender calves 


Derm : No rashes or decubitus ulcer.





LABS/RADIOLOGY:


H/H LOW


WBC  HIGH








Objective





- Vital Signs/Intake and Output


Vital Signs (last 24 hours): 


 











Temp Pulse Resp BP Pulse Ox


 


 97.6 F   94 H  20   100/62   98 


 


 03/13/18 15:44  03/13/18 15:44  03/13/18 15:44  03/13/18 15:44  03/13/18 15:44








Intake and Output: 


 











 03/13/18 03/14/18





 18:59 06:59


 


Intake Total 600 


 


Balance 600 














- Medications


Medications: 


 Current Medications





Diphenhydramine HCl (Benadryl)  50 mg IVP Q3 [facility]


   Last Admin: 03/13/18 19:50 Dose:  50 mg


Enoxaparin Sodium (Lovenox)  40 mg SC DAILY [facility]


   Last Admin: 03/13/18 10:04 Dose:  Not Given


Folic Acid (Folic Acid)  1 mg PO DAILY [facility]


Hydromorphone HCl (Dilaudid)  3 mg IVP Q3H [facility]


   Last Admin: 03/13/18 20:26 Dose:  3 mg


Linezolid (Zyvox 600mg/300ml D5w)  600 mg in 300 mls @ 200 mls/hr IVPB Q12H [facility]


   PRN Reason: Protocol


   Last Admin: 03/13/18 14:05 Dose:  200 mls/hr


Pantoprazole Sodium (Protonix Ec Tab)  40 mg PO DAILY [facility]


   Last Admin: 03/13/18 09:57 Dose:  40 mg











- Labs


Labs: 


 





 03/13/18 07:10 





 03/13/18 07:10 





 











PT  15.3 SECONDS (9.7-12.2)  H  03/11/18  19:46    


 


INR  1.4   03/11/18  19:46    


 


APTT  41 SECONDS (21-34)  H  03/11/18  19:46    














Assessment and Plan


(1) Sickle cell pain crisis


Status: Acute   





(2) Leukocytosis


Status: Acute   





(3) UTI (urinary tract infection)


Status: Acute   





(4) Severe anemia


Status: Acute   





(5) Neurogenic bladder


Status: Acute   





- Assessment and Plan (Free Text)


Plan: 





CONTINUE IV ZYVOX 600MG IV Q 12HRLY.


F/U RENAL/PELVIC US/.





 EVALUATION .


CASE DISCUSSED WITH DR. FORBES.

## 2018-03-14 LAB
ALBUMIN SERPL-MCNC: 3.2 G/DL (ref 3.5–5)
ALBUMIN/GLOB SERPL: 0.7 {RATIO} (ref 1–2.1)
ALT SERPL-CCNC: 96 U/L (ref 9–52)
AST SERPL-CCNC: 144 U/L (ref 14–36)
BASOPHILS # BLD AUTO: 0.2 K/UL (ref 0–0.2)
BASOPHILS NFR BLD: 0.9 % (ref 0–2)
BUN SERPL-MCNC: 22 MG/DL (ref 7–17)
CALCIUM SERPL-MCNC: 7.6 MG/DL (ref 8.6–10.4)
EOSINOPHIL # BLD AUTO: 1.3 K/UL (ref 0–0.7)
EOSINOPHIL NFR BLD: 5.7 % (ref 0–4)
ERYTHROCYTE [DISTWIDTH] IN BLOOD BY AUTOMATED COUNT: 17.6 % (ref 11.5–14.5)
GFR NON-AFRICAN AMERICAN: 42
HGB BLD-MCNC: 5.2 G/DL (ref 11–16)
LYMPHOCYTES # BLD AUTO: 5.8 K/UL (ref 1–4.3)
LYMPHOCYTES NFR BLD AUTO: 24.8 % (ref 20–40)
MCH RBC QN AUTO: 28.4 PG (ref 27–31)
MCHC RBC AUTO-ENTMCNC: 32.7 G/DL (ref 33–37)
MCV RBC AUTO: 86.7 FL (ref 81–99)
MONOCYTES # BLD: 2.6 K/UL (ref 0–0.8)
MONOCYTES NFR BLD: 11.3 % (ref 0–10)
NEUTROPHILS # BLD: 13.3 K/UL (ref 1.8–7)
NEUTROPHILS NFR BLD AUTO: 57.3 % (ref 50–75)
NRBC BLD AUTO-RTO: 0.5 % (ref 0–2)
PLATELET # BLD: 191 K/UL (ref 130–400)
PMV BLD AUTO: 9.1 FL (ref 7.2–11.7)
RBC # BLD AUTO: 1.83 MIL/UL (ref 3.8–5.2)
WBC # BLD AUTO: 23.2 K/UL (ref 4.8–10.8)

## 2018-03-14 RX ADMIN — ENOXAPARIN SODIUM SCH: 40 INJECTION SUBCUTANEOUS at 10:08

## 2018-03-14 RX ADMIN — LINEZOLID SCH: 600 INJECTION, SOLUTION INTRAVENOUS at 14:30

## 2018-03-14 RX ADMIN — DIPHENHYDRAMINE HYDROCHLORIDE SCH MG: 50 INJECTION INTRAMUSCULAR; INTRAVENOUS at 06:32

## 2018-03-14 RX ADMIN — DIPHENHYDRAMINE HYDROCHLORIDE SCH MG: 50 INJECTION INTRAMUSCULAR; INTRAVENOUS at 10:03

## 2018-03-14 RX ADMIN — DIPHENHYDRAMINE HYDROCHLORIDE SCH MG: 50 INJECTION INTRAMUSCULAR; INTRAVENOUS at 19:18

## 2018-03-14 RX ADMIN — DIPHENHYDRAMINE HYDROCHLORIDE SCH MG: 50 INJECTION INTRAMUSCULAR; INTRAVENOUS at 03:12

## 2018-03-14 RX ADMIN — DIPHENHYDRAMINE HYDROCHLORIDE SCH MG: 50 INJECTION INTRAMUSCULAR; INTRAVENOUS at 00:00

## 2018-03-14 RX ADMIN — DIPHENHYDRAMINE HYDROCHLORIDE SCH MG: 50 INJECTION INTRAMUSCULAR; INTRAVENOUS at 16:23

## 2018-03-14 RX ADMIN — LINEZOLID SCH MLS/HR: 600 INJECTION, SOLUTION INTRAVENOUS at 03:12

## 2018-03-14 RX ADMIN — DIPHENHYDRAMINE HYDROCHLORIDE SCH MG: 50 INJECTION INTRAMUSCULAR; INTRAVENOUS at 13:19

## 2018-03-14 RX ADMIN — DIPHENHYDRAMINE HYDROCHLORIDE SCH MG: 50 INJECTION INTRAMUSCULAR; INTRAVENOUS at 22:17

## 2018-03-14 RX ADMIN — PANTOPRAZOLE SODIUM SCH MG: 40 TABLET, DELAYED RELEASE ORAL at 10:03

## 2018-03-14 NOTE — CP.PCM.PN
Subjective





- Date & Time of Evaluation


Date of Evaluation: 03/14/18


Time of Evaluation: 08:30





- Subjective


Subjective: 


clinically same





Objective





- Vital Signs/Intake and Output


Vital Signs (last 24 hours): 


 











Temp Pulse Resp BP Pulse Ox


 


 98.1 F   104 H  20   109/73   97 


 


 03/14/18 16:32  03/14/18 16:32  03/14/18 16:32  03/14/18 16:32  03/14/18 16:32








Intake and Output: 


 











 03/14/18 03/14/18





 06:59 18:59


 


Intake Total 500 1250


 


Balance 500 1250














- Medications


Medications: 


 Current Medications





Diphenhydramine HCl (Benadryl)  50 mg IVP Q3 [facility]


   Last Admin: 03/14/18 16:23 Dose:  50 mg


Enoxaparin Sodium (Lovenox)  40 mg SC DAILY [facility]


   Last Admin: 03/14/18 10:08 Dose:  Not Given


Folic Acid (Folic Acid)  1 mg PO DAILY [facility]


   Last Admin: 03/14/18 10:03 Dose:  1 mg


Hydromorphone HCl (Dilaudid)  3 mg IVP Q3H [facility]


   Last Admin: 03/14/18 16:23 Dose:  3 mg


Linezolid (Zyvox)  600 mg PO Q12H [facility]


Pantoprazole Sodium (Protonix Ec Tab)  40 mg PO DAILY [facility]


   Last Admin: 03/14/18 10:03 Dose:  40 mg











- Labs


Labs: 


 





 03/14/18 06:09 





 03/14/18 06:09 





 











PT  15.3 SECONDS (9.7-12.2)  H  03/11/18  19:46    


 


INR  1.4   03/11/18  19:46    


 


APTT  41 SECONDS (21-34)  H  03/11/18  19:46    














- Constitutional


Appears: Well





- Head Exam


Head Exam: ATRAUMATIC, NORMAL INSPECTION, NORMOCEPHALIC





- Eye Exam


Eye Exam: EOMI, Normal appearance, PERRL


Pupil Exam: NORMAL ACCOMODATION, PERRL





- ENT Exam


ENT Exam: Mucous Membranes Moist, Normal Exam





- Neck Exam


Neck Exam: Full ROM, Normal Inspection.  absent: Lymphadenopathy





- Respiratory Exam


Respiratory Exam: Decreased Breath Sounds





- Cardiovascular Exam


Cardiovascular Exam: REGULAR RHYTHM, +S1, +S2





- GI/Abdominal Exam


GI & Abdominal Exam: Soft, Diminished Bowel Sounds





- Rectal Exam


Rectal Exam: Deferred

## 2018-03-14 NOTE — CP.PCM.PN
Subjective





- Date & Time of Evaluation


Date of Evaluation: 03/14/18


Time of Evaluation: 10:54





- Subjective


Subjective: 





Progress Note for Dr. Etienne's Service





Pt seen and examined at bedside. She is laying comfortably. She continues to 

have diffuse aches and pains. No acute complaints.





Objective





- Vital Signs/Intake and Output


Vital Signs (last 24 hours): 


 











Temp Pulse Resp BP Pulse Ox


 


 97.7 F   110 H  18   123/77   100 


 


 03/14/18 08:13  03/14/18 08:13  03/14/18 08:13  03/14/18 08:13  03/14/18 08:13








Intake and Output: 


 











 03/14/18 03/14/18





 06:59 18:59


 


Intake Total 500 


 


Balance 500 














- Medications


Medications: 


 Current Medications





Diphenhydramine HCl (Benadryl)  50 mg IVP Q3 [facility]


   Last Admin: 03/14/18 10:03 Dose:  50 mg


Enoxaparin Sodium (Lovenox)  40 mg SC DAILY [facility]


   Last Admin: 03/14/18 10:08 Dose:  Not Given


Folic Acid (Folic Acid)  1 mg PO DAILY [facility]


   Last Admin: 03/14/18 10:03 Dose:  1 mg


Hydromorphone HCl (Dilaudid)  3 mg IVP Q3H [facility]


   Last Admin: 03/14/18 10:03 Dose:  3 mg


Linezolid (Zyvox 600mg/300ml D5w)  600 mg in 300 mls @ 200 mls/hr IVPB Q12H [facility]


   PRN Reason: Protocol


   Last Admin: 03/14/18 03:12 Dose:  200 mls/hr


Pantoprazole Sodium (Protonix Ec Tab)  40 mg PO DAILY [facility]


   Last Admin: 03/14/18 10:03 Dose:  40 mg











- Labs


Labs: 


 





 03/14/18 06:09 





 03/14/18 06:09 





 











PT  15.3 SECONDS (9.7-12.2)  H  03/11/18  19:46    


 


INR  1.4   03/11/18  19:46    


 


APTT  41 SECONDS (21-34)  H  03/11/18  19:46    














- Head Exam


Head Exam: ATRAUMATIC, NORMOCEPHALIC





- Eye Exam


Eye Exam: EOMI





- ENT Exam


ENT Exam: Mucous Membranes Moist





- Respiratory Exam


Respiratory Exam: Clear to Ausculation Bilateral, NORMAL BREATHING PATTERN





- Cardiovascular Exam


Cardiovascular Exam: REGULAR RHYTHM





- GI/Abdominal Exam


GI & Abdominal Exam: Soft, Tenderness (mild)





- Neurological Exam


Neurological Exam: Alert, Awake, Oriented x3





Assessment and Plan





- Assessment and Plan (Free Text)


Plan: 





Sickle cell Crisis


-folic acid 1mg PO daily


-dilaudid 3mg IV q3hrs prn 


-lovenox 40mg sc daily


-HGB below baseline (7.0) today at 5.2


   will continue to monitor


   Transfusion recommendation from previous admissions suggests transfusion 

threshold of 5.7


   1u PRBC ordered





Urinary tract infection


-ID consult. Dr. Sagar Stanton. recs appreciated. 


   -Zyvox 600mg IV q12hr


-Uro consult. Dr. JAZZY Chacko. Recs appreciated.


-Bladder u/s- B/L ureteral stents with moderate hydronephrosis R>L


-Ucx (3/7/18)- vancomycin resistant E. faecium


-Blood cx- no growth


-WBC 23.6


-afebrile





Neurogenic bladder


-straight cath PRN; patient is able to do herself 





leukocytosis


-WBC- 23.6


-likely secondary to UTI


-will monitor


-afebrile 





GI/DVT ppx


-lovenox 40mg SC daily


-protonix 40mg PO daily





Case discussed with Dr. Etienne.


All management as per Dr. Etienne.

## 2018-03-14 NOTE — CP.PCM.CON
Past Patient History





- Past Medical History & Family History


Past Medical History?: Yes





- Past Social History


Smoking Status: Never Smoked





- CARDIAC


Hx Atrial Fibrillation: No


Hx Cardia Arrhythmia: No


Hx Congestive Heart Failure: No


Hx Hypercholesterolemia: No


Hx Hypertension: No


Hx Mitral Valve Prolapse: No


Hx Pacemaker: No


Hx Peripheral Edema: No





- PULMONARY


Hx Asthma: No


Hx Bronchitis: No


Hx Chronic Obstructive Pulmonary Disease (COPD): No


Hx Emphysema: No


Hx Pneumonia: No


Hx Pulmonary Embolism: No


Hx Sleep Apnea: No





- NEUROLOGICAL


Hx Alzheimer's Disease: No


Hx Dementia: No


Hx Migraine: Yes


Hx Multiple Sclerosis: No


Hx Parkinson's Disease: No


Hx Seizures: No


Hx Transient Ischemic Attacks (TIA): No





- HEENT


Hx HEENT Problems: No


Hx Blind: No


Hx Cataracts: No


Hx Deafness: No


Hx Difficulty Chewing: No


Hx Epistaxis: No


Hx Glaucoma: No


Hx Macular Degeneration: No





- RENAL


Hx Chronic Kidney Disease: No


Hx Kidney Stones: No





- ENDOCRINE/METABOLIC


Hx Hyperthyroidism: No


Hx Hypothyroidism: No





- HEMATOLOGICAL/ONCOLOGICAL


Hx Anemia: Yes


Hx Human Immunodeficiency Virus (HIV): No


Hx Sickle Cell Disease: Yes





- INTEGUMENTARY


Hx Dermatological Problems: No





- MUSCULOSKELETAL/RHEUMATOLOGICAL


Hx Falls: No





- GASTROINTESTINAL


Hx Gastrointestinal Disorders: No





- GENITOURINARY/GYNECOLOGICAL


Hx Genitourinary Disorders: No





- PSYCHIATRIC


Hx Substance Use: No





- SURGICAL HISTORY


Hx Surgeries: Yes


Hx Cholecystectomy: Yes (2004)





- ANESTHESIA


Hx Anesthesia: Yes


Hx Anesthesia Reactions: No


Hx Malignant Hyperthermia: No


Has any member of the family had a problem w/ anesthesia?: No





Meds


Allergies/Adverse Reactions: 


 Allergies











Allergy/AdvReac Type Severity Reaction Status Date / Time


 


droperidol Allergy   Verified 03/11/18 17:54


 


tramadol Allergy   Verified 03/11/18 17:54


 


inapsine Allergy  RASH Uncoded 03/11/18 17:54














- Medications


Medications: 


 Current Medications





Diphenhydramine HCl (Benadryl)  50 mg IVP Q3 [facility]


   Last Admin: 03/14/18 13:19 Dose:  50 mg


Enoxaparin Sodium (Lovenox)  40 mg SC DAILY [facility]


   Last Admin: 03/14/18 10:08 Dose:  Not Given


Folic Acid (Folic Acid)  1 mg PO DAILY [facility]


   Last Admin: 03/14/18 10:03 Dose:  1 mg


Hydromorphone HCl (Dilaudid)  3 mg IVP Q3H [facility]


   Last Admin: 03/14/18 13:20 Dose:  3 mg


Linezolid (Zyvox 600mg/300ml D5w)  600 mg in 300 mls @ 200 mls/hr IVPB Q12H [facility]


   PRN Reason: Protocol


   Last Admin: 03/14/18 03:12 Dose:  200 mls/hr


Pantoprazole Sodium (Protonix Ec Tab)  40 mg PO DAILY [facility]


   Last Admin: 03/14/18 10:03 Dose:  40 mg











Results





- Vital Signs


Recent Vital Signs: 


 Last Vital Signs











Temp  98 F   03/14/18 15:40


 


Pulse  105 H  03/14/18 15:40


 


Resp  20   03/14/18 15:40


 


BP  109/73   03/14/18 15:40


 


Pulse Ox  100   03/14/18 08:13














- Labs


Result Diagrams: 


 03/14/18 06:09





 03/14/18 06:09


Labs: 


 Laboratory Results - last 24 hr











  03/13/18 03/14/18 03/14/18





  08:37 06:09 06:09


 


WBC   23.2 H 


 


RBC   1.83 L 


 


Hgb   5.2 L* 


 


Hct   15.9 L 


 


MCV   86.7 


 


MCH   28.4 


 


MCHC   32.7 L 


 


RDW   17.6 H 


 


Plt Count   191 


 


MPV   9.1 


 


Neut % (Auto)   57.3 


 


Lymph % (Auto)   24.8 


 


Mono % (Auto)   11.3 H 


 


Eos % (Auto)   5.7 H 


 


Baso % (Auto)   0.9 


 


Neut # (Auto)   13.3 H 


 


Lymph # (Auto)   5.8 H 


 


Mono # (Auto)   2.6 H 


 


Eos # (Auto)   1.3 H 


 


Baso # (Auto)   0.2 


 


Sodium    138


 


Potassium    4.7


 


Chloride    107


 


Carbon Dioxide    22


 


Anion Gap    14


 


BUN    22 H


 


Creatinine    1.4 H


 


Est GFR ( Amer)    51


 


Est GFR (Non-Af Amer)    42


 


Random Glucose    97


 


Calcium    7.6 L


 


Total Bilirubin    1.9 H


 


AST    144 H


 


ALT    96 H


 


Alkaline Phosphatase    149 H


 


Total Protein    7.6


 


Albumin    3.2 L


 


Globulin    4.4 H


 


Albumin/Globulin Ratio    0.7 L


 


Blood Type  O POSITIVE  


 


Antibody Screen  Negative  














Assessment & Plan





- Assessment and Plan (Free Text)


Assessment: 





IMP:


Sickle cell disease


'Hx of Neurogenic Bladder


Hx of ureteral stent insertion 2016





full note t/f





- Date & Time


Date: 03/14/18


Time: 09:35

## 2018-03-14 NOTE — CP.PCM.PN
Subjective





- Date & Time of Evaluation


Date of Evaluation: 03/14/18


Time of Evaluation: 12:04





- Subjective


Subjective: 





CONSENT FOR BLOOD TRANSFUSION DISCUSSED AND SIGNED WITH PT AND PRIMARY RN 

RAMIRO.  MED RESIDENT, DR. MANZANARES, ORDERED TRANSFUSION ON 1 UNIT PRBC FOR TODAY 

AS HGB IS 5.2.  PT HAS HAD TRANSFUSIONS BEFORE WITHOUT ANY REACTION.  PT IN 

AGREEMENT FOR TRANSFUSION AND VERBALIZES UNDERSTANDING FOR THE NEED OF IT AND 

REACTION S/S.  NO FURTHER ORDERS.





Objective





- Vital Signs/Intake and Output


Vital Signs (last 24 hours): 


 











Temp Pulse Resp BP Pulse Ox


 


 97.7 F   110 H  18   123/77   100 


 


 03/14/18 08:13  03/14/18 08:13  03/14/18 08:13  03/14/18 08:13  03/14/18 08:13








Intake and Output: 


 











 03/14/18 03/14/18





 06:59 18:59


 


Intake Total 500 


 


Balance 500 














- Medications


Medications: 


 Current Medications





Diphenhydramine HCl (Benadryl)  50 mg IVP Q3 [facility]


   Last Admin: 03/14/18 10:03 Dose:  50 mg


Enoxaparin Sodium (Lovenox)  40 mg SC DAILY [facility]


   Last Admin: 03/14/18 10:08 Dose:  Not Given


Folic Acid (Folic Acid)  1 mg PO DAILY [facility]


   Last Admin: 03/14/18 10:03 Dose:  1 mg


Hydromorphone HCl (Dilaudid)  3 mg IVP Q3H [facility]


   Last Admin: 03/14/18 10:03 Dose:  3 mg


Linezolid (Zyvox 600mg/300ml D5w)  600 mg in 300 mls @ 200 mls/hr IVPB Q12H [facility]


   PRN Reason: Protocol


   Last Admin: 03/14/18 03:12 Dose:  200 mls/hr


Pantoprazole Sodium (Protonix Ec Tab)  40 mg PO DAILY [facility]


   Last Admin: 03/14/18 10:03 Dose:  40 mg











- Labs


Labs: 


 





 03/14/18 06:09 





 03/14/18 06:09 





 











PT  15.3 SECONDS (9.7-12.2)  H  03/11/18  19:46    


 


INR  1.4   03/11/18  19:46    


 


APTT  41 SECONDS (21-34)  H  03/11/18  19:46

## 2018-03-14 NOTE — CP.PCM.PN
Subjective





- Date & Time of Evaluation


Date of Evaluation: 03/14/18


Time of Evaluation: 14:27





- Subjective


Subjective: 





CHIEF COMPLAINTS TODAY :


AFEBRILE


STATES FEELS CRAPPY.


H/H DROPPED FURTHER S/P I UNIT PRBC








REPORTS NEUROGENIC BLADDER FROM OLD CVA.


DOES INTERMITTENT ST. CATHETERIZATION HERSELF 2 OR 3TIMES /DAY


?RECURRENT UTI (DIFFERENT ORGS )





HX OF B/L STENTS.








ROS.


HEENT :  N.


Resp :       No cough, wheezing ,pleuritic CP ,or hemoptysis 


Cardio :     No anginal  CP, PND, orthopnea, palpitation 


GI :           No abd.pain, n/v ,diarrhea or GI bleeding .


CNS : No headache, vertigo, focal deficit.


Musculoskel :  No joint swelling ,


Derm :        No rash 


Psych :     Normal affect.


Ext :  No  swelling ,calf pain 





PE.


Pt. is alert awake in no distress.


V.S  As noted in the chart 


Head ,ear nose,throat and eyes : Normal.


Neck : Supple with normal carotids.


Lungs: Clear air entry.


Heart : S1 & S2 normal with S4. No murmur.


Abd : Soft non tender with normal bowel sounds.


Neuro : Moves all ext. with no localized deficit.


Ext : No edema with intact pulses.Non tender calves 


Derm : No rashes or decubitus ulcer.





LABS/RADIOLOGY:





bladder ultrasound noted.  Prevoid bladder volume 19 20 cc


Post void bladder volume 77.7 cc


.No calculi, no hydro-, no renal cysts.


H/H LOW 5.2/17.6


WBC  HIGH


CREAT 1.7/BUN 27


LFT ABN -HEMOLYSIS











Objective





- Vital Signs/Intake and Output


Vital Signs (last 24 hours): 


 











Temp Pulse Resp BP Pulse Ox


 


 98.2 F   70   18   114/66   100 


 


 03/14/18 13:38  03/14/18 13:38  03/14/18 13:38  03/14/18 13:38  03/14/18 08:13








Intake and Output: 


 











 03/14/18 03/14/18





 06:59 18:59


 


Intake Total 500 0


 


Balance 500 0














- Medications


Medications: 


 Current Medications





Diphenhydramine HCl (Benadryl)  50 mg IVP Q3 [facility]


   Last Admin: 03/14/18 13:19 Dose:  50 mg


Enoxaparin Sodium (Lovenox)  40 mg SC DAILY [facility]


   Last Admin: 03/14/18 10:08 Dose:  Not Given


Folic Acid (Folic Acid)  1 mg PO DAILY [facility]


   Last Admin: 03/14/18 10:03 Dose:  1 mg


Hydromorphone HCl (Dilaudid)  3 mg IVP Q3H [facility]


   Last Admin: 03/14/18 13:20 Dose:  3 mg


Linezolid (Zyvox 600mg/300ml D5w)  600 mg in 300 mls @ 200 mls/hr IVPB Q12H [facility]


   PRN Reason: Protocol


   Last Admin: 03/14/18 03:12 Dose:  200 mls/hr


Pantoprazole Sodium (Protonix Ec Tab)  40 mg PO DAILY [facility]


   Last Admin: 03/14/18 10:03 Dose:  40 mg











- Labs


Labs: 


 





 03/14/18 06:09 





 03/14/18 06:09 





 











PT  15.3 SECONDS (9.7-12.2)  H  03/11/18  19:46    


 


INR  1.4   03/11/18  19:46    


 


APTT  41 SECONDS (21-34)  H  03/11/18  19:46    














Assessment and Plan


(1) Sickle cell pain crisis


Status: Acute   





(2) Leukocytosis


Status: Acute   





(3) UTI (urinary tract infection)


Status: Acute   





(4) Severe anemia


Status: Acute   





(5) Neurogenic bladder


Status: Acute   





- Assessment and Plan (Free Text)


Plan: 





CONTINUE IV ZYVOX 600MG IV Q 12HRLY.3/12/18  x 8days..





 EVALUATION .

## 2018-03-15 VITALS
HEART RATE: 98 BPM | RESPIRATION RATE: 20 BRPM | OXYGEN SATURATION: 97 % | TEMPERATURE: 98 F | DIASTOLIC BLOOD PRESSURE: 80 MMHG | SYSTOLIC BLOOD PRESSURE: 117 MMHG

## 2018-03-15 LAB
ALBUMIN SERPL-MCNC: 3.2 G/DL (ref 3.5–5)
ALBUMIN/GLOB SERPL: 0.6 {RATIO} (ref 1–2.1)
ALT SERPL-CCNC: 88 U/L (ref 9–52)
AST SERPL-CCNC: 114 U/L (ref 14–36)
BACTERIA #/AREA URNS HPF: (no result) /[HPF]
BASOPHILS # BLD AUTO: 0.2 K/UL (ref 0–0.2)
BASOPHILS NFR BLD: 0.7 % (ref 0–2)
BILIRUB UR-MCNC: NEGATIVE MG/DL
BUN SERPL-MCNC: 19 MG/DL (ref 7–17)
CALCIUM SERPL-MCNC: 7 MG/DL (ref 8.6–10.4)
COLOR UR: YELLOW
EOSINOPHIL # BLD AUTO: 1.1 K/UL (ref 0–0.7)
EOSINOPHIL NFR BLD: 5 % (ref 0–4)
ERYTHROCYTE [DISTWIDTH] IN BLOOD BY AUTOMATED COUNT: 17.3 % (ref 11.5–14.5)
GFR NON-AFRICAN AMERICAN: 46
GLUCOSE UR STRIP-MCNC: NORMAL MG/DL
HGB BLD-MCNC: 7.5 G/DL (ref 11–16)
HYPHAE YEAST: (no result) /LPF
LEUKOCYTE ESTERASE UR-ACNC: (no result) LEU/UL
LYMPHOCYTES # BLD AUTO: 4.4 K/UL (ref 1–4.3)
LYMPHOCYTES NFR BLD AUTO: 20.5 % (ref 20–40)
MCH RBC QN AUTO: 29.2 PG (ref 27–31)
MCHC RBC AUTO-ENTMCNC: 33.3 G/DL (ref 33–37)
MCV RBC AUTO: 87.6 FL (ref 81–99)
MONOCYTES # BLD: 2 K/UL (ref 0–0.8)
MONOCYTES NFR BLD: 9.1 % (ref 0–10)
NEUTROPHILS # BLD: 14 K/UL (ref 1.8–7)
NEUTROPHILS NFR BLD AUTO: 64.7 % (ref 50–75)
NRBC BLD AUTO-RTO: 0.5 % (ref 0–2)
PH UR STRIP: 5 [PH] (ref 5–8)
PLATELET # BLD: 166 K/UL (ref 130–400)
PMV BLD AUTO: 9.5 FL (ref 7.2–11.7)
PROT UR STRIP-MCNC: (no result) MG/DL
RBC # BLD AUTO: 2.57 MIL/UL (ref 3.8–5.2)
RBC # UR STRIP: (no result) /UL
SP GR UR STRIP: 1.01 (ref 1–1.03)
UROBILINOGEN UR-MCNC: NORMAL MG/DL (ref 0.2–1)
WBC # BLD AUTO: 21.6 K/UL (ref 4.8–10.8)

## 2018-03-15 RX ADMIN — DIPHENHYDRAMINE HYDROCHLORIDE SCH MG: 50 INJECTION INTRAMUSCULAR; INTRAVENOUS at 14:44

## 2018-03-15 RX ADMIN — DIPHENHYDRAMINE HYDROCHLORIDE SCH MG: 50 INJECTION INTRAMUSCULAR; INTRAVENOUS at 11:17

## 2018-03-15 RX ADMIN — ENOXAPARIN SODIUM SCH: 40 INJECTION SUBCUTANEOUS at 09:45

## 2018-03-15 RX ADMIN — DIPHENHYDRAMINE HYDROCHLORIDE SCH MG: 50 INJECTION INTRAMUSCULAR; INTRAVENOUS at 04:44

## 2018-03-15 RX ADMIN — DIPHENHYDRAMINE HYDROCHLORIDE SCH MG: 50 INJECTION INTRAMUSCULAR; INTRAVENOUS at 08:06

## 2018-03-15 RX ADMIN — DIPHENHYDRAMINE HYDROCHLORIDE SCH MG: 50 INJECTION INTRAMUSCULAR; INTRAVENOUS at 16:46

## 2018-03-15 RX ADMIN — PANTOPRAZOLE SODIUM SCH MG: 40 TABLET, DELAYED RELEASE ORAL at 09:45

## 2018-03-15 RX ADMIN — DIPHENHYDRAMINE HYDROCHLORIDE SCH MG: 50 INJECTION INTRAMUSCULAR; INTRAVENOUS at 01:35

## 2018-03-15 NOTE — CP.PCM.PN
Subjective





- Date & Time of Evaluation


Date of Evaluation: 03/15/18


Time of Evaluation: 13:24





- Subjective


Subjective: 





CHIEF COMPLAINTS TODAY :


AFEBRILE


STATES FEELS BETTER AFTER BLOOD TRANSFUSION..


RESTING COMFORTABLY.


SEEN BY VINCE MENDEZ


HEENT :  N.


Resp :       No cough, wheezing ,pleuritic CP ,or hemoptysis 


Cardio :     No anginal  CP, PND, orthopnea, palpitation 


GI :           No abd.pain, n/v ,diarrhea or GI bleeding .


CNS : No headache, vertigo, focal deficit.


Musculoskel :  No joint swelling ,


Derm :        No rash 


Psych :     Normal affect.


Ext :  No  swelling ,calf pain 





PE.


Pt. is alert awake in no distress.


V.S  As noted in the chart 


Head ,ear nose,throat and eyes : Normal.


Neck : Supple with normal carotids.


Lungs: Clear air entry.


Heart : S1 & S2 normal with S4. No murmur.


Abd : Soft non tender with normal bowel sounds.


Neuro : Moves all ext. with no localized deficit.


Ext : No edema with intact pulses.Non tender calves 


Derm : No rashes or decubitus ulcer.





LABS/RADIOLOGY:





bladder ultrasound noted.  Prevoid bladder volume 19 20 cc


Post void bladder volume 77.7 cc


.No calculi, no hydro-, no renal cysts.





LFT  IMPROVING











Objective





- Vital Signs/Intake and Output


Vital Signs (last 24 hours): 


 











Temp Pulse Resp BP Pulse Ox


 


 98.2 F   102 H  18   119/74   98 


 


 03/15/18 08:15  03/15/18 08:15  03/15/18 08:15  03/15/18 08:15  03/15/18 08:15











- Medications


Medications: 


 Current Medications





Diphenhydramine HCl (Benadryl)  50 mg IVP Q3 [facility]


   Last Admin: 03/15/18 11:17 Dose:  50 mg


Enoxaparin Sodium (Lovenox)  40 mg SC DAILY [facility]


   Last Admin: 03/15/18 09:45 Dose:  Not Given


Folic Acid (Folic Acid)  1 mg PO DAILY [facility]


   Last Admin: 03/15/18 09:45 Dose:  1 mg


Hydromorphone HCl (Dilaudid)  3 mg IVP Q3H [facility]


   Last Admin: 03/15/18 11:18 Dose:  3 mg


Linezolid (Zyvox)  600 mg PO Q12H [facility]


   Last Admin: 03/15/18 06:03 Dose:  600 mg


Pantoprazole Sodium (Protonix Ec Tab)  40 mg PO DAILY [facility]


   Last Admin: 03/15/18 09:45 Dose:  40 mg











- Labs


Labs: 


 





 03/15/18 10:27 





 03/15/18 10:27 





 











PT  15.3 SECONDS (9.7-12.2)  H  03/11/18  19:46    


 


INR  1.4   03/11/18  19:46    


 


APTT  41 SECONDS (21-34)  H  03/11/18  19:46    














Assessment and Plan


(1) Sickle cell pain crisis


Status: Acute   





(2) Leukocytosis


Status: Acute   





(3) UTI (urinary tract infection)


Status: Acute   





(4) Severe anemia


Status: Acute   





(5) Neurogenic bladder


Status: Acute   





- Assessment and Plan (Free Text)


Plan: 





CONTINUE IV ZYVOX 600MG IV Q 12HRLY X 3 DAYS MORE.


fOLLOW-UP REPEAT CLEAN CATCH ua AND URINE CULTURE





pATIENT TO FOLLOW UP CLOSELY WITH PMD/AND .


cASE DISCUSSED WITH STAFF AND RESIDENT.

## 2018-03-15 NOTE — CP.PCM.PN
<Liu,Weilin - Last Filed: 03/15/18 18:31>





Subjective





- Date & Time of Evaluation


Date of Evaluation: 03/15/18


Time of Evaluation: 09:45





- Subjective


Subjective: 





PGY-2 Progress Note for Dr. Etienne





Patient seen and examined by bedside. No acute events reported overnight. 

Patient continues to complain of pain, managed well by pain medication. Patient 

denies fever, chills, shortness of breath, nausea, vomiting, or diarrhea.





Objective





- Vital Signs/Intake and Output


Vital Signs (last 24 hours): 


 











Temp Pulse Resp BP Pulse Ox


 


 98.2 F   102 H  18   119/74   98 


 


 03/15/18 08:15  03/15/18 08:15  03/15/18 08:15  03/15/18 08:15  03/15/18 08:15











- Medications


Medications: 


 Current Medications





Diphenhydramine HCl (Benadryl)  50 mg IVP Q3 [facility]


   Last Admin: 03/15/18 08:06 Dose:  50 mg


Enoxaparin Sodium (Lovenox)  40 mg SC DAILY [facility]


   Last Admin: 03/15/18 09:45 Dose:  Not Given


Folic Acid (Folic Acid)  1 mg PO DAILY [facility]


   Last Admin: 03/15/18 09:45 Dose:  1 mg


Hydromorphone HCl (Dilaudid)  3 mg IVP Q3H [facility]


   Last Admin: 03/15/18 08:06 Dose:  3 mg


Linezolid (Zyvox)  600 mg PO Q12H [facility]


   Last Admin: 03/15/18 06:03 Dose:  600 mg


Pantoprazole Sodium (Protonix Ec Tab)  40 mg PO DAILY [facility]


   Last Admin: 03/15/18 09:45 Dose:  40 mg











- Labs


Labs: 


 





 03/15/18 10:27 





 03/14/18 06:09 





 











PT  15.3 SECONDS (9.7-12.2)  H  03/11/18  19:46    


 


INR  1.4   03/11/18  19:46    


 


APTT  41 SECONDS (21-34)  H  03/11/18  19:46    














- Constitutional


Appears: Non-toxic, No Acute Distress





- Head Exam


Head Exam: ATRAUMATIC





- Eye Exam


Eye Exam: EOMI, Normal appearance


Pupil Exam: NORMAL ACCOMODATION





- ENT Exam


ENT Exam: Mucous Membranes Moist





- Neck Exam


Neck Exam: Normal Inspection





- Respiratory Exam


Respiratory Exam: Clear to Ausculation Bilateral, NORMAL BREATHING PATTERN





- Cardiovascular Exam


Cardiovascular Exam: REGULAR RHYTHM, +S1, +S2.  absent: Murmur





- GI/Abdominal Exam


GI & Abdominal Exam: Soft, Normal Bowel Sounds





- Neurological Exam


Neurological Exam: Alert, Awake, Oriented x3





- Psychiatric Exam


Psychiatric exam: Normal Affect, Normal Mood





- Skin


Skin Exam: Warm





Assessment and Plan





- Assessment and Plan (Free Text)


Assessment: 





Sickle cell Crisis


-folic acid 1mg PO daily


-dilaudid 3mg IV q3hrs prn 


-lovenox 40mg sc daily


-HGB 7.5, s/p 1 unit PRBC 





Urinary tract infection


-ID consult. Dr. Sagar Stanton. recs appreciated. 


-Zyvox 600mg PO q12hr, will continue for 3 more days


-Uro consult. Dr. JAZZY Chacko. Recs appreciated.


-Bladder u/s- B/L ureteral stents with moderate hydronephrosis R>L


-Ucx (3/7/18)- vancomycin resistant E. faecium


-Blood cx- no growth


-Patient will follow up with Dr. Chacko as outpatient





Neurogenic bladder


-straight cath PRN; patient is able to do herself 





GI/DVT ppx


-lovenox 40mg SC daily


-protonix 40mg PO daily





Case discussed with Dr. Etienne


Patient is medically stable to be discharged per Dr. Etienne





<Shahab Etienne S - Last Filed: 03/15/18 22:15>





Objective





- Vital Signs/Intake and Output


Vital Signs (last 24 hours): 


 











Temp Pulse Resp BP Pulse Ox


 


 98.0 F   98 H  20   117/80   97 


 


 03/15/18 15:31  03/15/18 15:31  03/15/18 15:31  03/15/18 15:31  03/15/18 15:31








Intake and Output: 


 











 03/15/18 03/16/18





 18:59 06:59


 


Intake Total 400 


 


Balance 400 














- Labs


Labs: 


 





 03/15/18 10:27 





 03/15/18 10:27 





 











PT  15.3 SECONDS (9.7-12.2)  H  03/11/18  19:46    


 


INR  1.4   03/11/18  19:46    


 


APTT  41 SECONDS (21-34)  H  03/11/18  19:46    














Attending/Attestation





- Attestation


I have personally seen and examined this patient.: Yes


I have fully participated in the care of the patient.: Yes


I have reviewed all pertinent clinical information, including history, physical 

exam and plan: Yes


Notes (Text): 





03/15/18 22:15


case jak sara pratt staff adn tyrelldiettiffanie


adm for scd


s/p p[rbcs

## 2018-03-19 ENCOUNTER — HOSPITAL ENCOUNTER (EMERGENCY)
Dept: HOSPITAL 31 - C.ER | Age: 38
Discharge: HOME | End: 2018-03-19
Payer: MEDICARE

## 2018-03-19 VITALS
DIASTOLIC BLOOD PRESSURE: 83 MMHG | RESPIRATION RATE: 15 BRPM | SYSTOLIC BLOOD PRESSURE: 119 MMHG | HEART RATE: 93 BPM | TEMPERATURE: 97.4 F

## 2018-03-19 VITALS — BODY MASS INDEX: 22.4 KG/M2

## 2018-03-19 VITALS — OXYGEN SATURATION: 100 %

## 2018-03-19 DIAGNOSIS — N39.0: Primary | ICD-10-CM

## 2018-03-19 DIAGNOSIS — G89.29: ICD-10-CM

## 2018-03-19 LAB
ALBUMIN SERPL-MCNC: 3.7 G/DL (ref 3.5–5)
ALBUMIN/GLOB SERPL: 0.6 {RATIO} (ref 1–2.1)
ALT SERPL-CCNC: 64 U/L (ref 9–52)
APTT BLD: 39 SECONDS (ref 21–34)
AST SERPL-CCNC: 88 U/L (ref 14–36)
BACTERIA #/AREA URNS HPF: (no result) /[HPF]
BASOPHILS # BLD AUTO: 0.2 K/UL (ref 0–0.2)
BASOPHILS NFR BLD: 1.2 % (ref 0–2)
BILIRUB UR-MCNC: NEGATIVE MG/DL
BUN SERPL-MCNC: 19 MG/DL (ref 7–17)
CALCIUM SERPL-MCNC: 8.3 MG/DL (ref 8.6–10.4)
EOSINOPHIL # BLD AUTO: 0.7 K/UL (ref 0–0.7)
EOSINOPHIL NFR BLD: 4.1 % (ref 0–4)
ERYTHROCYTE [DISTWIDTH] IN BLOOD BY AUTOMATED COUNT: 17.9 % (ref 11.5–14.5)
GFR NON-AFRICAN AMERICAN: 56
GLUCOSE UR STRIP-MCNC: NORMAL MG/DL
HCG,QUALITATIVE URINE: NEGATIVE
HGB BLD-MCNC: 8 G/DL (ref 11–16)
INR PPP: 1.3
LEUKOCYTE ESTERASE UR-ACNC: (no result) LEU/UL
LYMPHOCYTES # BLD AUTO: 3.7 K/UL (ref 1–4.3)
LYMPHOCYTES NFR BLD AUTO: 20.8 % (ref 20–40)
MCH RBC QN AUTO: 29 PG (ref 27–31)
MCHC RBC AUTO-ENTMCNC: 33.6 G/DL (ref 33–37)
MCV RBC AUTO: 86.5 FL (ref 81–99)
MONOCYTES # BLD: 1.3 K/UL (ref 0–0.8)
MONOCYTES NFR BLD: 7.2 % (ref 0–10)
NEUTROPHILS # BLD: 11.7 K/UL (ref 1.8–7)
NEUTROPHILS NFR BLD AUTO: 66.7 % (ref 50–75)
NRBC BLD AUTO-RTO: 0.1 % (ref 0–2)
PH UR STRIP: 6 [PH] (ref 5–8)
PLATELET # BLD: 206 K/UL (ref 130–400)
PMV BLD AUTO: 8 FL (ref 7.2–11.7)
PROT UR STRIP-MCNC: (no result) MG/DL
PROTHROMBIN TIME: 15.2 SECONDS (ref 9.7–12.2)
RBC # BLD AUTO: 2.77 MIL/UL (ref 3.8–5.2)
RBC # UR STRIP: (no result) /UL
SP GR UR STRIP: 1.01 (ref 1–1.03)
SQUAMOUS EPITHIAL: 7 /HPF (ref 0–5)
UROBILINOGEN UR-MCNC: NORMAL MG/DL (ref 0.2–1)
WBC # BLD AUTO: 17.6 K/UL (ref 4.8–10.8)
WBC CLUMPS # UR AUTO: (no result) /HPF

## 2018-03-19 PROCEDURE — 80053 COMPREHEN METABOLIC PANEL: CPT

## 2018-03-19 PROCEDURE — 93005 ELECTROCARDIOGRAM TRACING: CPT

## 2018-03-19 PROCEDURE — 87181 SC STD AGAR DILUTION PER AGT: CPT

## 2018-03-19 PROCEDURE — 81001 URINALYSIS AUTO W/SCOPE: CPT

## 2018-03-19 PROCEDURE — 85730 THROMBOPLASTIN TIME PARTIAL: CPT

## 2018-03-19 PROCEDURE — 85610 PROTHROMBIN TIME: CPT

## 2018-03-19 PROCEDURE — 85044 MANUAL RETICULOCYTE COUNT: CPT

## 2018-03-19 PROCEDURE — 84484 ASSAY OF TROPONIN QUANT: CPT

## 2018-03-19 PROCEDURE — 99285 EMERGENCY DEPT VISIT HI MDM: CPT

## 2018-03-19 PROCEDURE — 96360 HYDRATION IV INFUSION INIT: CPT

## 2018-03-19 PROCEDURE — 85025 COMPLETE CBC W/AUTO DIFF WBC: CPT

## 2018-03-19 PROCEDURE — 87086 URINE CULTURE/COLONY COUNT: CPT

## 2018-03-19 PROCEDURE — 71045 X-RAY EXAM CHEST 1 VIEW: CPT

## 2018-03-19 PROCEDURE — 84703 CHORIONIC GONADOTROPIN ASSAY: CPT

## 2018-03-19 NOTE — C.PDOC
Time Seen by Provider: 03/19/18 10:01


Chief Complaint (Nursing): Pain, Chronic





Past Medical History


Vital Signs: 





 Last Vital Signs











Temp  97.6 F   03/19/18 09:51


 


Pulse  99 H  03/19/18 09:51


 


Resp  18   03/19/18 09:51


 


BP  111/79   03/19/18 09:51


 


Pulse Ox  100   03/19/18 09:51














- Medical History


PMH: Anemia, Migraine, Sickle Cell Disease


   Denies: Alzheimer's Disease, Asthma, Atrial Fibrillation, Bronchitis, Cardia 

Arrhythmia, CHF, COPD, Dementia, Emphysema, HIV, HTN, Hypercholesterolemia, 

Hyperthyroidism, Hypothyroidism, Kidney Stones, Mitral Valve Prolapse, Multiple 

Sclerosis, Parkinson's Disease, Peripheral Edema, Pneumonia, Pulmonary Embolism

, Chronic Kidney Disease, Seizures, Sleep Apnea, TIA


Surgical History: Cholecystectomy (2004)


   Denies: Pacemaker





- [facility] Procedures











PACKED CELL TRANSFUSION (06/04/13)


TETANUS TOXOID ADMINIST (09/23/13)


TRANSFUSE NONAUT RED BLOOD CELLS IN PERIPH VEIN, PERC (02/15/18)








Family History: States: Unknown Family Hx





- Social History


Hx Tobacco Use: No


Hx Alcohol Use: No


Hx Substance Use: No





- Immunization History


Hx Tetanus Toxoid Vaccination: Yes


Hx Influenza Vaccination: Yes


Hx Pneumococcal Vaccination: Yes





ED Course And Treatment





- Laboratory Results


Result Diagrams: 


 03/19/18 10:35





 03/19/18 10:35


O2 Sat by Pulse Oximetry: 100





Medical Decision Making


Medical Decision Making: 





chronic pain issues, probably more related to spinal rods and chronic back pain 

than Sickle Cell Crisis now as pt recent adm and d/c home comfortable.


labs today improved from baseline, argues against acute SC crisis.


pt seems comfortable and NAD even prior to eval


pt wanting to eat and ordered food delivered to her ED room 3, underscores 

benign presentation





[place] reviewed- 14 prescriptions from 11 prescribers in past year- suspicious 

pattern


Referred to Chronic Pain today- usually taking Dilaudid tablets 2 or 4 mg's.


Will NOT refill now in ED for benign presentation





chronic bladder infections, probably related to self-cathetering with 

neurogenic bladder


prior Micro reviewed- pan sensitive and usually multi-organism.


Keflex BID x 7 days and Urology f/u as already planned.  Culture sent.





Disposition


Doctor Will See Patient In The: Office


Counseled Patient/Family Regarding: Studies Performed, Diagnosis





- Disposition


Disposition: HOME/ ROUTINE


Disposition Time: 12:16


Condition: GOOD





- Clinical Impression


Clinical Impression: 


 UTI (urinary tract infection), Chronic pain disorder

## 2018-03-19 NOTE — RAD
PROCEDURE:  CHEST RADIOGRAPH, 1 VIEW



HISTORY:

SOB



COMPARISON:

2/15/2018



FINDINGS:



LUNGS:

Clear.



PLEURA:

No pneumothorax or pleural fluid seen.



CARDIOVASCULAR:

Normal heart size. No congestive change.  Right central venous 

infusion port.



OSSEOUS STRUCTURES:

[place] rods. Mild thoracic dextroscoliosis. Deformity of right 

lateral chest wall, unchanged.



VISUALIZED UPPER ABDOMEN:

Right ureteral stent noted.



OTHER FINDINGS:

None. 



IMPRESSION:

No acute infiltrate.

## 2018-03-19 NOTE — C.PDOC
History Of Present Illness


36 y/o female presents to the ED complaining of chronic pain to her back and 

bilateral thighs, worsened for past 3 days. Patient was admitted here for sick 

cell crisis on 3/11/18 and discharged home with no pain meds. Now complaining 

of malaise but patient is otherwise eating and drinking well. Pt with history 

of neurogenic bladder, self catheterizes at home. 





PMD: MARIVEL Etienne  


Time Seen by Provider: 03/19/18 10:01


Chief Complaint (Nursing): Pain, Chronic


History Per: Patient


History/Exam Limitations: no limitations


Onset/Duration Of Symptoms: Worse Since (3 days ago)


Current Symptoms Are (Timing): Still Present


Severity: Moderate


Pain Scale Rating Of: 8





Past Medical History


Reviewed: Historical Data, Nursing Documentation, Vital Signs


Vital Signs: 


 Last Vital Signs











Temp  97.4 F L  03/19/18 12:08


 


Pulse  93 H  03/19/18 12:08


 


Resp  15   03/19/18 12:08


 


BP  119/83   03/19/18 12:08


 


Pulse Ox  100   03/19/18 12:43














- Medical History


PMH: Anemia, Migraine, Sickle Cell Disease


   Denies: Alzheimer's Disease, Asthma, Atrial Fibrillation, Bronchitis, Cardia 

Arrhythmia, CHF, COPD, Dementia, Emphysema, HIV, HTN, Hypercholesterolemia, 

Hyperthyroidism, Hypothyroidism, Kidney Stones, Mitral Valve Prolapse, Multiple 

Sclerosis, Parkinson's Disease, Peripheral Edema, Pneumonia, Pulmonary Embolism

, Chronic Kidney Disease, Seizures, Sleep Apnea, TIA


Surgical History: Cholecystectomy (2004)


   Denies: Pacemaker





- [facility] Procedures








PACKED CELL TRANSFUSION (06/04/13)


TETANUS TOXOID ADMINIST (09/23/13)


TRANSFUSE NONAUT RED BLOOD CELLS IN PERIPH VEIN, PERC (02/15/18)








Family History: States: No Known Family Hx





- Social History


Hx Tobacco Use: No


Hx Alcohol Use: No


Hx Substance Use: No





- Immunization History


Hx Tetanus Toxoid Vaccination: Yes


Hx Influenza Vaccination: Yes


Hx Pneumococcal Vaccination: Yes





Review Of Systems


Except As Marked, All Systems Reviewed And Found Negative.


Constitutional: Positive for: Malaise.  Negative for: Fever


Gastrointestinal: Negative for: Nausea, Vomiting, Abdominal Pain, Other (change 

in appetite)


Genitourinary: Positive for: Other (self catheterizes)


Musculoskeletal: Positive for: Back Pain, Leg Pain





Physical Exam





- Physical Exam


Appears: Non-toxic, No Acute Distress


Skin: Normal Color, Warm, Dry


Head: Atraumatic, Normacephalic


Eye(s): bilateral: Normal Inspection, PERRL, EOMI


Nose: Normal


Oral Mucosa: Moist


Neck: Normal ROM, Supple


Chest: Symmetrical


Cardiovascular: Rhythm Regular, No Murmur


Respiratory: Normal Breath Sounds, No Accessory Muscle Use, No Wheezing


Gastrointestinal/Abdominal: Bowel Sounds (active), Soft, No Tenderness, No 

Distention


Back: Normal Inspection, No CVA Tenderness, No Vertebral Tenderness


Extremity: Normal ROM, No Tenderness, No Deformity, No Swelling


Pulses: Left Dorsalis Pedis: Normal, Right Dorsalis Pedis: Normal


Neurological/Psych: Oriented x3, Normal Speech, Normal Motor, Normal Sensation


Gait: Steady





ED Course And Treatment





- Laboratory Results


Result Diagrams: 


 03/19/18 10:35





 03/19/18 10:35


O2 Sat by Pulse Oximetry: 100 (RA)


Pulse Ox Interpretation: Normal





- Other Rad


  ** CXR


X-Ray: Viewed By Me, Read By Radiologist


Interpretation: OSSEOUS STRUCTURES:  [place] rods. Mild thoracic 

dextroscoliosis. Deformity of right lateral chest wall, unchanged.  IMPRESSION:

  No acute infiltrate.





Medical Decision Making


Medical Decision Making: 





Time: 10:19


Initial Plan:


--EKG


--Urine drug screen


--Troponin I


--CMP


--CBC


--PTT


--Prothrombin time


--Reticulocyte count


--Chest x-ray


--Urinalysis


--HCG, urine qualitative 


--IV fluids


--Benadryl 50 mg PO


--Dilaudid 8 mg PO


--Motrin 600 mg PO


--Reglan 10 mg PO


--Reevaluation





Labs reviewed:


Troponin negative.


UA shows (+) leuks, WBC, occasional bacteria. U tox negative.





Time: 11:53


--Added on urine culture


--Patient started on Keflex, 500 mg PO





Impression:


1.  Pt w/ chronic pain issues, probably more related to spinal rods and chronic 

back pain than Sickle Cell Crisis now as pt recent adm and d/c home comfortable.


labs today improved from baseline, argues against acute SC crisis.


pt seems comfortable and NAD even prior to eval


pt wanting to eat and ordered food delivered to her ED room 3, underscores 

benign presentation





 [place] RX reviewed- 14 prescriptions from 11 prescribers in past year- suspicious 

pattern


 Referred to Chronic Pain today- usually taking Dilaudid tablets 2 or 4 mg's.


 Will NOT refill now in ED for benign presentation





2.  chronic bladder infections, probably related to self-catheterizing with 

neurogenic bladder


prior Micro reviewed- pan sensitive and usually multi-organism.


Will d/c patient with Keflex BID x 7 days and Urology f/u as already planned.  

Culture sent.





pt claims has never seen Pain Mgmt- usually gets narcotics filled by Heme/Onc.  

Will refer to our local Pain Mgmt





Disposition


Doctor Will See Patient In The: Office


Counseled Patient/Family Regarding: Studies Performed, Diagnosis, Need For 

Followup, Rx Given





- Disposition


Disposition: HOME/ ROUTINE


Disposition Time: 12:16


Condition: GOOD


Forms:  [facility] (English)





- POA


Present On Arrival: Cath Associated UTI





- Clinical Impression


Clinical Impression: 


 UTI (urinary tract infection), Chronic pain disorder








- Scribe Statement


The provider has reviewed the documentation as recorded by the Scribe (Chata Del Real)


Provider Attestation: 





All medical record entries made by the Scribe were at my direction and 

personally dictated by me. I have reviewed the chart and agree that the record 

accurately reflects my personal performance of the history, physical exam, 

medical decision making, and the department course for this patient. I have 

also personally directed, reviewed, and agree with the discharge instructions 

and disposition.

## 2018-03-20 NOTE — CARD
--------------- APPROVED REPORT --------------





EKG Measurement

Heart [id number]

[place] [id number]

[id number]

[id number]

[id number]



<Conclusion>

Normal sinus rhythm

Normal ECG

## 2018-03-21 ENCOUNTER — HOSPITAL ENCOUNTER (INPATIENT)
Dept: HOSPITAL 31 - C.ER | Age: 38
LOS: 10 days | Discharge: HOME | DRG: 812 | End: 2018-03-31
Attending: INTERNAL MEDICINE | Admitting: INTERNAL MEDICINE
Payer: MEDICARE

## 2018-03-21 VITALS — BODY MASS INDEX: 22.4 KG/M2

## 2018-03-21 DIAGNOSIS — G89.29: ICD-10-CM

## 2018-03-21 DIAGNOSIS — D57.00: Primary | ICD-10-CM

## 2018-03-21 DIAGNOSIS — N13.30: ICD-10-CM

## 2018-03-21 DIAGNOSIS — N92.6: ICD-10-CM

## 2018-03-21 DIAGNOSIS — N39.0: ICD-10-CM

## 2018-03-21 DIAGNOSIS — M79.1: ICD-10-CM

## 2018-03-21 DIAGNOSIS — N31.9: ICD-10-CM

## 2018-03-21 DIAGNOSIS — Z87.440: ICD-10-CM

## 2018-03-21 LAB
ALBUMIN SERPL-MCNC: 3.5 G/DL (ref 3.5–5)
ALBUMIN/GLOB SERPL: 0.7 {RATIO} (ref 1–2.1)
ALT SERPL-CCNC: 66 U/L (ref 9–52)
AST SERPL-CCNC: 103 U/L (ref 14–36)
BACTERIA #/AREA URNS HPF: (no result) /[HPF]
BASOPHILS # BLD AUTO: 0.3 K/UL (ref 0–0.2)
BASOPHILS NFR BLD: 1.4 % (ref 0–2)
BILIRUB UR-MCNC: NEGATIVE MG/DL
BUN SERPL-MCNC: 20 MG/DL (ref 7–17)
CALCIUM SERPL-MCNC: 8 MG/DL (ref 8.6–10.4)
EOSINOPHIL # BLD AUTO: 0.7 K/UL (ref 0–0.7)
EOSINOPHIL NFR BLD: 3.8 % (ref 0–4)
ERYTHROCYTE [DISTWIDTH] IN BLOOD BY AUTOMATED COUNT: 17.3 % (ref 11.5–14.5)
GFR NON-AFRICAN AMERICAN: 50
GLUCOSE UR STRIP-MCNC: NORMAL MG/DL
HGB BLD-MCNC: 6.3 G/DL (ref 11–16)
LEUKOCYTE ESTERASE UR-ACNC: (no result) LEU/UL
LYMPHOCYTES # BLD AUTO: 6.1 K/UL (ref 1–4.3)
LYMPHOCYTES NFR BLD AUTO: 31.2 % (ref 20–40)
MCH RBC QN AUTO: 28.1 PG (ref 27–31)
MCHC RBC AUTO-ENTMCNC: 33.1 G/DL (ref 33–37)
MCV RBC AUTO: 84.9 FL (ref 81–99)
MONOCYTES # BLD: 1.6 K/UL (ref 0–0.8)
MONOCYTES NFR BLD: 8 % (ref 0–10)
NEUTROPHILS # BLD: 10.9 K/UL (ref 1.8–7)
NEUTROPHILS NFR BLD AUTO: 55.6 % (ref 50–75)
NRBC BLD AUTO-RTO: 0.1 % (ref 0–2)
PH UR STRIP: 6 [PH] (ref 5–8)
PLATELET # BLD: 150 K/UL (ref 130–400)
PMV BLD AUTO: 8.5 FL (ref 7.2–11.7)
PROT UR STRIP-MCNC: (no result) MG/DL
RBC # BLD AUTO: 2.26 MIL/UL (ref 3.8–5.2)
RBC # UR STRIP: (no result) /UL
SP GR UR STRIP: 1.01 (ref 1–1.03)
SQUAMOUS EPITHIAL: 7 /HPF (ref 0–5)
UROBILINOGEN UR-MCNC: NORMAL MG/DL (ref 0.2–1)
WBC # BLD AUTO: 19.6 K/UL (ref 4.8–10.8)

## 2018-03-21 RX ADMIN — DIPHENHYDRAMINE HYDROCHLORIDE SCH MG: 50 INJECTION INTRAMUSCULAR; INTRAVENOUS at 22:15

## 2018-03-21 RX ADMIN — DIPHENHYDRAMINE HYDROCHLORIDE SCH: 50 INJECTION INTRAMUSCULAR; INTRAVENOUS at 19:00

## 2018-03-21 NOTE — C.PDOC
History Of Present Illness


37-year-old female, PMHx includes sickle cell disease and neurogenic bladder, 

presents to the emergency department, with complaints of several day history of 

lower back pain and bilateral leg pain.  Patient states symptoms are typical of 

her crises. She denies chest pain, shortness of breath, abdominal pain, fevers, 

chills, nausea/vomiting.


Time Seen by Provider: 03/21/18 13:33


Chief Complaint (Nursing): Lower Extremity Problem/Injury


History Per: Patient


History/Exam Limitations: no limitations





Past Medical History


Reviewed: Historical Data, Nursing Documentation, Vital Signs


Vital Signs: 


 Last Vital Signs











Temp  98.3 F   03/21/18 19:17


 


Pulse  94 H  03/21/18 19:17


 


Resp  20   03/21/18 19:17


 


BP  107/71   03/21/18 19:17


 


Pulse Ox  100   03/21/18 19:17














- Medical History


PMH: Anemia, Migraine, Sickle Cell Disease


Surgical History: Cholecystectomy (2004)





- [facility] Procedures








PACKED CELL TRANSFUSION (06/04/13)


TETANUS TOXOID ADMINIST (09/23/13)


TRANSFUSE NONAUT RED BLOOD CELLS IN PERIPH VEIN, PERC (02/15/18)








Family History: States: Unknown Family Hx





- Social History


Hx Tobacco Use: No


Hx Alcohol Use: No


Hx Substance Use: No





- Immunization History


Hx Tetanus Toxoid Vaccination: Yes


Hx Influenza Vaccination: Yes


Hx Pneumococcal Vaccination: Yes





Review Of Systems


Constitutional: Negative for: Fever, Chills


Cardiovascular: Negative for: Chest Pain, Palpitations, Edema


Respiratory: Negative for: Cough, Shortness of Breath


Gastrointestinal: Negative for: Nausea, Vomiting


Musculoskeletal: Positive for: Back Pain


Neurological: Negative for: Weakness, Numbness, Headache, Dizziness





Physical Exam





- Physical Exam


Appears: Non-toxic, No Acute Distress


Skin: Normal Color, Warm, Dry, No Rash


Head: Normacephalic


Eye(s): bilateral: PERRL


Nose: Normal


Oral Mucosa: Moist


Lips: Normal Appearing


Neck: Normal ROM


Chest: Symmetrical


Cardiovascular: Rhythm Regular, No Murmur


Respiratory: Normal Breath Sounds, No Accessory Muscle Use


Gastrointestinal/Abdominal: Soft, No Tenderness


Extremity: Normal ROM, No Deformity, No Swelling


Neurological/Psych: Oriented x3, Normal Speech





ED Course And Treatment





- Laboratory Results


Result Diagrams: 


 03/21/18 14:24





 03/21/18 14:24


O2 Sat by Pulse Oximetry: 100 (RA)


Pulse Ox Interpretation: Normal





Medical Decision Making


Medical Decision Making: 





pt with urine culture results, antibiotics switched to bactrim





discussed with MARIVEL Etienne, will admit to his service.  





Disposition


Discussed With DrJan: Shahab Etienne


Doctor Will See Patient In The: Hospital





- Disposition


Disposition: HOSPITALIZED


Disposition Time: 17:13


Condition: FAIR





- Clinical Impression


Clinical Impression: 


 Sickle cell pain crisis, UTI (urinary tract infection), Anemia








- Scribe Statement


The provider has reviewed the documentation as recorded by the Scribe ([place])


All medical record entries made by the Scribe were at my direction and 

personally dictated by me. I have reviewed the chart and agree that the record 

accurately reflects my personal performance of the history, physical exam, 

medical decision making, and the department course for this patient. I have 

also personally directed, reviewed, and agree with the discharge instructions 

and disposition.

## 2018-03-21 NOTE — CP.PCM.HP
History of Present Illness





- History of Present Illness


History of Present Illness: 





79-year-old -American female non-smoker no need to do abuser with 

history of multiple admissions and well known to us with history of sickle cell 

disease history of neurogenic bladder status post self-catheterization although 

patient is not compliant to 8 with history of multiple UTIs with leukocytosis 

treated with IV antibiotic multiple times came in again with complaints of 

lower back pain and extremity pain denies the chest pain denies fever denies 

nausea denies vomiting


Denies shortness of breath abdominal pain





Present on Admission





- Present on Admission


Any Indicators Present on Admission: No





Past Patient History





- Infectious Disease


Hx of Infectious Diseases: None





- Past Medical History & Family History


Past Medical History?: Yes





- Past Social History


Smoking Status: Never Smoked





- CARDIAC


Hx Atrial Fibrillation: No


Hx Cardia Arrhythmia: No


Hx Congestive Heart Failure: No


Hx Hypercholesterolemia: No


Hx Hypertension: No


Hx Mitral Valve Prolapse: No


Hx Pacemaker: No


Hx Peripheral Edema: No





- PULMONARY


Hx Asthma: No


Hx Bronchitis: No


Hx Chronic Obstructive Pulmonary Disease (COPD): No


Hx Emphysema: No


Hx Pneumonia: No


Hx Pulmonary Embolism: No


Hx Sleep Apnea: No





- NEUROLOGICAL


Hx Migraine: Yes





- HEENT


Hx HEENT Problems: No


Hx Blind: No


Hx Cataracts: No


Hx Deafness: No


Hx Difficulty Chewing: No


Hx Epistaxis: No


Hx Glaucoma: No


Hx Macular Degeneration: No





- RENAL


Hx Chronic Kidney Disease: No


Hx Kidney Stones: No





- ENDOCRINE/METABOLIC


Hx Hyperthyroidism: No


Hx Hypothyroidism: No





- HEMATOLOGICAL/ONCOLOGICAL


Hx Anemia: Yes


Hx Sickle Cell Disease: Yes





- INTEGUMENTARY


Hx Dermatological Problems: No





- MUSCULOSKELETAL/RHEUMATOLOGICAL


Hx Falls: No





- GASTROINTESTINAL


Hx Gastrointestinal Disorders: No





- GENITOURINARY/GYNECOLOGICAL


Hx Genitourinary Disorders: No





- PSYCHIATRIC


Hx Substance Use: No





- SURGICAL HISTORY


Hx Cholecystectomy: Yes (2004)





- ANESTHESIA


Hx Anesthesia: Yes


Hx Anesthesia Reactions: No





Meds


Allergies/Adverse Reactions: 


 Allergies











Allergy/AdvReac Type Severity Reaction Status Date / Time


 


droperidol Allergy   Verified 03/11/18 17:54


 


tramadol Allergy   Verified 03/11/18 17:54


 


inapsine Allergy  RASH Uncoded 03/11/18 17:54














Results





- Vital Signs


Recent Vital Signs: 





 Last Vital Signs











Temp  97.9 F   03/21/18 18:07


 


Pulse  83   03/21/18 18:07


 


Resp  18   03/21/18 18:07


 


BP  104/71   03/21/18 18:07


 


Pulse Ox  100   03/21/18 18:07














- Labs


Result Diagrams: 


 03/22/18 12:36





 03/22/18 08:07


Labs: 





 Laboratory Results - last 24 hr











  03/21/18 03/21/18 03/21/18





  14:24 14:24 14:31


 


WBC  19.6 H  


 


RBC  2.26 L  


 


Hgb  6.3 L*  


 


Hct  19.2 L  


 


MCV  84.9  


 


MCH  28.1  


 


MCHC  33.1  


 


RDW  17.3 H  


 


Plt Count  150  


 


MPV  8.5  


 


Neut % (Auto)  55.6  


 


Lymph % (Auto)  31.2  


 


Mono % (Auto)  8.0  


 


Eos % (Auto)  3.8  


 


Baso % (Auto)  1.4  


 


Neut # (Auto)  10.9 H  


 


Lymph # (Auto)  6.1 H  


 


Mono # (Auto)  1.6 H  


 


Eos # (Auto)  0.7  


 


Baso # (Auto)  0.3 H  


 


Retic Count  2.9 H D  


 


Sodium   140 


 


Potassium   4.0 


 


Chloride   107 


 


Carbon Dioxide   19 L 


 


Anion Gap   18 


 


BUN   20 H 


 


Creatinine   1.2 


 


Est GFR ( Amer)   > 60 


 


Est GFR (Non-Af Amer)   50 


 


Random Glucose   92 


 


Calcium   8.0 L 


 


Total Bilirubin   2.2 H 


 


AST   103 H 


 


ALT   66 H 


 


Alkaline Phosphatase   134 H D 


 


Lactate Dehydrogenase   717 H 


 


Total Protein   8.7 H 


 


Albumin   3.5 


 


Globulin   5.2 H 


 


Albumin/Globulin Ratio   0.7 L 


 


Urine Color    Yellow


 


Urine Clarity    Hazy


 


Urine pH    6.0


 


Ur Specific Gravity    1.009


 


Urine Protein    1+ H


 


Urine Glucose (UA)    Normal


 


Urine Ketones    Negative


 


Urine Blood    2+ H


 


Urine Nitrate    Negative


 


Urine Bilirubin    Negative


 


Urine Urobilinogen    Normal


 


Ur Leukocyte Esterase    3+ H


 


Urine WBC (Auto)    636 H


 


Urine RBC (Auto)    13 H


 


Ur Squamous Epith Cells    7 H


 


Urine Bacteria    Mod H














Assessment & Plan





- Assessment and Plan (Free Text)


Plan: 





ivf


folatewill not give iron


will not transfuse at hgb 6.1


folowupwith dr. [place] prtiffanie


cassidy a sordered


iv dialudid


iv benadryl

## 2018-03-22 LAB
ALBUMIN SERPL-MCNC: 3.4 G/DL (ref 3.5–5)
ALBUMIN/GLOB SERPL: 0.7 {RATIO} (ref 1–2.1)
ALT SERPL-CCNC: 65 U/L (ref 9–52)
AST SERPL-CCNC: 89 U/L (ref 14–36)
BASOPHILS # BLD AUTO: 0.3 K/UL (ref 0–0.2)
BASOPHILS NFR BLD: 1.2 % (ref 0–2)
BILIRUB DIRECT SERPL-MCNC: 0.5 MG/DL (ref 0–0.4)
BUN SERPL-MCNC: 23 MG/DL (ref 7–17)
CALCIUM SERPL-MCNC: 7.6 MG/DL (ref 8.6–10.4)
EOSINOPHIL # BLD AUTO: 1.2 K/UL (ref 0–0.7)
EOSINOPHIL NFR BLD: 4.8 % (ref 0–4)
ERYTHROCYTE [DISTWIDTH] IN BLOOD BY AUTOMATED COUNT: 17.7 % (ref 11.5–14.5)
GFR NON-AFRICAN AMERICAN: 42
HGB BLD-MCNC: 6.3 G/DL (ref 11–16)
LYMPHOCYTES # BLD AUTO: 5.8 K/UL (ref 1–4.3)
LYMPHOCYTES NFR BLD AUTO: 24.2 % (ref 20–40)
MCH RBC QN AUTO: 28.9 PG (ref 27–31)
MCHC RBC AUTO-ENTMCNC: 34 G/DL (ref 33–37)
MCV RBC AUTO: 85.1 FL (ref 81–99)
MONOCYTES # BLD: 2.3 K/UL (ref 0–0.8)
MONOCYTES NFR BLD: 9.3 % (ref 0–10)
NEUTROPHILS # BLD: 14.6 K/UL (ref 1.8–7)
NEUTROPHILS NFR BLD AUTO: 60.5 % (ref 50–75)
NRBC BLD AUTO-RTO: 0.3 % (ref 0–2)
PLATELET # BLD: 172 K/UL (ref 130–400)
PMV BLD AUTO: 8.7 FL (ref 7.2–11.7)
RBC # BLD AUTO: 2.19 MIL/UL (ref 3.8–5.2)
WBC # BLD AUTO: 24.2 K/UL (ref 4.8–10.8)

## 2018-03-22 RX ADMIN — DIPHENHYDRAMINE HYDROCHLORIDE SCH MG: 50 INJECTION INTRAMUSCULAR; INTRAVENOUS at 10:28

## 2018-03-22 RX ADMIN — PANTOPRAZOLE SODIUM SCH MG: 40 TABLET, DELAYED RELEASE ORAL at 10:28

## 2018-03-22 RX ADMIN — SULFAMETHOXAZOLE AND TRIMETHOPRIM SCH TAB: 800; 160 TABLET ORAL at 10:28

## 2018-03-22 RX ADMIN — DIPHENHYDRAMINE HYDROCHLORIDE SCH MG: 50 INJECTION INTRAMUSCULAR; INTRAVENOUS at 16:14

## 2018-03-22 RX ADMIN — DIPHENHYDRAMINE HYDROCHLORIDE SCH MG: 50 INJECTION INTRAMUSCULAR; INTRAVENOUS at 13:17

## 2018-03-22 RX ADMIN — ENOXAPARIN SODIUM SCH: 40 INJECTION SUBCUTANEOUS at 10:33

## 2018-03-22 RX ADMIN — DIPHENHYDRAMINE HYDROCHLORIDE SCH MG: 50 INJECTION INTRAMUSCULAR; INTRAVENOUS at 01:05

## 2018-03-22 RX ADMIN — DIPHENHYDRAMINE HYDROCHLORIDE SCH MG: 50 INJECTION INTRAMUSCULAR; INTRAVENOUS at 19:25

## 2018-03-22 RX ADMIN — DIPHENHYDRAMINE HYDROCHLORIDE SCH MG: 50 INJECTION INTRAMUSCULAR; INTRAVENOUS at 07:07

## 2018-03-22 RX ADMIN — DIPHENHYDRAMINE HYDROCHLORIDE SCH MG: 50 INJECTION INTRAMUSCULAR; INTRAVENOUS at 04:06

## 2018-03-22 RX ADMIN — SULFAMETHOXAZOLE AND TRIMETHOPRIM SCH TAB: 800; 160 TABLET ORAL at 22:22

## 2018-03-22 RX ADMIN — DIPHENHYDRAMINE HYDROCHLORIDE SCH MG: 50 INJECTION INTRAMUSCULAR; INTRAVENOUS at 22:20

## 2018-03-22 NOTE — CP.PCM.PN
Subjective





- Date & Time of Evaluation


Date of Evaluation: 03/15/18


Time of Evaluation: 09:40





- Subjective


Subjective: 


clinically same





Objective





- Vital Signs/Intake and Output


Vital Signs (last 24 hours): 


 











Temp Pulse Resp BP Pulse Ox


 


 98.4 F   109 H  20   112/72   100 


 


 03/22/18 08:08  03/22/18 08:08  03/22/18 08:08  03/22/18 08:08  03/22/18 08:08











- Medications


Medications: 


 Current Medications





Diphenhydramine HCl (Benadryl)  25 mg IVP Q3 [facility]


   Last Admin: 03/22/18 10:28 Dose:  25 mg


Enoxaparin Sodium (Lovenox)  40 mg SC DAILY [facility]


   Last Admin: 03/22/18 10:33 Dose:  Not Given


Folic Acid (Folic Acid)  2 mg PO DAILY [facility]


   Last Admin: 03/22/18 10:28 Dose:  2 mg


Hydromorphone HCl (Dilaudid)  2 mg IVP Q3 [facility]


   Last Admin: 03/22/18 10:29 Dose:  2 mg


Pantoprazole Sodium (Protonix Ec Tab)  40 mg PO DAILY [facility]


   Last Admin: 03/22/18 10:28 Dose:  40 mg


Trimethoprim/Sulfamethoxazole (Bactrim Ds Tab)  1 tab PO Q12H [facility]


   PRN Reason: Protocol


   Last Admin: 03/22/18 10:28 Dose:  1 tab











- Labs


Labs: 


 





 03/22/18 12:36 





 03/22/18 08:07 











- Constitutional


Appears: Well





- Head Exam


Head Exam: ATRAUMATIC, NORMAL INSPECTION, NORMOCEPHALIC





- Eye Exam


Eye Exam: EOMI, Normal appearance, PERRL


Pupil Exam: NORMAL ACCOMODATION, PERRL





- ENT Exam


ENT Exam: Mucous Membranes Moist, Normal Exam





- Neck Exam


Neck Exam: Full ROM, Normal Inspection.  absent: Lymphadenopathy





- Respiratory Exam


Respiratory Exam: Decreased Breath Sounds





- Cardiovascular Exam


Cardiovascular Exam: REGULAR RHYTHM, +S1, +S2





- GI/Abdominal Exam


GI & Abdominal Exam: Soft, Diminished Bowel Sounds





- Rectal Exam


Rectal Exam: Deferred





Assessment and Plan


(1) Anemia


Status: Acute   





(2) Leukocytosis


Status: Acute   





(3) Sickle cell pain crisis


Status: Acute   





(4) UTI (urinary tract infection)


Status: Acute   





(5) Animal bite wound


Status: Acute   





(6) Chronic pain disorder


Status: Acute   





(7) Leg pain


Status: Acute   





(8) Leukocytosis


Status: Acute   





(9) Myalgia


Status: Acute   





(10) Neurogenic bladder


Status: Acute   





(11) Pain


Status: Acute   





(12) Severe anemia


Status: Acute   





(13) Sickle cell anemia


Status: Acute   





(14) Sickle cell anemia with pain


Status: Acute   





- Assessment and Plan (Free Text)


Plan: 





Pain medications


Bactrim


GI and DVT prophylaxis


IV Benadryl


Monitor WBC


ID consultation

## 2018-03-22 NOTE — CP.PCM.CON
History of Present Illness





- History of Present Illness


History of Present Illness: 


INFECTIOUS DISEASE CONSULT;


HPI


37-year-old female with past medical history of sickle cell disease and 

multiple sickle cell crisis who was recently hospitalized [facility] and 

discharged on 3/15/18 now returns back with complaints of severe pain bilateral 

legs and low back pain.


Patient states her symptoms are typical of her crisis.


Patient denies any chest pain, cough, shortness of breath, abdominal pain, 

fevers or chills.  Denies any nausea vomiting or appetite is very poor. 





 Patient also has history off neurogenic bladder and history of bilateral 

ureteral stents insertion 2016.  Recent bladder ultrasound showed right and 

left ureteral stent with moderate hydronephrosis bilaterally.





Patient has history of recurrent UTIs and self catheterizes herself 2 or 3 

times a day.


Patient's recent urine culture was positive for VRE which cleared after Zyvox 

therapy.


Patient now has increasing leukocytosis of 19.6 with H&H of 6.3 and 19.2


Patient urinalysis shows WBCs of 626 with 3+ leukocytes and moderate bacteria.


INFECTIOUS DISEASE CONSULT REQUESTED BY PMD FOR INCREASING LEUKOCYTOSIS AND 

RECURRENT UTI.





LMP 4 MARCH 2018.,LATELY PERIODS ARE IRREGULAR,BUT LAST FOR 5 DAYS.PATIENT IS 

SINGLE AND LIVES BY HERSELF.





PMH: Anemia, Migraine, Sickle Cell Disease


   


Surgical History: Cholecystectomy (2004), B/L INTERNAL STENTS FOR 

HYDRONEPHROSIS IN 2016.


   Denies: Pacemaker


Allergy; droperidol, tramadol. 








Social History


Hx Tobacco Use: No


Hx Alcohol Use: No


Hx Substance Use: No





- Immunization History


Hx Tetanus Toxoid Vaccination: Yes


Hx Influenza Vaccination: Yes


Hx Pneumococcal Vaccination: Yes








Review of Systems





- Constitutional


Constitutional: Malaise.  absent: Chills, Fever





- Cardiovascular


Cardiovascular: absent: Chest Pain, Dyspnea, Leg Edema





- Respiratory


Respiratory: absent: Cough





- Gastrointestinal


Gastrointestinal: absent: Abdominal Pain, Diarrhea, Nausea, Vomiting





- Genitourinary


Genitourinary: Pyuria, Urinary Hesitance, Freq UTI, Bladder Distension.  absent

: Hematuria





- Menstruation


Menstruation: Menses Variable





- Musculoskeletal


Musculoskeletal: Back Pain, Radiating Pain into Limb





- Neurological


Neurological: absent: Headaches, Paresthesias





- Psychiatric


Psychiatric: Change in Appetite





- Hematologic/Lymphatic


Hematologic: As Per HPI





Past Patient History





- Infectious Disease


Hx of Infectious Diseases: None





- Past Medical History & Family History


Past Medical History?: Yes





- Past Social History


Smoking Status: Never Smoked





- CARDIAC


Hx Atrial Fibrillation: No


Hx Cardia Arrhythmia: No


Hx Congestive Heart Failure: No


Hx Hypercholesterolemia: No


Hx Hypertension: No


Hx Mitral Valve Prolapse: No


Hx Pacemaker: No


Hx Peripheral Edema: No





- PULMONARY


Hx Asthma: No


Hx Bronchitis: No


Hx Chronic Obstructive Pulmonary Disease (COPD): No


Hx Emphysema: No


Hx Pneumonia: No


Hx Pulmonary Embolism: No


Hx Sleep Apnea: No





- NEUROLOGICAL


Hx Migraine: Yes





- HEENT


Hx HEENT Problems: No


Hx Blind: No


Hx Cataracts: No


Hx Deafness: No


Hx Difficulty Chewing: No


Hx Epistaxis: No


Hx Glaucoma: No


Hx Macular Degeneration: No





- RENAL


Hx Chronic Kidney Disease: No


Hx Kidney Stones: No





- ENDOCRINE/METABOLIC


Hx Hyperthyroidism: No


Hx Hypothyroidism: No





- HEMATOLOGICAL/ONCOLOGICAL


Hx Anemia: Yes


Hx Sickle Cell Disease: Yes





- INTEGUMENTARY


Hx Dermatological Problems: No





- MUSCULOSKELETAL/RHEUMATOLOGICAL


Hx Falls: No





- GASTROINTESTINAL


Hx Gastrointestinal Disorders: No





- GENITOURINARY/GYNECOLOGICAL


Hx Genitourinary Disorders: No





- PSYCHIATRIC


Hx Substance Use: No





- SURGICAL HISTORY


Hx Cholecystectomy: Yes (2004)





- ANESTHESIA


Hx Anesthesia: Yes


Hx Anesthesia Reactions: No





Meds


Allergies/Adverse Reactions: 


 Allergies











Allergy/AdvReac Type Severity Reaction Status Date / Time


 


droperidol Allergy   Verified 03/11/18 17:54


 


tramadol Allergy   Verified 03/11/18 17:54


 


inapsine Allergy  RASH Uncoded 03/11/18 17:54














- Medications


Medications: 


 Current Medications





Diphenhydramine HCl (Benadryl)  25 mg IVP Q3 [facility]


   Last Admin: 03/22/18 16:14 Dose:  25 mg


Enoxaparin Sodium (Lovenox)  40 mg SC DAILY [facility]


   Last Admin: 03/22/18 10:33 Dose:  Not Given


Folic Acid (Folic Acid)  2 mg PO DAILY [facility]


   Last Admin: 03/22/18 10:28 Dose:  2 mg


Hydromorphone HCl (Dilaudid)  2 mg IVP Q3 [facility]


   Last Admin: 03/22/18 16:15 Dose:  2 mg


Pantoprazole Sodium (Protonix Ec Tab)  40 mg PO DAILY [facility]


   Last Admin: 03/22/18 10:28 Dose:  40 mg


Trimethoprim/Sulfamethoxazole (Bactrim Ds Tab)  1 tab PO Q12H [facility]


   PRN Reason: Protocol


   Last Admin: 03/22/18 10:28 Dose:  1 tab











Physical Exam





- Head Exam


Head Exam: NORMAL INSPECTION





- Eye Exam


Eye Exam: EOMI, PERRL, Scleral icterus





- ENT Exam


ENT Exam: Normal Oropharynx





- Neck Exam


Neck exam: Positive for: Normal Inspection





- Respiratory Exam


Respiratory Exam: Clear to Auscultation Bilateral





- Cardiovascular Exam


Cardiovascular Exam: REGULAR RHYTHM, +S1, +S2





- GI/Abdominal Exam


GI & Abdominal Exam: Normal Bowel Sounds, Soft, Tenderness (SUPRAPUBIC 

TENDERNESS.)





- Extremities Exam


Extremities exam: Positive for: pedal edema, pedal pulses present.  Negative for

: calf tenderness





- Back Exam


Back exam: CVA tenderness (L), CVA tenderness (R)





- Neurological Exam


Neurological exam: Alert, CN II-XII Intact, Oriented x3





- Psychiatric Exam


Psychiatric exam: Depressed





- Skin


Skin Exam: Pallor, Warm





Results





- Vital Signs


Recent Vital Signs: 


 Last Vital Signs











Temp  98.0 F   03/22/18 15:00


 


Pulse  104 H  03/22/18 15:00


 


Resp  20   03/22/18 15:00


 


BP  114/71   03/22/18 15:00


 


Pulse Ox  95   03/22/18 16:00














- Labs


Result Diagrams: 


 03/22/18 12:36





 03/22/18 08:07


Labs: 


 Laboratory Results - last 24 hr











  03/22/18 03/22/18 03/22/18





  01:15 08:07 12:36


 


WBC    24.2 H


 


RBC    2.19 L


 


Hgb    6.3 L*


 


Hct    18.7 L


 


MCV    85.1


 


MCH    28.9


 


MCHC    34.0


 


RDW    17.7 H


 


Plt Count    172


 


MPV    8.7


 


Neut % (Auto)    60.5


 


Lymph % (Auto)    24.2


 


Mono % (Auto)    9.3


 


Eos % (Auto)    4.8 H


 


Baso % (Auto)    1.2


 


Neut # (Auto)    14.6 H


 


Lymph # (Auto)    5.8 H


 


Mono # (Auto)    2.3 H


 


Eos # (Auto)    1.2 H


 


Baso # (Auto)    0.3 H


 


Differential Comment    


 


Sodium   138 


 


Potassium   4.7 


 


Chloride   107 


 


Carbon Dioxide   17 L 


 


Anion Gap   19 


 


BUN   23 H 


 


Creatinine   1.4 H 


 


Est GFR ( Amer)   51 


 


Est GFR (Non-Af Amer)   42 


 


Random Glucose   84 


 


Calcium   7.6 L 


 


Total Bilirubin   2.0 H 


 


Direct Bilirubin   0.5 H 


 


AST   89 H 


 


ALT   65 H 


 


Alkaline Phosphatase   135 H 


 


Total Protein   8.4 H 


 


Albumin   3.4 L 


 


Globulin   5.1 H 


 


Albumin/Globulin Ratio   0.7 L 


 


Urine HCG, Qual  Negative  














Assessment & Plan


(1) Sickle cell pain crisis


Status: Acute   





(2) Leukocytosis


Status: Acute   





(3) UTI (urinary tract infection)


Status: Acute   





(4) Anemia


Status: Acute   





(5) Neurogenic bladder


Status: Acute   





- Assessment and Plan (Free Text)


Plan: 





pancultures


G6PD deficiency


*By mouth Bactrim 1 double strength twice a day 


  as urine culture 3/15/18 showing Escherichia coli sensitive to Bactrim.


Follow-up BMP, liver profile.


 evaluation as patient has bilateral hydronephrosis and bilateral ureteral 

stents.


Monitor H&H.


Consider hematology evaluation for possible blood transfusion.


Will follow along with you.


Follow-up repeat UA urine cultures and adjust antibiotics.


Case discussed with the staff.

## 2018-03-23 LAB
ALBUMIN SERPL-MCNC: 3.5 G/DL (ref 3.5–5)
ALBUMIN/GLOB SERPL: 0.7 {RATIO} (ref 1–2.1)
ALT SERPL-CCNC: 72 U/L (ref 9–52)
AST SERPL-CCNC: 108 U/L (ref 14–36)
BASOPHILS # BLD AUTO: 0.3 K/UL (ref 0–0.2)
BASOPHILS NFR BLD: 1.2 % (ref 0–2)
BUN SERPL-MCNC: 25 MG/DL (ref 7–17)
CALCIUM SERPL-MCNC: 7.7 MG/DL (ref 8.6–10.4)
EOSINOPHIL # BLD AUTO: 1.1 K/UL (ref 0–0.7)
EOSINOPHIL NFR BLD: 4.5 % (ref 0–4)
ERYTHROCYTE [DISTWIDTH] IN BLOOD BY AUTOMATED COUNT: 17.4 % (ref 11.5–14.5)
GFR NON-AFRICAN AMERICAN: 36
HGB BLD-MCNC: 5.8 G/DL (ref 11–16)
LYMPHOCYTES # BLD AUTO: 6.2 K/UL (ref 1–4.3)
LYMPHOCYTES NFR BLD AUTO: 26.2 % (ref 20–40)
MCH RBC QN AUTO: 28.3 PG (ref 27–31)
MCHC RBC AUTO-ENTMCNC: 33.2 G/DL (ref 33–37)
MCV RBC AUTO: 85.3 FL (ref 81–99)
MONOCYTES # BLD: 2.4 K/UL (ref 0–0.8)
MONOCYTES NFR BLD: 10 % (ref 0–10)
NEUTROPHILS # BLD: 13.8 K/UL (ref 1.8–7)
NEUTROPHILS NFR BLD AUTO: 58.1 % (ref 50–75)
NRBC BLD AUTO-RTO: 0.6 % (ref 0–2)
PLATELET # BLD: 185 K/UL (ref 130–400)
PMV BLD AUTO: 9.3 FL (ref 7.2–11.7)
RBC # BLD AUTO: 2.07 MIL/UL (ref 3.8–5.2)
WBC # BLD AUTO: 23.7 K/UL (ref 4.8–10.8)

## 2018-03-23 RX ADMIN — PANTOPRAZOLE SODIUM SCH MG: 40 TABLET, DELAYED RELEASE ORAL at 10:09

## 2018-03-23 RX ADMIN — DIPHENHYDRAMINE HYDROCHLORIDE SCH MG: 50 INJECTION INTRAMUSCULAR; INTRAVENOUS at 03:57

## 2018-03-23 RX ADMIN — SULFAMETHOXAZOLE AND TRIMETHOPRIM SCH TAB: 800; 160 TABLET ORAL at 10:09

## 2018-03-23 RX ADMIN — ENOXAPARIN SODIUM SCH: 40 INJECTION SUBCUTANEOUS at 10:10

## 2018-03-23 RX ADMIN — SULFAMETHOXAZOLE AND TRIMETHOPRIM SCH TAB: 800; 160 TABLET ORAL at 22:02

## 2018-03-23 RX ADMIN — DIPHENHYDRAMINE HYDROCHLORIDE SCH MG: 50 INJECTION INTRAMUSCULAR; INTRAVENOUS at 07:20

## 2018-03-23 RX ADMIN — DIPHENHYDRAMINE HYDROCHLORIDE SCH MG: 50 INJECTION INTRAMUSCULAR; INTRAVENOUS at 19:04

## 2018-03-23 RX ADMIN — DIPHENHYDRAMINE HYDROCHLORIDE SCH MG: 50 INJECTION INTRAMUSCULAR; INTRAVENOUS at 10:09

## 2018-03-23 RX ADMIN — DIPHENHYDRAMINE HYDROCHLORIDE SCH MG: 50 INJECTION INTRAMUSCULAR; INTRAVENOUS at 16:16

## 2018-03-23 RX ADMIN — DIPHENHYDRAMINE HYDROCHLORIDE SCH MG: 50 INJECTION INTRAMUSCULAR; INTRAVENOUS at 22:00

## 2018-03-23 RX ADMIN — DIPHENHYDRAMINE HYDROCHLORIDE SCH MG: 50 INJECTION INTRAMUSCULAR; INTRAVENOUS at 00:58

## 2018-03-23 RX ADMIN — DIPHENHYDRAMINE HYDROCHLORIDE SCH MG: 50 INJECTION INTRAMUSCULAR; INTRAVENOUS at 13:29

## 2018-03-23 NOTE — CP.PCM.CON
History of Present Illness





- History of Present Illness


History of Present Illness: 





37 year old female with a history of sickle cell anemia, admitted with sickle 

cell pain crisis.  The patient reports to increasing diffuse bone pain which 

did not improve with her oral pain meds.  She denies fevers and chills.  She 

does report to progressive fatigue but no shortness of breath.  She denies 

abnormal bleeding and bruising.





Past medical history:  Sickle cell anemia





Past surgical history:  Portacath





Family history:  Parents have the trait.





Social history:  Denies tobacco, alcohol, and illicit drug use.





Allergies:  Droperidol, tramadol





Review of systems:  All remaining review of systems including HEENT, 

cardiovascular, respiratory, gastrointestinal, genitourinary, musculoskeletal, 

dermatologic, neurologic, and psychiatric are negative unless mentioned in the 

HPI.








Past Patient History





- Infectious Disease


Hx of Infectious Diseases: None





- Past Medical History & Family History


Past Medical History?: Yes





- Past Social History


Smoking Status: Never Smoked





- CARDIAC


Hx Atrial Fibrillation: No


Hx Cardia Arrhythmia: No


Hx Congestive Heart Failure: No


Hx Hypercholesterolemia: No


Hx Hypertension: No


Hx Mitral Valve Prolapse: No


Hx Pacemaker: No


Hx Peripheral Edema: No





- PULMONARY


Hx Asthma: No


Hx Bronchitis: No


Hx Chronic Obstructive Pulmonary Disease (COPD): No


Hx Emphysema: No


Hx Pneumonia: No


Hx Pulmonary Embolism: No


Hx Sleep Apnea: No





- NEUROLOGICAL


Hx Migraine: Yes





- HEENT


Hx HEENT Problems: No


Hx Blind: No


Hx Cataracts: No


Hx Deafness: No


Hx Difficulty Chewing: No


Hx Epistaxis: No


Hx Glaucoma: No


Hx Macular Degeneration: No





- RENAL


Hx Chronic Kidney Disease: No


Hx Kidney Stones: No





- ENDOCRINE/METABOLIC


Hx Hyperthyroidism: No


Hx Hypothyroidism: No





- HEMATOLOGICAL/ONCOLOGICAL


Hx Anemia: Yes


Hx Sickle Cell Disease: Yes





- INTEGUMENTARY


Hx Dermatological Problems: No





- MUSCULOSKELETAL/RHEUMATOLOGICAL


Hx Falls: No





- GASTROINTESTINAL


Hx Gastrointestinal Disorders: No





- GENITOURINARY/GYNECOLOGICAL


Hx Genitourinary Disorders: No





- PSYCHIATRIC


Hx Substance Use: No





- SURGICAL HISTORY


Hx Cholecystectomy: Yes (2004)





- ANESTHESIA


Hx Anesthesia: Yes


Hx Anesthesia Reactions: No





Meds


Allergies/Adverse Reactions: 


 Allergies











Allergy/AdvReac Type Severity Reaction Status Date / Time


 


droperidol Allergy   Verified 03/11/18 17:54


 


tramadol Allergy   Verified 03/11/18 17:54


 


inapsine Allergy  RASH Uncoded 03/11/18 17:54














- Medications


Medications: 


 Current Medications





Diphenhydramine HCl (Benadryl)  25 mg IVP Q3 [facility]


   Last Admin: 03/23/18 10:09 Dose:  25 mg


Enoxaparin Sodium (Lovenox)  40 mg SC DAILY [facility]


   Last Admin: 03/23/18 10:10 Dose:  Not Given


Folic Acid (Folic Acid)  2 mg PO DAILY [facility]


   Last Admin: 03/23/18 10:09 Dose:  2 mg


Hydromorphone HCl (Dilaudid)  2 mg IVP Q3 [facility]


   Last Admin: 03/23/18 10:11 Dose:  2 mg


Pantoprazole Sodium (Protonix Ec Tab)  40 mg PO DAILY [facility]


   Last Admin: 03/23/18 10:09 Dose:  40 mg


Trimethoprim/Sulfamethoxazole (Bactrim Ds Tab)  1 tab PO Q12H [facility]


   PRN Reason: Protocol


   Last Admin: 03/23/18 10:09 Dose:  1 tab











Physical Exam





- Head Exam


Head Exam: ATRAUMATIC





- ENT Exam


ENT Exam: Mucous Membranes Dry





- Respiratory Exam


Respiratory Exam: NORMAL BREATHING PATTERN





- Cardiovascular Exam


Cardiovascular Exam: +S1, +S2





- GI/Abdominal Exam


GI & Abdominal Exam: Normal Bowel Sounds





- Extremities Exam


Extremities exam: Positive for: normal inspection





- Neurological Exam


Neurological exam: Oriented x3





- Psychiatric Exam


Psychiatric exam: Normal Affect, Normal Mood





- Skin


Skin Exam: Warm





Results





- Vital Signs


Recent Vital Signs: 


 Last Vital Signs











Temp  98.8 F   03/23/18 08:06


 


Pulse  110 H  03/23/18 08:06


 


Resp  20   03/23/18 08:06


 


BP  113/70   03/23/18 08:06


 


Pulse Ox  98   03/23/18 08:16














- Labs


Result Diagrams: 


 03/22/18 12:36





 03/22/18 08:07


Labs: 


 Laboratory Results - last 24 hr











  03/22/18





  12:36


 


WBC  24.2 H


 


RBC  2.19 L


 


Hgb  6.3 L*


 


Hct  18.7 L


 


MCV  85.1


 


MCH  28.9


 


MCHC  34.0


 


RDW  17.7 H


 


Plt Count  172


 


MPV  8.7


 


Neut % (Auto)  60.5


 


Lymph % (Auto)  24.2


 


Mono % (Auto)  9.3


 


Eos % (Auto)  4.8 H


 


Baso % (Auto)  1.2


 


Neut # (Auto)  14.6 H


 


Lymph # (Auto)  5.8 H


 


Mono # (Auto)  2.3 H


 


Eos # (Auto)  1.2 H


 


Baso # (Auto)  0.3 H


 


Differential Comment  














Assessment & Plan


(1) Sickle cell pain crisis


Assessment and Plan: 


IV fluids, folic acid, pain meds, 02 via NC





Status: Acute   





(2) Leukocytosis


Assessment and Plan: 


on antibiotics


may have reactive component from sickle cell


Status: Acute   





(3) Sickle cell anemia


Assessment and Plan: 


outpatient folic acid





Thank you for this interesting consult.


Status: Acute

## 2018-03-23 NOTE — CP.PCM.PN
Subjective





- Date & Time of Evaluation


Date of Evaluation: 03/23/18


Time of Evaluation: 23:02





- Subjective


Subjective: 





AFEBRILE,


PATIENT AMBULATING WITHOUT PAIN.


STILL COMPLAINS OF BACK PAIN AND LEG PAINS








LABS REVIEWED;


WBC 23.7


h/h 5.8/17.6





Creatinine 1.6/BUN 25


Potassium 5.7


lfts increasing transaminases.  Bilirubin 2.1.





URINE CULTURE 3/21/18 GNR- CLEAN-CATCH








seen by hematology





Objective





- Vital Signs/Intake and Output


Vital Signs (last 24 hours): 


 











Temp Pulse Resp BP Pulse Ox


 


 98.7 F   106 H  20   100/63   99 


 


 03/23/18 15:00  03/23/18 15:00  03/23/18 15:00  03/23/18 15:00  03/23/18 15:00











- Medications


Medications: 


 Current Medications





Diphenhydramine HCl (Benadryl)  25 mg IVP Q3 [facility]


   Last Admin: 03/23/18 22:00 Dose:  25 mg


Enoxaparin Sodium (Lovenox)  40 mg SC DAILY [facility]


   Last Admin: 03/23/18 10:10 Dose:  Not Given


Folic Acid (Folic Acid)  2 mg PO DAILY [facility]


   Last Admin: 03/23/18 10:09 Dose:  2 mg


Hydromorphone HCl (Dilaudid)  2 mg IVP Q3 [facility]


   Last Admin: 03/23/18 22:00 Dose:  2 mg


Pantoprazole Sodium (Protonix Ec Tab)  40 mg PO DAILY [facility]


   Last Admin: 03/23/18 10:09 Dose:  40 mg


Trimethoprim/Sulfamethoxazole (Bactrim Ds Tab)  1 tab PO Q12H [facility]


   PRN Reason: Protocol


   Last Admin: 03/23/18 22:02 Dose:  1 tab











- Labs


Labs: 


 





 03/23/18 12:02 





 03/23/18 12:02 











- Constitutional


Appears: No Acute Distress





- Head Exam


Head Exam: NORMAL INSPECTION





- Eye Exam


Eye Exam: EOMI, PERRL, Scleral icterus





- ENT Exam


ENT Exam: Normal Oropharynx





- Neck Exam


Neck Exam: Normal Inspection





- Respiratory Exam


Respiratory Exam: Clear to Ausculation Bilateral





- Cardiovascular Exam


Cardiovascular Exam: Tachycardia, +S1, +S2





- GI/Abdominal Exam


GI & Abdominal Exam: Soft, Normal Bowel Sounds





- Extremities Exam


Extremities Exam: Pedal Edema.  absent: Calf Tenderness





- Neurological Exam


Neurological Exam: Awake, CN II-XII Intact, Normal Gait, Oriented x3





- Psychiatric Exam


Psychiatric exam: Normal Mood





- Skin


Skin Exam: Normal Color, Warm





Assessment and Plan


(1) Sickle cell pain crisis


Status: Acute   





(2) Leukocytosis


Status: Acute   





(3) UTI (urinary tract infection)


Status: Acute   





(4) Anemia


Status: Acute   





(5) Neurogenic bladder


Status: Acute   





- Assessment and Plan (Free Text)


Plan: 





*By mouth Bactrim 1 double strength twice a day 


DECREASE DOSE TO 1 SINGLE STRENGTH bACTRIM TWICE A DAY AND INCREASING 

CREATININE AND BUN.


ALSO POTASSIUM INCREASING TO 5.7 TODAY


 ( as urine culture 3/15/18 showing Escherichia coli sensitive to Bactrim.)


Follow-up bmp in am


 evaluation as patient has bilateral hydronephrosis and bilateral ureteral 

stents.


Monitor H&H.

## 2018-03-23 NOTE — CP.PCM.PN
<Zach Cabrera - Last Filed: 03/23/18 09:26>





Subjective





- Date & Time of Evaluation


Date of Evaluation: 03/23/18


Time of Evaluation: 09:03





- Subjective


Subjective: 





PGY-2 note for Dr. Etienne's Service:





Pt seen and examined at bedside. Nursing reports no acute events overnight. 

Patient found walking halls in [facility]. She states she has had multiple admissions 

here for sickle cell crisis. She states pain in low back and legs is well 

controlled on current pain regimen. She states she has seen Dr. MANNY Etienne (Hem/Onc

) and Dr. Olvera as outpatient. She used to take hydroxyurea, but stopped 6 

months ago at direction of Dr. MANNY Etienne. She denies chest pain, SOB, headache, 

vision problems, abdominal pain, N/V. 





Objective





- Vital Signs/Intake and Output


Vital Signs (last 24 hours): 


 











Temp Pulse Resp BP Pulse Ox


 


 98.8 F   110 H  20   113/70   98 


 


 03/23/18 08:06  03/23/18 08:06  03/23/18 08:06  03/23/18 08:06  03/23/18 08:16








Intake and Output: 


 











 03/23/18 03/23/18





 06:59 18:59


 


Intake Total 450 


 


Balance 450 














- Medications


Medications: 


 Current Medications





Diphenhydramine HCl (Benadryl)  25 mg IVP Q3 [facility]


   Last Admin: 03/23/18 07:20 Dose:  25 mg


Enoxaparin Sodium (Lovenox)  40 mg SC DAILY [facility]


   Last Admin: 03/22/18 10:33 Dose:  Not Given


Folic Acid (Folic Acid)  2 mg PO DAILY [facility]


   Last Admin: 03/22/18 10:28 Dose:  2 mg


Hydromorphone HCl (Dilaudid)  2 mg IVP Q3 [facility]


   Last Admin: 03/23/18 07:00 Dose:  2 mg


Pantoprazole Sodium (Protonix Ec Tab)  40 mg PO DAILY [facility]


   Last Admin: 03/22/18 10:28 Dose:  40 mg


Trimethoprim/Sulfamethoxazole (Bactrim Ds Tab)  1 tab PO Q12H [facility]


   PRN Reason: Protocol


   Last Admin: 03/22/18 22:22 Dose:  1 tab











- Labs


Labs: 


 





 03/22/18 12:36 





 03/22/18 08:07 











- Constitutional


Appears: Non-toxic, No Acute Distress





- Head Exam


Head Exam: ATRAUMATIC, NORMAL INSPECTION





- Eye Exam


Eye Exam: EOMI, Normal appearance





- ENT Exam


ENT Exam: Mucous Membranes Moist





- Neck Exam


Neck Exam: Full ROM





- Respiratory Exam


Respiratory Exam: Clear to Ausculation Bilateral, NORMAL BREATHING PATTERN.  

absent: Rales, Rhonchi, Wheezes





- Cardiovascular Exam


Cardiovascular Exam: REGULAR RHYTHM, +S1, +S2





- GI/Abdominal Exam


GI & Abdominal Exam: Soft, Normal Bowel Sounds.  absent: Tenderness





- Extremities Exam


Extremities Exam: Normal Inspection.  absent: Pedal Edema, Tenderness





- Back Exam


Back Exam: absent: CVA tenderness (L), CVA tenderness (R)





- Neurological Exam


Neurological Exam: Alert, Awake, Oriented x3





- Psychiatric Exam


Psychiatric exam: Normal Affect, Normal Mood





- Skin


Skin Exam: Dry, Normal Color, Warm





Assessment and Plan





- Assessment and Plan (Free Text)


Plan: 





Sickle cell Crisis


Admit to med/surg


Pt with LBP, leg pain; denies CP, SOB


HGB 6.3 on (3/21/18), stable yesterday


Retic ct 2.3, 


- f/u AM labs 


Dr. Olvera Hem/Onc consultant, help greatly appreciated


- will await Dr. Olvera's assessment before transfusing





Folic acid 2mg PO daily


Dilaudid 3mg IV q3hrs prn 


Lovenox 40mg sc daily





Urinary tract infection


UA (3/21/18): 3+ LE, Nitrate negative, , Moderate bacteria


Urine Culture: Gram negative tomi





ID consult. Dr. Sagar Stanton. recs appreciated. 


-DS Bactrim 1 tab PO Q12H (start 3/22/18)





Previous Admission:


-Bladder US (3/10/18)- B/L ureteral stents with moderate hydronephrosis R>L


-Ucx (3/7/18)- vancomycin resistant E. faecium





Leukocytosis


WBC increasing


Etiology: UTI in setting of SSC


ID consult. Dr. Sagar Stanton. recs appreciated. 


-DS Bactrim 1 tab PO Q12H





Neurogenic bladder


-straight cath PRN; patient is able to do herself 





GI/DVT ppx


-lovenox 40mg SC daily


-protonix 40mg PO daily


SCDs not warranted; pt mobile





Zach Cabrera PGY2


Patient is medically stable to be discharged per Dr. Etienne





<Shahab Etienne - Last Filed: 03/28/18 08:17>





Subjective





- Subjective


Subjective: 





case jak dnd.w staff and resident


d/w staff about scd and uti mx





Objective





- Vital Signs/Intake and Output


Vital Signs (last 24 hours): 


 











Temp Pulse Resp BP Pulse Ox


 


 98.6 F   108 H  20   103/72   100 


 


 03/28/18 00:04  03/28/18 00:04  03/28/18 00:04  03/28/18 00:04  03/28/18 00:04











- Medications


Medications: 


 Current Medications





Diphenhydramine HCl (Benadryl)  25 mg IVP Q3 PRN


   PRN Reason: Anaphylaxis


   Last Admin: 03/28/18 06:17 Dose:  25 mg


Folic Acid (Folic Acid)  2 mg PO DAILY CHENG


   Last Admin: 03/27/18 10:17 Dose:  2 mg


Hydromorphone HCl (Dilaudid)  2 mg IVP Q3 PRN


   PRN Reason: Pain, severe (8-10)


   Last Admin: 03/28/18 06:16 Dose:  2 mg


Meropenem 500 mg/ Sodium (Chloride)  100 mls @ 100 mls/hr IVPB Q8 CHENG


   PRN Reason: Protocol


   Last Admin: 03/28/18 06:13 Dose:  100 mls/hr


Pantoprazole Sodium (Protonix Ec Tab)  40 mg PO DAILY CHENG


   Last Admin: 03/27/18 10:16 Dose:  40 mg











- Labs


Labs: 


 





 03/28/18 07:20 





 03/27/18 07:37

## 2018-03-23 NOTE — CP.PCM.PN
Subjective





- Date & Time of Evaluation


Date of Evaluation: 03/23/18


Time of Evaluation: 08:00





- Subjective


Subjective: 





Pt seen and examined at bedside. Nursing reports no acute events overnight. 

Patient found walking halls in [facility]. She states she has had multiple admissions 

here for sickle cell crisis. She states pain in low back and legs is well 

controlled on current pain regimen. She states she has seen Dr. MANNY Etienne (Hem/Onc

) and Dr. Olvera as outpatient. She used to take hydroxyurea, but stopped 6 

months ago at direction of Dr. MANNY Etienne. She denies chest pain, SOB, headache, 

vision problems, abdominal pain, N/V. 








Objective





- Vital Signs/Intake and Output


Vital Signs (last 24 hours): 


 











Temp Pulse Resp BP Pulse Ox


 


 98.7 F   106 H  20   100/63   99 


 


 03/23/18 15:00  03/23/18 15:00  03/23/18 15:00  03/23/18 15:00  03/23/18 15:00








Intake and Output: 


 











 03/23/18 03/23/18





 06:59 18:59


 


Intake Total 450 


 


Balance 450 














- Medications


Medications: 


 Current Medications





Diphenhydramine HCl (Benadryl)  25 mg IVP Q3 [facility]


   Last Admin: 03/23/18 16:16 Dose:  25 mg


Enoxaparin Sodium (Lovenox)  40 mg SC DAILY [facility]


   Last Admin: 03/23/18 10:10 Dose:  Not Given


Folic Acid (Folic Acid)  2 mg PO DAILY [facility]


   Last Admin: 03/23/18 10:09 Dose:  2 mg


Hydromorphone HCl (Dilaudid)  2 mg IVP Q3 [facility]


   Last Admin: 03/23/18 16:19 Dose:  2 mg


Pantoprazole Sodium (Protonix Ec Tab)  40 mg PO DAILY [facility]


   Last Admin: 03/23/18 10:09 Dose:  40 mg


Trimethoprim/Sulfamethoxazole (Bactrim Ds Tab)  1 tab PO Q12H [facility]


   PRN Reason: Protocol


   Last Admin: 03/23/18 10:09 Dose:  1 tab











- Labs


Labs: 


 





 03/23/18 12:02 





 03/23/18 12:02 











- Constitutional


Appears: Well





- Head Exam


Head Exam: ATRAUMATIC, NORMAL INSPECTION, NORMOCEPHALIC





- Eye Exam


Eye Exam: EOMI, Normal appearance, PERRL


Pupil Exam: NORMAL ACCOMODATION, PERRL





- ENT Exam


ENT Exam: Mucous Membranes Moist, Normal Exam





- Neck Exam


Neck Exam: Full ROM, Normal Inspection.  absent: Lymphadenopathy





- Respiratory Exam


Respiratory Exam: Decreased Breath Sounds





- Cardiovascular Exam


Cardiovascular Exam: REGULAR RHYTHM, +S1, +S2





- GI/Abdominal Exam


GI & Abdominal Exam: Soft, Diminished Bowel Sounds





- Rectal Exam


Rectal Exam: Deferred





Assessment and Plan


(1) Anemia


Status: Acute   





(2) Leukocytosis


Status: Acute   





(3) Sickle cell pain crisis


Status: Acute   





(4) UTI (urinary tract infection)


Status: Acute   





(5) Animal bite wound


Status: Acute   





(6) Chronic pain disorder


Status: Acute   





(7) Leg pain


Status: Acute   





(8) Leukocytosis


Status: Acute   





(9) Myalgia


Status: Acute   





(10) Neurogenic bladder


Status: Acute   





(11) Pain


Status: Acute   





(12) Severe anemia


Status: Acute   





(13) Sickle cell anemia


Status: Acute   





(14) Sickle cell anemia with pain


Status: Acute   





- Assessment and Plan (Free Text)


Plan: 








Pain screencase seen and d.w staff and resident, concurred with finding and 

management.. Is seen oxygen


IV fluid


ID consult


Bactrim


BUN UCS as needed


Straight cath the patient compliant encouraged

## 2018-03-24 LAB
ALBUMIN SERPL-MCNC: 3.1 G/DL (ref 3.5–5)
ALBUMIN/GLOB SERPL: 0.7 {RATIO} (ref 1–2.1)
ALT SERPL-CCNC: 67 U/L (ref 9–52)
AST SERPL-CCNC: 94 U/L (ref 14–36)
BASOPHILS # BLD AUTO: 0.2 K/UL (ref 0–0.2)
BASOPHILS NFR BLD: 0.7 % (ref 0–2)
BUN SERPL-MCNC: 26 MG/DL (ref 7–17)
CALCIUM SERPL-MCNC: 7.8 MG/DL (ref 8.6–10.4)
EOSINOPHIL # BLD AUTO: 1.1 K/UL (ref 0–0.7)
EOSINOPHIL NFR BLD: 5 % (ref 0–4)
ERYTHROCYTE [DISTWIDTH] IN BLOOD BY AUTOMATED COUNT: 17.9 % (ref 11.5–14.5)
GFR NON-AFRICAN AMERICAN: 39
HGB BLD-MCNC: 5.2 G/DL (ref 11–16)
LYMPHOCYTES # BLD AUTO: 5.1 K/UL (ref 1–4.3)
LYMPHOCYTES NFR BLD AUTO: 23.8 % (ref 20–40)
MCH RBC QN AUTO: 28.9 PG (ref 27–31)
MCHC RBC AUTO-ENTMCNC: 33.7 G/DL (ref 33–37)
MCV RBC AUTO: 85.7 FL (ref 81–99)
MONOCYTES # BLD: 2.1 K/UL (ref 0–0.8)
MONOCYTES NFR BLD: 9.6 % (ref 0–10)
NEUTROPHILS # BLD: 13.1 K/UL (ref 1.8–7)
NEUTROPHILS NFR BLD AUTO: 60.9 % (ref 50–75)
NRBC BLD AUTO-RTO: 0.4 % (ref 0–2)
PLATELET # BLD: 140 K/UL (ref 130–400)
PMV BLD AUTO: 9.4 FL (ref 7.2–11.7)
RBC # BLD AUTO: 1.79 MIL/UL (ref 3.8–5.2)
WBC # BLD AUTO: 21.5 K/UL (ref 4.8–10.8)

## 2018-03-24 RX ADMIN — ENOXAPARIN SODIUM SCH: 40 INJECTION SUBCUTANEOUS at 10:02

## 2018-03-24 RX ADMIN — SULFAMETHOXAZOLE AND TRIMETHOPRIM SCH TAB: 400; 80 TABLET ORAL at 10:01

## 2018-03-24 RX ADMIN — DIPHENHYDRAMINE HYDROCHLORIDE SCH MG: 50 INJECTION INTRAMUSCULAR; INTRAVENOUS at 01:14

## 2018-03-24 RX ADMIN — PANTOPRAZOLE SODIUM SCH MG: 40 TABLET, DELAYED RELEASE ORAL at 10:01

## 2018-03-24 RX ADMIN — DIPHENHYDRAMINE HYDROCHLORIDE SCH MG: 50 INJECTION INTRAMUSCULAR; INTRAVENOUS at 22:48

## 2018-03-24 RX ADMIN — DIPHENHYDRAMINE HYDROCHLORIDE SCH MG: 50 INJECTION INTRAMUSCULAR; INTRAVENOUS at 06:59

## 2018-03-24 RX ADMIN — DIPHENHYDRAMINE HYDROCHLORIDE SCH MG: 50 INJECTION INTRAMUSCULAR; INTRAVENOUS at 13:13

## 2018-03-24 RX ADMIN — DIPHENHYDRAMINE HYDROCHLORIDE SCH MG: 50 INJECTION INTRAMUSCULAR; INTRAVENOUS at 04:10

## 2018-03-24 RX ADMIN — DIPHENHYDRAMINE HYDROCHLORIDE SCH MG: 50 INJECTION INTRAMUSCULAR; INTRAVENOUS at 16:21

## 2018-03-24 RX ADMIN — DIPHENHYDRAMINE HYDROCHLORIDE SCH MG: 50 INJECTION INTRAMUSCULAR; INTRAVENOUS at 18:56

## 2018-03-24 RX ADMIN — DIPHENHYDRAMINE HYDROCHLORIDE SCH MG: 50 INJECTION INTRAMUSCULAR; INTRAVENOUS at 10:02

## 2018-03-24 RX ADMIN — SULFAMETHOXAZOLE AND TRIMETHOPRIM SCH TAB: 400; 80 TABLET ORAL at 18:56

## 2018-03-24 NOTE — CP.PCM.PN
Subjective





- Date & Time of Evaluation


Date of Evaluation: 03/24/18


Time of Evaluation: 07:30





- Subjective


Subjective: 


clinically same





Objective





- Vital Signs/Intake and Output


Vital Signs (last 24 hours): 


 











Temp Pulse Resp BP Pulse Ox


 


 98.2 F   102 H  20   112/64   96 


 


 03/24/18 07:34  03/24/18 07:34  03/24/18 07:34  03/24/18 07:34  03/24/18 07:34








Intake and Output: 


 











 03/24/18 03/24/18





 06:59 18:59


 


Intake Total  360


 


Balance  360














- Medications


Medications: 


 Current Medications





Diphenhydramine HCl (Benadryl)  25 mg IVP Q3 [facility]


   Last Admin: 03/24/18 13:13 Dose:  25 mg


Enoxaparin Sodium (Lovenox)  40 mg SC DAILY [facility]


   Last Admin: 03/24/18 10:02 Dose:  Not Given


Folic Acid (Folic Acid)  2 mg PO DAILY [facility]


   Last Admin: 03/24/18 10:01 Dose:  2 mg


Hydromorphone HCl (Dilaudid)  2 mg IVP Q3 [facility]


   Last Admin: 03/24/18 13:13 Dose:  2 mg


Pantoprazole Sodium (Protonix Ec Tab)  40 mg PO DAILY [facility]


   Last Admin: 03/24/18 10:01 Dose:  40 mg


Trimethoprim/Sulfamethoxazole (Bactrim Ss Tab)  1 tab PO BID [facility]


   PRN Reason: Protocol


   Last Admin: 03/24/18 10:01 Dose:  1 tab











- Labs


Labs: 


 





 03/24/18 06:30 





 03/24/18 06:30 











- Constitutional


Appears: Well





- Head Exam


Head Exam: ATRAUMATIC, NORMAL INSPECTION, NORMOCEPHALIC





- Eye Exam


Eye Exam: EOMI, Normal appearance, PERRL


Pupil Exam: NORMAL ACCOMODATION, PERRL





- ENT Exam


ENT Exam: Mucous Membranes Moist, Normal Exam





- Neck Exam


Neck Exam: Full ROM, Normal Inspection.  absent: Lymphadenopathy





- Respiratory Exam


Respiratory Exam: Decreased Breath Sounds





- Cardiovascular Exam


Cardiovascular Exam: REGULAR RHYTHM, +S1, +S2





- GI/Abdominal Exam


GI & Abdominal Exam: Soft, Diminished Bowel Sounds





- Rectal Exam


Rectal Exam: Deferred

## 2018-03-24 NOTE — CP.PCM.PN
Subjective





- Date & Time of Evaluation


Date of Evaluation: 03/24/18


Time of Evaluation: 20:03





- Subjective


Subjective: 





C/O PAIN BACK/AND B/L LEGS





SEEN BY HEMATOLOGY.


H/H 5.2/15.4





PT FOR 1 UNIT PRBC TODAY.








LABS REVIEWED 





URINE CULTURE +VE E.COLI +VEESBL


S -GENT/NITRO,BACTRIM.





Objective





- Vital Signs/Intake and Output


Vital Signs (last 24 hours): 


 











Temp Pulse Resp BP Pulse Ox


 


 98.4 F   112 H  20   102/72   98 


 


 03/24/18 19:51  03/24/18 19:51  03/24/18 19:51  03/24/18 19:51  03/24/18 15:00








Intake and Output: 


 











 03/24/18 03/25/18





 18:59 06:59


 


Intake Total 840 0


 


Balance 840 0














- Medications


Medications: 


 Current Medications





Diphenhydramine HCl (Benadryl)  25 mg IVP Q3 [facility]


   Last Admin: 03/24/18 18:56 Dose:  25 mg


Enoxaparin Sodium (Lovenox)  40 mg SC DAILY [facility]


   Last Admin: 03/24/18 10:02 Dose:  Not Given


Folic Acid (Folic Acid)  2 mg PO DAILY [facility]


   Last Admin: 03/24/18 10:01 Dose:  2 mg


Hydromorphone HCl (Dilaudid)  2 mg IVP Q3 [facility]


   Last Admin: 03/24/18 18:56 Dose:  2 mg


Pantoprazole Sodium (Protonix Ec Tab)  40 mg PO DAILY [facility]


   Last Admin: 03/24/18 10:01 Dose:  40 mg


Trimethoprim/Sulfamethoxazole (Bactrim Ss Tab)  1 tab PO BID [facility]


   PRN Reason: Protocol


   Last Admin: 03/24/18 18:56 Dose:  1 tab











- Labs


Labs: 


 





 03/24/18 06:30 





 03/24/18 06:30 











- Constitutional


Appears: No Acute Distress, Chronically Ill





- Head Exam


Head Exam: NORMAL INSPECTION





- Eye Exam


Eye Exam: EOMI, PERRL, Scleral icterus





- ENT Exam


ENT Exam: Normal Oropharynx





- Neck Exam


Neck Exam: Normal Inspection





- Respiratory Exam


Respiratory Exam: Clear to Ausculation Bilateral





- Cardiovascular Exam


Cardiovascular Exam: Tachycardia, REGULAR RHYTHM, +S1, +S2





- GI/Abdominal Exam


GI & Abdominal Exam: Soft, Normal Bowel Sounds





- Extremities Exam


Extremities Exam: absent: Calf Tenderness, Pedal Edema





- Neurological Exam


Neurological Exam: Awake, CN II-XII Intact, Normal Gait, Oriented x3





- Psychiatric Exam


Psychiatric exam: Normal Mood





- Skin


Skin Exam: Pallor, Warm





Assessment and Plan


(1) Sickle cell pain crisis


Status: Acute   





(2) Leukocytosis


Status: Acute   





(3) UTI (urinary tract infection)


Status: Acute   





(4) Anemia


Status: Acute   





(5) Neurogenic bladder


Status: Acute   





- Assessment and Plan (Free Text)


Plan: 





DECREASE DOSE TO 1 SINGLE STRENGTH bACTRIM TWICE A DAY AS INCREASING CREATININE 

AND BUN.3/23/18





 ( as urine culture 3/15/18 showing Escherichia coli sensitive to Bactrim.)


Follow-up bmp in am


 evaluation as patient has bilateral hydronephrosis and bilateral ureteral 

stents.


Monitor H&H.


ANALGESICS AS PER PMD.

## 2018-03-24 NOTE — CP.PCM.PN
Subjective





- Date & Time of Evaluation


Date of Evaluation: 03/24/18


Time of Evaluation: 14:54





- Subjective


Subjective: 





PT SEEN SITTING UPRIGHT IN BED.  C/O FEELING GENERAL FATIGUE TODAY WITH HER 

BOUTS OF CHRONIC PAIN.  NP DISCUSSED WITH PT DR. GUZMAN'S RECOMMENDATIONS FOR 

TRANSFUSION ON 1 UNIT PRBC TODAY.  PT IS IN AGREEMENT.  WILL OBTAIN CONSENT FOR 

TRANSFUSION AS WELL.   ORDER ALSO PLACED FOR NURSING STAFF TO PROVIDE PT WITH 

THE URINARY CATHETERS FOR HER TO CATH HERSELF (H/O NEUROGENIC BLADDER).  FOR 

REPEAT LABS IN THE MORNING; NO FURTHER ORDERS. 





Objective





- Vital Signs/Intake and Output


Vital Signs (last 24 hours): 


 











Temp Pulse Resp BP Pulse Ox


 


 98.2 F   102 H  20   112/64   96 


 


 03/24/18 07:34  03/24/18 07:34  03/24/18 07:34  03/24/18 07:34  03/24/18 07:34








Intake and Output: 


 











 03/24/18 03/24/18





 06:59 18:59


 


Intake Total  360


 


Balance  360














- Medications


Medications: 


 Current Medications





Diphenhydramine HCl (Benadryl)  25 mg IVP Q3 [facility]


   Last Admin: 03/24/18 13:13 Dose:  25 mg


Enoxaparin Sodium (Lovenox)  40 mg SC DAILY [facility]


   Last Admin: 03/24/18 10:02 Dose:  Not Given


Folic Acid (Folic Acid)  2 mg PO DAILY [facility]


   Last Admin: 03/24/18 10:01 Dose:  2 mg


Hydromorphone HCl (Dilaudid)  2 mg IVP Q3 [facility]


   Last Admin: 03/24/18 13:13 Dose:  2 mg


Pantoprazole Sodium (Protonix Ec Tab)  40 mg PO DAILY [facility]


   Last Admin: 03/24/18 10:01 Dose:  40 mg


Trimethoprim/Sulfamethoxazole (Bactrim Ss Tab)  1 tab PO BID [facility]


   PRN Reason: Protocol


   Last Admin: 03/24/18 10:01 Dose:  1 tab











- Labs


Labs: 


 





 03/24/18 06:30 





 03/24/18 06:30

## 2018-03-24 NOTE — CP.PCM.PN
Subjective





- Date & Time of Evaluation


Date of Evaluation: 03/24/18


Time of Evaluation: 19:00





- Subjective


Subjective: 





Has some pain and fatigue





Objective





- Vital Signs/Intake and Output


Vital Signs (last 24 hours): 


 











Temp Pulse Resp BP Pulse Ox


 


 98.9 F   106 H  20   100/64   98 


 


 03/24/18 20:51  03/24/18 20:51  03/24/18 20:51  03/24/18 20:51  03/24/18 15:00








Intake and Output: 


 











 03/24/18 03/25/18





 18:59 06:59


 


Intake Total 840 0


 


Balance 840 0














- Medications


Medications: 


 Current Medications





Diphenhydramine HCl (Benadryl)  25 mg IVP Q3 [facility]


   Last Admin: 03/24/18 22:48 Dose:  25 mg


Enoxaparin Sodium (Lovenox)  40 mg SC DAILY [facility]


   Last Admin: 03/24/18 10:02 Dose:  Not Given


Folic Acid (Folic Acid)  2 mg PO DAILY [facility]


   Last Admin: 03/24/18 10:01 Dose:  2 mg


Hydromorphone HCl (Dilaudid)  2 mg IVP Q3 [facility]


   Last Admin: 03/24/18 22:48 Dose:  2 mg


Pantoprazole Sodium (Protonix Ec Tab)  40 mg PO DAILY [facility]


   Last Admin: 03/24/18 10:01 Dose:  40 mg


Trimethoprim/Sulfamethoxazole (Bactrim Ss Tab)  1 tab PO BID [facility]


   PRN Reason: Protocol


   Last Admin: 03/24/18 18:56 Dose:  1 tab











- Labs


Labs: 


 





 03/24/18 06:30 





 03/24/18 06:30 











- Head Exam


Head Exam: ATRAUMATIC





- Eye Exam


Eye Exam: Normal appearance





- ENT Exam


ENT Exam: Mucous Membranes Dry





- Respiratory Exam


Respiratory Exam: NORMAL BREATHING PATTERN





- Cardiovascular Exam


Cardiovascular Exam: +S1, +S2





- GI/Abdominal Exam


GI & Abdominal Exam: Normal Bowel Sounds





- Extremities Exam


Extremities Exam: Normal Inspection





Assessment and Plan


(1) Sickle cell pain crisis


Assessment & Plan: 


IV fluids, pain meds, folic acid, 02 via NC


1U PRBC today


Status: Acute   





(2) Leukocytosis


Assessment & Plan: 


on antibiotics


reactive element from sickle cell disease


Status: Acute   





(3) Sickle cell anemia


Assessment & Plan: 


folic acid


Status: Acute

## 2018-03-25 LAB
ALBUMIN SERPL-MCNC: 3.5 G/DL (ref 3.5–5)
ALBUMIN/GLOB SERPL: 0.7 {RATIO} (ref 1–2.1)
ALT SERPL-CCNC: 83 U/L (ref 9–52)
AST SERPL-CCNC: 123 U/L (ref 14–36)
BASOPHILS # BLD AUTO: 0.2 K/UL (ref 0–0.2)
BASOPHILS NFR BLD: 0.8 % (ref 0–2)
BUN SERPL-MCNC: 22 MG/DL (ref 7–17)
CALCIUM SERPL-MCNC: 7.7 MG/DL (ref 8.6–10.4)
EOSINOPHIL # BLD AUTO: 1 K/UL (ref 0–0.7)
EOSINOPHIL NFR BLD: 4.3 % (ref 0–4)
ERYTHROCYTE [DISTWIDTH] IN BLOOD BY AUTOMATED COUNT: 16.7 % (ref 11.5–14.5)
GFR NON-AFRICAN AMERICAN: 42
HGB BLD-MCNC: 6.9 G/DL (ref 11–16)
LYMPHOCYTES # BLD AUTO: 6.1 K/UL (ref 1–4.3)
LYMPHOCYTES NFR BLD AUTO: 27.6 % (ref 20–40)
MCH RBC QN AUTO: 29.4 PG (ref 27–31)
MCHC RBC AUTO-ENTMCNC: 34 G/DL (ref 33–37)
MCV RBC AUTO: 86.4 FL (ref 81–99)
MONOCYTES # BLD: 1.9 K/UL (ref 0–0.8)
MONOCYTES NFR BLD: 8.7 % (ref 0–10)
NEUTROPHILS # BLD: 13 K/UL (ref 1.8–7)
NEUTROPHILS NFR BLD AUTO: 58.6 % (ref 50–75)
NRBC BLD AUTO-RTO: 0.6 % (ref 0–2)
PLATELET # BLD: 183 K/UL (ref 130–400)
PMV BLD AUTO: 9 FL (ref 7.2–11.7)
RBC # BLD AUTO: 2.35 MIL/UL (ref 3.8–5.2)
WBC # BLD AUTO: 22.1 K/UL (ref 4.8–10.8)

## 2018-03-25 RX ADMIN — DIPHENHYDRAMINE HYDROCHLORIDE SCH MG: 50 INJECTION INTRAMUSCULAR; INTRAVENOUS at 01:45

## 2018-03-25 RX ADMIN — DIPHENHYDRAMINE HYDROCHLORIDE SCH MG: 50 INJECTION INTRAMUSCULAR; INTRAVENOUS at 10:00

## 2018-03-25 RX ADMIN — DIPHENHYDRAMINE HYDROCHLORIDE SCH MG: 50 INJECTION INTRAMUSCULAR; INTRAVENOUS at 13:00

## 2018-03-25 RX ADMIN — DIPHENHYDRAMINE HYDROCHLORIDE SCH MG: 50 INJECTION INTRAMUSCULAR; INTRAVENOUS at 22:05

## 2018-03-25 RX ADMIN — DIPHENHYDRAMINE HYDROCHLORIDE SCH MG: 50 INJECTION INTRAMUSCULAR; INTRAVENOUS at 19:11

## 2018-03-25 RX ADMIN — PANTOPRAZOLE SODIUM SCH MG: 40 TABLET, DELAYED RELEASE ORAL at 09:01

## 2018-03-25 RX ADMIN — DIPHENHYDRAMINE HYDROCHLORIDE SCH MG: 50 INJECTION INTRAMUSCULAR; INTRAVENOUS at 04:20

## 2018-03-25 RX ADMIN — DIPHENHYDRAMINE HYDROCHLORIDE SCH MG: 50 INJECTION INTRAMUSCULAR; INTRAVENOUS at 07:10

## 2018-03-25 RX ADMIN — DIPHENHYDRAMINE HYDROCHLORIDE SCH MG: 50 INJECTION INTRAMUSCULAR; INTRAVENOUS at 16:05

## 2018-03-25 RX ADMIN — SULFAMETHOXAZOLE AND TRIMETHOPRIM SCH TAB: 400; 80 TABLET ORAL at 09:01

## 2018-03-25 RX ADMIN — SULFAMETHOXAZOLE AND TRIMETHOPRIM SCH TAB: 400; 80 TABLET ORAL at 18:14

## 2018-03-25 RX ADMIN — ENOXAPARIN SODIUM SCH: 40 INJECTION SUBCUTANEOUS at 09:03

## 2018-03-25 NOTE — CP.PCM.PN
Subjective





- Date & Time of Evaluation


Date of Evaluation: 03/25/18


Time of Evaluation: 08:40





- Subjective


Subjective: 


clinically same





Objective





- Vital Signs/Intake and Output


Vital Signs (last 24 hours): 


 











Temp Pulse Resp BP Pulse Ox


 


 98.5 F   102 H  20   109/74   99 


 


 03/25/18 16:50  03/25/18 16:50  03/25/18 16:50  03/25/18 16:50  03/25/18 16:50








Intake and Output: 


 











 03/25/18 03/25/18





 06:59 18:59


 


Intake Total 1110 360


 


Output Total 2 


 


Balance 1108 360














- Medications


Medications: 


 Current Medications





Diphenhydramine HCl (Benadryl)  25 mg IVP Q3 [facility]


   Last Admin: 03/25/18 16:05 Dose:  25 mg


Enoxaparin Sodium (Lovenox)  40 mg SC DAILY [facility]


   Last Admin: 03/25/18 09:03 Dose:  Not Given


Folic Acid (Folic Acid)  2 mg PO DAILY [facility]


   Last Admin: 03/25/18 09:05 Dose:  2 mg


Hydromorphone HCl (Dilaudid)  2 mg IVP Q3 [facility]


   Last Admin: 03/25/18 16:05 Dose:  2 mg


Pantoprazole Sodium (Protonix Ec Tab)  40 mg PO DAILY [facility]


   Last Admin: 03/25/18 09:01 Dose:  40 mg


Trimethoprim/Sulfamethoxazole (Bactrim Ss Tab)  1 tab PO BID [facility]


   PRN Reason: Protocol


   Last Admin: 03/25/18 18:14 Dose:  1 tab











- Labs


Labs: 


 





 03/25/18 07:09 





 03/25/18 07:09 











- Constitutional


Appears: Well





- Head Exam


Head Exam: ATRAUMATIC, NORMAL INSPECTION, NORMOCEPHALIC





- Eye Exam


Eye Exam: EOMI, Normal appearance, PERRL


Pupil Exam: NORMAL ACCOMODATION, PERRL





- ENT Exam


ENT Exam: Mucous Membranes Moist, Normal Exam





- Neck Exam


Neck Exam: Full ROM, Normal Inspection.  absent: Lymphadenopathy





- Respiratory Exam


Respiratory Exam: Decreased Breath Sounds





- Cardiovascular Exam


Cardiovascular Exam: REGULAR RHYTHM, +S1, +S2





- GI/Abdominal Exam


GI & Abdominal Exam: Soft, Diminished Bowel Sounds





- Rectal Exam


Rectal Exam: Deferred

## 2018-03-26 LAB
ALBUMIN SERPL-MCNC: 3 G/DL (ref 3.5–5)
ALBUMIN/GLOB SERPL: 0.7 {RATIO} (ref 1–2.1)
ALT SERPL-CCNC: 66 U/L (ref 9–52)
AST SERPL-CCNC: 93 U/L (ref 14–36)
BASOPHILS # BLD AUTO: 0.2 K/UL (ref 0–0.2)
BASOPHILS NFR BLD: 0.9 % (ref 0–2)
BUN SERPL-MCNC: 22 MG/DL (ref 7–17)
CALCIUM SERPL-MCNC: 7 MG/DL (ref 8.6–10.4)
EOSINOPHIL # BLD AUTO: 1.2 K/UL (ref 0–0.7)
EOSINOPHIL NFR BLD: 6.6 % (ref 0–4)
ERYTHROCYTE [DISTWIDTH] IN BLOOD BY AUTOMATED COUNT: 17.1 % (ref 11.5–14.5)
GFR NON-AFRICAN AMERICAN: 46
HGB BLD-MCNC: 6.2 G/DL (ref 11–16)
LYMPHOCYTES # BLD AUTO: 4.9 K/UL (ref 1–4.3)
LYMPHOCYTES NFR BLD AUTO: 26.7 % (ref 20–40)
MCH RBC QN AUTO: 29.5 PG (ref 27–31)
MCHC RBC AUTO-ENTMCNC: 33.8 G/DL (ref 33–37)
MCV RBC AUTO: 87.1 FL (ref 81–99)
MONOCYTES # BLD: 1.9 K/UL (ref 0–0.8)
MONOCYTES NFR BLD: 10.2 % (ref 0–10)
NEUTROPHILS # BLD: 10.2 K/UL (ref 1.8–7)
NEUTROPHILS NFR BLD AUTO: 55.6 % (ref 50–75)
NRBC BLD AUTO-RTO: 0.2 % (ref 0–2)
PLATELET # BLD: 193 K/UL (ref 130–400)
PMV BLD AUTO: 9 FL (ref 7.2–11.7)
RBC # BLD AUTO: 2.11 MIL/UL (ref 3.8–5.2)
WBC # BLD AUTO: 18.5 K/UL (ref 4.8–10.8)

## 2018-03-26 RX ADMIN — DIPHENHYDRAMINE HYDROCHLORIDE SCH MG: 50 INJECTION INTRAMUSCULAR; INTRAVENOUS at 13:29

## 2018-03-26 RX ADMIN — DIPHENHYDRAMINE HYDROCHLORIDE SCH MG: 50 INJECTION INTRAMUSCULAR; INTRAVENOUS at 01:00

## 2018-03-26 RX ADMIN — DIPHENHYDRAMINE HYDROCHLORIDE SCH MG: 50 INJECTION INTRAMUSCULAR; INTRAVENOUS at 07:46

## 2018-03-26 RX ADMIN — SULFAMETHOXAZOLE AND TRIMETHOPRIM SCH TAB: 400; 80 TABLET ORAL at 10:33

## 2018-03-26 RX ADMIN — DIPHENHYDRAMINE HYDROCHLORIDE SCH MG: 50 INJECTION INTRAMUSCULAR; INTRAVENOUS at 22:00

## 2018-03-26 RX ADMIN — ENOXAPARIN SODIUM SCH: 40 INJECTION SUBCUTANEOUS at 10:35

## 2018-03-26 RX ADMIN — DIPHENHYDRAMINE HYDROCHLORIDE SCH MG: 50 INJECTION INTRAMUSCULAR; INTRAVENOUS at 16:00

## 2018-03-26 RX ADMIN — SULFAMETHOXAZOLE AND TRIMETHOPRIM SCH TAB: 400; 80 TABLET ORAL at 19:16

## 2018-03-26 RX ADMIN — DIPHENHYDRAMINE HYDROCHLORIDE SCH MG: 50 INJECTION INTRAMUSCULAR; INTRAVENOUS at 10:33

## 2018-03-26 RX ADMIN — DIPHENHYDRAMINE HYDROCHLORIDE SCH MG: 50 INJECTION INTRAMUSCULAR; INTRAVENOUS at 04:00

## 2018-03-26 RX ADMIN — PANTOPRAZOLE SODIUM SCH MG: 40 TABLET, DELAYED RELEASE ORAL at 10:32

## 2018-03-26 RX ADMIN — DIPHENHYDRAMINE HYDROCHLORIDE SCH MG: 50 INJECTION INTRAMUSCULAR; INTRAVENOUS at 19:00

## 2018-03-26 NOTE — CP.PCM.PN
Subjective





- Date & Time of Evaluation


Date of Evaluation: 03/26/18


Time of Evaluation: 18:35





- Subjective


Subjective: 





Feels better s/p PRBC transfusion





Objective





- Vital Signs/Intake and Output


Vital Signs (last 24 hours): 


 











Temp Pulse Resp BP Pulse Ox


 


 98.4 F   118 H  20   104/73   96 


 


 03/26/18 16:00  03/26/18 16:00  03/26/18 16:00  03/26/18 16:00  03/26/18 16:00








Intake and Output: 


 











 03/26/18 03/27/18





 18:59 06:59


 


Intake Total 880 


 


Balance 880 














- Medications


Medications: 


 Current Medications





Diphenhydramine HCl (Benadryl)  25 mg IVP Q3 [facility]


   Last Admin: 03/26/18 22:00 Dose:  25 mg


Folic Acid (Folic Acid)  2 mg PO DAILY [facility]


   Last Admin: 03/26/18 10:32 Dose:  2 mg


Hydromorphone HCl (Dilaudid)  2 mg IVP Q3 [facility]


   Last Admin: 03/26/18 22:00 Dose:  2 mg


Pantoprazole Sodium (Protonix Ec Tab)  40 mg PO DAILY [facility]


   Last Admin: 03/26/18 10:32 Dose:  40 mg


Trimethoprim/Sulfamethoxazole (Bactrim Ss Tab)  1 tab PO BID [facility]


   PRN Reason: Protocol


   Last Admin: 03/26/18 19:16 Dose:  1 tab











- Labs


Labs: 


 





 03/26/18 07:14 





 03/26/18 07:14 











- Head Exam


Head Exam: ATRAUMATIC





- Eye Exam


Eye Exam: Normal appearance





- ENT Exam


ENT Exam: Mucous Membranes Dry





- Respiratory Exam


Respiratory Exam: NORMAL BREATHING PATTERN





- Cardiovascular Exam


Cardiovascular Exam: +S1, +S2





- GI/Abdominal Exam


GI & Abdominal Exam: Normal Bowel Sounds





Assessment and Plan


(1) Sickle cell pain crisis


Assessment & Plan: 


IV fluids, pain meds, folic acid, 02 via NC


s/p PRBC transfusion


Status: Acute   





(2) Leukocytosis


Assessment & Plan: 


on antibiotics


reactive from sickle cell


Status: Acute   





(3) Sickle cell anemia


Assessment & Plan: 


outpatient folic acid


Status: Acute

## 2018-03-26 NOTE — CP.PCM.PN
Addendum entered and electronically signed by Zach Cabrera DO  03/27/18 04:56: 





Spoke to Dr. Chacko, urologist, who saw pt on last admission. His 

recommendations:


- no need for consult at this time


- pt should see her private urologist who placed bilateral ureteral stents for 

follow up as stents are overdue for change


- if pt cannot see private urologist, she can follow up in his office as 

outpatient





Addition to plan:


Hx of hydronephrosis


f/u Renal US to assess change





Original Note:








<Zach Cabrera - Last Filed: 03/26/18 11:14>





Subjective





- Date & Time of Evaluation


Date of Evaluation: 03/26/18


Time of Evaluation: 11:14





- Subjective


Subjective: 








PGY-2 note for Dr. Etienne's Service:





Pt seen and examined at bedside. Nursing reports no acute events overnight. 

Patient reports mildly improved fatigue after transfusion over the weekend. 

Denies dizziness/CP/lightheadedness. States her pain well-controlled on current 

pain regimen. 





Objective





- Vital Signs/Intake and Output


Vital Signs (last 24 hours): 


 











Temp Pulse Resp BP Pulse Ox


 


 98.2 F   92 H  20   117/76   96 


 


 03/26/18 08:00  03/26/18 08:00  03/26/18 08:00  03/26/18 08:00  03/26/18 08:00








Intake and Output: 


 











 03/26/18 03/26/18





 06:59 18:59


 


Intake Total 650 


 


Output Total 600 


 


Balance 50 














- Medications


Medications: 


 Current Medications





Diphenhydramine HCl (Benadryl)  [facility]P [facility]


   Last Admin: 03/26/18 10:33 Dose:  25 mg


Enoxaparin Sodium (Lovenox)  40 mg SC [facility]ILY [facility]


   Last Admin: 03/26/18 10:35 Dose:  Not Given


Folic Acid (Folic Acid)  2 mg PO [facility]ILY [facility]


   Last Admin: 03/26/18 10:32 Dose:  2 mg


Hydromorphone HCl (Dilaudid)  2 mg IVP Q3 [facility]


   Last Admin: 03/26/18 10:32 Dose:  2 mg


Pantoprazole Sodium (Protonix Ec Tab)  40 mg PO DAILY [facility]


   Last Admin: 03/26/18 10:32 Dose:  40 mg


Trimethoprim/Sulfamethoxazole (Bactrim Ss Tab)  1 tab PO BID [facility]


   PRN Reason: Protocol


   Last Admin: 03/26/18 10:33 Dose:  1 tab











- Labs


Labs: 


 





 03/26/18 07:14 





 03/26/18 07:14 











- Additional Findings


Additional findings: 





- Constitutional


Appears: Non-toxic, No Acute Distress





- Head Exam


Head Exam: ATRAUMATIC, NORMAL INSPECTION





- Eye Exam


Eye Exam: EOMI, Normal appearance, Scleral icterus





- ENT Exam


ENT Exam: Mucous Membranes Moist





- Neck Exam


Neck Exam: Full ROM





- Respiratory Exam


Respiratory Exam: Clear to Ausculation Bilateral, NORMAL BREATHING PATTERN.  

absent: Rales, Rhonchi, Wheezes





- Cardiovascular Exam


Cardiovascular Exam: REGULAR RHYTHM, +S1, +S2





- GI/Abdominal Exam


GI & Abdominal Exam: Soft, Normal Bowel Sounds.  absent: Tenderness





- Extremities Exam


Extremities Exam: Normal Inspection.  absent: Pedal Edema, Tenderness





- Back Exam


Back Exam: absent: CVA tenderness (L), CVA tenderness (R)





- Neurological Exam


Neurological Exam: Alert, Awake, Oriented x3





- Psychiatric Exam


Psychiatric exam: Normal Affect, Normal Mood





- Skin


Skin Exam: Dry, Normal Color, Warm





Assessment and Plan





- Assessment and Plan (Free Text)


Plan: 





Sickle cell Crisis


Admit to med/surg


Pt with LBP, leg pain; denies CP, SOB


HGB 6.3 on (3/21/18), 6.2 today


- s/p 1 unit RBC over the weekend





Dr. Olvera Hem/Onc consultant, help greatly appreciated


- IVF, O2, Transfuse under Hgb 5





Folic acid 2mg PO daily


Dilaudid 3mg IV q3hrs prn 


Benadryl 25mg IV Q3hrs prn


Lovenox 40mg sc daily





Urinary tract infection, ESBL +


UA (3/21/18): 3+ LE, Nitrate negative, , Moderate bacteria


Urine Culture: ESBL


Negative pregnancy





ID consult. Dr. Sagar Stanton. recs appreciated. 


- Start SS Bactrim 1 tab PO Q12H (start 3/24/18)


- Discontinued DS Bactrim 1 tab PO Q12H (start 3/22/18)








Previous Admission:


-Bladder US (3/10/18)- B/L ureteral stents with moderate hydronephrosis R>L


-Ucx (3/7/18)- vancomycin resistant E. faecium





Leukocytosis


WBC increasing


Etiology: UTI in setting of SSC


ID consult. Dr. Sagar Stanton. recs appreciated. 


-DS Bactrim 1 tab PO Q12H





Transaminitis


AST/ALT/ALK Phos 93/66/156


Tbili 1.6


Monitor





Neurogenic bladder


-straight cath PRN; patient is able to do herself 





Elevated creatinine


Cr 1.5 today


Will rehydrate today and monitor





Electrolyte Imbalance


- Mg 1.5, repleted





GI/DVT ppx


-lovenox 40mg SC daily


-protonix 40mg PO daily


SCDs not warranted; pt mobile





Zach Cabrera PGY2


Patient is medically stable to be discharged per Dr. Etienne





<Shahab Etienne - Last Filed: 03/28/18 08:15>





Objective





- Vital Signs/Intake and Output


Vital Signs (last 24 hours): 


 











Temp Pulse Resp BP Pulse Ox


 


 98.6 F   108 H  20   103/72   100 


 


 03/28/18 00:04  03/28/18 00:04  03/28/18 00:04  03/28/18 00:04  03/28/18 00:04











- Medications


Medications: 


 Current Medications





Diphenhydramine HCl (Benadryl)  25 mg IVP Q3 [facility]


   PRN Reason: Anaphylaxis


   Last Admin: 03/28/18 06:17 Dose:  25 mg


Folic Acid (Folic Acid)  2 mg PO DAILY [facility]


   Last Admin: 03/27/18 10:17 Dose:  2 mg


Hydromorphone HCl (Dilaudid)  2 mg IVP Q3 [facility]


   PRN Reason: Pain, severe (8-10)


   Last Admin: 03/28/18 06:16 Dose:  2 mg


Meropenem 500 mg/ Sodium (Chloride)  100 mls @ 100 mls/hr IVPB Q8 [facility]


   PRN Reason: Protocol


   Last Admin: 03/28/18 06:13 Dose:  100 mls/hr


Pantoprazole Sodium (Protonix Ec Tab)  40 mg PO DAILY [facility]


   Last Admin: 03/27/18 10:16 Dose:  40 mg











- Labs


Labs: 


 





 03/28/18 07:20 





 03/27/18 07:37 











Attending/Attestation





- Attestation


I have personally seen and examined this patient.: Yes


I have fully participated in the care of the patient.: Yes


I have reviewed all pertinent clinical information, including history, physical 

exam and plan: Yes


Notes (Text): 





03/28/18 08:14


case seen and dJanw staff and resident, concurred with finding and management..

## 2018-03-26 NOTE — CP.PCM.PN
Subjective





- Date & Time of Evaluation


Date of Evaluation: 03/26/18


Time of Evaluation: 08:20





- Subjective


Subjective: 


clinically same





Objective





- Vital Signs/Intake and Output


Vital Signs (last 24 hours): 


 











Temp Pulse Resp BP Pulse Ox


 


 98.2 F   92 H  20   117/76   96 


 


 03/26/18 08:00  03/26/18 08:00  03/26/18 08:00  03/26/18 08:00  03/26/18 08:00








Intake and Output: 


 











 03/26/18 03/26/18





 06:59 18:59


 


Intake Total 650 


 


Output Total 600 


 


Balance 50 














- Medications


Medications: 


 Current Medications





Diphenhydramine HCl (Benadryl)  25 mg IVP Q3 [facility]ly [facility]


   Last Admin: 03/26/18 13:29 Dose:  25 mg


Enoxaparin Sodium (Lovenox)  40 mg SC DAILY [facility]


   Last Admin: 03/26/18 10:35 Dose:  Not Given


Folic Acid (Folic Acid)  2 mg PO DAILY [facility]


   Last Admin: 03/26/18 10:32 Dose:  2 mg


Hydromorphone HCl (Dilaudid)  2 mg IVP Q3 [facility]


   Last Admin: 03/26/18 13:30 Dose:  2 mg


Sodium Chloride (Sodium Chloride 0.9%)  1,000 mls @ 100 mls/hr IV .Q10H [facility]


   Last Admin: 03/26/18 12:06 Dose:  100 mls/hr


Pantoprazole Sodium (Protonix Ec Tab)  40 mg PO DAILY [facility]


   Last Admin: 03/26/18 10:32 Dose:  40 mg


Trimethoprim/Sulfamethoxazole (Bactrim Ss Tab)  1 tab PO BID [facility]


   PRN Reason: Protocol


   Last Admin: 03/26/18 10:33 Dose:  1 tab











- Labs


Labs: 


 





 03/26/18 07:14 





 03/26/18 07:14 











- Constitutional


Appears: Well





- Head Exam


Head Exam: ATRAUMATIC, NORMAL INSPECTION, NORMOCEPHALIC





- Eye Exam


Eye Exam: EOMI, Normal appearance, PERRL


Pupil Exam: NORMAL ACCOMODATION, PERRL





- ENT Exam


ENT Exam: Mucous Membranes Moist, Normal Exam





- Neck Exam


Neck Exam: Full ROM, Normal Inspection.  absent: Lymphadenopathy





- Respiratory Exam


Respiratory Exam: Decreased Breath Sounds





- Cardiovascular Exam


Cardiovascular Exam: REGULAR RHYTHM, +S1, +S2





- GI/Abdominal Exam


GI & Abdominal Exam: Soft, Diminished Bowel Sounds





- Rectal Exam


Rectal Exam: Deferred





Assessment and Plan





- Assessment and Plan (Free Text)


Plan: 





Sickle cell Crisis


Admit to med/surg


Pt with LBP, leg pain; denies CP, SOB


HGB 6.3 on (3/21/18), 6.2 today


- s/p 1 unit RBC over the weekend





Dr. Olvera Hem/Onc consultant, help greatly appreciated


- IVF, O2, Transfuse under Hgb 5





Folic acid 2mg PO daily


Dilaudid 3mg IV q3hrs prn 


Benadryl 25mg IV Q3hrs prn


Lovenox 40mg sc daily





Urinary tract infection, ESBL +


UA (3/21/18): 3+ LE, Nitrate negative, , Moderate bacteria


Urine Culture: ESBL


Negative pregnancy





ID consult. Dr. Sagar Stanton. recs appreciated. 


- Start SS Bactrim 1 tab PO Q12H (start 3/24/18)


- Discontinued DS Bactrim 1 tab PO Q12H (start 3/22/18)








Previous Admission:


-Bladder US (3/10/18)- B/L ureteral stents with moderate hydronephrosis R>L


-Ucx (3/7/18)- vancomycin resistant E. faecium





Leukocytosis


WBC increasing


Etiology: UTI in setting of SSC


ID consult. Dr. Sagar Stanton. recs appreciated. 


-DS Bactrim 1 tab PO Q12H





Transaminitis


AST/ALT/ALK Phos 93/66/156


Tbili 1.6


Monitor





Neurogenic bladder


-straight cath PRN; patient is able to do herself 





Elevated creatinine


Cr 1.5 today


Will rehydrate today and monitor





Electrolyte Imbalance


- Mg 1.5, repleted





GI/DVT ppx


-lovenox 40mg SC daily


-protonix 40mg PO daily


SCDs not warranted; pt mobile

## 2018-03-27 LAB
ALBUMIN SERPL-MCNC: 3.4 G/DL (ref 3.5–5)
ALBUMIN/GLOB SERPL: 0.7 {RATIO} (ref 1–2.1)
ALT SERPL-CCNC: 78 U/L (ref 9–52)
AST SERPL-CCNC: 126 U/L (ref 14–36)
BACTERIA #/AREA URNS HPF: (no result) /[HPF]
BASOPHILS # BLD AUTO: 0.1 K/UL (ref 0–0.2)
BASOPHILS NFR BLD: 0.8 % (ref 0–2)
BILIRUB UR-MCNC: NEGATIVE MG/DL
BUN SERPL-MCNC: 22 MG/DL (ref 7–17)
CALCIUM SERPL-MCNC: 7.8 MG/DL (ref 8.6–10.4)
EOSINOPHIL # BLD AUTO: 1.2 K/UL (ref 0–0.7)
EOSINOPHIL NFR BLD: 6.9 % (ref 0–4)
ERYTHROCYTE [DISTWIDTH] IN BLOOD BY AUTOMATED COUNT: 18.3 % (ref 11.5–14.5)
GFR NON-AFRICAN AMERICAN: 42
GLUCOSE UR STRIP-MCNC: NORMAL MG/DL
HGB BLD-MCNC: 6.5 G/DL (ref 11–16)
LEUKOCYTE ESTERASE UR-ACNC: (no result) LEU/UL
LYMPHOCYTES # BLD AUTO: 4.8 K/UL (ref 1–4.3)
LYMPHOCYTES NFR BLD AUTO: 26.7 % (ref 20–40)
MCH RBC QN AUTO: 30.4 PG (ref 27–31)
MCHC RBC AUTO-ENTMCNC: 34.8 G/DL (ref 33–37)
MCV RBC AUTO: 87.2 FL (ref 81–99)
MONOCYTES # BLD: 1.6 K/UL (ref 0–0.8)
MONOCYTES NFR BLD: 9.1 % (ref 0–10)
NEUTROPHILS # BLD: 10.2 K/UL (ref 1.8–7)
NEUTROPHILS NFR BLD AUTO: 56.5 % (ref 50–75)
NRBC BLD AUTO-RTO: 0.1 % (ref 0–2)
PH UR STRIP: 7 [PH] (ref 5–8)
PLATELET # BLD: 153 K/UL (ref 130–400)
PMV BLD AUTO: 8.9 FL (ref 7.2–11.7)
PROT UR STRIP-MCNC: (no result) MG/DL
RBC # BLD AUTO: 2.14 MIL/UL (ref 3.8–5.2)
RBC # UR STRIP: (no result) /UL
SP GR UR STRIP: 1.01 (ref 1–1.03)
SQUAMOUS EPITHIAL: 1 /HPF (ref 0–5)
UROBILINOGEN UR-MCNC: 2 MG/DL (ref 0.2–1)
WBC # BLD AUTO: 18.1 K/UL (ref 4.8–10.8)
WBC CLUMPS # UR AUTO: (no result) /HPF

## 2018-03-27 RX ADMIN — HYDROMORPHONE HYDROCHLORIDE PRN MG: 1 INJECTION, SOLUTION INTRAMUSCULAR; INTRAVENOUS; SUBCUTANEOUS at 23:57

## 2018-03-27 RX ADMIN — PANTOPRAZOLE SODIUM SCH MG: 40 TABLET, DELAYED RELEASE ORAL at 10:16

## 2018-03-27 RX ADMIN — HYDROMORPHONE HYDROCHLORIDE PRN MG: 1 INJECTION, SOLUTION INTRAMUSCULAR; INTRAVENOUS; SUBCUTANEOUS at 20:30

## 2018-03-27 RX ADMIN — HYDROMORPHONE HYDROCHLORIDE PRN MG: 1 INJECTION, SOLUTION INTRAMUSCULAR; INTRAVENOUS; SUBCUTANEOUS at 13:43

## 2018-03-27 RX ADMIN — DIPHENHYDRAMINE HYDROCHLORIDE PRN MG: 50 INJECTION INTRAMUSCULAR; INTRAVENOUS at 17:20

## 2018-03-27 RX ADMIN — DIPHENHYDRAMINE HYDROCHLORIDE PRN MG: 50 INJECTION INTRAMUSCULAR; INTRAVENOUS at 10:15

## 2018-03-27 RX ADMIN — DIPHENHYDRAMINE HYDROCHLORIDE SCH MG: 50 INJECTION INTRAMUSCULAR; INTRAVENOUS at 06:47

## 2018-03-27 RX ADMIN — SULFAMETHOXAZOLE AND TRIMETHOPRIM SCH TAB: 400; 80 TABLET ORAL at 10:17

## 2018-03-27 RX ADMIN — DIPHENHYDRAMINE HYDROCHLORIDE PRN MG: 50 INJECTION INTRAMUSCULAR; INTRAVENOUS at 23:53

## 2018-03-27 RX ADMIN — DIPHENHYDRAMINE HYDROCHLORIDE SCH MG: 50 INJECTION INTRAMUSCULAR; INTRAVENOUS at 04:15

## 2018-03-27 RX ADMIN — DIPHENHYDRAMINE HYDROCHLORIDE PRN MG: 50 INJECTION INTRAMUSCULAR; INTRAVENOUS at 20:30

## 2018-03-27 RX ADMIN — HYDROMORPHONE HYDROCHLORIDE PRN MG: 1 INJECTION, SOLUTION INTRAMUSCULAR; INTRAVENOUS; SUBCUTANEOUS at 17:20

## 2018-03-27 RX ADMIN — DIPHENHYDRAMINE HYDROCHLORIDE PRN MG: 50 INJECTION INTRAMUSCULAR; INTRAVENOUS at 13:43

## 2018-03-27 RX ADMIN — DIPHENHYDRAMINE HYDROCHLORIDE SCH MG: 50 INJECTION INTRAMUSCULAR; INTRAVENOUS at 01:03

## 2018-03-27 RX ADMIN — HYDROMORPHONE HYDROCHLORIDE PRN MG: 1 INJECTION, SOLUTION INTRAMUSCULAR; INTRAVENOUS; SUBCUTANEOUS at 10:11

## 2018-03-27 NOTE — CP.PCM.PN
Subjective





- Date & Time of Evaluation


Date of Evaluation: 03/27/18


Time of Evaluation: 08:00





- Subjective


Subjective: 


clinically same





Objective





- Vital Signs/Intake and Output


Vital Signs (last 24 hours): 


 











Temp Pulse Resp BP Pulse Ox


 


 98.3 F   90   20   111/76   100 


 


 03/27/18 07:23  03/27/18 07:23  03/27/18 07:23  03/27/18 07:23  03/27/18 07:23











- Medications


Medications: 


 Current Medications





Diphenhydramine HCl (Benadryl)  25 mg IVP Q3 PRN


   PRN Reason: Anaphylaxis


   Last Admin: 03/27/18 13:43 Dose:  25 mg


Folic Acid (Folic Acid)  2 mg PO DAILY Formerly [facility]


   Last Admin: 03/27/18 10:17 Dose:  2 mg


Hydromorphone HCl (Dilaudid)  2 mg IVP Q3 PRN


   PRN Reason: Pain, severe (8-10)


   Last Admin: 03/27/18 13:43 Dose:  2 mg


Pantoprazole Sodium (Protonix Ec Tab)  40 mg PO DAILY [facility]


   Last Admin: 03/27/18 10:16 Dose:  40 mg


Trimethoprim/Sulfamethoxazole (Bactrim Ss Tab)  1 tab PO BID [facility]


   PRN Reason: Protocol


   Last Admin: 03/27/18 10:17 Dose:  1 tab











- Labs


Labs: 


 





 03/27/18 07:37 





 03/27/18 07:37 











- Constitutional


Appears: Well





- Head Exam


Head Exam: ATRAUMATIC, NORMAL INSPECTION, NORMOCEPHALIC





- Eye Exam


Eye Exam: EOMI, Normal appearance, PERRL


Pupil Exam: NORMAL ACCOMODATION, PERRL





- ENT Exam


ENT Exam: Mucous Membranes Moist, Normal Exam





- Neck Exam


Neck Exam: Full ROM, Normal Inspection.  absent: Lymphadenopathy





- Respiratory Exam


Respiratory Exam: Decreased Breath Sounds





- Cardiovascular Exam


Cardiovascular Exam: REGULAR RHYTHM, +S1, +S2





- GI/Abdominal Exam


GI & Abdominal Exam: Soft, Diminished Bowel Sounds





- Rectal Exam


Rectal Exam: Deferred





Assessment and Plan





- Assessment and Plan (Free Text)


Plan: 





Sickle cell Crisis


Admit to med/surg


Pt with LBP, leg pain; denies CP, SOB


HGB 6.3 on (3/21/18), 6.5 today 3/27


- s/p 1 unit RBC 3/24





Dr. Olvera Hem/Onc consultant, help greatly appreciated


- IVF, O2, Transfuse under Hgb 5





Folic acid 2mg PO daily


Dilaudid 2mg IV q3hrs prn 


Benadryl 25mg IV Q3hrs prn





Urinary tract infection, ESBL +


UA (3/21/18): 3+ LE, Nitrate negative, , Moderate bacteria


Urine Culture: ESBL


Negative pregnancy





ID consult. Dr. Sagar Stanton. recs appreciated. 


- Start SS Bactrim 1 tab PO Q12H (start 3/24/18)


- Discontinued DS Bactrim 1 tab PO Q12H (start 3/22/18)





Case discussed with Dr. REINIER Chacko, urologist, who saw pt on last admission. 

His recommendations:


- no need for consult at this time


- pt should see her private urologist who placed bilateral ureteral stents for 

follow up as stents are overdue for change


- if pt cannot see private urologist, she can follow up in his office as 

outpatient





Given that this patient has a hx of hydronephrosis Renal US ordered to assess 

change





Previous Admission:


-Bladder US (3/10/18)- B/L ureteral stents with moderate hydronephrosis R>L


-Ucx (3/7/18)- vancomycin resistant E. faecium





Leukocytosis


WBC trending down- 18.1 3/27


Etiology: UTI in setting of SSC


ID consult. Dr. Sagar Stanton. recs appreciated. 


-SS Bactrim 1 tab PO Q12H





Transaminitis- worsening


AST/ALT/ALK Phos 126/78/195


Tbili 1.8


Monitor





Neurogenic bladder


-straight cath PRN; patient is able to do herself 





Elevated creatinine


Cr 1.4 today 3/27


Will continue to monitor





GI/DVT ppx


-protonix 40mg PO daily


SCDs not warranted; pt mobile


Lovenox discontinued 3/26





Plan for D/C tomorrow with outpatient follow up

## 2018-03-27 NOTE — CP.PCM.PN
<ArbatshevaKunBehzad S - Last Filed: 03/27/18 15:16>





Subjective





- Date & Time of Evaluation


Date of Evaluation: 03/27/18


Time of Evaluation: 09:50





- Subjective


Subjective: 








Progress note for Dr. Etienne's Service





Pt seen and examined at bedside. She states that she is feeling ok today and 

continues to have pains mostly in her legs. Medications do provided relief. No 

acute complaints.





Objective





- Vital Signs/Intake and Output


Vital Signs (last 24 hours): 


 











Temp Pulse Resp BP Pulse Ox


 


 98.3 F   90   20   111/76   100 


 


 03/27/18 07:23  03/27/18 07:23  03/27/18 07:23  03/27/18 07:23  03/27/18 07:23











- Medications


Medications: 


 Current Medications





Diphenhydramine HCl (Benadryl)  25 mg IVP Q3 [facility]


   Last Admin: 03/27/18 06:47 Dose:  25 mg


Folic Acid (Folic Acid)  2 mg PO DAILY [facility]


   Last Admin: 03/26/18 10:32 Dose:  2 mg


Hydromorphone HCl (Dilaudid)  2 mg IVP Q3 PRN


   PRN Reason: Pain, severe (8-10)


Pantoprazole Sodium (Protonix Ec Tab)  40 mg PO DAILY [facility]


   Last Admin: 03/26/18 10:32 Dose:  40 mg


Trimethoprim/Sulfamethoxazole (Bactrim Ss Tab)  1 tab PO BID [facility]


   PRN Reason: Protocol


   Last Admin: 03/26/18 19:16 Dose:  1 tab











- Labs


Labs: 


 





 03/27/18 07:37 





 03/27/18 07:37 











- Constitutional


Appears: No Acute Distress





- Head Exam


Head Exam: ATRAUMATIC, NORMOCEPHALIC





- Eye Exam


Eye Exam: EOMI





- ENT Exam


ENT Exam: Mucous Membranes Moist





- Respiratory Exam


Respiratory Exam: Clear to Ausculation Bilateral, NORMAL BREATHING PATTERN





- Cardiovascular Exam


Cardiovascular Exam: REGULAR RHYTHM, +S1, +S2





- GI/Abdominal Exam


GI & Abdominal Exam: Distended (mild), Soft.  absent: Tenderness





- Neurological Exam


Neurological Exam: Alert, Awake, Oriented x3





- Psychiatric Exam


Psychiatric exam: Normal Affect, Normal Mood





- Skin


Skin Exam: Dry, Warm





Assessment and Plan





- Assessment and Plan (Free Text)


Plan: 





Sickle cell Crisis


Admit to med/surg


Pt with LBP, leg pain; denies CP, SOB


HGB 6.3 on (3/21/18), 6.5 today 3/27


- s/p 1 unit RBC 3/24





Dr. Olvera Hem/Onc consultant, help greatly appreciated


- IVF, O2, Transfuse under Hgb 5





Folic acid 2mg PO daily


Dilaudid 2mg IV q3hrs prn 


Benadryl 25mg IV Q3hrs prn





Urinary tract infection, ESBL +


UA (3/21/18): 3+ LE, Nitrate negative, , Moderate bacteria


Urine Culture: ESBL


Negative pregnancy





ID consult. Dr. Sagar Stanton. recs appreciated. 


- Start SS Bactrim 1 tab PO Q12H (start 3/24/18)


- Discontinued DS Bactrim 1 tab PO Q12H (start 3/22/18)





Case discussed with Dr. REINIER Chacko, urologist, who saw pt on last admission. 

His recommendations:


- no need for consult at this time


- pt should see her private urologist who placed bilateral ureteral stents for 

follow up as stents are overdue for change


- if pt cannot see private urologist, she can follow up in his office as 

outpatient





Given that this patient has a hx of hydronephrosis Renal US ordered to assess 

change





Previous Admission:


-Bladder US (3/10/18)- B/L ureteral stents with moderate hydronephrosis R>L


-Ucx (3/7/18)- vancomycin resistant E. faecium





Leukocytosis


WBC trending down- 18.1 3/27


Etiology: UTI in setting of SSC


ID consult. Dr. Sagar Stanton. recs appreciated. 


-SS Bactrim 1 tab PO Q12H





Transaminitis- worsening


AST/ALT/ALK Phos 126/78/195


Tbili 1.8


Monitor





Neurogenic bladder


-straight cath PRN; patient is able to do herself 





Elevated creatinine


Cr 1.4 today 3/27


Will continue to monitor





GI/DVT ppx


-protonix 40mg PO daily


SCDs not warranted; pt mobile


Lovenox discontinued 3/26





Plan for D/C tomorrow with outpatient follow up


Case discussed with Dr. Etienne


All management as per Dr. Etienne








<Shahab Etienne - Last Filed: 03/28/18 08:13>





Objective





- Vital Signs/Intake and Output


Vital Signs (last 24 hours): 


 











Temp Pulse Resp BP Pulse Ox


 


 98.6 F   108 H  20   103/72   100 


 


 03/28/18 00:04  03/28/18 00:04  03/28/18 00:04  03/28/18 00:04  03/28/18 00:04











- Medications


Medications: 


 Current Medications





Diphenhydramine HCl (Benadryl)  25 mg IVP Q3 PRN


   PRN Reason: Anaphylaxis


   Last Admin: 03/28/18 06:17 Dose:  25 mg


Folic Acid (Folic Acid)  2 mg PO DAILY [facility]


   Last Admin: 03/27/18 10:17 Dose:  2 mg


Hydromorphone HCl (Dilaudid)  2 mg IVP Q3 PRN


   PRN Reason: Pain, severe (8-10)


   Last Admin: 03/28/18 06:16 Dose:  2 mg


Meropenem 500 mg/ Sodium (Chloride)  100 mls @ 100 mls/hr IVPB Q8 [facility]


   PRN Reason: Protocol


   Last Admin: 03/28/18 06:13 Dose:  100 mls/hr


Pantoprazole Sodium (Protonix Ec Tab)  40 mg PO DAILY [facility]


   Last Admin: 03/27/18 10:16 Dose:  40 mg











- Labs


Labs: 


 





 03/28/18 07:20 





 03/27/18 07:37 











Attending/Attestation





- Attestation


I have personally seen and examined this patient.: Yes


I have fully participated in the care of the patient.: Yes


I have reviewed all pertinent clinical information, including history, physical 

exam and plan: Yes


Notes (Text): 





03/28/18 08:13


case seen and d.w staff and resident, concurred with finding and management..

## 2018-03-27 NOTE — CP.PCM.PN
Subjective





- Date & Time of Evaluation


Date of Evaluation: 03/27/18


Time of Evaluation: 16:05





- Subjective


Subjective: 


C/O "PO ABX NOT WORKING " 


LOWER ABDOMINAL /PELVIC DISCOMFORT 





STILL WITH LEUKOCYTOSIS











Objective





- Vital Signs/Intake and Output


Vital Signs (last 24 hours): 


 











Temp Pulse Resp BP Pulse Ox


 


 98.0 F   85   20   103/68   100 


 


 03/27/18 15:02  03/27/18 15:02  03/27/18 15:02  03/27/18 15:02  03/27/18 15:02











- Medications


Medications: 


 Current Medications





Diphenhydramine HCl (Benadryl)  25 mg IVP Q3 PRN


   PRN Reason: Anaphylaxis


   Last Admin: 03/27/18 13:43 Dose:  25 mg


Folic Acid (Folic Acid)  2 mg PO DAILY [facility]


   Last Admin: 03/27/18 10:17 Dose:  2 mg


Hydromorphone HCl (Dilaudid)  2 mg IVP Q3 PRN


   PRN Reason: Pain, severe (8-10)


   Last Admin: 03/27/18 13:43 Dose:  2 mg


Meropenem 500 mg/ Sodium (Chloride)  100 mls @ 100 mls/hr IVPB Q8 [facility]


   PRN Reason: Protocol


Pantoprazole Sodium (Protonix Ec Tab)  40 mg PO DAILY [facility]


   Last Admin: 03/27/18 10:16 Dose:  40 mg


Trimethoprim/Sulfamethoxazole (Bactrim Ss Tab)  1 tab PO BID [facility]


   PRN Reason: Protocol


   Last Admin: 03/27/18 10:17 Dose:  1 tab











- Labs


Labs: 


 





 03/27/18 07:37 





 03/27/18 07:37 











- Constitutional


Appears: No Acute Distress





- Head Exam


Head Exam: NORMAL INSPECTION





- Eye Exam


Eye Exam: EOMI


Pupil Exam: PERRL





- ENT Exam


ENT Exam: Normal Exam





- Neck Exam


Neck Exam: Normal Inspection





- Respiratory Exam


Respiratory Exam: Clear to Ausculation Bilateral





- Cardiovascular Exam


Cardiovascular Exam: REGULAR RHYTHM, +S1, +S2





- GI/Abdominal Exam


GI & Abdominal Exam: Soft, Tenderness (SUPRAPUBIC.), Diminished Bowel Sounds





- Extremities Exam


Extremities Exam: Normal Capillary Refill, Normal Inspection.  absent: Calf 

Tenderness





- Neurological Exam


Neurological Exam: Alert, Awake, CN II-XII Intact, Oriented x3, Reflexes Normal





- Psychiatric Exam


Psychiatric exam: Normal Mood





- Skin


Skin Exam: Pallor





Assessment and Plan


(1) Sickle cell pain crisis


Status: Acute   





(2) Leukocytosis


Status: Acute   





(3) UTI (urinary tract infection)


Status: Acute   





(4) Anemia


Status: Acute   





(5) Neurogenic bladder


Status: Acute   





- Assessment and Plan (Free Text)


Plan: 








DC PO BACTRIM-DAY5.


REPEAT CLEAN CATCH UA/URINE CULTURE.


START IV MERREM 500MG IV  Q 8HRLY 3/27/18.


F/U CBC 


F/U LFTS





ANALGESICS


F/U REPEAT CULTURES.


CASE DISCUSSED W STAFF.

## 2018-03-27 NOTE — CP.PCM.PN
Subjective





- Date & Time of Evaluation


Date of Evaluation: 03/27/18


Time of Evaluation: 14:00





- Subjective


Subjective: 





Has pain in legs





Objective





- Vital Signs/Intake and Output


Vital Signs (last 24 hours): 


 











Temp Pulse Resp BP Pulse Ox


 


 98.0 F   85   20   103/68   100 


 


 03/27/18 15:02  03/27/18 15:02  03/27/18 15:02  03/27/18 15:02  03/27/18 15:02











- Medications


Medications: 


 Current Medications





Diphenhydramine HCl (Benadryl)  25 mg IVP Q3 PRN


   PRN Reason: Anaphylaxis


   Last Admin: 03/27/18 13:43 Dose:  25 mg


Folic Acid (Folic Acid)  2 mg PO DAILY Former[facility]


   Last Admin: 03/27/18 10:17 Dose:  2 mg


Hydromorphone HCl (Dilaudid)  2 mg IVP Q3 PRN


   PRN Reason: Pain, severe (8-10)


   Last Admin: 03/27/18 13:43 Dose:  2 mg


Meropenem 500 mg/ Sodium (Chloride)  100 mls @ 100 mls/hr IVPB Q8 [facility]


   PRN Reason: Protocol


Pantoprazole Sodium (Protonix Ec Tab)  40 mg PO DAILY [facility]


   Last Admin: 03/27/18 10:16 Dose:  40 mg











- Labs


Labs: 


 





 03/27/18 07:37 





 03/27/18 07:37 











- Head Exam


Head Exam: ATRAUMATIC





- Eye Exam


Eye Exam: Normal appearance





- ENT Exam


ENT Exam: Mucous Membranes Dry





- Respiratory Exam


Respiratory Exam: NORMAL BREATHING PATTERN





- Cardiovascular Exam


Cardiovascular Exam: +S1, +S2





- GI/Abdominal Exam


GI & Abdominal Exam: Normal Bowel Sounds





Assessment and Plan


(1) Sickle cell pain crisis


Assessment & Plan: 


IV fluids, pain meds, 02 via NC, folic acid


s/p PRBC transfusion


Status: Acute   





(2) Leukocytosis


Assessment & Plan: 


on antibiotics


reactive component from sickle cell


Status: Acute   





(3) Sickle cell anemia


Assessment & Plan: 


folic acid


Status: Acute

## 2018-03-27 NOTE — US
PROCEDURE:  Ultrasound of the Kidneys



HISTORY:

hx of hydronephrosis



COMPARISON:

None available.



TECHNIQUE:

Sonogram of the kidneys.



FINDINGS:



RIGHT KIDNEY:

Measures: 10.9 cm. 



Normal in size, contour and echogenicity. 



No nephrolithiasis.  Again seen is severe hydronephrosis. 



LEFT KIDNEY:

Measures: 13.1 cm. 



Normal in size, contour and echogenicity. 



No nephrolithiasis.  Again seen is moderate hydronephrosis. 



OTHER FINDINGS:

Bilateral ureteral stents are visualized. 



Note is made of multiple hypoechoic lesions in the spleen, the 

largest measures 5.1.



IMPRESSION:

Severe right and moderate left hydronephrosis.  Bilateral stents 

remain in place.



Multiple variable-sized lesions in the spleen, the differential 

considerations include metastasis, lymphoma, abscess and 

granulomatous disease.

## 2018-03-28 LAB
ALBUMIN SERPL-MCNC: 3.5 G/DL (ref 3.5–5)
ALBUMIN/GLOB SERPL: 0.7 {RATIO} (ref 1–2.1)
ALT SERPL-CCNC: 91 U/L (ref 9–52)
AST SERPL-CCNC: 137 U/L (ref 14–36)
BASOPHILS # BLD AUTO: 0.2 K/UL (ref 0–0.2)
BASOPHILS NFR BLD: 1.1 % (ref 0–2)
BILIRUB DIRECT SERPL-MCNC: 0.8 MG/DL (ref 0–0.4)
BUN SERPL-MCNC: 22 MG/DL (ref 7–17)
CALCIUM SERPL-MCNC: 7.9 MG/DL (ref 8.6–10.4)
EOSINOPHIL # BLD AUTO: 1.3 K/UL (ref 0–0.7)
EOSINOPHIL NFR BLD: 7.1 % (ref 0–4)
ERYTHROCYTE [DISTWIDTH] IN BLOOD BY AUTOMATED COUNT: 18.3 % (ref 11.5–14.5)
GFR NON-AFRICAN AMERICAN: 42
HGB BLD-MCNC: 6.5 G/DL (ref 11–16)
LYMPHOCYTES # BLD AUTO: 4.9 K/UL (ref 1–4.3)
LYMPHOCYTES NFR BLD AUTO: 26.4 % (ref 20–40)
MCH RBC QN AUTO: 30.2 PG (ref 27–31)
MCHC RBC AUTO-ENTMCNC: 34.5 G/DL (ref 33–37)
MCV RBC AUTO: 87.4 FL (ref 81–99)
MONOCYTES # BLD: 1.7 K/UL (ref 0–0.8)
MONOCYTES NFR BLD: 9 % (ref 0–10)
NEUTROPHILS # BLD: 10.5 K/UL (ref 1.8–7)
NEUTROPHILS NFR BLD AUTO: 56.4 % (ref 50–75)
NRBC BLD AUTO-RTO: 0.1 % (ref 0–2)
PLATELET # BLD: 132 K/UL (ref 130–400)
PMV BLD AUTO: 9.1 FL (ref 7.2–11.7)
RBC # BLD AUTO: 2.15 MIL/UL (ref 3.8–5.2)
WBC # BLD AUTO: 18.6 K/UL (ref 4.8–10.8)

## 2018-03-28 RX ADMIN — PANTOPRAZOLE SODIUM SCH MG: 40 TABLET, DELAYED RELEASE ORAL at 09:26

## 2018-03-28 RX ADMIN — DIPHENHYDRAMINE HYDROCHLORIDE PRN MG: 50 INJECTION INTRAMUSCULAR; INTRAVENOUS at 19:05

## 2018-03-28 RX ADMIN — DIPHENHYDRAMINE HYDROCHLORIDE PRN MG: 50 INJECTION INTRAMUSCULAR; INTRAVENOUS at 12:31

## 2018-03-28 RX ADMIN — DIPHENHYDRAMINE HYDROCHLORIDE PRN MG: 50 INJECTION INTRAMUSCULAR; INTRAVENOUS at 16:02

## 2018-03-28 RX ADMIN — DIPHENHYDRAMINE HYDROCHLORIDE PRN MG: 50 INJECTION INTRAMUSCULAR; INTRAVENOUS at 03:21

## 2018-03-28 RX ADMIN — DIPHENHYDRAMINE HYDROCHLORIDE PRN MG: 50 INJECTION INTRAMUSCULAR; INTRAVENOUS at 06:17

## 2018-03-28 RX ADMIN — DIPHENHYDRAMINE HYDROCHLORIDE PRN MG: 50 INJECTION INTRAMUSCULAR; INTRAVENOUS at 09:26

## 2018-03-28 RX ADMIN — DIPHENHYDRAMINE HYDROCHLORIDE PRN MG: 50 INJECTION INTRAMUSCULAR; INTRAVENOUS at 22:07

## 2018-03-28 NOTE — CP.PCM.PN
Subjective





- Date & Time of Evaluation


Date of Evaluation: 03/28/18


Time of Evaluation: 07:20





- Subjective


Subjective: 


clinically same





Objective





- Vital Signs/Intake and Output


Vital Signs (last 24 hours): 


 











Temp Pulse Resp BP Pulse Ox


 


 97.4 F L  97 H  20   100/65   97 


 


 03/28/18 15:40  03/28/18 15:40  03/28/18 15:40  03/28/18 15:40  03/28/18 15:40








Intake and Output: 


 











 03/28/18 03/28/18





 06:59 18:59


 


Intake Total  1180


 


Balance  1180














- Medications


Medications: 


 Current Medications





Diphenhydramine HCl (Benadryl)  25 mg IVP Q3 PRN


   PRN Reason: Anaphylaxis


   Last Admin: 03/28/18 16:02 Dose:  25 mg


Folic Acid (Folic Acid)  2 mg PO DAILY [facility]


   Last Admin: 03/28/18 09:26 Dose:  2 mg


Hydromorphone HCl (Dilaudid)  2 mg IVP Q3 PRN


   PRN Reason: Pain, severe (8-10)


   Last Admin: 03/28/18 16:03 Dose:  2 mg


Meropenem 500 mg/ Sodium (Chloride)  100 mls @ 100 mls/hr IVPB Q8 [facility]


   PRN Reason: Protocol


   Last Admin: 03/28/18 14:25 Dose:  100 mls/hr


Pantoprazole Sodium (Protonix Ec Tab)  40 mg PO DAILY [facility]


   Last Admin: 03/28/18 09:26 Dose:  40 mg











- Labs


Labs: 


 





 03/28/18 07:20 





 03/28/18 07:20 











- Constitutional


Appears: Well





- Head Exam


Head Exam: ATRAUMATIC, NORMAL INSPECTION, NORMOCEPHALIC





- Eye Exam


Eye Exam: EOMI, Normal appearance, PERRL


Pupil Exam: NORMAL ACCOMODATION, PERRL





- ENT Exam


ENT Exam: Mucous Membranes Moist, Normal Exam





- Neck Exam


Neck Exam: Full ROM, Normal Inspection.  absent: Lymphadenopathy





- Respiratory Exam


Respiratory Exam: Decreased Breath Sounds





- Cardiovascular Exam


Cardiovascular Exam: REGULAR RHYTHM, +S1, +S2





- GI/Abdominal Exam


GI & Abdominal Exam: Soft, Diminished Bowel Sounds





- Rectal Exam


Rectal Exam: Deferred





Assessment and Plan


(1) Anemia


Status: Acute   





(2) Leukocytosis


Status: Acute   





(3) Sickle cell pain crisis


Status: Acute   





(4) UTI (urinary tract infection)


Status: Acute   





(5) Animal bite wound


Status: Acute   





(6) Chronic pain disorder


Status: Acute   





(7) Leg pain


Status: Acute   





(8) Leukocytosis


Status: Acute   





(9) Myalgia


Status: Acute   





(10) Neurogenic bladder


Status: Acute   





(11) Pain


Status: Acute   





(12) Severe anemia


Status: Acute   





(13) Sickle cell anemia


Status: Acute   





(14) Sickle cell anemia with pain


Status: Acute   





- Assessment and Plan (Free Text)


Plan: 





Sickle cell Crisis


Admit to med/surg


Pt with LBP, leg pain; denies CP, SOB


HGB 6.3 on (3/21/18), 6.5 today 3/28


- s/p 1 unit RBC 3/24





Dr. Olvera Hem/Onc consultant, help greatly appreciated


   -IVF, pain meds, 02 via NC, folic acid, Transfuse under Hgb 5





Folic acid 2mg PO daily


Dilaudid 2mg IV q3hrs prn 


Benadryl 25mg IV Q3hrs prn





Urinary tract infection, ESBL +


UA (3/21/18): 3+ LE, Nitrate negative, , Moderate bacteria


Urine Culture: ESBL


Negative pregnancy


ID consult. Dr. Sagar Stanton. recs appreciated. 


   -DC PO BACTRIM-DAY5


      - Discontinued SS Bactrim 1 tab PO Q12H on 3/27 (started 3/24/18)


      - Discontinued DS Bactrim 1 tab PO Q12H on 3/24 (started 3/22/18)


   -START IV MERREM 500MG IV  Q 8HRLY 3/27/18   


   -REPEAT CLEAN CATCH UA/URINE CULTURE.


Uro consult Dr. JAZZY Chacko. Recs appreciated.


   - CT abdomen pelvis ordered- follow up results


   - may need ureteral stents changed- follow up rec





Renal US 3/27 severe right and mod left hydronephrosis





Previous Admission:


-Bladder US (3/10/18)- B/L ureteral stents with moderate hydronephrosis R>L


-Ucx (3/7/18)- vancomycin resistant E. faecium





Leukocytosis


WBC 18.6 3/28


Etiology: UTI in setting of SSC


ID consult. Dr. Sagar Stanton. recs appreciated. 


   -START IV MERREM 500MG IV  Q 8HRLY 3/27/18


Uro consult Dr. JAZZY Chacko. Recs appreciated.


   - CT abdomen pelvis ordered- follow up results


   - may need ureteral stents changed- follow up rec





Transaminitis- worsening


AST/ALT/ALK Phos 126/78/195 (3/27/18)


   AST/ALT/ALK Phos 137/91/184 (3/28/18)


Tbili 1.8 -> 1.9


Follow up CT abdomen/pelvis


Monitor





Neurogenic bladder


-straight cath PRN; patient is able to do herself 





Elevated creatinine


Cr 1.4 today 3/28


Will continue to monitor





GI/DVT ppx


-protonix 40mg PO daily


SCDs not warranted; pt mobile


Lovenox discontinued 3/26

## 2018-03-28 NOTE — CP.PCM.PN
Subjective





- Date & Time of Evaluation


Date of Evaluation: 03/28/18


Time of Evaluation: 19:57





- Subjective


Subjective: 


C/O B/L LEG PAINS


LOWER ABDOMINAL /PELVIC DISCOMFORT 





STILL WITH LEUKOCYTOSIS.





STARTED ON IV MERREM 3/27/18.


CASE DISCUSSED WITH DR ROGER CLARKE FOR EVALUATION OF B/L STENTS.





FOR NOW CPM.


F/U REPEAT  URINE-CULTURES








Objective





- Vital Signs/Intake and Output


Vital Signs (last 24 hours): 


 











Temp Pulse Resp BP Pulse Ox


 


 97.4 F L  97 H  20   100/65   97 


 


 03/28/18 15:40  03/28/18 15:40  03/28/18 15:40  03/28/18 15:40  03/28/18 15:40








Intake and Output: 


 











 03/28/18 03/29/18





 18:59 06:59


 


Intake Total 1180 


 


Balance 1180 














- Medications


Medications: 


 Current Medications





Diphenhydramine HCl (Benadryl)  25 mg IVP Q3 PRN


   PRN Reason: Anaphylaxis


   Last Admin: 03/28/18 19:05 Dose:  25 mg


Folic Acid (Folic Acid)  2 mg PO DAILY [facility]


   Last Admin: 03/28/18 09:26 Dose:  2 mg


Hydromorphone HCl (Dilaudid)  2 mg IVP Q3 PRN


   PRN Reason: Pain, severe (8-10)


   Last Admin: 03/28/18 19:06 Dose:  2 mg


Meropenem 500 mg/ Sodium (Chloride)  100 mls @ 100 mls/hr IVPB Q8 CHENG


   PRN Reason: Protocol


   Last Admin: 03/28/18 14:25 Dose:  100 mls/hr


Pantoprazole Sodium (Protonix Ec Tab)  40 mg PO DAILY [facility]


   Last Admin: 03/28/18 09:26 Dose:  40 mg











- Labs


Labs: 


 





 03/28/18 07:20 





 03/28/18 07:20 











- Constitutional


Appears: No Acute Distress, Chronically Ill





- Head Exam


Head Exam: NORMAL INSPECTION





- Eye Exam


Eye Exam: EOMI, PERRL





- ENT Exam


ENT Exam: Normal Oropharynx





- Neck Exam


Neck Exam: Normal Inspection





- Respiratory Exam


Respiratory Exam: Clear to Ausculation Bilateral





- Cardiovascular Exam


Cardiovascular Exam: REGULAR RHYTHM, +S1, +S2





- GI/Abdominal Exam


GI & Abdominal Exam: Soft, Tenderness (PELVIC AREA), Hypoactive Bowel Sounds





- Neurological Exam


Neurological Exam: Alert, Awake, CN II-XII Intact, Oriented x3, Reflexes Normal





- Psychiatric Exam


Psychiatric exam: Normal Mood





- Skin


Skin Exam: Pallor, Warm





Assessment and Plan


(1) Sickle cell pain crisis


Status: Acute   





(2) Leukocytosis


Status: Acute   





(3) UTI (urinary tract infection)


Status: Acute   





(4) Anemia


Status: Acute   





(5) Neurogenic bladder


Status: Acute   





- Assessment and Plan (Free Text)


Plan: 





REPEAT CLEAN CATCH UA/URINE CULTURE.-P


ON IV MERREM 500MG IV  Q 8HRLY 3/27/18.


F/U CBC 


F/U LFTS





ANALGESICS


F/U REPEAT CULTURES.


 TO EVALUATE B/L STENTS.





CASE DISCUSSED W STAFF AND DR HINSON.

## 2018-03-28 NOTE — CT
PROCEDURE:  CT Abdomen and Pelvis without intravenous contrast



HISTORY:

stones



COMPARISON:

None.



TECHNIQUE:

Contiguous images were obtained from the domes of the diaphragms to 

the upper thighs without the administration of intravenous contrast. 

Oral contrast was not administered.



Radiation dose: 



Total exam DLP = 284.5 mGy-cm.



This CT exam was performed using one or more of the following dose 

reduction techniques: Automated exposure control, adjustment of the 

mA and/or kV according to patient size, and/or use of iterative 

reconstruction technique.



FINDINGS:



LOWER THORAX:

Unremarkable. 



LIVER:

Hepatomegaly. Diffusely dense liver. No gross lesion or ductal 

dilatation.  



GALLBLADDER AND BILE DUCTS:

Unremarkable. 



PANCREAS:

Unremarkable. No gross lesion or ductal dilatation.



SPLEEN:

Splenomegaly with multiple low-density areas and high density areas. 



ADRENALS:

Unremarkable. No mass. 



KIDNEYS AND URETERS:

Stable right renal cortical thinning. Malpositioned right ureteral 

stent with proximal pigtail in the right upper pole and distal 

pigtail in the distal right ureter. Left ureteral stent is 

satisfactory position. Bilateral pelvocaliectasis. 



VASCULATURE:

Unremarkable. No aortic aneurysm. 



BOWEL:

Prominent amount of retained colonic stool. No obstruction. No gross 

mural thickening. 



APPENDIX:

Normal. 



PERITONEUM:

Unremarkable. No free fluid. No free air. 



LYMPH NODES:

Hyperdense lymph nodes. 



BLADDER:

Distended. 



REPRODUCTIVE:

Unremarkable. 



BONES:

Partially imaged [place] rods. Erosive change involving endplates 

flanking L3-4 as well as superior endplate of L5. Patchy sclerosis of 

the bilateral femoral heads. 



OTHER FINDINGS:

None.



IMPRESSION:

Hepatosplenomegaly with high-density liver, spleen and lymph nodes 

compatible with iron deposition disease.



Malpositioned right ureteral stent with proximal pigtail in the right 

upper pole and distal pigtail in the distal right ureter. Urologic 

repositioning is recommended.



No urolithiasis or evidence of recently passed genitourinary calculus.



Erosive change involving the endplates flanking L3-4 as well as 

superior endplate of L5. If there is clinical concern for 

osteomyelitis, MRI of the lumbar spine can be obtained for further 

evaluation.



Additional findings as above.

## 2018-03-28 NOTE — CP.PCM.PN
<Behzad Gamboa S - Last Filed: 03/28/18 13:49>





Subjective





- Date & Time of Evaluation


Date of Evaluation: 03/28/18


Time of Evaluation: 11:06





- Subjective


Subjective: 





Progress note for Dr. Etienne's Service





Pt seen and examined at bedside. Continues to have pains mostly in her legs. 

Medications do provided relief. No acute complaints.





Objective





- Vital Signs/Intake and Output


Vital Signs (last 24 hours): 


 











Temp Pulse Resp BP Pulse Ox


 


 98.1 F   96 H  20   100/67   99 


 


 03/28/18 08:36  03/28/18 08:36  03/28/18 08:36  03/28/18 08:36  03/28/18 08:36








Intake and Output: 


 











 03/28/18 03/28/18





 06:59 18:59


 


Intake Total  600


 


Balance  600














- Medications


Medications: 


 Current Medications





Diphenhydramine HCl (Benadryl)  25 mg IVP Q3 PRN


   PRN Reason: Anaphylaxis


   Last Admin: 03/28/18 09:26 Dose:  25 mg


Folic Acid (Folic Acid)  2 mg PO DAILY [facility]


   Last Admin: 03/28/18 09:26 Dose:  2 mg


Hydromorphone HCl (Dilaudid)  2 mg IVP Q3 PRN


   PRN Reason: Pain, severe (8-10)


   Last Admin: 03/28/18 09:27 Dose:  2 mg


Meropenem 500 mg/ Sodium (Chloride)  100 mls @ 100 mls/hr IVPB Q8 [facility]


   PRN Reason: Protocol


   Last Admin: 03/28/18 06:13 Dose:  100 mls/hr


Pantoprazole Sodium (Protonix Ec Tab)  40 mg PO DAILY [facility]


   Last Admin: 03/28/18 09:26 Dose:  40 mg











- Labs


Labs: 


 





 03/28/18 07:20 





 03/28/18 07:20 











- Constitutional


Appears: No Acute Distress





- Head Exam


Head Exam: ATRAUMATIC





- Eye Exam


Eye Exam: Normal appearance





- ENT Exam


ENT Exam: Mucous Membranes Moist





- Respiratory Exam


Respiratory Exam: Clear to Ausculation Bilateral, NORMAL BREATHING PATTERN.  

absent: Rhonchi, Wheezes





- Cardiovascular Exam


Cardiovascular Exam: REGULAR RHYTHM, +S1, +S2





- GI/Abdominal Exam


GI & Abdominal Exam: Distended (mild), Soft.  absent: Tenderness





- Back Exam


Back Exam: absent: CVA tenderness (L), CVA tenderness (R)





- Neurological Exam


Neurological Exam: Alert, Awake, Oriented x3





- Psychiatric Exam


Psychiatric exam: Normal Affect, Normal Mood





- Skin


Skin Exam: Dry, Warm





Assessment and Plan





- Assessment and Plan (Free Text)


Plan: 





Sickle cell Crisis


Admit to med/surg


Pt with LBP, leg pain; denies CP, SOB


HGB 6.3 on (3/21/18), 6.5 today 3/28


- s/p 1 unit RBC 3/24





Dr. Olvera Hem/Onc consultant, help greatly appreciated


   -IVF, pain meds, 02 via NC, folic acid, Transfuse under Hgb 5





Folic acid 2mg PO daily


Dilaudid 2mg IV q3hrs prn 


Benadryl 25mg IV Q3hrs prn





Urinary tract infection, ESBL +


UA (3/21/18): 3+ LE, Nitrate negative, , Moderate bacteria


Urine Culture: ESBL


Negative pregnancy


ID consult. Dr. Sagar Stanton. recs appreciated. 


   -DC PO BACTRIM-DAY5


      - Discontinued SS Bactrim 1 tab PO Q12H on 3/27 (started 3/24/18)


      - Discontinued DS Bactrim 1 tab PO Q12H on 3/24 (started 3/22/18)


   -START IV MERREM 500MG IV  Q 8HRLY 3/27/18   


   -REPEAT CLEAN CATCH UA/URINE CULTURE.


Uro consult Dr. JAZZY Chacko. Recs appreciated.


   - CT abdomen pelvis ordered- follow up results


   - may need ureteral stents changed- follow up rec





Renal US 3/27 severe right and mod left hydronephrosis





Previous Admission:


-Bladder US (3/10/18)- B/L ureteral stents with moderate hydronephrosis R>L


-Ucx (3/7/18)- vancomycin resistant E. faecium





Leukocytosis


WBC 18.6 3/28


Etiology: UTI in setting of SSC


ID consult. Dr. Sagar Stanton. recs appreciated. 


   -START IV MERREM 500MG IV  Q 8HRLY 3/27/18


Uro consult Dr. JAZZY Chacko. Recs appreciated.


   - CT abdomen pelvis ordered- follow up results


   - may need ureteral stents changed- follow up rec





Transaminitis- worsening


AST/ALT/ALK Phos 126/78/195 (3/27/18)


   AST/ALT/ALK Phos 137/91/184 (3/28/18)


Tbili 1.8 -> 1.9


Follow up CT abdomen/pelvis


Monitor





Neurogenic bladder


-straight cath PRN; patient is able to do herself 





Elevated creatinine


Cr 1.4 today 3/28


Will continue to monitor





GI/DVT ppx


-protonix 40mg PO daily


SCDs not warranted; pt mobile


Lovenox discontinued 3/26





Case discussed with Dr. Etienne


All management as per Dr. Etienne








<Shahab Etienne - Last Filed: 03/29/18 18:40>





Objective





- Vital Signs/Intake and Output


Vital Signs (last 24 hours): 


 











Temp Pulse Resp BP Pulse Ox


 


 98.2 F   101 H  20   103/67   99 


 


 03/29/18 15:00  03/29/18 15:00  03/29/18 15:00  03/29/18 15:00  03/29/18 15:00








Intake and Output: 


 











 03/29/18 03/29/18





 06:59 18:59


 


Intake Total 450 1000


 


Balance 450 1000














- Medications


Medications: 


 Current Medications





Diphenhydramine HCl (Benadryl)  25 mg IVP Q3 PRN


   PRN Reason: Anaphylaxis


   Last Admin: 03/29/18 16:27 Dose:  25 mg


Folic Acid (Folic Acid)  2 mg PO DAILY [facility]


   Last Admin: 03/29/18 10:10 Dose:  2 mg


Hydromorphone HCl (Dilaudid)  2 mg IVP Q3 PRN


   PRN Reason: Pain, severe (8-10)


   Last Admin: 03/29/18 16:28 Dose:  2 mg


Meropenem 500 mg/ Sodium (Chloride)  100 mls @ 100 mls/hr IVPB Q8 [facility]ENG


   PRN Reason: Protocol


   Last Admin: 03/29/18 13:12 Dose:  100 mls/hr


Pantoprazole Sodium (Protonix Ec Tab)  40 mg PO DAILY [facility]


   Last Admin: 03/29/18 10:10 Dose:  40 mg











- Labs


Labs: 


 





 03/29/18 07:33 





 03/29/18 07:33 











Assessment and Plan


(1) Anemia


Status: Acute   





(2) Leukocytosis


Status: Acute   





(3) Sickle cell pain crisis


Status: Acute   





(4) UTI (urinary tract infection)


Status: Acute   





(5) Animal bite wound


Status: Acute   





(6) Chronic pain disorder


Status: Acute   





(7) Leg pain


Status: Acute   





(8) Leukocytosis


Status: Acute   





(9) Myalgia


Status: Acute   





(10) Neurogenic bladder


Status: Acute   





(11) Pain


Status: Acute   





(12) Severe anemia


Status: Acute   





(13) Sickle cell anemia


Status: Acute   





(14) Sickle cell anemia with pain


Status: Acute   





Attending/Attestation





- Attestation


I have personally seen and examined this patient.: Yes


I have fully participated in the care of the patient.: Yes


I have reviewed all pertinent clinical information, including history, physical 

exam and plan: Yes


Notes (Text): 





03/29/18 18:39


case seen and d.w staff and resident

## 2018-03-29 LAB
ALBUMIN SERPL-MCNC: 3 G/DL (ref 3.5–5)
ALBUMIN/GLOB SERPL: 0.7 {RATIO} (ref 1–2.1)
ALT SERPL-CCNC: 74 U/L (ref 9–52)
AST SERPL-CCNC: 107 U/L (ref 14–36)
BASOPHILS # BLD AUTO: 0.2 K/UL (ref 0–0.2)
BASOPHILS NFR BLD: 1 % (ref 0–2)
BUN SERPL-MCNC: 23 MG/DL (ref 7–17)
CALCIUM SERPL-MCNC: 7.6 MG/DL (ref 8.6–10.4)
EOSINOPHIL # BLD AUTO: 1.2 K/UL (ref 0–0.7)
EOSINOPHIL NFR BLD: 6.7 % (ref 0–4)
ERYTHROCYTE [DISTWIDTH] IN BLOOD BY AUTOMATED COUNT: 18.2 % (ref 11.5–14.5)
GFR NON-AFRICAN AMERICAN: 50
HGB BLD-MCNC: 5.8 G/DL (ref 11–16)
LYMPHOCYTES # BLD AUTO: 4.1 K/UL (ref 1–4.3)
LYMPHOCYTES NFR BLD AUTO: 23.3 % (ref 20–40)
MCH RBC QN AUTO: 29.7 PG (ref 27–31)
MCHC RBC AUTO-ENTMCNC: 34.6 G/DL (ref 33–37)
MCV RBC AUTO: 86 FL (ref 81–99)
MONOCYTES # BLD: 1.5 K/UL (ref 0–0.8)
MONOCYTES NFR BLD: 8.3 % (ref 0–10)
NEUTROPHILS # BLD: 10.6 K/UL (ref 1.8–7)
NEUTROPHILS NFR BLD AUTO: 60.7 % (ref 50–75)
NRBC BLD AUTO-RTO: 0.1 % (ref 0–2)
PLATELET # BLD: 165 K/UL (ref 130–400)
PMV BLD AUTO: 8.5 FL (ref 7.2–11.7)
RBC # BLD AUTO: 1.96 MIL/UL (ref 3.8–5.2)
WBC # BLD AUTO: 17.5 K/UL (ref 4.8–10.8)

## 2018-03-29 RX ADMIN — DIPHENHYDRAMINE HYDROCHLORIDE PRN MG: 50 INJECTION INTRAMUSCULAR; INTRAVENOUS at 16:27

## 2018-03-29 RX ADMIN — DIPHENHYDRAMINE HYDROCHLORIDE PRN MG: 50 INJECTION INTRAMUSCULAR; INTRAVENOUS at 04:11

## 2018-03-29 RX ADMIN — DIPHENHYDRAMINE HYDROCHLORIDE PRN MG: 50 INJECTION INTRAMUSCULAR; INTRAVENOUS at 19:27

## 2018-03-29 RX ADMIN — DIPHENHYDRAMINE HYDROCHLORIDE PRN MG: 50 INJECTION INTRAMUSCULAR; INTRAVENOUS at 13:10

## 2018-03-29 RX ADMIN — DIPHENHYDRAMINE HYDROCHLORIDE PRN MG: 50 INJECTION INTRAMUSCULAR; INTRAVENOUS at 07:20

## 2018-03-29 RX ADMIN — PANTOPRAZOLE SODIUM SCH MG: 40 TABLET, DELAYED RELEASE ORAL at 10:10

## 2018-03-29 RX ADMIN — DIPHENHYDRAMINE HYDROCHLORIDE PRN MG: 50 INJECTION INTRAMUSCULAR; INTRAVENOUS at 01:11

## 2018-03-29 RX ADMIN — DIPHENHYDRAMINE HYDROCHLORIDE PRN MG: 50 INJECTION INTRAMUSCULAR; INTRAVENOUS at 22:35

## 2018-03-29 RX ADMIN — DIPHENHYDRAMINE HYDROCHLORIDE PRN MG: 50 INJECTION INTRAMUSCULAR; INTRAVENOUS at 10:10

## 2018-03-29 NOTE — CP.PCM.PN
Subjective





- Date & Time of Evaluation


Date of Evaluation: 03/29/18


Time of Evaluation: 07:20





- Subjective


Subjective: 


Patient seen and examined at bedside. No acute events overnight. Patient still 

complains of abdominal pain but controlled with pain medication. Patient 

revealed to me that she never follow up with her urologist for her stent 

removal since last hospitalization. Patient denies fever, chills, shortness of 

breath, chest pain, nausea, or vomiting.








Objective





- Vital Signs/Intake and Output


Vital Signs (last 24 hours): 


 











Temp Pulse Resp BP Pulse Ox


 


 98.3 F   90   20   104/67   100 


 


 03/29/18 07:40  03/29/18 07:40  03/29/18 07:40  03/29/18 07:40  03/29/18 07:40








Intake and Output: 


 











 03/29/18 03/29/18





 06:59 18:59


 


Intake Total 450 650


 


Balance 450 650














- Medications


Medications: 


 Current Medications





Diphenhydramine HCl (Benadryl)  25 mg IVP Q3 PRN


   PRN Reason: Anaphylaxis


   Last Admin: 03/29/18 10:10 Dose:  25 mg


Folic Acid (Folic Acid)  2 mg PO DAILY [facility]


   Last Admin: 03/29/18 10:10 Dose:  2 mg


Hydromorphone HCl (Dilaudid)  2 mg IVP Q3 PRN


   PRN Reason: Pain, severe (8-10)


   Last Admin: 03/29/18 07:18 Dose:  2 mg


Meropenem 500 mg/ Sodium (Chloride)  100 mls @ 100 mls/hr IVPB Q8 [facility]ENG


   PRN Reason: Protocol


   Last Admin: 03/29/18 05:26 Dose:  100 mls/hr


Pantoprazole Sodium (Protonix Ec Tab)  40 mg PO DAILY [facility]


   Last Admin: 03/29/18 10:10 Dose:  40 mg











- Labs


Labs: 


 





 03/29/18 07:33 





 03/29/18 07:33 











- Constitutional


Appears: Well





- Head Exam


Head Exam: ATRAUMATIC, NORMAL INSPECTION, NORMOCEPHALIC





- Eye Exam


Eye Exam: EOMI, Normal appearance, PERRL


Pupil Exam: NORMAL ACCOMODATION, PERRL





- ENT Exam


ENT Exam: Mucous Membranes Moist, Normal Exam





- Neck Exam


Neck Exam: Full ROM, Normal Inspection.  absent: Lymphadenopathy





- Respiratory Exam


Respiratory Exam: Decreased Breath Sounds





- Cardiovascular Exam


Cardiovascular Exam: REGULAR RHYTHM, +S1, +S2





- GI/Abdominal Exam


GI & Abdominal Exam: Soft, Diminished Bowel Sounds





- Rectal Exam


Rectal Exam: Deferred





Assessment and Plan


(1) Anemia


Status: Acute   





(2) Leukocytosis


Status: Acute   





(3) Sickle cell pain crisis


Status: Acute   





(4) UTI (urinary tract infection)


Status: Acute   





(5) Animal bite wound


Status: Acute   





(6) Chronic pain disorder


Status: Acute   





(7) Leg pain


Status: Acute   





(8) Leukocytosis


Status: Acute   





(9) Myalgia


Status: Acute   





(10) Neurogenic bladder


Status: Acute   





(11) Pain


Status: Acute   





(12) Severe anemia


Status: Acute   





(13) Sickle cell anemia


Status: Acute   





(14) Sickle cell anemia with pain


Status: Acute   





- Assessment and Plan (Free Text)


Plan: 





Sickle cell Crisis


Admit to med/surg


Pt with LBP, leg pain; denies CP, SOB


HGB 6.3 on (3/21/18), 6.5 on (3/28), 5.8 today


- s/p 1 unit RBC 3/24





Dr. Olvera Hem/Onc consultant, help greatly appreciated


   -IVF, pain meds, 02 via NC, folic acid, Transfuse under Hgb 5





Folic acid 2mg PO daily


Dilaudid 2mg IV q3hrs prn 


Benadryl 25mg IV Q3hrs prn





Urinary tract infection, ESBL +


UA (3/21/18): 3+ LE, Nitrate negative, , Moderate bacteria


Urine Culture: ESBL


Negative pregnancy


ID consult. Dr. Sagar Stanton. recs appreciated. 


   -DC PO BACTRIM-DAY5


      - Discontinued SS Bactrim 1 tab PO Q12H on 3/27 (started 3/24/18)


      - Discontinued DS Bactrim 1 tab PO Q12H on 3/24 (started 3/22/18)


   -START IV MERREM 500MG IV  Q 8HRLY 3/27/18   


   -REPEAT URINE CULTURE Gram negative tomi, Gram positive cocci


Uro consult Dr. JAZZY Chacko. Recs appreciated.


   - CT abdomen pelvis ordered- Malpositioned right ureteral stent with 

proximal pigtail in the right upper pole and distal pigtail in the distal right 

ureter.                      Urologic repositioning is recommended.


   - Cystoscopy planned for 3/30 at 3pm


   - clear liquid for breakfast, NPO afterwards








Renal US 3/27 severe right and mod left hydronephrosis





Previous Admission:


-Bladder US (3/10/18)- B/L ureteral stents with moderate hydronephrosis R>L


-Ucx (3/7/18)- vancomycin resistant E. faecium





Leukocytosis


WBC 17.5 3/29


Etiology: UTI in setting of SSC


ID consult. Dr. Sagar Statnon. recs appreciated. 


   -continue IV MERREM 500MG IV  Q 8HRLY 3/27/18


Uro consult Dr. JAZZY Chacko. Recs appreciated





Transaminitis


AST/ALT/ALK Phos 126/78/195 (3/27/18)


   AST/ALT/ALK Phos 137/91/184 (3/28/18)


      AST/ALT/ALK Phos 107/74/182 (3/29/18)


Tbili 1.7


Monitor





Neurogenic bladder


-straight cath PRN; patient is able to do herself 





Elevated creatinine


Cr 1.2 today 3/29


Will continue to monitor





GI/DVT ppx


-protonix 40mg PO daily


SCDs not warranted; pt mobile


Lovenox discontinued 3/26

## 2018-03-29 NOTE — CP.PCM.PN
Subjective





- Date & Time of Evaluation


Date of Evaluation: 03/28/18


Time of Evaluation: 19:00





- Subjective


Subjective: 





Has pain in legs





Objective





- Vital Signs/Intake and Output


Vital Signs (last 24 hours): 


 











Temp Pulse Resp BP Pulse Ox


 


 98.2 F   101 H  20   103/67   99 


 


 03/29/18 15:00  03/29/18 15:00  03/29/18 15:00  03/29/18 15:00  03/29/18 15:00








Intake and Output: 


 











 03/29/18 03/30/18





 18:59 06:59


 


Intake Total 1000 450


 


Balance 1000 450














- Medications


Medications: 


 Current Medications





Diphenhydramine HCl (Benadryl)  25 mg IVP Q3 PRN


   PRN Reason: Anaphylaxis


   Last Admin: 03/29/18 22:35 Dose:  25 mg


Folic Acid (Folic Acid)  2 mg PO DAILY [facility]


   Last Admin: 03/29/18 10:10 Dose:  2 mg


Hydromorphone HCl (Dilaudid)  2 mg IVP Q3 PRN


   PRN Reason: Pain, severe (8-10)


   Last Admin: 03/29/18 22:35 Dose:  2 mg


Meropenem 500 mg/ Sodium (Chloride)  100 mls @ 100 mls/hr IVPB Q8 [facility]


   PRN Reason: Protocol


   Last Admin: 03/29/18 21:25 Dose:  100 mls/hr


Pantoprazole Sodium (Protonix Ec Tab)  40 mg PO DAILY [facility]


   Last Admin: 03/29/18 10:10 Dose:  40 mg











- Labs


Labs: 


 





 03/29/18 07:33 





 03/29/18 07:33 











- Head Exam


Head Exam: ATRAUMATIC





- Eye Exam


Eye Exam: Normal appearance





- ENT Exam


ENT Exam: Mucous Membranes Dry





- Respiratory Exam


Respiratory Exam: NORMAL BREATHING PATTERN





- Cardiovascular Exam


Cardiovascular Exam: +S1, +S2





- GI/Abdominal Exam


GI & Abdominal Exam: Normal Bowel Sounds





Assessment and Plan


(1) Sickle cell pain crisis


Assessment & Plan: 


IV fluids, pain meds, 02 via NC, folic acid


s/p PRBC transfusion


Status: Acute   





(2) Leukocytosis


Assessment & Plan: 


on antibiotics


Status: Acute   





(3) Sickle cell anemia


Assessment & Plan: 


outpatient folic acid


Status: Acute

## 2018-03-29 NOTE — CP.PCM.PN
<Liu,Weilin - Last Filed: 03/29/18 15:09>





Subjective





- Date & Time of Evaluation


Date of Evaluation: 03/29/18


Time of Evaluation: 09:00





- Subjective


Subjective: 





PGY-2 Progress Note for Dr. Etienne





Patient seen and examined at bedside. No acute events overnight. Patient still 

complains of abdominal pain but controlled with pain medication. Patient 

revealed to me that she never follow up with her urologist for her stent 

removal since last hospitalization. Patient denies fever, chills, shortness of 

breath, chest pain, nausea, or vomiting.





Objective





- Vital Signs/Intake and Output


Vital Signs (last 24 hours): 


 











Temp Pulse Resp BP Pulse Ox


 


 98.3 F   90   20   104/67   100 


 


 03/29/18 07:40  03/29/18 07:40  03/29/18 07:40  03/29/18 07:40  03/29/18 07:40








Intake and Output: 


 











 03/29/18 03/29/18





 06:59 18:59


 


Intake Total 450 650


 


Balance 450 650














- Medications


Medications: 


 Current Medications





Diphenhydramine HCl (Benadryl)  25 mg IVP Q3 PRN


   PRN Reason: Anaphylaxis


   Last Admin: 03/29/18 07:20 Dose:  25 mg


Folic Acid (Folic Acid)  2 mg PO DAILY [facility]


   Last Admin: 03/28/18 09:26 Dose:  2 mg


Hydromorphone HCl (Dilaudid)  2 mg IVP Q3 PRN


   PRN Reason: Pain, severe (8-10)


   Last Admin: 03/29/18 07:18 Dose:  2 mg


Meropenem 500 mg/ Sodium (Chloride)  100 mls @ 100 mls/hr IVPB Q8 [facility]


   PRN Reason: Protocol


   Last Admin: 03/29/18 05:26 Dose:  100 mls/hr


Pantoprazole Sodium (Protonix Ec Tab)  40 mg PO DAILY [facility]


   Last Admin: 03/28/18 09:26 Dose:  40 mg











- Labs


Labs: 


 





 03/29/18 07:33 





 03/29/18 07:33 











- Additional Findings


Additional findings: 





- Constitutional


Appears: No Acute Distress





- Head Exam


Head Exam: ATRAUMATIC





- Eye Exam


Eye Exam: Normal appearance





- ENT Exam


ENT Exam: Mucous Membranes Moist





- Respiratory Exam


Respiratory Exam: Clear to Ausculation Bilateral, NORMAL BREATHING PATTERN.  

absent: Rhonchi, Wheezes





- Cardiovascular Exam


Cardiovascular Exam: REGULAR RHYTHM, +S1, +S2





- GI/Abdominal Exam


GI & Abdominal Exam: Distended (mild), Soft.  absent: Tenderness





- Back Exam


Back Exam: absent: CVA tenderness (L), CVA tenderness (R)





- Neurological Exam


Neurological Exam: Alert, Awake, Oriented x3





- Psychiatric Exam


Psychiatric exam: Normal Affect, Normal Mood





- Skin


Skin Exam: Dry, Warm





Assessment and Plan





- Assessment and Plan (Free Text)


Assessment: 





Sickle cell Crisis


Admit to med/surg


Pt with LBP, leg pain; denies CP, SOB


HGB 6.3 on (3/21/18), 6.5 on (3/28), 5.8 today


- s/p 1 unit RBC 3/24





Dr. Olvera Hem/Onc consultant, help greatly appreciated


   -IVF, pain meds, 02 via NC, folic acid, Transfuse under Hgb 5





Folic acid 2mg PO daily


Dilaudid 2mg IV q3hrs prn 


Benadryl 25mg IV Q3hrs prn





Urinary tract infection, ESBL +


UA (3/21/18): 3+ LE, Nitrate negative, , Moderate bacteria


Urine Culture: ESBL


Negative pregnancy


ID consult. Dr. Sagar Stanton. recs appreciated. 


   -DC PO BACTRIM-DAY5


      - Discontinued SS Bactrim 1 tab PO Q12H on 3/27 (started 3/24/18)


      - Discontinued DS Bactrim 1 tab PO Q12H on 3/24 (started 3/22/18)


   -START IV MERREM 500MG IV  Q 8HRLY 3/27/18   


   -REPEAT URINE CULTURE Gram negative tomi, Gram positive cocci


Uro consult Dr. JAZZY Chacko. Recs appreciated.


   - CT abdomen pelvis ordered- Malpositioned right ureteral stent with 

proximal pigtail in the right upper pole and distal pigtail in the distal right 

ureter.                      Urologic repositioning is recommended.


   - Cystoscopy planned for 3/30 at 3pm


   - clear liquid for breakfast, NPO afterwards








Renal US 3/27 severe right and mod left hydronephrosis





Previous Admission:


-Bladder US (3/10/18)- B/L ureteral stents with moderate hydronephrosis R>L


-Ucx (3/7/18)- vancomycin resistant E. faecium





Leukocytosis


WBC 17.5 3/29


Etiology: UTI in setting of SSC


ID consult. Dr. Sagar Stanton. recs appreciated. 


   -continue IV MERREM 500MG IV  Q 8HRLY 3/27/18


Uro consult Dr. JAZZY Chacko. Recs appreciated





Transaminitis


AST/ALT/ALK Phos 126/78/195 (3/27/18)


   AST/ALT/ALK Phos 137/91/184 (3/28/18)


      AST/ALT/ALK Phos 107/74/182 (3/29/18)


Tbili 1.7


Monitor





Neurogenic bladder


-straight cath PRN; patient is able to do herself 





Elevated creatinine


Cr 1.2 today 3/29


Will continue to monitor





GI/DVT ppx


-protonix 40mg PO daily


SCDs not warranted; pt mobile


Lovenox discontinued 3/26





Case discussed with Dr. Etienne


All management as per Dr. Etienne





<Shahab Etienne - Last Filed: 03/29/18 18:38>





Objective





- Vital Signs/Intake and Output


Vital Signs (last 24 hours): 


 











Temp Pulse Resp BP Pulse Ox


 


 98.2 F   101 H  20   103/67   99 


 


 03/29/18 15:00  03/29/18 15:00  03/29/18 15:00  03/29/18 15:00  03/29/18 15:00








Intake and Output: 


 











 03/29/18 03/29/18





 06:59 18:59


 


Intake Total 450 1000


 


Balance 450 1000














- Medications


Medications: 


 Current Medications





Diphenhydramine HCl (Benadryl)  25 mg IVP Q3 PRN


   PRN Reason: Anaphylaxis


   Last Admin: 03/29/18 16:27 Dose:  25 mg


Folic Acid (Folic Acid)  2 mg PO DAILY [facility]


   Last Admin: 03/29/18 10:10 Dose:  2 mg


Hydromorphone HCl (Dilaudid)  2 mg IVP Q3 PRN


   PRN Reason: Pain, severe (8-10)


   Last Admin: 03/29/18 16:28 Dose:  2 mg


Meropenem 500 mg/ Sodium (Chloride)  100 mls @ 100 mls/hr IVPB Q8 [facility]


   PRN Reason: Protocol


   Last Admin: 03/29/18 13:12 Dose:  100 mls/hr


Pantoprazole Sodium (Protonix Ec Tab)  40 mg PO DAILY [facility]


   Last Admin: 03/29/18 10:10 Dose:  40 mg











- Labs


Labs: 


 





 03/29/18 07:33 





 03/29/18 07:33 











Assessment and Plan


(1) Anemia


Status: Acute   





(2) Leukocytosis


Status: Acute   





(3) Sickle cell pain crisis


Status: Acute   





(4) UTI (urinary tract infection)


Status: Acute   





(5) Animal bite wound


Status: Acute   





(6) Chronic pain disorder


Status: Acute   





(7) Leg pain


Status: Acute   





(8) Leukocytosis


Status: Acute   





(9) Myalgia


Status: Acute   





(10) Neurogenic bladder


Status: Acute   





(11) Pain


Status: Acute   





(12) Severe anemia


Status: Acute   





(13) Sickle cell anemia


Status: Acute   





(14) Sickle cell anemia with pain


Status: Acute   





- Assessment and Plan (Free Text)


Plan: 





Sickle cell Crisis


Admit to med/surg


Pt with LBP, leg pain; denies CP, SOB


HGB 6.3 on (3/21/18), 6.5 on (3/28), 5.8 today


- s/p 1 unit RBC 3/24





Dr. Olvera Hem/Onc consultant, help greatly appreciated


   -IVF, pain meds, 02 via NC, folic acid, Transfuse under Hgb 5





Folic acid 2mg PO daily


Dilaudid 2mg IV q3hrs prn 


Benadryl 25mg IV Q3hrs prn





Urinary tract infection, ESBL +


UA (3/21/18): 3+ LE, Nitrate negative, , Moderate bacteria


Urine Culture: ESBL


Negative pregnancy


ID consult. Dr. Sagar Stanton. recs appreciated. 


   -DC PO BACTRIM-DAY5


      - Discontinued SS Bactrim 1 tab PO Q12H on 3/27 (started 3/24/18)


      - Discontinued DS Bactrim 1 tab PO Q12H on 3/24 (started 3/22/18)


   -START IV MERREM 500MG IV  Q 8HRLY 3/27/18   


   -REPEAT URINE CULTURE Gram negative tomi, Gram positive cocci


Uro consult Dr. JAZZY Chacko. Recs appreciated.


   - CT abdomen pelvis ordered- Malpositioned right ureteral stent with 

proximal pigtail in the right upper pole and distal pigtail in the distal right 

ureter.                      Urologic repositioning is recommended.


   - Cystoscopy planned for 3/30 at 3pm


   - clear liquid for breakfast, NPO afterwards








Renal US 3/27 severe right and mod left hydronephrosis





Previous Admission:


-Bladder US (3/10/18)- B/L ureteral stents with moderate hydronephrosis R>L


-Ucx (3/7/18)- vancomycin resistant E. faecium





Leukocytosis


WBC 17.5 3/29


Etiology: UTI in setting of SSC


ID consult. Dr. Sagar Stanton. recs appreciated. 


   -continue IV MERREM 500MG IV  Q 8HRLY 3/27/18


Uro consult Dr. JAZZY Chacko. Recs appreciated





Transaminitis


AST/ALT/ALK Phos 126/78/195 (3/27/18)


   AST/ALT/ALK Phos 137/91/184 (3/28/18)


      AST/ALT/ALK Phos 107/74/182 (3/29/18)


Tbili 1.7


Monitor





Neurogenic bladder


-straight cath PRN; patient is able to do herself 





Elevated creatinine


Cr 1.2 today 3/29


Will continue to monitor





GI/DVT ppx


-protonix 40mg PO daily


SCDs not warranted; pt mobile


Lovenox discontinued 3/26





Attending/Attestation





- Attestation


I have personally seen and examined this patient.: Yes


I have fully participated in the care of the patient.: Yes


I have reviewed all pertinent clinical information, including history, physical 

exam and plan: Yes


Notes (Text): 





03/29/18 18:38


case seen and d.w staff and [facility]

## 2018-03-29 NOTE — CP.PCM.PN
Subjective





- Date & Time of Evaluation


Date of Evaluation: 03/29/18


Time of Evaluation: 22:30





- Subjective


Subjective: 





C/O B/L LEG PAINS


LOWER ABDOMINAL /PELVIC DISCOMFORT 





STILL WITH LEUKOCYTOSIS 


H/H  LOW 5.8 HGB





URINE CULTURE REPEAT- P








Objective





- Vital Signs/Intake and Output


Vital Signs (last 24 hours): 


 











Temp Pulse Resp BP Pulse Ox


 


 98.2 F   101 H  20   103/67   99 


 


 03/29/18 15:00  03/29/18 15:00  03/29/18 15:00  03/29/18 15:00  03/29/18 15:00








Intake and Output: 


 











 03/29/18 03/30/18





 18:59 06:59


 


Intake Total 1000 


 


Balance 1000 














- Medications


Medications: 


 Current Medications





Diphenhydramine HCl (Benadryl)  25 mg IVP Q3 PRN


   PRN Reason: Anaphylaxis


   Last Admin: 03/29/18 19:27 Dose:  25 mg


Folic Acid (Folic Acid)  2 mg PO DAILY [facility]


   Last Admin: 03/29/18 10:10 Dose:  2 mg


Hydromorphone HCl (Dilaudid)  2 mg IVP Q3 PRN


   PRN Reason: Pain, severe (8-10)


   Last Admin: 03/29/18 19:29 Dose:  2 mg


Meropenem 500 mg/ Sodium (Chloride)  100 mls @ 100 mls/hr IVPB Q8 [facility]


   PRN Reason: Protocol


   Last Admin: 03/29/18 21:25 Dose:  100 mls/hr


Pantoprazole Sodium (Protonix Ec Tab)  40 mg PO DAILY [facility]


   Last Admin: 03/29/18 10:10 Dose:  40 mg











- Labs


Labs: 


 





 03/29/18 07:33 





 03/29/18 07:33 











- Constitutional


Appears: No Acute Distress





- Head Exam


Head Exam: NORMAL INSPECTION





- Eye Exam


Eye Exam: EOMI, PERRL





- ENT Exam


ENT Exam: Normal Oropharynx





- Neck Exam


Neck Exam: Normal Inspection





- Respiratory Exam


Respiratory Exam: Clear to Ausculation Bilateral





- Cardiovascular Exam


Cardiovascular Exam: REGULAR RHYTHM, +S1, +S2





- GI/Abdominal Exam


GI & Abdominal Exam: Tenderness (SUPRAPUBIC), Normal Bowel Sounds





- Extremities Exam


Extremities Exam: absent: Calf Tenderness





- Neurological Exam


Neurological Exam: Alert, Awake, CN II-XII Intact, Normal Gait, Oriented x3





- Skin


Skin Exam: Pallor





Assessment and Plan


(1) Sickle cell pain crisis


Status: Acute   





(2) Leukocytosis


Status: Acute   





(3) UTI (urinary tract infection)


Status: Acute   





(4) Anemia


Status: Acute   





(5) Neurogenic bladder


Status: Acute   





- Assessment and Plan (Free Text)


Plan: 








ON IV MERREM 500MG IV  Q 8HRLY 3/27/18.


F/U CBC 


F/U LFTS





ANALGESICS


F/U REPEAT CULTURES.


 TO EVALUATE B/L STENTS - CYSTOSCOPY

## 2018-03-29 NOTE — CP.PCM.PN
Subjective





- Date & Time of Evaluation


Date of Evaluation: 03/29/18


Time of Evaluation: 18:00





- Subjective


Subjective: 





Has pain in legs


H/H declining





Objective





- Vital Signs/Intake and Output


Vital Signs (last 24 hours): 


 











Temp Pulse Resp BP Pulse Ox


 


 98.2 F   101 H  20   103/67   99 


 


 03/29/18 15:00  03/29/18 15:00  03/29/18 15:00  03/29/18 15:00  03/29/18 15:00








Intake and Output: 


 











 03/29/18 03/30/18





 18:59 06:59


 


Intake Total 1000 450


 


Balance 1000 450














- Medications


Medications: 


 Current Medications





Diphenhydramine HCl (Benadryl)  25 mg IVP Q3 PRN


   PRN Reason: Anaphylaxis


   Last Admin: 03/29/18 22:35 Dose:  25 mg


Folic Acid (Folic Acid)  2 mg PO DAILY [facility]


   Last Admin: 03/29/18 10:10 Dose:  2 mg


Hydromorphone HCl (Dilaudid)  2 mg IVP Q3 PRN


   PRN Reason: Pain, severe (8-10)


   Last Admin: 03/29/18 22:35 Dose:  2 mg


Meropenem 500 mg/ Sodium (Chloride)  100 mls @ 100 mls/hr IVPB Q8 [facility]


   PRN Reason: Protocol


   Last Admin: 03/29/18 21:25 Dose:  100 mls/hr


Pantoprazole Sodium (Protonix Ec Tab)  40 mg PO DAILY [facility]


   Last Admin: 03/29/18 10:10 Dose:  40 mg











- Labs


Labs: 


 





 03/29/18 07:33 





 03/29/18 07:33 











- Head Exam


Head Exam: ATRAUMATIC





- Eye Exam


Eye Exam: Normal appearance





- ENT Exam


ENT Exam: Mucous Membranes Dry





- Respiratory Exam


Respiratory Exam: NORMAL BREATHING PATTERN





- Cardiovascular Exam


Cardiovascular Exam: +S1, +S2





- GI/Abdominal Exam


GI & Abdominal Exam: Normal Bowel Sounds





Assessment and Plan


(1) Sickle cell pain crisis


Assessment & Plan: 


IV fluids, pain meds, folic acid, 02 via NC





Status: Acute   





(2) Leukocytosis


Assessment & Plan: 


on antibiotics


may have reactive component from sickle cell


Status: Acute   





(3) Sickle cell anemia


Assessment & Plan: 


outpatient folic acid


Status: Acute

## 2018-03-30 VITALS — RESPIRATION RATE: 20 BRPM

## 2018-03-30 LAB
ALBUMIN SERPL-MCNC: 3.2 G/DL (ref 3.5–5)
ALBUMIN/GLOB SERPL: 0.6 {RATIO} (ref 1–2.1)
ALT SERPL-CCNC: 61 U/L (ref 9–52)
AST SERPL-CCNC: 94 U/L (ref 14–36)
BASOPHILS # BLD AUTO: 0.2 K/UL (ref 0–0.2)
BASOPHILS NFR BLD: 0.9 % (ref 0–2)
BUN SERPL-MCNC: 25 MG/DL (ref 7–17)
CALCIUM SERPL-MCNC: 7.5 MG/DL (ref 8.6–10.4)
EOSINOPHIL # BLD AUTO: 1 K/UL (ref 0–0.7)
EOSINOPHIL NFR BLD: 5.6 % (ref 0–4)
ERYTHROCYTE [DISTWIDTH] IN BLOOD BY AUTOMATED COUNT: 18.5 % (ref 11.5–14.5)
GFR NON-AFRICAN AMERICAN: 50
HGB BLD-MCNC: 5.8 G/DL (ref 11–16)
LYMPHOCYTES # BLD AUTO: 3.9 K/UL (ref 1–4.3)
LYMPHOCYTES NFR BLD AUTO: 22.1 % (ref 20–40)
MCH RBC QN AUTO: 29.4 PG (ref 27–31)
MCHC RBC AUTO-ENTMCNC: 33.8 G/DL (ref 33–37)
MCV RBC AUTO: 87 FL (ref 81–99)
MONOCYTES # BLD: 1.4 K/UL (ref 0–0.8)
MONOCYTES NFR BLD: 7.8 % (ref 0–10)
NEUTROPHILS # BLD: 11.3 K/UL (ref 1.8–7)
NEUTROPHILS NFR BLD AUTO: 63.6 % (ref 50–75)
NRBC BLD AUTO-RTO: 0.2 % (ref 0–2)
PLATELET # BLD: 159 K/UL (ref 130–400)
PMV BLD AUTO: 8.6 FL (ref 7.2–11.7)
RBC # BLD AUTO: 1.96 MIL/UL (ref 3.8–5.2)
WBC # BLD AUTO: 17.8 K/UL (ref 4.8–10.8)

## 2018-03-30 RX ADMIN — DIPHENHYDRAMINE HYDROCHLORIDE PRN MG: 50 INJECTION INTRAMUSCULAR; INTRAVENOUS at 17:25

## 2018-03-30 RX ADMIN — DIPHENHYDRAMINE HYDROCHLORIDE PRN MG: 50 INJECTION INTRAMUSCULAR; INTRAVENOUS at 07:35

## 2018-03-30 RX ADMIN — DIPHENHYDRAMINE HYDROCHLORIDE PRN MG: 50 INJECTION INTRAMUSCULAR; INTRAVENOUS at 01:34

## 2018-03-30 RX ADMIN — DIPHENHYDRAMINE HYDROCHLORIDE PRN MG: 50 INJECTION INTRAMUSCULAR; INTRAVENOUS at 11:06

## 2018-03-30 RX ADMIN — DIPHENHYDRAMINE HYDROCHLORIDE PRN MG: 50 INJECTION INTRAMUSCULAR; INTRAVENOUS at 14:08

## 2018-03-30 RX ADMIN — DEXTROSE AND SODIUM CHLORIDE SCH MLS/HR: 5; 450 INJECTION, SOLUTION INTRAVENOUS at 11:01

## 2018-03-30 RX ADMIN — DIPHENHYDRAMINE HYDROCHLORIDE PRN MG: 50 INJECTION INTRAMUSCULAR; INTRAVENOUS at 20:59

## 2018-03-30 RX ADMIN — PANTOPRAZOLE SODIUM SCH: 40 TABLET, DELAYED RELEASE ORAL at 09:24

## 2018-03-30 RX ADMIN — DIPHENHYDRAMINE HYDROCHLORIDE PRN MG: 50 INJECTION INTRAMUSCULAR; INTRAVENOUS at 04:34

## 2018-03-30 NOTE — CP.PCM.PN
Subjective





- Date & Time of Evaluation


Date of Evaluation: 03/30/18


Time of Evaluation: 07:45





- Subjective


Subjective: 





PGY-2 Progress Note for Dr. Etienne





Patient seen and examined at bedside. No acute events overnight. Patient still 

complains of abdominal pain but managed well with pain medication. Patient is 

aware of her cystoscopy with Dr. Chacko this afternoon. She denies of fever, 

chills, headache, shortness of breath, chest pain, nausea or vomiting.





Objective





- Vital Signs/Intake and Output


Vital Signs (last 24 hours): 


 











Temp Pulse Resp BP Pulse Ox


 


 98.3 F   110 H  20   109/60   98 


 


 03/30/18 07:22  03/30/18 07:22  03/30/18 07:22  03/30/18 07:22  03/30/18 07:22








Intake and Output: 


 











 03/30/18 03/30/18





 06:59 18:59


 


Intake Total 450 300


 


Balance 450 300














- Medications


Medications: 


 Current Medications





Diphenhydramine HCl (Benadryl)  25 mg IVP Q3 PRN


   PRN Reason: Anaphylaxis


   Last Admin: 03/30/18 07:35 Dose:  25 mg


Folic Acid (Folic Acid)  2 mg PO DAILY [facility]


   Last Admin: 03/29/18 10:10 Dose:  2 mg


Hydromorphone HCl (Dilaudid)  2 mg IVP Q3 PRN


   PRN Reason: Pain, severe (8-10)


   Last Admin: 03/30/18 07:35 Dose:  2 mg


Meropenem 500 mg/ Sodium (Chloride)  100 mls @ 100 mls/hr IVPB Q8 [facility]


   PRN Reason: Protocol


   Last Admin: 03/30/18 05:24 Dose:  100 mls/hr


Pantoprazole Sodium (Protonix Ec Tab)  40 mg PO DAILY [facility]


   Last Admin: 03/29/18 10:10 Dose:  40 mg











- Labs


Labs: 


 





 03/30/18 07:33 





 03/30/18 07:33 











- Additional Findings


Additional findings: 





- Constitutional


Appears: No Acute Distress





- Head Exam


Head Exam: ATRAUMATIC





- Eye Exam


Eye Exam: Normal appearance





- ENT Exam


ENT Exam: Mucous Membranes Moist





- Respiratory Exam


Respiratory Exam: Clear to Ausculation Bilateral, NORMAL BREATHING PATTERN.  

absent: Rhonchi, Wheezes





- Cardiovascular Exam


Cardiovascular Exam: REGULAR RHYTHM, +S1, +S2





- GI/Abdominal Exam


GI & Abdominal Exam: Distended (mild), Soft.  absent: Tenderness





- Back Exam


Back Exam: absent: CVA tenderness (L), CVA tenderness (R)





- Neurological Exam


Neurological Exam: Alert, Awake, Oriented x3





- Psychiatric Exam


Psychiatric exam: Normal Affect, Normal Mood





- Skin


Skin Exam: Dry, Warm








Assessment and Plan





- Assessment and Plan (Free Text)


Assessment: 





Sickle cell Crisis


Admit to med/surg


Pt with LBP, leg pain; denies CP, SOB


HGB 6.3 on (3/21/18), 6.5 on (3/28), 5.8 today


- s/p 1 unit RBC 3/24





Dr. Olvera Hem/Onc consultant, help greatly appreciated


   -IVF, pain meds, 02 via NC, folic acid, Transfuse under Hgb 5





Folic acid 2mg PO daily


Dilaudid 2mg IV q3hrs prn 


Benadryl 25mg IV Q3hrs prn





Urinary tract infection, ESBL +


UA (3/21/18): 3+ LE, Nitrate negative, , Moderate bacteria


Urine Culture: 3/21/18 ESBL


Urine Culture: 3/27/18 E. coli, Vancomycin Resistant E. Faecium


Negative pregnancy


ID consult. Dr. Sagar Stanton. recs appreciated. 


   -DC PO BACTRIM-DAY5


      - Discontinued SS Bactrim 1 tab PO Q12H on 3/27 (started 3/24/18)


      - Discontinued DS Bactrim 1 tab PO Q12H on 3/24 (started 3/22/18)


   -START IV MERREM 500MG IV  Q 8HRLY 3/27/18   


Uro consult Dr. JAZZY Chacko. Recs appreciated.


   - CT abdomen pelvis ordered- Malpositioned right ureteral stent with 

proximal pigtail in the right upper pole and distal pigtail in the distal right 

ureter.                      Urologic repositioning is recommended.


   - Cystoscopy planned for 3/30 at 3pm


   - clear liquid for breakfast, NPO afterwards for OR








Renal US 3/27 severe right and mod left hydronephrosis





Previous Admission:


-Bladder US (3/10/18)- B/L ureteral stents with moderate hydronephrosis R>L


-Ucx (3/7/18)- vancomycin resistant E. faecium





Leukocytosis


WBC 17.8 3/30


Etiology: UTI in setting of SSC


ID consult. Dr. Sagar Stanton. recs appreciated. 


   -continue IV MERREM 500MG IV  Q 8HRLY 3/27/18


Uro consult Dr. JAZZY Chacko. Recs appreciated





Transaminitis


AST/ALT/ALK Phos 126/78/195 (3/27/18)


   AST/ALT/ALK Phos 137/91/184 (3/28/18)


      AST/ALT/ALK Phos 94/61/175 (3/30/18)


Tbili 1.6


Monitor





Neurogenic bladder


-straight cath PRN; patient is able to do herself 





Elevated creatinine


Cr 1.2 today


Will continue to monitor





GI/DVT ppx


protonix 40mg PO daily


SCDs not warranted; pt mobile


Lovenox discontinued 3/26


Patient is actively ambulating





Discussed with attending Dr. Etienne


All management per Dr. Etienne

## 2018-03-30 NOTE — PCM.URO
Urology Progress Note





- Objective


Lab Studies: Reviewed (plans: reschedule as an out pt full note is dictated)


Lab Results Last 24 Hours: 


 Laboratory Results - last 24 hr











  03/30/18 03/30/18





  07:33 07:33


 


WBC  17.8 H 


 


RBC  1.96 L 


 


Hgb  5.8 L* 


 


Hct  17.0 L 


 


MCV  87.0 


 


MCH  29.4 


 


MCHC  33.8 


 


RDW  18.5 H 


 


Plt Count  159 


 


MPV  8.6 


 


Neut % (Auto)  63.6 


 


Lymph % (Auto)  22.1 


 


Mono % (Auto)  7.8 


 


Eos % (Auto)  5.6 H 


 


Baso % (Auto)  0.9 


 


Neut # (Auto)  11.3 H 


 


Lymph # (Auto)  3.9 


 


Mono # (Auto)  1.4 H 


 


Eos # (Auto)  1.0 H 


 


Baso # (Auto)  0.2 


 


Sodium   139


 


Potassium   5.0


 


Chloride   108 H


 


Carbon Dioxide   21 L


 


Anion Gap   15


 


BUN   25 H


 


Creatinine   1.2


 


Est GFR ( Amer)   > 60


 


Est GFR (Non-Af Amer)   50


 


Random Glucose   107 H


 


Calcium   7.5 L


 


Total Bilirubin   1.6 H


 


AST   94 H


 


ALT   61 H


 


Alkaline Phosphatase   175 H


 


Total Protein   8.1


 


Albumin   3.2 L


 


Globulin   5.0 H


 


Albumin/Globulin Ratio   0.6 L











Intake & Output: 


 Intake & Output











 03/29/18 03/30/18 03/30/18





 18:59 06:59 18:59


 


Intake Total 5217 277 0028


 


Balance 1954 527 0089


 


Intake:   


 


  Intake, IV Amount 200 100 475


 


    Right Subclavian 200 100 475


 


  Oral 800 350 600


 


Other:   


 


  # Voids   


 


    Urine, Voided 3 3 2


 


  # Bowel Movements 0  0











Vital Signs: 


 Vital Signs - 24 hr











  03/30/18 03/30/18





  00:00 07:22


 


Temperature 98.4 F 98.3 F


 


Pulse Rate 97 H 110 H


 


Respiratory 20 20





Rate  


 


Blood Pressure 100/65 109/60


 


O2 Sat by Pulse 96 98





Oximetry

## 2018-03-30 NOTE — CP.PCM.PN
Subjective





- Date & Time of Evaluation


Date of Evaluation: 03/30/18


Time of Evaluation: 07:20





- Subjective


Subjective: 


clinically same





Objective





- Vital Signs/Intake and Output


Vital Signs (last 24 hours): 


 











Temp Pulse Resp BP Pulse Ox


 


 98.3 F   110 H  20   109/60   98 


 


 03/30/18 07:22  03/30/18 07:22  03/30/18 07:22  03/30/18 07:22  03/30/18 07:22








Intake and Output: 


 











 03/30/18 03/30/18





 06:59 18:59


 


Intake Total 450 1075


 


Balance 450 1075














- Medications


Medications: 


 Current Medications





Diphenhydramine HCl (Benadryl)  25 mg IVP Q3 PRN


   PRN Reason: Anaphylaxis


   Last Admin: 03/30/18 14:08 Dose:  25 mg


Folic Acid (Folic Acid)  2 mg PO DAILY [facility]


   Last Admin: 03/30/18 09:24 Dose:  Not Given


Hydromorphone HCl (Dilaudid)  2 mg IVP Q3 PRN


   PRN Reason: Pain, severe (8-10)


   Last Admin: 03/30/18 14:08 Dose:  2 mg


Meropenem 500 mg/ Sodium (Chloride)  100 mls @ 100 mls/hr IVPB Q8 [facility]


   PRN Reason: Protocol


   Last Admin: 03/30/18 13:45 Dose:  100 mls/hr


Dextrose/Sodium Chloride (Dextrose 5%/0.45% Ns 1000 Ml)  1,000 mls @ 75 mls/hr 

IV [id number] [facility]


   Last Admin: 03/30/18 11:01 Dose:  75 mls/hr


Pantoprazole Sodium (Protonix Ec Tab)  40 mg PO DAILY [facility]


   Last Admin: 03/30/18 09:24 Dose:  Not Given











- Labs


Labs: 


 





 03/30/18 07:33 





 03/30/18 07:33 











- Constitutional


Appears: Well





- Head Exam


Head Exam: ATRAUMATIC, NORMAL INSPECTION, NORMOCEPHALIC





- Eye Exam


Eye Exam: EOMI, Normal appearance, PERRL


Pupil Exam: NORMAL ACCOMODATION, PERRL





- ENT Exam


ENT Exam: Mucous Membranes Moist, Normal Exam





- Neck Exam


Neck Exam: Full ROM, Normal Inspection.  absent: Lymphadenopathy





- Respiratory Exam


Respiratory Exam: Decreased Breath Sounds





- Cardiovascular Exam


Cardiovascular Exam: REGULAR RHYTHM, +S1, +S2





- GI/Abdominal Exam


GI & Abdominal Exam: Soft, Diminished Bowel Sounds





- Rectal Exam


Rectal Exam: Deferred





Assessment and Plan


(1) Anemia


Status: Acute   





(2) Leukocytosis


Status: Acute   





(3) Sickle cell pain crisis


Status: Acute   





(4) UTI (urinary tract infection)


Status: Acute   





(5) Animal bite wound


Status: Acute   





(6) Chronic pain disorder


Status: Acute   





(7) Leg pain


Status: Acute   





(8) Leukocytosis


Status: Acute   





(9) Myalgia


Status: Acute   





(10) Neurogenic bladder


Status: Acute   





(11) Pain


Status: Acute   





(12) Severe anemia


Status: Acute   





(13) Sickle cell anemia


Status: Acute   





(14) Sickle cell anemia with pain


Status: Acute   





- Assessment and Plan (Free Text)


Plan: 





Continue IV fluids


Continue pain medications


discontinue meropenem and observe off antibiotic


Microbiology revealed VRE sensitive to tigecycline and meropenem


Follow-up with ID consult


Patient's stent to be removed patient was n.p.o.


Follow-up with the urologist

## 2018-03-30 NOTE — CP.PCM.PN
Subjective





- Date & Time of Evaluation


Date of Evaluation: 03/30/18


Time of Evaluation: 22:03





- Subjective


Subjective: 





afebrile,


FEELS WEAK.C/O PAIN LEGS/BACK





H/H DROPPED.


PT FOR 2UNITS PRBC TRANSFUSION.





 PROCEDURE SCHEDULED AS OPD.





LABS REVIEWED;





URINE CULTURE +VE REPEAT FOR E.COLI/VRE 


  ? CONTAMINANT/COLINIZATION. (NEUROGENIC BLADDER )





WBC 17.3 HIGH REACTIVE 2NDRY TO SSC





Objective





- Vital Signs/Intake and Output


Vital Signs (last 24 hours): 


 











Temp Pulse Resp BP Pulse Ox


 


 98.3 F   104 H  20   129/80   100 


 


 03/30/18 20:22  03/30/18 20:22  03/30/18 20:22  03/30/18 20:22  03/30/18 15:00








Intake and Output: 


 











 03/30/18 03/31/18





 18:59 06:59


 


Intake Total 1075 0


 


Balance 1075 0














- Medications


Medications: 


 Current Medications





Diphenhydramine HCl (Benadryl)  25 mg IVP Q3 PRN


   PRN Reason: Anaphylaxis


   Last Admin: 03/30/18 20:59 Dose:  25 mg


Folic Acid (Folic Acid)  2 mg PO DAILY [facility]


   Last Admin: 03/30/18 09:24 Dose:  Not Given


Hydromorphone HCl (Dilaudid)  2 mg IVP Q3 PRN


   PRN Reason: Pain, severe (8-10)


   Last Admin: 03/30/18 21:00 Dose:  2 mg


Dextrose/Sodium Chloride (Dextrose 5%/0.45% Ns 1000 Ml)  1,000 mls @ 75 mls/hr 

IV .[id number] [facility]


   Last Admin: 03/30/18 11:01 Dose:  75 mls/hr


Pantoprazole Sodium (Protonix Ec Tab)  40 mg PO DAILY [facility]


   Last Admin: 03/30/18 09:24 Dose:  Not Given











- Labs


Labs: 


 





 03/30/18 07:33 





 03/30/18 07:33 











- Constitutional


Appears: No Acute Distress, Chronically Ill





- Head Exam


Head Exam: NORMAL INSPECTION





- Eye Exam


Eye Exam: EOMI, PERRL





- ENT Exam


ENT Exam: Normal Oropharynx





- Neck Exam


Neck Exam: Normal Inspection





- Respiratory Exam


Respiratory Exam: Clear to Ausculation Bilateral





- GI/Abdominal Exam


GI & Abdominal Exam: Soft, Normal Bowel Sounds





- Extremities Exam


Extremities Exam: Normal Capillary Refill.  absent: Calf Tenderness, Pedal Edema





- Neurological Exam


Neurological Exam: Alert, Awake, CN II-XII Intact, Normal Gait





- Psychiatric Exam


Psychiatric exam: Normal Mood





- Skin


Skin Exam: Pallor, Warm





Assessment and Plan


(1) Sickle cell pain crisis


Status: Acute   





(2) Leukocytosis


Status: Acute   





(3) UTI (urinary tract infection)


Status: Acute   





(4) Anemia


Status: Acute   





(5) Neurogenic bladder


Status: Acute   





- Assessment and Plan (Free Text)


Plan: 





DC IV MERREM.


OBSERVE OFF ANTIBIOTICS .





ANALGESICSAS PER PMD.





BLOOD TRANSFUSION AS PER HEMATOLOGY.


F/U WITH  AS OPD .





CASE DISCUSSED WITH RESIDENT/AND  NP -MS SAMANIEGO.

## 2018-03-30 NOTE — CP.PCM.PCO
Assessment & Plan





- Assessment and Plan (Free Text)


Assessment: 





Pt is a 36 yo f ho sickle cell currently in crisis and expected to undergo cysto

/ stent insertion with GA. Spoke with Dr. Olvera from oncology regarding 

patients increased risk for intraoperative cardiac/ resp events due to anemia. 

Will transfuse 1 units at this time.

## 2018-03-30 NOTE — CP.PCM.PN
Subjective





- Date & Time of Evaluation


Date of Evaluation: 03/30/18


Time of Evaluation: 15:00





- Subjective


Subjective: 





Feels weak


Discussed with anesthesia; concern for intraoperative cardiac/respiratory event 

with current H/H levels, for 2U PRBC prior to OR





Objective





- Vital Signs/Intake and Output


Vital Signs (last 24 hours): 


 











Temp Pulse Resp BP Pulse Ox


 


 98.4 F   104 H  20   134/84   100 


 


 03/30/18 22:33  03/30/18 22:33  03/30/18 22:33  03/30/18 22:33  03/30/18 15:00








Intake and Output: 


 











 03/30/18 03/31/18





 18:59 06:59


 


Intake Total 1075 350


 


Balance 1075 350














- Medications


Medications: 


 Current Medications





Diphenhydramine HCl (Benadryl)  25 mg IVP Q3 PRN


   PRN Reason: Anaphylaxis


   Last Admin: 03/30/18 20:59 Dose:  25 mg


Folic Acid (Folic Acid)  2 mg PO DAILY [facility]


   Last Admin: 03/30/18 09:24 Dose:  Not Given


Hydromorphone HCl (Dilaudid)  2 mg IVP Q3 PRN


   PRN Reason: Pain, severe (8-10)


   Last Admin: 03/30/18 21:00 Dose:  2 mg


Dextrose/Sodium Chloride (Dextrose 5%/0.45% Ns 1000 Ml)  1,000 mls @ 75 mls/hr 

IV .[id number] [facility]


   Last Admin: 03/30/18 11:01 Dose:  75 mls/hr


Pantoprazole Sodium (Protonix Ec Tab)  40 mg PO DAILY [facility]


   Last Admin: 03/30/18 09:24 Dose:  Not Given











- Labs


Labs: 


 





 03/30/18 07:33 





 03/30/18 07:33 











- Head Exam


Head Exam: ATRAUMATIC





- Eye Exam


Eye Exam: Normal appearance





- ENT Exam


ENT Exam: Mucous Membranes Dry





- Respiratory Exam


Respiratory Exam: NORMAL BREATHING PATTERN





- Cardiovascular Exam


Cardiovascular Exam: +S1, +S2





- GI/Abdominal Exam


GI & Abdominal Exam: Normal Bowel Sounds





Assessment and Plan


(1) Sickle cell pain crisis


Assessment & Plan: 


IV fluids, pain meds, folic acid, 02 via NC


transfusion support prior to OR for goal hgb ~ 8


Status: Acute   





(2) Leukocytosis


Assessment & Plan: 


on antibiotics


Status: Acute   





(3) Sickle cell anemia


Assessment & Plan: 


folic acid


Status: Acute

## 2018-03-31 VITALS
SYSTOLIC BLOOD PRESSURE: 139 MMHG | OXYGEN SATURATION: 99 % | TEMPERATURE: 98.1 F | HEART RATE: 93 BPM | DIASTOLIC BLOOD PRESSURE: 72 MMHG

## 2018-03-31 LAB
ALBUMIN SERPL-MCNC: 3.5 G/DL (ref 3.5–5)
ALBUMIN/GLOB SERPL: 0.7 {RATIO} (ref 1–2.1)
ALT SERPL-CCNC: 62 U/L (ref 9–52)
AST SERPL-CCNC: 109 U/L (ref 14–36)
BASOPHILS # BLD AUTO: 0.2 K/UL (ref 0–0.2)
BASOPHILS NFR BLD: 1 % (ref 0–2)
BUN SERPL-MCNC: 22 MG/DL (ref 7–17)
CALCIUM SERPL-MCNC: 7.8 MG/DL (ref 8.6–10.4)
EOSINOPHIL # BLD AUTO: 0.8 K/UL (ref 0–0.7)
EOSINOPHIL NFR BLD: 4.2 % (ref 0–4)
ERYTHROCYTE [DISTWIDTH] IN BLOOD BY AUTOMATED COUNT: 18.1 % (ref 11.5–14.5)
GFR NON-AFRICAN AMERICAN: 56
HGB BLD-MCNC: 7.2 G/DL (ref 11–16)
LYMPHOCYTES # BLD AUTO: 3.7 K/UL (ref 1–4.3)
LYMPHOCYTES NFR BLD AUTO: 20.3 % (ref 20–40)
MCH RBC QN AUTO: 29.2 PG (ref 27–31)
MCHC RBC AUTO-ENTMCNC: 33.5 G/DL (ref 33–37)
MCV RBC AUTO: 87.3 FL (ref 81–99)
MONOCYTES # BLD: 1.2 K/UL (ref 0–0.8)
MONOCYTES NFR BLD: 6.3 % (ref 0–10)
NEUTROPHILS # BLD: 12.5 K/UL (ref 1.8–7)
NEUTROPHILS NFR BLD AUTO: 68.2 % (ref 50–75)
NRBC BLD AUTO-RTO: 0.2 % (ref 0–2)
PLATELET # BLD: 167 K/UL (ref 130–400)
PMV BLD AUTO: 8.7 FL (ref 7.2–11.7)
RBC # BLD AUTO: 2.45 MIL/UL (ref 3.8–5.2)
WBC # BLD AUTO: 18.3 K/UL (ref 4.8–10.8)

## 2018-03-31 RX ADMIN — DIPHENHYDRAMINE HYDROCHLORIDE PRN MG: 50 INJECTION INTRAMUSCULAR; INTRAVENOUS at 18:47

## 2018-03-31 RX ADMIN — DIPHENHYDRAMINE HYDROCHLORIDE PRN MG: 50 INJECTION INTRAMUSCULAR; INTRAVENOUS at 00:10

## 2018-03-31 RX ADMIN — DIPHENHYDRAMINE HYDROCHLORIDE PRN MG: 50 INJECTION INTRAMUSCULAR; INTRAVENOUS at 06:10

## 2018-03-31 RX ADMIN — DEXTROSE AND SODIUM CHLORIDE SCH: 5; 450 INJECTION, SOLUTION INTRAVENOUS at 12:55

## 2018-03-31 RX ADMIN — PANTOPRAZOLE SODIUM SCH MG: 40 TABLET, DELAYED RELEASE ORAL at 09:22

## 2018-03-31 RX ADMIN — DIPHENHYDRAMINE HYDROCHLORIDE PRN MG: 50 INJECTION INTRAMUSCULAR; INTRAVENOUS at 03:10

## 2018-03-31 RX ADMIN — DIPHENHYDRAMINE HYDROCHLORIDE PRN MG: 50 INJECTION INTRAMUSCULAR; INTRAVENOUS at 12:22

## 2018-03-31 RX ADMIN — DEXTROSE AND SODIUM CHLORIDE SCH: 5; 450 INJECTION, SOLUTION INTRAVENOUS at 00:00

## 2018-03-31 RX ADMIN — DIPHENHYDRAMINE HYDROCHLORIDE PRN MG: 50 INJECTION INTRAMUSCULAR; INTRAVENOUS at 09:23

## 2018-03-31 RX ADMIN — DIPHENHYDRAMINE HYDROCHLORIDE PRN MG: 50 INJECTION INTRAMUSCULAR; INTRAVENOUS at 15:42

## 2018-03-31 NOTE — CP.PCM.PN
Subjective





- Date & Time of Evaluation


Date of Evaluation: 03/31/18


Time of Evaluation: 17:00





- Subjective


Subjective: 





Feeling better s/p 2U PRBC


urologic procedure not done; for outpatient





Objective





- Vital Signs/Intake and Output


Vital Signs (last 24 hours): 


 











Temp Pulse Resp BP Pulse Ox


 


 98.1 F   93 H  20   139/72   99 


 


 03/31/18 15:00  03/31/18 15:00  03/31/18 15:00  03/31/18 15:00  03/31/18 15:00








Intake and Output: 


 











 03/31/18 04/01/18





 18:59 06:59


 


Intake Total 400 


 


Output Total 2 


 


Balance 398 














- Labs


Labs: 


 





 03/31/18 06:41 





 03/31/18 06:41 











- Head Exam


Head Exam: ATRAUMATIC





- Eye Exam


Eye Exam: Normal appearance





- ENT Exam


ENT Exam: Mucous Membranes Dry





- Respiratory Exam


Respiratory Exam: NORMAL BREATHING PATTERN





- Cardiovascular Exam


Cardiovascular Exam: +S1, +S2





- GI/Abdominal Exam


GI & Abdominal Exam: Normal Bowel Sounds





Assessment and Plan


(1) Sickle cell pain crisis


Assessment & Plan: 


IV fluids, pain meds, 02 via NC, folic acid


s/p PRBC transfusion


Status: Acute   





(2) Leukocytosis


Assessment & Plan: 


outpatient urologic procedure


s/p antibiotics


Status: Acute   





(3) Sickle cell anemia


Assessment & Plan: 


outpatient folic acid


Status: Acute

## 2018-03-31 NOTE — CP.PCM.PN
Subjective





- Date & Time of Evaluation


Date of Evaluation: 03/31/18


Time of Evaluation: 11:00





- Subjective


Subjective: 





aler and orientedx3, no acute pain, no fever, NAD.





Objective





- Vital Signs/Intake and Output


Vital Signs (last 24 hours): 


 











Temp Pulse Resp BP Pulse Ox


 


 98.5 F   97 H  20   106/70   98 


 


 03/31/18 08:23  03/31/18 08:23  03/31/18 08:23  03/31/18 08:23  03/31/18 08:23








Intake and Output: 


 











 03/31/18 03/31/18





 06:59 18:59


 


Intake Total 1350 400


 


Output Total  2


 


Balance 1350 398














- Medications


Medications: 


 Current Medications





Diphenhydramine HCl (Benadryl)  25 mg IVP Q3 PRN


   PRN Reason: Anaphylaxis


   Last Admin: 03/31/18 15:42 Dose:  25 mg


Folic Acid (Folic Acid)  2 mg PO DAILY [facility]


   Last Admin: 03/31/18 09:22 Dose:  2 mg


Hydromorphone HCl (Dilaudid)  2 mg IVP Q3 PRN


   PRN Reason: Pain, severe (8-10)


   Last Admin: 03/31/18 15:42 Dose:  2 mg


Dextrose/Sodium Chloride (Dextrose 5%/0.45% Ns 1000 Ml)  1,000 mls @ 75 mls/hr 

IV .[id number] [facility]


   Last Admin: 03/31/18 12:55 Dose:  Not Given


Pantoprazole Sodium (Protonix Ec Tab)  40 mg PO DAILY [facility]


   Last Admin: 03/31/18 09:22 Dose:  40 mg











- Labs


Labs: 


 





 03/31/18 06:41 





 03/31/18 06:41 











Assessment and Plan





- Assessment and Plan (Free Text)


Assessment: 





Patient is seen and examined. Alert and orientedx3, denies cough or abdominal 

pains. Seen by DR MARIVEL Etienne today, plan to discharge home today , Advised to 

follow up with DR Chacko in his office for further work up as outpatient. 

Patient verbalized understanding. Dilaudid 2mg tablet #20 given for 

breakthrough pains.

## 2018-03-31 NOTE — CP.PCM.PN
Subjective





- Date & Time of Evaluation


Date of Evaluation: 03/31/18


Time of Evaluation: 07:20





- Subjective


Subjective: 


clinically same





Objective





- Vital Signs/Intake and Output


Vital Signs (last 24 hours): 


 











Temp Pulse Resp BP Pulse Ox


 


 98.5 F   97 H  20   106/70   98 


 


 03/31/18 08:23  03/31/18 08:23  03/31/18 08:23  03/31/18 08:23  03/31/18 08:23








Intake and Output: 


 











 03/31/18 03/31/18





 06:59 18:59


 


Intake Total 1350 


 


Balance 1350 














- Medications


Medications: 


 Current Medications





Diphenhydramine HCl (Benadryl)  25 mg IVP Q3 PRN


   PRN Reason: Anaphylaxis


   Last Admin: 03/31/18 12:22 Dose:  25 mg


Folic Acid (Folic Acid)  2 mg PO DAILY [facility]


   Last Admin: 03/31/18 09:22 Dose:  2 mg


Hydromorphone HCl (Dilaudid)  2 mg IVP Q3 PRN


   PRN Reason: Pain, severe (8-10)


   Last Admin: 03/31/18 12:22 Dose:  2 mg


Dextrose/Sodium Chloride (Dextrose 5%/0.45% Ns 1000 Ml)  1,000 mls @ 75 mls/hr 

IV .[id number] [facility]


   Last Admin: 03/31/18 00:00 Dose:  Not Given


Pantoprazole Sodium (Protonix Ec Tab)  40 mg PO DAILY [facility]


   Last Admin: 03/31/18 09:22 Dose:  40 mg











- Labs


Labs: 


 





 03/31/18 06:41 





 03/31/18 06:41 











- Constitutional


Appears: Well





- Head Exam


Head Exam: ATRAUMATIC, NORMAL INSPECTION, NORMOCEPHALIC





- Eye Exam


Eye Exam: EOMI, Normal appearance, PERRL


Pupil Exam: NORMAL ACCOMODATION, PERRL





- ENT Exam


ENT Exam: Mucous Membranes Moist, Normal Exam





- Neck Exam


Neck Exam: Full ROM, Normal Inspection.  absent: Lymphadenopathy





- Respiratory Exam


Respiratory Exam: Decreased Breath Sounds





- Cardiovascular Exam


Cardiovascular Exam: REGULAR RHYTHM, +S1, +S2





- GI/Abdominal Exam


GI & Abdominal Exam: Soft, Diminished Bowel Sounds





- Rectal Exam


Rectal Exam: Deferred





Assessment and Plan


(1) Anemia


Status: Acute   





(2) Leukocytosis


Status: Acute   





(3) Sickle cell pain crisis


Status: Acute   





(4) UTI (urinary tract infection)


Status: Acute   





(5) Animal bite wound


Status: Acute   





(6) Chronic pain disorder


Status: Acute   





(7) Leg pain


Status: Acute   





(8) Leukocytosis


Status: Acute   





(9) Myalgia


Status: Acute   





(10) Neurogenic bladder


Status: Acute   





(11) Pain


Status: Acute   





(12) Severe anemia


Status: Acute   





(13) Sickle cell anemia


Status: Acute   





(14) Sickle cell anemia with pain


Status: Acute   





- Assessment and Plan (Free Text)


Plan: 





Sickle cell Crisis


Admit to med/surg


Pt with LBP, leg pain; denies CP, SOB


HGB 6.3 on (3/21/18), 6.5 on (3/28), 5.8 today


- s/p 1 unit RBC 3/24





Dr. Olvera Hem/Onc consultant, help greatly appreciated


   -IVF, pain meds, 02 via NC, folic acid, Transfuse under Hgb 5





Folic acid 2mg PO daily


Dilaudid 2mg IV q3hrs prn 


Benadryl 25mg IV Q3hrs prn





Urinary tract infection, ESBL +


UA (3/21/18): 3+ LE, Nitrate negative, , Moderate bacteria


Urine Culture: 3/21/18 ESBL


Urine Culture: 3/27/18 E. coli, Vancomycin Resistant E. Faecium


Negative pregnancy


ID consult. Dr. Sagar Stanton. recs appreciated. 


   -DC PO BACTRIM-DAY5


      - Discontinued SS Bactrim 1 tab PO Q12H on 3/27 (started 3/24/18)


      - Discontinued DS Bactrim 1 tab PO Q12H on 3/24 (started 3/22/18)


   -START IV MERREM 500MG IV  Q 8HRLY 3/27/18   


Uro consult Dr. JAZZY Chacko. Recs appreciated.


   - CT abdomen pelvis ordered- Malpositioned right ureteral stent with 

proximal pigtail in the right upper pole and distal pigtail in the distal right 

ureter.                      Urologic repositioning is recommended.


   - Cystoscopy planned for 3/30 at 3pm


   - clear liquid for breakfast, NPO afterwards for OR








Renal US 3/27 severe right and mod left hydronephrosis





Previous Admission:


-Bladder US (3/10/18)- B/L ureteral stents with moderate hydronephrosis R>L


-Ucx (3/7/18)- vancomycin resistant E. faecium





Leukocytosis


WBC 17.8 3/30


Etiology: UTI in setting of SSC


ID consult. Dr. Sagar Stanton. recs appreciated. 


   -continue IV MERREM 500MG IV  Q 8HRLY 3/27/18


Uro consult Dr. JAZZY Chacko. Recs appreciated





Transaminitis


AST/ALT/ALK Phos 126/78/195 (3/27/18)


   AST/ALT/ALK Phos 137/91/184 (3/28/18)


      AST/ALT/ALK Phos 94/61/175 (3/30/18)


Tbili 1.6


Monitor





Neurogenic bladder


-straight cath PRN; patient is able to do herself 





Elevated creatinine


Cr 1.2 today


Will continue to monitor





GI/DVT ppx


protonix 40mg PO daily


SCDs not warranted; pt mobile


Lovenox discontinued 3/26


Patient is actively ambulating

## 2018-04-24 ENCOUNTER — HOSPITAL ENCOUNTER (OUTPATIENT)
Dept: HOSPITAL 31 - C.SDS | Age: 38
Discharge: HOME | End: 2018-04-24
Attending: UROLOGY
Payer: MEDICARE

## 2018-04-24 VITALS — BODY MASS INDEX: 22.4 KG/M2

## 2018-04-24 VITALS
HEART RATE: 92 BPM | RESPIRATION RATE: 18 BRPM | TEMPERATURE: 97 F | SYSTOLIC BLOOD PRESSURE: 95 MMHG | DIASTOLIC BLOOD PRESSURE: 68 MMHG

## 2018-04-24 VITALS — OXYGEN SATURATION: 100 %

## 2018-04-24 DIAGNOSIS — N13.30: ICD-10-CM

## 2018-04-24 DIAGNOSIS — N31.9: Primary | ICD-10-CM

## 2018-04-24 PROCEDURE — 52310 CYSTOSCOPY AND TREATMENT: CPT

## 2018-04-24 NOTE — RAD
HISTORY:

GILDA STENT  



COMPARISON:

10/27/2015



FINDINGS:



BOWEL:

Normal. No obstruction. No free air. 



BONES:

Normal.



OTHER FINDINGS:

Documentation of bilateral double-J stent catheters.



IMPRESSION:

Double-J stent catheters identified bilaterally although the precise 

location of the right stent is uncertain. Proximal loop is core 

elements 3 cm above the gallbladder fossa clips

## 2018-04-25 ENCOUNTER — HOSPITAL ENCOUNTER (INPATIENT)
Dept: HOSPITAL 31 - C.ER | Age: 38
LOS: 10 days | Discharge: HOME | DRG: 812 | End: 2018-05-05
Attending: INTERNAL MEDICINE | Admitting: INTERNAL MEDICINE
Payer: MEDICARE

## 2018-04-25 VITALS — BODY MASS INDEX: 22.4 KG/M2

## 2018-04-25 DIAGNOSIS — Z16.12: ICD-10-CM

## 2018-04-25 DIAGNOSIS — N31.9: ICD-10-CM

## 2018-04-25 DIAGNOSIS — Z16.21: ICD-10-CM

## 2018-04-25 DIAGNOSIS — N39.0: ICD-10-CM

## 2018-04-25 DIAGNOSIS — Z87.440: ICD-10-CM

## 2018-04-25 DIAGNOSIS — Z88.8: ICD-10-CM

## 2018-04-25 DIAGNOSIS — E83.111: ICD-10-CM

## 2018-04-25 DIAGNOSIS — Z88.5: ICD-10-CM

## 2018-04-25 DIAGNOSIS — R33.9: ICD-10-CM

## 2018-04-25 DIAGNOSIS — D57.00: Primary | ICD-10-CM

## 2018-04-25 DIAGNOSIS — N13.30: ICD-10-CM

## 2018-04-25 DIAGNOSIS — B95.2: ICD-10-CM

## 2018-04-25 DIAGNOSIS — R16.0: ICD-10-CM

## 2018-04-25 DIAGNOSIS — R74.0: ICD-10-CM

## 2018-04-25 DIAGNOSIS — B96.20: ICD-10-CM

## 2018-04-25 DIAGNOSIS — Z83.2: ICD-10-CM

## 2018-04-25 NOTE — RAD
PROCEDURE:  Intraoperative fluoroscopy



HISTORY:

GILDA STENT



COMPARISON:

Not available



TECHNIQUE:

Intraoperative fluoroscopy was provided for placement of left 

ureteral stent. Total time of fluoroscopy was 3.6 seconds.



FINDINGS:

Several fluoroscopic spot films are submitted demonstrating placement 

of left ureteral stent.  Right ureteral stent in situ.



IMPRESSION:

Fluoroscopy provided.

## 2018-04-26 LAB
ALBUMIN SERPL-MCNC: 3.5 G/DL (ref 3.5–5)
ALBUMIN/GLOB SERPL: 0.8 {RATIO} (ref 1–2.1)
ALT SERPL-CCNC: 95 U/L (ref 9–52)
APTT BLD: 37 SECONDS (ref 21–34)
AST SERPL-CCNC: 97 U/L (ref 14–36)
BACTERIA #/AREA URNS HPF: (no result) /[HPF]
BASOPHILS # BLD AUTO: 0.2 K/UL (ref 0–0.2)
BASOPHILS NFR BLD: 1.1 % (ref 0–2)
BILIRUB UR-MCNC: NEGATIVE MG/DL
BUN SERPL-MCNC: 21 MG/DL (ref 7–17)
CALCIUM SERPL-MCNC: 8 MG/DL (ref 8.6–10.4)
EOSINOPHIL # BLD AUTO: 0.7 K/UL (ref 0–0.7)
EOSINOPHIL NFR BLD: 3.3 % (ref 0–4)
ERYTHROCYTE [DISTWIDTH] IN BLOOD BY AUTOMATED COUNT: 18.4 % (ref 11.5–14.5)
GFR NON-AFRICAN AMERICAN: > 60
GLUCOSE UR STRIP-MCNC: NORMAL MG/DL
HEPATITIS A IGM: NEGATIVE
HEPATITIS B CORE AB: NEGATIVE
HEPATITIS C ANTIBODY: NEGATIVE
HGB BLD-MCNC: 6.6 G/DL (ref 11–16)
INR PPP: 1.2
LEUKOCYTE ESTERASE UR-ACNC: (no result) LEU/UL
LYMPHOCYTES # BLD AUTO: 3.7 K/UL (ref 1–4.3)
LYMPHOCYTES NFR BLD AUTO: 18.2 % (ref 20–40)
MCH RBC QN AUTO: 30.1 PG (ref 27–31)
MCHC RBC AUTO-ENTMCNC: 34.2 G/DL (ref 33–37)
MCV RBC AUTO: 88.1 FL (ref 81–99)
MONOCYTES # BLD: 1.6 K/UL (ref 0–0.8)
MONOCYTES NFR BLD: 7.9 % (ref 0–10)
NEUTROPHILS # BLD: 13.9 K/UL (ref 1.8–7)
NEUTROPHILS NFR BLD AUTO: 69.5 % (ref 50–75)
NRBC BLD AUTO-RTO: 0 % (ref 0–2)
PH UR STRIP: 6 [PH] (ref 5–8)
PLATELET # BLD: 248 K/UL (ref 130–400)
PMV BLD AUTO: 8.9 FL (ref 7.2–11.7)
PROT UR STRIP-MCNC: (no result) MG/DL
PROTHROMBIN TIME: 13.6 SECONDS (ref 9.7–12.2)
RBC # BLD AUTO: 2.19 MIL/UL (ref 3.8–5.2)
RBC # UR STRIP: (no result) /UL
SP GR UR STRIP: 1.01 (ref 1–1.03)
UROBILINOGEN UR-MCNC: NORMAL MG/DL (ref 0.2–1)
WBC # BLD AUTO: 20.1 K/UL (ref 4.8–10.8)

## 2018-04-26 RX ADMIN — DIPHENHYDRAMINE HYDROCHLORIDE PRN MG: 50 INJECTION INTRAMUSCULAR; INTRAVENOUS at 16:06

## 2018-04-26 RX ADMIN — DIPHENHYDRAMINE HYDROCHLORIDE PRN MG: 50 INJECTION INTRAMUSCULAR; INTRAVENOUS at 22:24

## 2018-04-26 RX ADMIN — DIPHENHYDRAMINE HYDROCHLORIDE PRN MG: 50 INJECTION INTRAMUSCULAR; INTRAVENOUS at 19:33

## 2018-04-26 NOTE — CP.PCM.PN
Subjective





- Date & Time of Evaluation


Date of Evaluation: 02/28/18


Time of Evaluation: 09:00





- Subjective


Subjective: 


clinically same





Objective





- Vital Signs/Intake and Output


Vital Signs (last 24 hours): 


 











Temp Pulse Resp BP Pulse Ox


 


 98.3 F   97 H  20   109/70   100 


 


 02/28/18 09:09  02/28/18 09:09  02/28/18 09:09  02/28/18 09:09  02/28/18 09:09








Intake and Output: 


 











 02/28/18 02/28/18





 06:59 18:59


 


Intake Total 1060 


 


Balance 1060 














- Medications


Medications: 


 Current Medications





Diphenhydramine HCl (Benadryl)  25 mg IVP Q3 PRN


   PRN Reason: Pain, moderate (4-7)


   Last Admin: 02/28/18 15:16 Dose:  25 mg


Enoxaparin Sodium (Lovenox)  40 mg SC DAILY [facility]


   Last Admin: 02/28/18 12:08 Dose:  Not Given


Folic Acid (Folic Acid)  2 mg PO DAILY [facility]


   Last Admin: 02/28/18 09:28 Dose:  2 mg


Hydromorphone HCl (Dilaudid)  2 mg IVP Q3 PRN


   PRN Reason: Pain, moderate (4-7)


   Last Admin: 02/28/18 15:16 Dose:  2 mg


Dextrose/Sodium Chloride (Dextrose 5%/0.45% Ns 1000 Ml)  1,000 mls @ 60 mls/hr 

IV .[id number] [facility]


   Last Admin: 02/28/18 06:13 Dose:  Not Given


Meropenem 1 gm/ Sodium (Chloride)  100 mls @ 100 mls/hr IVPB Q8 [facility]


   Last Admin: 02/28/18 14:23 Dose:  100 mls/hr











- Labs


Labs: 


 





 02/28/18 06:57 





 02/26/18 03:24 





 











PT  14.6 SECONDS (9.7-12.2)  H  02/26/18  02:26    


 


INR  1.3   02/26/18  02:26    


 


APTT  39 SECONDS (21-34)  H  02/26/18  02:26    














- Constitutional


Appears: Well





- Head Exam


Head Exam: ATRAUMATIC, NORMAL INSPECTION, NORMOCEPHALIC





- Eye Exam


Eye Exam: EOMI, Normal appearance, PERRL


Pupil Exam: NORMAL ACCOMODATION, PERRL





- ENT Exam


ENT Exam: Mucous Membranes Moist, Normal Exam





- Neck Exam


Neck Exam: Full ROM, Normal Inspection.  absent: Lymphadenopathy





- Respiratory Exam


Respiratory Exam: Decreased Breath Sounds





- Cardiovascular Exam


Cardiovascular Exam: REGULAR RHYTHM, +S1, +S2





- GI/Abdominal Exam


GI & Abdominal Exam: Soft, Diminished Bowel Sounds





- Rectal Exam


Rectal Exam: Deferred





Assessment and Plan


(1) Severe anemia


Status: Acute   





(2) Sickle cell anemia with pain


Status: Acute   





(3) Animal bite wound


Status: Acute   





(4) Leg pain


Status: Acute   





(5) Leukocytosis


Status: Acute   





(6) Myalgia


Status: Acute   





(7) Pain


Status: Acute   





(8) Sickle cell anemia


Status: Acute   





(9) Sickle cell pain crisis


Status: Acute   





(10) UTI (urinary tract infection)


Status: Acute No

## 2018-04-26 NOTE — CP.PCM.CON
History of Present Illness





- History of Present Illness


History of Present Illness: 





37 year old female with a history of sickle cell anemia, admitted with sickle 

cell pain crisis.  The patient reports to increasing diffuse bone pain which 

did not improve with her oral pain meds after a ureteral stent exchange.  She 

denies fevers and chills.  She does report to progressive fatigue but no 

shortness of breath.  She denies abnormal bleeding and bruising.





Past medical history:  Sickle cell anemia





Past surgical history:  Portacat





Family history:  Parents have the trait.





Social history:  Denies tobacco, alcohol, and illicit drug use.





Allergies:  Droperidol, tramadol





Review of systems:  All remaining review of systems including HEENT, 

cardiovascular, respiratory, gastrointestinal, genitourinary, musculoskeletal, 

dermatologic, neurologic, and psychiatric are negative unless mentioned in the 

HPI.





Past Patient History





- Infectious Disease


Hx of Infectious Diseases: None





- Past Medical History & Family History


Past Medical History?: Yes





- Past Social History


Smoking Status: Never Smoked





- CARDIAC


Hx Cardiac Disorders: No





- PULMONARY


Hx Respiratory Disorders: No





- NEUROLOGICAL


Hx Neurological Disorder: Yes


Hx Migraine: Yes





- HEENT


Hx HEENT Problems: No





- RENAL


Hx Chronic Kidney Disease: No





- ENDOCRINE/METABOLIC


Hx Endocrine Disorders: No





- HEMATOLOGICAL/ONCOLOGICAL


Hx Blood Disorders: Yes


Hx Anemia: Yes


Hx Blood Transfusions: Yes


Hx Sickle Cell Disease: Yes (sickle cell crisis 8/2018)





- INTEGUMENTARY


Hx Dermatological Problems: No





- MUSCULOSKELETAL/RHEUMATOLOGICAL


Hx Musculoskeletal Disorders: No


Hx Falls: No





- GASTROINTESTINAL


Hx Gastrointestinal Disorders: No





- GENITOURINARY/GYNECOLOGICAL


Hx Genitourinary Disorders: Yes


Hx Urinary Tract Infection: Yes


Other/Comment: ureter stent 04/25/18





- PSYCHIATRIC


Hx Psychophysiologic Disorder: No


Hx Substance Use: No





- SURGICAL HISTORY


Hx Surgeries: Yes


Hx Cholecystectomy: Yes (2004)





- ANESTHESIA


Hx Anesthesia: Yes


Hx Anesthesia Reactions: No


Hx Malignant Hyperthermia: No


Has any member of the family had a problem w/ anesthesia?: No





Meds


Allergies/Adverse Reactions: 


 Allergies











Allergy/AdvReac Type Severity Reaction Status Date / Time


 


droperidol Allergy   Verified 03/11/18 17:54


 


tramadol Allergy   Verified 03/11/18 17:54


 


inapsine Allergy  RASH Uncoded 03/11/18 17:54














- Medications


Medications: 


 Current Medications





Acetaminophen (Tylenol 325mg Tab)  650 mg PO Q6 PRN


   PRN Reason: Fever >100.4 F


Bisacodyl (Dulcolax)  10 mg ND DAILY PRN


   PRN Reason: Constipation


Diphenhydramine HCl (Benadryl)  25 mg IVP Q3 PRN


   PRN Reason: Itching / Pruritus


   Last Admin: 04/26/18 16:06 Dose:  25 mg


Folic Acid (Folic Acid)  2 mg PO DAILY [facility]


   Last Admin: 04/26/18 09:44 Dose:  2 mg


Hydromorphone HCl (Dilaudid)  4 mg PO Q4 PRN


   PRN Reason: Pain, severe (8-10)


   Last Admin: 04/26/18 16:06 Dose:  4 mg


Sodium Chloride (Sodium Chloride 0.9%)  1,000 mls @ 150 mls/hr IV .Q6H40M [facility]


   Last Admin: 04/26/18 11:46 Dose:  150 mls/hr


Ondansetron HCl (Zofran Inj)  4 mg IVP Q6 PRN


   PRN Reason: Nausea/Vomiting











Physical Exam





- Head Exam


Head Exam: ATRAUMATIC





- Eye Exam


Eye Exam: Normal appearance





- ENT Exam


ENT Exam: Mucous Membranes Dry





- Respiratory Exam


Respiratory Exam: NORMAL BREATHING PATTERN





- Cardiovascular Exam


Cardiovascular Exam: +S1, +S2





- GI/Abdominal Exam


GI & Abdominal Exam: Normal Bowel Sounds





- Extremities Exam


Extremities exam: Positive for: normal inspection





- Neurological Exam


Neurological exam: Oriented x3





- Psychiatric Exam


Psychiatric exam: Normal Affect, Normal Mood





- Skin


Skin Exam: Warm





Results





- Vital Signs


Recent Vital Signs: 


 Last Vital Signs











Temp  98.3 F   04/26/18 15:51


 


Pulse  96 H  04/26/18 15:51


 


Resp  20   04/26/18 15:51


 


BP  101/67   04/26/18 15:51


 


Pulse Ox  100   04/26/18 15:51














- Labs


Result Diagrams: 


 04/27/18 08:02





 04/27/18 08:02


Labs: 


 Laboratory Results - last 24 hr











  04/26/18 04/26/18 04/26/18





  02:06 02:06 05:54


 


WBC  20.1 H  


 


RBC  2.19 L  


 


Hgb  6.6 L  


 


Hct  19.3 L  


 


MCV  88.1  


 


MCH  30.1  


 


MCHC  34.2  


 


RDW  18.4 H  


 


Plt Count  248  


 


MPV  8.9  


 


Neut % (Auto)  69.5  


 


Lymph % (Auto)  18.2 L  


 


Mono % (Auto)  7.9  


 


Eos % (Auto)  3.3  


 


Baso % (Auto)  1.1  


 


Neut # (Auto)  13.9 H  


 


Lymph # (Auto)  3.7  


 


Mono # (Auto)  1.6 H  


 


Eos # (Auto)  0.7  


 


Baso # (Auto)  0.2  


 


Differential Comment    


 


Retic Count  2.5 H  


 


PT   


 


INR   


 


APTT   


 


Sodium   143 


 


Potassium   4.7 


 


Chloride   111 H 


 


Carbon Dioxide   19 L 


 


Anion Gap   18 


 


BUN   21 H 


 


Creatinine   1.0 


 


Est GFR ( Amer)   > 60 


 


Est GFR (Non-Af Amer)   > 60 


 


Random Glucose   80 


 


Calcium   8.0 L 


 


Total Bilirubin   1.6 H 


 


AST   97 H 


 


ALT   95 H D 


 


Alkaline Phosphatase   209 H D 


 


Total Protein   8.1 


 


Albumin   3.5 


 


Globulin   4.6 H 


 


Albumin/Globulin Ratio   0.8 L 


 


Urine Color   


 


Urine Clarity   


 


Urine pH   


 


Ur Specific Gravity   


 


Urine Protein   


 


Urine Glucose (UA)   


 


Urine Ketones   


 


Urine Blood   


 


Urine Nitrate   


 


Urine Bilirubin   


 


Urine Urobilinogen   


 


Ur Leukocyte Esterase   


 


Urine WBC (Auto)   


 


Urine RBC (Auto)   


 


Urine Bacteria   


 


Urine Opiates Screen   


 


Urine Methadone Screen   


 


Ur Barbiturates Screen   


 


Ur Phencyclidine Scrn   


 


Ur Amphetamines Screen   


 


U Benzodiazepines Scrn   


 


U Oth Cocaine Metabols   


 


U Cannabinoids Screen   


 


Hepatitis A IgM Ab   


 


Hep Bs Antigen   


 


Hep B Core IgM Ab   


 


Hepatitis C Antibody   


 


HIV 1&2 Antibody Screen   


 


Blood Type    O POSITIVE


 


Antibody Screen    Negative














  04/26/18 04/26/18 04/26/18





  05:58 05:58 05:58


 


WBC   


 


RBC   


 


Hgb   


 


Hct   


 


MCV   


 


MCH   


 


MCHC   


 


RDW   


 


Plt Count   


 


MPV   


 


Neut % (Auto)   


 


Lymph % (Auto)   


 


Mono % (Auto)   


 


Eos % (Auto)   


 


Baso % (Auto)   


 


Neut # (Auto)   


 


Lymph # (Auto)   


 


Mono # (Auto)   


 


Eos # (Auto)   


 


Baso # (Auto)   


 


Differential Comment   


 


Retic Count   


 


PT    13.6 H


 


INR    1.2


 


APTT    37 H


 


Sodium   


 


Potassium   


 


Chloride   


 


Carbon Dioxide   


 


Anion Gap   


 


BUN   


 


Creatinine   


 


Est GFR ( Amer)   


 


Est GFR (Non-Af Amer)   


 


Random Glucose   


 


Calcium   


 


Total Bilirubin   


 


AST   


 


ALT   


 


Alkaline Phosphatase   


 


Total Protein   


 


Albumin   


 


Globulin   


 


Albumin/Globulin Ratio   


 


Urine Color   


 


Urine Clarity   


 


Urine pH   


 


Ur Specific Gravity   


 


Urine Protein   


 


Urine Glucose (UA)   


 


Urine Ketones   


 


Urine Blood   


 


Urine Nitrate   


 


Urine Bilirubin   


 


Urine Urobilinogen   


 


Ur Leukocyte Esterase   


 


Urine WBC (Auto)   


 


Urine RBC (Auto)   


 


Urine Bacteria   


 


Urine Opiates Screen   


 


Urine Methadone Screen   


 


Ur Barbiturates Screen   


 


Ur Phencyclidine Scrn   


 


Ur Amphetamines Screen   


 


U Benzodiazepines Scrn   


 


U Oth Cocaine Metabols   


 


U Cannabinoids Screen   


 


Hepatitis A IgM Ab  Negative  


 


Hep Bs Antigen  Negative  


 


Hep B Core IgM Ab  Negative  


 


Hepatitis C Antibody  Negative  


 


HIV 1&2 Antibody Screen   Negative 


 


Blood Type   


 


Antibody Screen   














  04/26/18 04/26/18





  05:58 05:58


 


WBC  


 


RBC  


 


Hgb  


 


Hct  


 


MCV  


 


MCH  


 


MCHC  


 


RDW  


 


Plt Count  


 


MPV  


 


Neut % (Auto)  


 


Lymph % (Auto)  


 


Mono % (Auto)  


 


Eos % (Auto)  


 


Baso % (Auto)  


 


Neut # (Auto)  


 


Lymph # (Auto)  


 


Mono # (Auto)  


 


Eos # (Auto)  


 


Baso # (Auto)  


 


Differential Comment  


 


Retic Count  


 


PT  


 


INR  


 


APTT  


 


Sodium  


 


Potassium  


 


Chloride  


 


Carbon Dioxide  


 


Anion Gap  


 


BUN  


 


Creatinine  


 


Est GFR ( Amer)  


 


Est GFR (Non-Af Amer)  


 


Random Glucose  


 


Calcium  


 


Total Bilirubin  


 


AST  


 


ALT  


 


Alkaline Phosphatase  


 


Total Protein  


 


Albumin  


 


Globulin  


 


Albumin/Globulin Ratio  


 


Urine Color   Yellow


 


Urine Clarity   Hazy


 


Urine pH   6.0


 


Ur Specific Gravity   1.010


 


Urine Protein   2+ H


 


Urine Glucose (UA)   Normal


 


Urine Ketones   Negative


 


Urine Blood   3+ H


 


Urine Nitrate   Negative


 


Urine Bilirubin   Negative


 


Urine Urobilinogen   Normal


 


Ur Leukocyte Esterase   3+ H


 


Urine WBC (Auto)   1295 H


 


Urine RBC (Auto)   351 H


 


Urine Bacteria   Occ H


 


Urine Opiates Screen  Negative 


 


Urine Methadone Screen  Negative 


 


Ur Barbiturates Screen  Negative 


 


Ur Phencyclidine Scrn  Negative 


 


Ur Amphetamines Screen  Negative 


 


U Benzodiazepines Scrn  Negative 


 


U Oth Cocaine Metabols  Negative 


 


U Cannabinoids Screen  Negative 


 


Hepatitis A IgM Ab  


 


Hep Bs Antigen  


 


Hep B Core IgM Ab  


 


Hepatitis C Antibody  


 


HIV 1&2 Antibody Screen  


 


Blood Type  


 


Antibody Screen  














Assessment & Plan


(1) Sickle cell pain crisis


Assessment and Plan: 


IV fluids, pain meds, folic acid, 02 via NC


no current transfusion indication


Status: Acute   





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


Assessment and Plan: 


minimize blood transfusions


outpatient chelation


Status: Acute   





(3) Leukocytosis


Assessment and Plan: 


may be reactive to acute sickle cell crisis.


Status: Acute   





(4) Sickle cell anemia


Assessment and Plan: 


folic acid





Thank you for this interesting consult.


Status: Acute

## 2018-04-26 NOTE — CP.PCM.PN
<Sugey Gayle - Last Filed: 04/26/18 18:29>





Subjective





- Date & Time of Evaluation


Date of Evaluation: 04/26/18


Time of Evaluation: 07:00





- Subjective


Subjective: 





PGY 2 medicine progress note for Dr. MARIVEL Etienne:








Patient was seen and examined at bedside this morning. Patient appeared very 

comfortable resting in bed. She stated that she had 10/10 body pain all over. 

She was requesting more pain medication. She states that she is tolerating PO 

intake and having regular bowel movement. She reports having the left renal 

stent removed this week and will have the right one changed this upcoming 

Tuesday with Dr. Chacko. Patient denies urinary symptoms/fever/chills at this 

time. 





Objective





- Vital Signs/Intake and Output


Vital Signs (last 24 hours): 


 











Temp Pulse Resp BP Pulse Ox


 


 98.2 F   110 H  18   112/67   99 


 


 04/26/18 07:00  04/26/18 07:00  04/26/18 07:00  04/26/18 07:00  04/26/18 07:00











- Medications


Medications: 


 Current Medications





Acetaminophen (Tylenol 325mg Tab)  650 mg PO Q6 PRN


   PRN Reason: Fever >100.4 F


Bisacodyl (Dulcolax)  10 mg GA DAILY PRN


   PRN Reason: Constipation


Diphenhydramine HCl (Benadryl)  25 mg IVP Q6 [facility]


   Last Admin: 04/26/18 11:42 Dose:  25 mg


Folic Acid (Folic Acid)  2 mg PO DAILY [facility]


   Last Admin: 04/26/18 09:44 Dose:  2 mg


Hydromorphone HCl (Dilaudid)  2 mg PO Q4 PRN


   PRN Reason: Pain, severe (8-10)


   Last Admin: 04/26/18 11:43 Dose:  2 mg


Sodium Chloride (Sodium Chloride 0.9%)  1,000 mls @ 150 mls/hr IV .Q6H40M [facility]


   Last Admin: 04/26/18 11:46 Dose:  150 mls/hr


Ondansetron HCl (Zofran Inj)  4 mg IVP Q6 PRN


   PRN Reason: Nausea/Vomiting











- Labs


Labs: 


 





 04/26/18 02:06 





 04/26/18 02:06 





 











PT  13.6 SECONDS (9.7-12.2)  H  04/26/18  05:58    


 


INR  1.2   04/26/18  05:58    


 


APTT  37 SECONDS (21-34)  H  04/26/18  05:58    














- Constitutional


Appears: Non-toxic, No Acute Distress





- Head Exam


Head Exam: ATRAUMATIC, NORMAL INSPECTION





- Eye Exam


Eye Exam: EOMI


Pupil Exam: NORMAL ACCOMODATION





- ENT Exam


ENT Exam: Mucous Membranes Moist





- Respiratory Exam


Respiratory Exam: Clear to Ausculation Bilateral, NORMAL BREATHING PATTERN.  

absent: Respiratory Distress





- Cardiovascular Exam


Cardiovascular Exam: REGULAR RHYTHM, +S1, +S2


Additional comments: 





port for access on her right upper chest





- GI/Abdominal Exam


GI & Abdominal Exam: Soft, Normal Bowel Sounds.  absent: Distended, Firm, 

Guarding, Tenderness





- Extremities Exam


Extremities Exam: Normal Inspection





- Back Exam


Back Exam: NORMAL INSPECTION





- Neurological Exam


Neurological Exam: Alert, Awake, CN II-XII Intact, Oriented x3





- Psychiatric Exam


Psychiatric exam: Normal Affect, Normal Mood





Assessment and Plan





- Assessment and Plan (Free Text)


Assessment: 














Patient is now under the care of Dr. MARIVEL Etienne. Patient is a regular patient of 

Dr. Etienne. 








Assessment: 


36yo AA F w/ Sickle Cell Crisis





Sickle Cell Crisis


-Pain control with PO dilaudid


-folic acid


-fluid resuscitation 150ml/hr NS 


-Retic count 2.5


- Dr. Olvera consulted help appreciated


- Zofran prn





Hx frequest UTIs


- f/u urine culture


- UA is abnormal but unchanged from other Ua


- patient is asymptomatic and afebrile


- Dr. Stanton consulted, help appreciated - will wait on urine culture before 

starting antibiotics





Renal stents


Dr. Chacko, urology, help appreciated





Transaminitis


-hepatitis and HIV negative 2.5





Proph


-chemical DVT prophylaxis not indicated; will order SCD; OOB as tolerated


-GI prophylaxis not indicated 


-Benadryl prn








All management per Dr. MARIVEL Etienne. 








<Shahab Etienne - Last Filed: 05/04/18 11:34>





Objective





- Vital Signs/Intake and Output


Vital Signs (last 24 hours): 


 











Temp Pulse Resp BP Pulse Ox


 


 98.0 F   84   20   97/65 L  99 


 


 05/04/18 07:57  05/04/18 07:57  05/04/18 07:57  05/04/18 07:57  05/04/18 07:57











- Medications


Medications: 


 Current Medications





Acetaminophen (Tylenol 325mg Tab)  650 mg PO Q6 PRN


   PRN Reason: Fever >100.4 F


Bisacodyl (Dulcolax)  10 mg GA DAILY PRN


   PRN Reason: Constipation


Diphenhydramine HCl (Benadryl)  25 mg IVP Q3 PRN


   PRN Reason: Itching / Pruritus


   Last Admin: 05/04/18 10:21 Dose:  25 mg


Folic Acid (Folic Acid)  2 mg PO DAILY [facility]


   Last Admin: 05/04/18 10:20 Dose:  2 mg


Hydromorphone HCl (Dilaudid)  4 mg PO Q3H PRN


   PRN Reason: Pain, severe (8-10)


   Last Admin: 05/04/18 10:21 Dose:  4 mg


Ondansetron HCl (Zofran Inj)  4 mg IVP Q6 PRN


   PRN Reason: Nausea/Vomiting











- Labs


Labs: 


 





 05/04/18 05:58 





 05/04/18 05:58 





 











PT  13.6 SECONDS (9.7-12.2)  H  04/26/18  05:58    


 


INR  1.2   04/26/18  05:58    


 


APTT  37 SECONDS (21-34)  H  04/26/18  05:58    














Attending/Attestation





- Attestation


I have personally seen and examined this patient.: Yes


I have fully participated in the care of the patient.: Yes


I have reviewed all pertinent clinical information, including history, physical 

exam and plan: Yes


Notes (Text): 





case seen and d.w staff and resident, concurred with finding and management..

## 2018-04-26 NOTE — CP.PCM.CON
History of Present Illness





- History of Present Illness


History of Present Illness: 





INFECTIOUS DISEASE CONSULT;


HPI





37-year-old female with history of sickle cell disease and multiple sickle cell 

crisis who was recently hospitalized and discharged on 3/31/18 and who recently 

had an outpatient urological procedure including stent change and replacement 

on 4/24/16.


Patient has history of neurogenic bladder and history of bilateral ureteral 

stents insertion since December 2016.  A recent ultrasound of bladder showed 

right and left ureteral stent with moderate bilateral hydronephrosis.


Patient states she was told by urologist that she will have her stent changed 

next week.


She denies any pain on urination or any hematuria.  Patient self catheterizes 

herself 2 or 3 times a day.


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


On admission patient has leukocytosis but denies any symptoms.


Infectious disease consultation requested by PMD for leukocytosis and recurrent 

UTI.





LMP  MARCH 2018.,LATELY PERIODS ARE IRREGULAR,BUT LAST FOR 5 DAYS.PATIENT IS 

SINGLE AND LIVES BY HERSELF.





PMH: Anemia, Migraine, Sickle Cell Disease


   


Surgical History: Cholecystectomy (2004), B/L INTERNAL STENTS FOR 

HYDRONEPHROSIS IN 2016.


   Denies: Pacemaker


Allergy; droperidol, tramadol. 








Social History


Hx Tobacco Use: No


Hx Alcohol Use: No


Hx Substance Use: No





- Immunization History


Hx Tetanus Toxoid Vaccination: Yes


Hx Influenza Vaccination: Yes


Hx Pneumococcal Vaccination: Yes











Review of Systems





- Constitutional


Constitutional: As Per HPI.  absent: Chills, Fever





- EENT


Nose/Mouth/Throat: absent: Mouth Lesions





- Cardiovascular


Cardiovascular: absent: Chest Pain, Dyspnea





- Respiratory


Respiratory: absent: Cough, Hemoptysis





- Gastrointestinal


Gastrointestinal: absent: Diarrhea, Nausea, Vomiting





- Genitourinary


Genitourinary: absent: Dysuria, Freq UTI, Hx /Renal Surgery





- Menstruation


Menstruation: Menses Variable





- Neurological


Neurological: absent: Headaches





- Hematologic/Lymphatic


Hematologic: As Per HPI





Past Patient History





- Infectious Disease


Hx of Infectious Diseases: None





- Past Medical History & Family History


Past Medical History?: Yes





- Past Social History


Smoking Status: Never Smoked





- CARDIAC


Hx Cardiac Disorders: No





- PULMONARY


Hx Respiratory Disorders: No





- NEUROLOGICAL


Hx Neurological Disorder: Yes


Hx Migraine: Yes





- HEENT


Hx HEENT Problems: No





- RENAL


Hx Chronic Kidney Disease: No





- ENDOCRINE/METABOLIC


Hx Endocrine Disorders: No





- HEMATOLOGICAL/ONCOLOGICAL


Hx Blood Disorders: Yes


Hx Anemia: Yes


Hx Blood Transfusions: Yes


Hx Sickle Cell Disease: Yes (sickle cell crisis 8/2018)





- INTEGUMENTARY


Hx Dermatological Problems: No





- MUSCULOSKELETAL/RHEUMATOLOGICAL


Hx Musculoskeletal Disorders: No


Hx Falls: No





- GASTROINTESTINAL


Hx Gastrointestinal Disorders: No





- GENITOURINARY/GYNECOLOGICAL


Hx Genitourinary Disorders: Yes


Hx Urinary Tract Infection: Yes


Other/Comment: ureter stent 04/25/18





- PSYCHIATRIC


Hx Psychophysiologic Disorder: No


Hx Substance Use: No





- SURGICAL HISTORY


Hx Surgeries: Yes


Hx Cholecystectomy: Yes (2004)





- ANESTHESIA


Hx Anesthesia: Yes


Hx Anesthesia Reactions: No


Hx Malignant Hyperthermia: No


Has any member of the family had a problem w/ anesthesia?: No





Meds


Allergies/Adverse Reactions: 


 Allergies











Allergy/AdvReac Type Severity Reaction Status Date / Time


 


droperidol Allergy   Verified 03/11/18 17:54


 


tramadol Allergy   Verified 03/11/18 17:54


 


inapsine Allergy  RASH Uncoded 03/11/18 17:54














- Medications


Medications: 


 Current Medications





Acetaminophen (Tylenol 325mg Tab)  650 mg PO Q6 PRN


   PRN Reason: Fever >100.4 F


Bisacodyl (Dulcolax)  10 mg IA DAILY PRN


   PRN Reason: Constipation


Diphenhydramine HCl (Benadryl)  25 mg IVP Q3 PRN


   PRN Reason: Itching / Pruritus


   Last Admin: 04/26/18 16:06 Dose:  25 mg


Folic Acid (Folic Acid)  2 mg PO DAILY [facility]


   Last Admin: 04/26/18 09:44 Dose:  2 mg


Hydromorphone HCl (Dilaudid)  4 mg PO Q4 PRN


   PRN Reason: Pain, severe (8-10)


   Last Admin: 04/26/18 16:06 Dose:  4 mg


Sodium Chloride (Sodium Chloride 0.9%)  1,000 mls @ 150 mls/hr IV .Q6H40M [facility]


   Last Admin: 04/26/18 18:49 Dose:  150 mls/hr


Ondansetron HCl (Zofran Inj)  4 mg IVP Q6 PRN


   PRN Reason: Nausea/Vomiting











Physical Exam





- Constitutional


Appears: Non-toxic





- Head Exam


Head Exam: NORMAL INSPECTION





- Eye Exam


Eye Exam: EOMI, PERRL





- ENT Exam


ENT Exam: Normal Oropharynx





- Neck Exam


Neck exam: Positive for: Normal Inspection





- Respiratory Exam


Respiratory Exam: Clear to Auscultation Bilateral





- Cardiovascular Exam


Cardiovascular Exam: REGULAR RHYTHM, +S1, +S2





- GI/Abdominal Exam


GI & Abdominal Exam: Normal Bowel Sounds, Soft.  absent: Guarding





- Extremities Exam


Extremities exam: Negative for: calf tenderness, pedal edema





- Back Exam


Back exam: absent: CVA tenderness (L), CVA tenderness (R)





- Neurological Exam


Neurological exam: Alert, CN II-XII Intact, Oriented x3, Reflexes Normal





- Psychiatric Exam


Psychiatric exam: Normal Mood





- Skin


Skin Exam: Pallor, Warm





Results





- Vital Signs


Recent Vital Signs: 


 Last Vital Signs











Temp  98.3 F   04/26/18 15:51


 


Pulse  96 H  04/26/18 15:51


 


Resp  20   04/26/18 15:51


 


BP  101/67   04/26/18 15:51


 


Pulse Ox  100   04/26/18 15:51














- Labs


Result Diagrams: 


 04/26/18 02:06





 04/26/18 02:06


Labs: 


 Laboratory Results - last 24 hr











  04/26/18 04/26/18 04/26/18





  02:06 02:06 05:54


 


WBC  20.1 H  


 


RBC  2.19 L  


 


Hgb  6.6 L  


 


Hct  19.3 L  


 


MCV  88.1  


 


MCH  30.1  


 


MCHC  34.2  


 


RDW  18.4 H  


 


Plt Count  248  


 


MPV  8.9  


 


Neut % (Auto)  69.5  


 


Lymph % (Auto)  18.2 L  


 


Mono % (Auto)  7.9  


 


Eos % (Auto)  3.3  


 


Baso % (Auto)  1.1  


 


Neut # (Auto)  13.9 H  


 


Lymph # (Auto)  3.7  


 


Mono # (Auto)  1.6 H  


 


Eos # (Auto)  0.7  


 


Baso # (Auto)  0.2  


 


Differential Comment    


 


Retic Count  2.5 H  


 


PT   


 


INR   


 


APTT   


 


Sodium   143 


 


Potassium   4.7 


 


Chloride   111 H 


 


Carbon Dioxide   19 L 


 


Anion Gap   18 


 


BUN   21 H 


 


Creatinine   1.0 


 


Est GFR ( Amer)   > 60 


 


Est GFR (Non-Af Amer)   > 60 


 


Random Glucose   80 


 


Calcium   8.0 L 


 


Total Bilirubin   1.6 H 


 


AST   97 H 


 


ALT   95 H D 


 


Alkaline Phosphatase   209 H D 


 


Total Protein   8.1 


 


Albumin   3.5 


 


Globulin   4.6 H 


 


Albumin/Globulin Ratio   0.8 L 


 


Urine Color   


 


Urine Clarity   


 


Urine pH   


 


Ur Specific Gravity   


 


Urine Protein   


 


Urine Glucose (UA)   


 


Urine Ketones   


 


Urine Blood   


 


Urine Nitrate   


 


Urine Bilirubin   


 


Urine Urobilinogen   


 


Ur Leukocyte Esterase   


 


Urine WBC (Auto)   


 


Urine RBC (Auto)   


 


Urine Bacteria   


 


Urine Opiates Screen   


 


Urine Methadone Screen   


 


Ur Barbiturates Screen   


 


Ur Phencyclidine Scrn   


 


Ur Amphetamines Screen   


 


U Benzodiazepines Scrn   


 


U Oth Cocaine Metabols   


 


U Cannabinoids Screen   


 


Hepatitis A IgM Ab   


 


Hep Bs Antigen   


 


Hep B Core IgM Ab   


 


Hepatitis C Antibody   


 


HIV 1&2 Antibody Screen   


 


Blood Type    O POSITIVE


 


Antibody Screen    Negative














  04/26/18 04/26/18 04/26/18





  05:58 05:58 05:58


 


WBC   


 


RBC   


 


Hgb   


 


Hct   


 


MCV   


 


MCH   


 


MCHC   


 


RDW   


 


Plt Count   


 


MPV   


 


Neut % (Auto)   


 


Lymph % (Auto)   


 


Mono % (Auto)   


 


Eos % (Auto)   


 


Baso % (Auto)   


 


Neut # (Auto)   


 


Lymph # (Auto)   


 


Mono # (Auto)   


 


Eos # (Auto)   


 


Baso # (Auto)   


 


Differential Comment   


 


Retic Count   


 


PT    13.6 H


 


INR    1.2


 


APTT    37 H


 


Sodium   


 


Potassium   


 


Chloride   


 


Carbon Dioxide   


 


Anion Gap   


 


BUN   


 


Creatinine   


 


Est GFR ( Amer)   


 


Est GFR (Non-Af Amer)   


 


Random Glucose   


 


Calcium   


 


Total Bilirubin   


 


AST   


 


ALT   


 


Alkaline Phosphatase   


 


Total Protein   


 


Albumin   


 


Globulin   


 


Albumin/Globulin Ratio   


 


Urine Color   


 


Urine Clarity   


 


Urine pH   


 


Ur Specific Gravity   


 


Urine Protein   


 


Urine Glucose (UA)   


 


Urine Ketones   


 


Urine Blood   


 


Urine Nitrate   


 


Urine Bilirubin   


 


Urine Urobilinogen   


 


Ur Leukocyte Esterase   


 


Urine WBC (Auto)   


 


Urine RBC (Auto)   


 


Urine Bacteria   


 


Urine Opiates Screen   


 


Urine Methadone Screen   


 


Ur Barbiturates Screen   


 


Ur Phencyclidine Scrn   


 


Ur Amphetamines Screen   


 


U Benzodiazepines Scrn   


 


U Oth Cocaine Metabols   


 


U Cannabinoids Screen   


 


Hepatitis A IgM Ab  Negative  


 


Hep Bs Antigen  Negative  


 


Hep B Core IgM Ab  Negative  


 


Hepatitis C Antibody  Negative  


 


HIV 1&2 Antibody Screen   Negative 


 


Blood Type   


 


Antibody Screen   














  04/26/18 04/26/18





  05:58 05:58


 


WBC  


 


RBC  


 


Hgb  


 


Hct  


 


MCV  


 


MCH  


 


MCHC  


 


RDW  


 


Plt Count  


 


MPV  


 


Neut % (Auto)  


 


Lymph % (Auto)  


 


Mono % (Auto)  


 


Eos % (Auto)  


 


Baso % (Auto)  


 


Neut # (Auto)  


 


Lymph # (Auto)  


 


Mono # (Auto)  


 


Eos # (Auto)  


 


Baso # (Auto)  


 


Differential Comment  


 


Retic Count  


 


PT  


 


INR  


 


APTT  


 


Sodium  


 


Potassium  


 


Chloride  


 


Carbon Dioxide  


 


Anion Gap  


 


BUN  


 


Creatinine  


 


Est GFR ( Amer)  


 


Est GFR (Non-Af Amer)  


 


Random Glucose  


 


Calcium  


 


Total Bilirubin  


 


AST  


 


ALT  


 


Alkaline Phosphatase  


 


Total Protein  


 


Albumin  


 


Globulin  


 


Albumin/Globulin Ratio  


 


Urine Color   Yellow


 


Urine Clarity   Hazy


 


Urine pH   6.0


 


Ur Specific Gravity   1.010


 


Urine Protein   2+ H


 


Urine Glucose (UA)   Normal


 


Urine Ketones   Negative


 


Urine Blood   3+ H


 


Urine Nitrate   Negative


 


Urine Bilirubin   Negative


 


Urine Urobilinogen   Normal


 


Ur Leukocyte Esterase   3+ H


 


Urine WBC (Auto)   1295 H


 


Urine RBC (Auto)   351 H


 


Urine Bacteria   Occ H


 


Urine Opiates Screen  Negative 


 


Urine Methadone Screen  Negative 


 


Ur Barbiturates Screen  Negative 


 


Ur Phencyclidine Scrn  Negative 


 


Ur Amphetamines Screen  Negative 


 


U Benzodiazepines Scrn  Negative 


 


U Oth Cocaine Metabols  Negative 


 


U Cannabinoids Screen  Negative 


 


Hepatitis A IgM Ab  


 


Hep Bs Antigen  


 


Hep B Core IgM Ab  


 


Hepatitis C Antibody  


 


HIV 1&2 Antibody Screen  


 


Blood Type  


 


Antibody Screen  














Assessment & Plan


(1) Sickle cell anemia


Assessment and Plan: 


H/H 6.6/19.2


 ? BLOOD TRANSFUSION.


Status: Acute   





(2) Leukocytosis


Assessment and Plan: 


patient has leukocytosis-most likely reactive secondary to sickle cell crisis.


Case discussed with the resident.


Continue IV fluids, folic acid, analgesics as needed when necessary.


Patient has neurogenic bladder and recently had the left stent removed/and 

replaced on 4/24/18 by Dr. Chacko.





Patient is colonized gram-positive and gram-negative organisms.


Will observe off antibiotics as she may develop resistant bacterial 

infections..  She already has VRE/E.COLI IN URINE. 


 fOLLOW-UP FEVER CURVE.


Status: Acute   





(3) Anemia


Assessment and Plan: 


h&h LOW.


hEMATOLOGY ON BOARD.


Status: Acute   





(4) Neurogenic bladder


Status: Acute   





(5) Bilateral hydronephrosis


Assessment and Plan: 


S/P LEFT URETERAL STENT REMOVED AND REPLACED ON 4/24/18.  


RIGHT URETERAL STENTS WILL BE REPLACED NEXT WEEK ON TUESDAY AS PER


.


WILL GIVE PREOP. ANTIBIOTICS DEPENDING ON CULTURES.


Status: Acute

## 2018-04-26 NOTE — C.PDOC
History Of Present Illness


37 year old female presents to the ER with a complaint of diffuse body aches, 

secondary to sickle cell, associated with mild fatigue. Patient had ureter 

stent placed yesterday. Patient reports she has had UTIs in the past, however, 

without any symptoms. Denies dysuria, fever, or vomiting.


Chief Complaint (Nursing): Abdominal Pain


History Per: Patient


History/Exam Limitations: no limitations


Onset/Duration Of Symptoms: Hrs


Current Symptoms Are (Timing): Still Present


Recent travel outside of the [place]: No





Past Medical History


Reviewed: Historical Data, Nursing Documentation, Vital Signs


Vital Signs: 


 Last Vital Signs











Temp  98.6 F   04/25/18 23:40


 


Pulse  102 H  04/25/18 23:40


 


Resp  20   04/25/18 23:40


 


BP  112/78   04/25/18 23:40


 


Pulse Ox  100   04/26/18 05:29














- Medical History


PMH: Anemia, Migraine, Sickle Cell Disease (sickle cell crisis 8/2018)


Surgical History: Cholecystectomy (2004)





- [facility] Procedures








PACKED CELL TRANSFUSION (06/04/13)


TETANUS TOXOID ADMINIST (09/23/13)


TRANSFUSE NONAUT RED BLOOD CELLS IN PERIPH VEIN, PERC (02/15/18)








Family History: States: Unknown Family Hx





- Social History


Hx Tobacco Use: No


Hx Alcohol Use: No


Hx Substance Use: No





- Immunization History


Hx Tetanus Toxoid Vaccination: Yes


Hx Influenza Vaccination: Yes


Hx Pneumococcal Vaccination: Yes





Review Of Systems


Constitutional: Positive for: Other (mild fatigue).  Negative for: Fever, Chills


Gastrointestinal: Negative for: Vomiting


Genitourinary: Negative for: Dysuria


Musculoskeletal: Positive for: Other (Diffuse body aches)





Physical Exam





- Physical Exam


Appears: Non-toxic


Skin: Normal Color, Warm, Dry


Head: Atraumatic, Normacephalic


Eye(s): bilateral: Normal Inspection


Oral Mucosa: Moist


Chest: Symmetrical, No Tenderness


Cardiovascular: Rhythm Regular


Respiratory: Normal Breath Sounds, No Rales, No Rhonchi, No Wheezing


Gastrointestinal/Abdominal: Soft, No Tenderness


Extremity: Tenderness (Diffuse x4)


Neurological/Psych: Oriented x3, Normal Speech





ED Course And Treatment





- Laboratory Results


Result Diagrams: 


 04/26/18 02:06





 04/26/18 02:06


O2 Sat by Pulse Oximetry: 100 (room air)


Pulse Ox Interpretation: Normal


Progress Note: Pain medications given in the ED. Patient still with pain and 

with hemoglobin of 6.6, will admit under hospitalist service for transfusion 

and pain management.





Disposition





- Disposition


Disposition Time: 02:40


Condition: STABLE





- Clinical Impression


Clinical Impression: 


 Anemia, Sickle cell anemia with pain








- Scribe Statement


The provider has reviewed the documentation as recorded by the Scribe





Enio Londono





All medical record entries made by the Anaibagnieszka were at my direction and 

personally dictated by me. I have reviewed the chart and agree that the record 

accurately reflects my personal performance of the history, physical exam, 

medical decision making, and the department course for this patient. I have 

also personally directed, reviewed, and agree with the discharge instructions 

and disposition.

## 2018-04-26 NOTE — CP.PCM.HP
<MargaritaJosé - Last Filed: 04/26/18 05:18>





History of Present Illness





- History of Present Illness


History of Present Illness: 


CC: abdominal pain





PMD: Dr. MARIVEL Etienne





This patient is well known to the writer, comes to the hospital frequently for 

sickle cell crisis. She states that after she had a urological procedure the 

other day for a kidney stone (had a stent placed) her abdominal pain worsened. 

She denies any urinary symptoms, no frequency, urgency, dysuria fevers/chills, 

HA, CP, SOB, no dyspnea on exertion, is able to move without problem and go 

about her daily activities. She states her hemoglobin is normally around a 7, 

and she normally only gets transfused when she is in the 5's for her 

hemoglobin. She states she is only taking folic acid for her sickle cell anemia

, as her hematologist took her off of hydroxyurea as it "was not working". She 

has a port for access on her right upper chest, for frequent blood draws and 

transfusions. She also takes 2mg of Dilaudid Q4H at home for pain control. 











Present on Admission





- Present on Admission


Any Indicators Present on Admission: No


History of DVT/PE: No


History of Uncontrolled Diabetes: No


Urinary Catheter: No


Decubitus Ulcer Present: No





Past Patient History





- Infectious Disease


Hx of Infectious Diseases: None





- Past Medical History & Family History


Past Medical History?: Yes





- Past Social History


Smoking Status: Never Smoked





- CARDIAC


Hx Cardiac Disorders: No





- PULMONARY


Hx Respiratory Disorders: No





- NEUROLOGICAL


Hx Neurological Disorder: Yes


Hx Migraine: Yes





- HEENT


Hx HEENT Problems: No





- RENAL


Hx Chronic Kidney Disease: No


Hx Neurogenic Bladder: Yes (2016 self catheterize)


Other/Comment: stent placement   04-





- ENDOCRINE/METABOLIC


Hx Endocrine Disorders: No


Hx Hyperthyroidism: No





- HEMATOLOGICAL/ONCOLOGICAL


Hx Blood Disorders: Yes


Hx Anemia: Yes


Hx Blood Transfusions: Yes


Hx Blood Transfusion Reaction: No


Hx Sickle Cell Disease: Yes (sickle cell crisis 8/2018)





- INTEGUMENTARY


Hx Dermatological Problems: No





- MUSCULOSKELETAL/RHEUMATOLOGICAL


Hx Musculoskeletal Disorders: No


Hx Falls: No





- GASTROINTESTINAL


Hx Gastrointestinal Disorders: No





- GENITOURINARY/GYNECOLOGICAL


Hx Genitourinary Disorders: No





- PSYCHIATRIC


Hx Psychophysiologic Disorder: No


Hx Substance Use: No





- SURGICAL HISTORY


Hx Surgeries: Yes


Hx Cholecystectomy: Yes (2004)


Hx Vascular Surgery: Yes (sugar cath right chest)


Other/Comment: back surgery sec to scoliosis





- ANESTHESIA


Hx Anesthesia: Yes


Hx Anesthesia Reactions: No


Hx Malignant Hyperthermia: No





Meds


Allergies/Adverse Reactions: 


 Allergies











Allergy/AdvReac Type Severity Reaction Status Date / Time


 


droperidol Allergy   Verified 03/11/18 17:54


 


tramadol Allergy   Verified 03/11/18 17:54


 


inapsine Allergy  RASH Uncoded 03/11/18 17:54














Physical Exam





- Constitutional


Appears: Well, Non-toxic


Additional comments: 


patient is sitting comfortably in bed, able to move about without issue, with 

no complaints 





- Head Exam


Head Exam: ATRAUMATIC, NORMAL INSPECTION





- Eye Exam


Eye Exam: EOMI, Normal appearance (injected conjunctiva )





- ENT Exam


ENT Exam: Mucous Membranes Moist





- Neck Exam


Neck exam: Positive for: Full Rom.  Negative for: Lymphadenopathy, Thyromegaly





- Respiratory Exam


Respiratory Exam: Clear to Auscultation Bilateral, NORMAL BREATHING PATTERN.  

absent: Rales, Rhonchi, Wheezes





- Cardiovascular Exam


Cardiovascular Exam: Tachycardia, REGULAR RHYTHM, +S1, +S2





- GI/Abdominal Exam


GI & Abdominal Exam: Normal Bowel Sounds, Organomegaly (markedly enlarged liver 

down to umbilicus no tenderness whatsoever ), Soft.  absent: Tenderness





- Extremities Exam


Extremities exam: Positive for: full ROM, normal capillary refill, normal 

inspection, pedal pulses present.  Negative for: calf tenderness, joint swelling

, pedal edema, tenderness





- Back Exam


Back exam: NORMAL INSPECTION.  absent: CVA tenderness (L), CVA tenderness (R)





- Neurological Exam


Neurological exam: Alert, CN II-XII Intact, Normal Gait, Oriented x3





- Psychiatric Exam


Psychiatric exam: Normal Affect, Normal Mood





- Skin


Skin Exam: Warm





Results





- Vital Signs


Recent Vital Signs: 





 Last Vital Signs











Temp  98.6 F   04/25/18 23:40


 


Pulse  102 H  04/25/18 23:40


 


Resp  20   04/25/18 23:40


 


BP  112/78   04/25/18 23:40


 


Pulse Ox  100   04/25/18 23:40














- Labs


Result Diagrams: 


 04/26/18 02:06





 04/26/18 02:06


Labs: 





 Laboratory Results - last 24 hr











  04/26/18 04/26/18





  02:06 02:06


 


WBC  20.1 H 


 


RBC  2.19 L 


 


Hgb  6.6 L 


 


Hct  19.3 L 


 


MCV  88.1 


 


MCH  30.1 


 


MCHC  34.2 


 


RDW  18.4 H 


 


Plt Count  248 


 


MPV  8.9 


 


Neut % (Auto)  69.5 


 


Lymph % (Auto)  18.2 L 


 


Mono % (Auto)  7.9 


 


Eos % (Auto)  3.3 


 


Baso % (Auto)  1.1 


 


Neut # (Auto)  13.9 H 


 


Lymph # (Auto)  3.7 


 


Mono # (Auto)  1.6 H 


 


Eos # (Auto)  0.7 


 


Baso # (Auto)  0.2 


 


Differential Comment   


 


Sodium   143


 


Potassium   4.7


 


Chloride   111 H


 


Carbon Dioxide   19 L


 


Anion Gap   18


 


BUN   21 H


 


Creatinine   1.0


 


Est GFR ( Amer)   > 60


 


Est GFR (Non-Af Amer)   > 60


 


Random Glucose   80


 


Calcium   8.0 L


 


Total Bilirubin   1.6 H


 


AST   97 H


 


ALT   95 H D


 


Alkaline Phosphatase   209 H D


 


Total Protein   8.1


 


Albumin   3.5


 


Globulin   4.6 H


 


Albumin/Globulin Ratio   0.8 L














Assessment & Plan





- Assessment and Plan (Free Text)


Assessment: 


36yo AA F w/ Sickle Cell Crisis





Sickle Cell Crisis


-Pain control with oxycodone; transition away from dilaudid IV in the hospital 2 /2 to shortage


-folic acid


-fluid resuscitation 150ml/hr NS 


-1unit PRBC; currently at 6.6 normally in 7's comfortably; no symptoms at 

present 


-f/u reticulocyte count 





Enlarged Liver w/ elevated LFT


-f/u hep panel and HIV





Proph


-chemical DVT prophylaxis not indicated; will order SCD; OOB as tolerated


-GI prophylaxis not indicated 





Discussed with Dr. Naomi Estrada PGY2





Decision To Admit





- Pt Status Changed To:


Hospital Disposition Of: Observation





- .


Bed Request Type: Regular


Admitting Physician: Quique Salgado





<Quique Salgado - Last Filed: 04/26/18 06:06>





Results





- Vital Signs


Recent Vital Signs: 





 Last Vital Signs











Temp  98.3 F   04/26/18 05:53


 


Pulse  100 H  04/26/18 05:53


 


Resp  16   04/26/18 05:53


 


BP  107/74   04/26/18 05:53


 


Pulse Ox  100   04/26/18 05:53














- Labs


Result Diagrams: 


 04/26/18 02:06





 04/26/18 02:06


Labs: 





 Laboratory Results - last 24 hr











  04/26/18 04/26/18





  02:06 02:06


 


WBC  20.1 H 


 


RBC  2.19 L 


 


Hgb  6.6 L 


 


Hct  19.3 L 


 


MCV  88.1 


 


MCH  30.1 


 


MCHC  34.2 


 


RDW  18.4 H 


 


Plt Count  248 


 


MPV  8.9 


 


Neut % (Auto)  69.5 


 


Lymph % (Auto)  18.2 L 


 


Mono % (Auto)  7.9 


 


Eos % (Auto)  3.3 


 


Baso % (Auto)  1.1 


 


Neut # (Auto)  13.9 H 


 


Lymph # (Auto)  3.7 


 


Mono # (Auto)  1.6 H 


 


Eos # (Auto)  0.7 


 


Baso # (Auto)  0.2 


 


Differential Comment   


 


Sodium   143


 


Potassium   4.7


 


Chloride   111 H


 


Carbon Dioxide   19 L


 


Anion Gap   18


 


BUN   21 H


 


Creatinine   1.0


 


Est GFR ( Amer)   > 60


 


Est GFR (Non-Af Amer)   > 60


 


Random Glucose   80


 


Calcium   8.0 L


 


Total Bilirubin   1.6 H


 


AST   97 H


 


ALT   95 H D


 


Alkaline Phosphatase   209 H D


 


Total Protein   8.1


 


Albumin   3.5


 


Globulin   4.6 H


 


Albumin/Globulin Ratio   0.8 L














Assessment & Plan





- Date & Time


Date: 04/26/18 (I have seen and examined the patient.  I agree with the 

findings and plan of care as documented by Dr. Estrada.  Patient with sickle 

cell anemia.  Hemoglobin lower than usual.  Transfuse 1 unit.  Pain control.  

Monitor for acute changes.)


Time: 06:05





Attending/Attestation





- Attestation


I have personally seen and examined this patient.: Yes


I have fully participated in the care of the patient.: Yes


I have reviewed all pertinent clinical information: Yes

## 2018-04-26 NOTE — CP.PCM.HP
Past Patient History





- Infectious Disease


Hx of Infectious Diseases: None





- Past Medical History & Family History


Past Medical History?: Yes





- Past Social History


Smoking Status: Never Smoked





- CARDIAC


Hx Cardiac Disorders: No





- PULMONARY


Hx Respiratory Disorders: No





- NEUROLOGICAL


Hx Neurological Disorder: Yes


Hx Migraine: Yes





- HEENT


Hx HEENT Problems: No





- RENAL


Hx Chronic Kidney Disease: No





- ENDOCRINE/METABOLIC


Hx Endocrine Disorders: No





- HEMATOLOGICAL/ONCOLOGICAL


Hx Blood Disorders: Yes


Hx Anemia: Yes


Hx Blood Transfusions: Yes


Hx Sickle Cell Disease: Yes (sickle cell crisis 8/2018)





- INTEGUMENTARY


Hx Dermatological Problems: No





- MUSCULOSKELETAL/RHEUMATOLOGICAL


Hx Musculoskeletal Disorders: No


Hx Falls: No





- GASTROINTESTINAL


Hx Gastrointestinal Disorders: No





- GENITOURINARY/GYNECOLOGICAL


Hx Genitourinary Disorders: Yes


Hx Urinary Tract Infection: Yes


Other/Comment: ureter stent 04/25/18





- PSYCHIATRIC


Hx Psychophysiologic Disorder: No


Hx Substance Use: No





- SURGICAL HISTORY


Hx Surgeries: Yes


Hx Cholecystectomy: Yes (2004)





- ANESTHESIA


Hx Anesthesia: Yes


Hx Anesthesia Reactions: No


Hx Malignant Hyperthermia: No


Has any member of the family had a problem w/ anesthesia?: No





Meds


Allergies/Adverse Reactions: 


 Allergies











Allergy/AdvReac Type Severity Reaction Status Date / Time


 


droperidol Allergy   Verified 03/11/18 17:54


 


tramadol Allergy   Verified 03/11/18 17:54


 


inapsine Allergy  RASH Uncoded 03/11/18 17:54














Physical Exam





- Constitutional


Appears: Well





- Head Exam


Head Exam: ATRAUMATIC, NORMAL INSPECTION, NORMOCEPHALIC





- Eye Exam


Eye Exam: EOMI, Normal appearance, PERRL


Pupil Exam: NORMAL ACCOMODATION, PERRL





- ENT Exam


ENT Exam: Mucous Membranes Moist, Normal Exam





- Neck Exam


Neck exam: Positive for: Normal Inspection





- Respiratory Exam


Respiratory Exam: Decreased Breath Sounds





- Cardiovascular Exam


Cardiovascular Exam: REGULAR RHYTHM, +S1, +S2





- GI/Abdominal Exam


GI & Abdominal Exam: Diminished Bowel Sounds, Soft





- Rectal Exam


Rectal Exam: Deferred





Results





- Vital Signs


Recent Vital Signs: 





 Last Vital Signs











Temp  98.3 F   04/26/18 15:51


 


Pulse  96 H  04/26/18 15:51


 


Resp  20   04/26/18 15:51


 


BP  101/67   04/26/18 15:51


 


Pulse Ox  100   04/26/18 15:51














- Labs


Result Diagrams: 


 04/26/18 02:06





 04/26/18 02:06


Labs: 





 Laboratory Results - last 24 hr











  04/26/18 04/26/18 04/26/18





  02:06 02:06 05:54


 


WBC  20.1 H  


 


RBC  2.19 L  


 


Hgb  6.6 L  


 


Hct  19.3 L  


 


MCV  88.1  


 


MCH  30.1  


 


MCHC  34.2  


 


RDW  18.4 H  


 


Plt Count  248  


 


MPV  8.9  


 


Neut % (Auto)  69.5  


 


Lymph % (Auto)  18.2 L  


 


Mono % (Auto)  7.9  


 


Eos % (Auto)  3.3  


 


Baso % (Auto)  1.1  


 


Neut # (Auto)  13.9 H  


 


Lymph # (Auto)  3.7  


 


Mono # (Auto)  1.6 H  


 


Eos # (Auto)  0.7  


 


Baso # (Auto)  0.2  


 


Differential Comment    


 


Retic Count  2.5 H  


 


PT   


 


INR   


 


APTT   


 


Sodium   143 


 


Potassium   4.7 


 


Chloride   111 H 


 


Carbon Dioxide   19 L 


 


Anion Gap   18 


 


BUN   21 H 


 


Creatinine   1.0 


 


Est GFR ( Amer)   > 60 


 


Est GFR (Non-Af Amer)   > 60 


 


Random Glucose   80 


 


Calcium   8.0 L 


 


Total Bilirubin   1.6 H 


 


AST   97 H 


 


ALT   95 H D 


 


Alkaline Phosphatase   209 H D 


 


Total Protein   8.1 


 


Albumin   3.5 


 


Globulin   4.6 H 


 


Albumin/Globulin Ratio   0.8 L 


 


Urine Color   


 


Urine Clarity   


 


Urine pH   


 


Ur Specific Gravity   


 


Urine Protein   


 


Urine Glucose (UA)   


 


Urine Ketones   


 


Urine Blood   


 


Urine Nitrate   


 


Urine Bilirubin   


 


Urine Urobilinogen   


 


Ur Leukocyte Esterase   


 


Urine WBC (Auto)   


 


Urine RBC (Auto)   


 


Urine Bacteria   


 


Urine Opiates Screen   


 


Urine Methadone Screen   


 


Ur Barbiturates Screen   


 


Ur Phencyclidine Scrn   


 


Ur Amphetamines Screen   


 


U Benzodiazepines Scrn   


 


U Oth Cocaine Metabols   


 


U Cannabinoids Screen   


 


Hepatitis A IgM Ab   


 


Hep Bs Antigen   


 


Hep B Core IgM Ab   


 


Hepatitis C Antibody   


 


HIV 1&2 Antibody Screen   


 


Blood Type    O POSITIVE


 


Antibody Screen    Negative














  04/26/18 04/26/18 04/26/18





  05:58 05:58 05:58


 


WBC   


 


RBC   


 


Hgb   


 


Hct   


 


MCV   


 


MCH   


 


MCHC   


 


RDW   


 


Plt Count   


 


MPV   


 


Neut % (Auto)   


 


Lymph % (Auto)   


 


Mono % (Auto)   


 


Eos % (Auto)   


 


Baso % (Auto)   


 


Neut # (Auto)   


 


Lymph # (Auto)   


 


Mono # (Auto)   


 


Eos # (Auto)   


 


Baso # (Auto)   


 


Differential Comment   


 


Retic Count   


 


PT    13.6 H


 


INR    1.2


 


APTT    37 H


 


Sodium   


 


Potassium   


 


Chloride   


 


Carbon Dioxide   


 


Anion Gap   


 


BUN   


 


Creatinine   


 


Est GFR ( Amer)   


 


Est GFR (Non-Af Amer)   


 


Random Glucose   


 


Calcium   


 


Total Bilirubin   


 


AST   


 


ALT   


 


Alkaline Phosphatase   


 


Total Protein   


 


Albumin   


 


Globulin   


 


Albumin/Globulin Ratio   


 


Urine Color   


 


Urine Clarity   


 


Urine pH   


 


Ur Specific Gravity   


 


Urine Protein   


 


Urine Glucose (UA)   


 


Urine Ketones   


 


Urine Blood   


 


Urine Nitrate   


 


Urine Bilirubin   


 


Urine Urobilinogen   


 


Ur Leukocyte Esterase   


 


Urine WBC (Auto)   


 


Urine RBC (Auto)   


 


Urine Bacteria   


 


Urine Opiates Screen   


 


Urine Methadone Screen   


 


Ur Barbiturates Screen   


 


Ur Phencyclidine Scrn   


 


Ur Amphetamines Screen   


 


U Benzodiazepines Scrn   


 


U Oth Cocaine Metabols   


 


U Cannabinoids Screen   


 


Hepatitis A IgM Ab  Negative  


 


Hep Bs Antigen  Negative  


 


Hep B Core IgM Ab  Negative  


 


Hepatitis C Antibody  Negative  


 


HIV 1&2 Antibody Screen   Negative 


 


Blood Type   


 


Antibody Screen   














  04/26/18 04/26/18





  05:58 05:58


 


WBC  


 


RBC  


 


Hgb  


 


Hct  


 


MCV  


 


MCH  


 


MCHC  


 


RDW  


 


Plt Count  


 


MPV  


 


Neut % (Auto)  


 


Lymph % (Auto)  


 


Mono % (Auto)  


 


Eos % (Auto)  


 


Baso % (Auto)  


 


Neut # (Auto)  


 


Lymph # (Auto)  


 


Mono # (Auto)  


 


Eos # (Auto)  


 


Baso # (Auto)  


 


Differential Comment  


 


Retic Count  


 


PT  


 


INR  


 


APTT  


 


Sodium  


 


Potassium  


 


Chloride  


 


Carbon Dioxide  


 


Anion Gap  


 


BUN  


 


Creatinine  


 


Est GFR ( Amer)  


 


Est GFR (Non-Af Amer)  


 


Random Glucose  


 


Calcium  


 


Total Bilirubin  


 


AST  


 


ALT  


 


Alkaline Phosphatase  


 


Total Protein  


 


Albumin  


 


Globulin  


 


Albumin/Globulin Ratio  


 


Urine Color   Yellow


 


Urine Clarity   Hazy


 


Urine pH   6.0


 


Ur Specific Gravity   1.010


 


Urine Protein   2+ H


 


Urine Glucose (UA)   Normal


 


Urine Ketones   Negative


 


Urine Blood   3+ H


 


Urine Nitrate   Negative


 


Urine Bilirubin   Negative


 


Urine Urobilinogen   Normal


 


Ur Leukocyte Esterase   3+ H


 


Urine WBC (Auto)   1295 H


 


Urine RBC (Auto)   351 H


 


Urine Bacteria   Occ H


 


Urine Opiates Screen  Negative 


 


Urine Methadone Screen  Negative 


 


Ur Barbiturates Screen  Negative 


 


Ur Phencyclidine Scrn  Negative 


 


Ur Amphetamines Screen  Negative 


 


U Benzodiazepines Scrn  Negative 


 


U Oth Cocaine Metabols  Negative 


 


U Cannabinoids Screen  Negative 


 


Hepatitis A IgM Ab  


 


Hep Bs Antigen  


 


Hep B Core IgM Ab  


 


Hepatitis C Antibody  


 


HIV 1&2 Antibody Screen  


 


Blood Type  


 


Antibody Screen

## 2018-04-27 LAB
ALBUMIN SERPL-MCNC: 3.2 G/DL (ref 3.5–5)
ALBUMIN/GLOB SERPL: 0.7 {RATIO} (ref 1–2.1)
ALT SERPL-CCNC: 93 U/L (ref 9–52)
AST SERPL-CCNC: 112 U/L (ref 14–36)
BACTERIA #/AREA URNS HPF: (no result) /[HPF]
BASOPHILS # BLD AUTO: 0.3 K/UL (ref 0–0.2)
BASOPHILS NFR BLD: 1.2 % (ref 0–2)
BILIRUB UR-MCNC: NEGATIVE MG/DL
BUN SERPL-MCNC: 20 MG/DL (ref 7–17)
CALCIUM SERPL-MCNC: 7.8 MG/DL (ref 8.6–10.4)
EOSINOPHIL # BLD AUTO: 0.9 K/UL (ref 0–0.7)
EOSINOPHIL NFR BLD: 3.9 % (ref 0–4)
ERYTHROCYTE [DISTWIDTH] IN BLOOD BY AUTOMATED COUNT: 18.7 % (ref 11.5–14.5)
GFR NON-AFRICAN AMERICAN: 50
GLUCOSE UR STRIP-MCNC: NORMAL MG/DL
HGB BLD-MCNC: 6 G/DL (ref 11–16)
LEUKOCYTE ESTERASE UR-ACNC: (no result) LEU/UL
LYMPHOCYTES # BLD AUTO: 4.5 K/UL (ref 1–4.3)
LYMPHOCYTES NFR BLD AUTO: 18.5 % (ref 20–40)
MCH RBC QN AUTO: 29.7 PG (ref 27–31)
MCHC RBC AUTO-ENTMCNC: 33.4 G/DL (ref 33–37)
MCV RBC AUTO: 88.8 FL (ref 81–99)
MONOCYTES # BLD: 2.1 K/UL (ref 0–0.8)
MONOCYTES NFR BLD: 8.5 % (ref 0–10)
NEUTROPHILS # BLD: 16.5 K/UL (ref 1.8–7)
NEUTROPHILS NFR BLD AUTO: 67.9 % (ref 50–75)
NRBC BLD AUTO-RTO: 0.1 % (ref 0–2)
PH UR STRIP: 6 [PH] (ref 5–8)
PLATELET # BLD: 169 K/UL (ref 130–400)
PMV BLD AUTO: 9.6 FL (ref 7.2–11.7)
PROT UR STRIP-MCNC: NEGATIVE MG/DL
RBC # BLD AUTO: 2.04 MIL/UL (ref 3.8–5.2)
RBC # UR STRIP: (no result) /UL
SP GR UR STRIP: 1.01 (ref 1–1.03)
UROBILINOGEN UR-MCNC: NORMAL MG/DL (ref 0.2–1)
WBC # BLD AUTO: 24.3 K/UL (ref 4.8–10.8)

## 2018-04-27 RX ADMIN — DIPHENHYDRAMINE HYDROCHLORIDE PRN MG: 50 INJECTION INTRAMUSCULAR; INTRAVENOUS at 04:28

## 2018-04-27 RX ADMIN — DIPHENHYDRAMINE HYDROCHLORIDE PRN MG: 50 INJECTION INTRAMUSCULAR; INTRAVENOUS at 07:43

## 2018-04-27 RX ADMIN — DIPHENHYDRAMINE HYDROCHLORIDE PRN MG: 50 INJECTION INTRAMUSCULAR; INTRAVENOUS at 10:52

## 2018-04-27 RX ADMIN — DIPHENHYDRAMINE HYDROCHLORIDE PRN MG: 50 INJECTION INTRAMUSCULAR; INTRAVENOUS at 22:05

## 2018-04-27 RX ADMIN — DIPHENHYDRAMINE HYDROCHLORIDE PRN MG: 50 INJECTION INTRAMUSCULAR; INTRAVENOUS at 01:28

## 2018-04-27 RX ADMIN — DIPHENHYDRAMINE HYDROCHLORIDE PRN MG: 50 INJECTION INTRAMUSCULAR; INTRAVENOUS at 17:17

## 2018-04-27 RX ADMIN — DIPHENHYDRAMINE HYDROCHLORIDE PRN MG: 50 INJECTION INTRAMUSCULAR; INTRAVENOUS at 14:14

## 2018-04-27 NOTE — CP.PCM.PN
<Samaria [place] - Last Filed: 04/27/18 14:59>





Subjective





- Date & Time of Evaluation


Date of Evaluation: 04/27/18


Time of Evaluation: 11:52





- Subjective


Subjective: 





PGY2 progress note for Dr. Etienne





Pt seen and examined at bedside.  No acute events overnight.  Pt continues to 

complain of diffuse pain in her body.  Specifically she complains of abd pain, 

LE pain B/L and back pain.  Patient denies having any CP, SOB, N/V/D/C, F/C.  

she states that she is currently on her period with slightly heavy blood flow.  





Objective





- Vital Signs/Intake and Output


Vital Signs (last 24 hours): 


 











Temp Pulse Resp BP Pulse Ox


 


 98.0 F   93 H  18   103/69   97 


 


 04/27/18 07:42  04/27/18 10:49  04/27/18 07:42  04/27/18 10:49  04/27/18 07:42











- Medications


Medications: 


 Current Medications





Acetaminophen (Tylenol 325mg Tab)  650 mg PO Q6 PRN


   PRN Reason: Fever >100.4 F


Bisacodyl (Dulcolax)  10 mg [place] DAILY PRN


   PRN Reason: Constipation


Diphenhydramine HCl (Benadryl)  25 mg IVP Q3 PRN


   PRN Reason: Itching / Pruritus


   Last Admin: 04/27/18 10:52 Dose:  25 mg


Folic Acid (Folic Acid)  2 mg PO DAILY [facility]


   Last Admin: 04/27/18 10:36 Dose:  2 mg


Hydromorphone HCl (Dilaudid)  4 mg PO Q3H PRN


   PRN Reason: Pain, severe (8-10)


   Last Admin: 04/27/18 10:51 Dose:  4 mg


Sodium Chloride (Sodium Chloride 0.9%)  1,000 mls @ 150 mls/hr IV .Q6H40M [facility]


   Last Admin: 04/27/18 08:38 Dose:  Not Given


Ondansetron HCl (Zofran Inj)  4 mg IVP Q6 PRN


   PRN Reason: Nausea/Vomiting











- Labs


Labs: 


 





 04/27/18 08:02 





 04/27/18 08:02 





 











PT  13.6 SECONDS (9.7-12.2)  H  04/26/18  05:58    


 


INR  1.2   04/26/18  05:58    


 


APTT  37 SECONDS (21-34)  H  04/26/18  05:58    














- Constitutional


Appears: Non-toxic, No Acute Distress





- Head Exam


Head Exam: ATRAUMATIC





- ENT Exam


ENT Exam: Mucous Membranes Moist





- Respiratory Exam


Respiratory Exam: Clear to Ausculation Bilateral.  absent: Accessory Muscle Use

, Rales, Rhonchi, Wheezes, Respiratory Distress





- Cardiovascular Exam


Cardiovascular Exam: REGULAR RHYTHM, +S1, +S2.  absent: Gallop, Rubs, Murmur





- GI/Abdominal Exam


GI & Abdominal Exam: Soft, Normal Bowel Sounds.  absent: Distended, Firm, 

Guarding, Rigid, Tenderness, Organomegaly





- Extremities Exam


Extremities Exam: absent: Pedal Edema, Tenderness





- Neurological Exam


Neurological Exam: Alert, Awake, Oriented x3





- Psychiatric Exam


Psychiatric exam: Normal Affect, Normal Mood





- Skin


Skin Exam: Dry, Intact, Normal Color, Warm





Assessment and Plan





- Assessment and Plan (Free Text)


Assessment: 





Sickle Cell Crisis


- Hgb noted to be 6.0 (6.6 yesterday) this morning. Will continue to monitor.  

Transfusion if Hgb below 5.0 per Heme/onc recs. 


- Pain control with PO dilaudid


- folic acid


- fluid resuscitation 150ml/hr NS 


- Retic count 2.5 on admission


- Dr. Olvera consulted help appreciated


- Zofran prn





Hx frequest UTIs


- f/u urine culture


- UA is abnormal but unchanged from other Ua


- patient is asymptomatic and afebrile


- Dr. Stanton consulted, help appreciated - will wait on urine culture before 

starting antibiotics





Renal stents


-Dr. Chacko, urology, help appreciated





Transaminitis


-hepatitis and HIV negative 2.5





Neurogenic bladder


- Straight cath prn 





Proph


-chemical DVT prophylaxis not indicated; will order SCD; OOB as tolerated


-GI prophylaxis not indicated 


-Benadryl prn








All management per Dr. MARIVEL Etienne. 











<Shahab Etienne - Last Filed: 05/04/18 11:35>





Objective





- Vital Signs/Intake and Output


Vital Signs (last 24 hours): 


 











Temp Pulse Resp BP Pulse Ox


 


 98.0 F   84   20   97/65 L  99 


 


 05/04/18 07:57  05/04/18 07:57  05/04/18 07:57  05/04/18 07:57  05/04/18 07:57











- Medications


Medications: 


 Current Medications





Acetaminophen (Tylenol 325mg Tab)  650 mg PO Q6 PRN


   PRN Reason: Fever >100.4 F


Bisacodyl (Dulcolax)  10 mg LA DAILY PRN


   PRN Reason: Constipation


Diphenhydramine HCl (Benadryl)  25 mg IVP Q3 PRN


   PRN Reason: Itching / Pruritus


   Last Admin: 05/04/18 10:21 Dose:  25 mg


Folic Acid (Folic Acid)  2 mg PO DAILY [facility]


   Last Admin: 05/04/18 10:20 Dose:  2 mg


Hydromorphone HCl (Dilaudid)  4 mg PO Q3H PRN


   PRN Reason: Pain, severe (8-10)


   Last Admin: 05/04/18 10:21 Dose:  4 mg


Ondansetron HCl (Zofran Inj)  4 mg IVP Q6 PRN


   PRN Reason: Nausea/Vomiting











- Labs


Labs: 


 





 05/04/18 05:58 





 05/04/18 05:58 





 











PT  13.6 SECONDS (9.7-12.2)  H  04/26/18  05:58    


 


INR  1.2   04/26/18  05:58    


 


APTT  37 SECONDS (21-34)  H  04/26/18  05:58    














Attending/Attestation





- Attestation


I have personally seen and examined this patient.: Yes


I have fully participated in the care of the patient.: Yes


I have reviewed all pertinent clinical information, including history, physical 

exam and plan: Yes


Notes (Text): 





case seen and d.w staff and resident, concurred with finding and management..

## 2018-04-27 NOTE — CP.PCM.PN
Subjective





- Date & Time of Evaluation


Date of Evaluation: 04/27/18


Time of Evaluation: 12:30





- Subjective


Subjective: 





Has some pain.





Objective





- Vital Signs/Intake and Output


Vital Signs (last 24 hours): 


 











Temp Pulse Resp BP Pulse Ox


 


 98.0 F   93 H  18   103/69   97 


 


 04/27/18 07:42  04/27/18 10:49  04/27/18 07:42  04/27/18 10:49  04/27/18 07:42











- Medications


Medications: 


 Current Medications





Acetaminophen (Tylenol 325mg Tab)  650 mg PO Q6 PRN


   PRN Reason: Fever >100.4 F


Bisacodyl (Dulcolax)  10 mg [place] DAILY PRN


   PRN Reason: Constipation


Diphenhydramine HCl (Benadryl)  25 mg IVP Q3 PRN


   PRN Reason: Itching / Pruritus


   Last Admin: 04/27/18 10:52 Dose:  25 mg


Folic Acid (Folic Acid)  2 mg PO DAILY [facility]


   Last Admin: 04/27/18 10:36 Dose:  2 mg


Hydromorphone HCl (Dilaudid)  4 mg PO Q3H PRN


   PRN Reason: Pain, severe (8-10)


   Last Admin: 04/27/18 10:51 Dose:  4 mg


Sodium Chloride (Sodium Chloride 0.9%)  1,000 mls @ 150 mls/hr IV .Q6H40M [facility]


   Last Admin: 04/27/18 12:43 Dose:  150 mls/hr


Ondansetron HCl (Zofran Inj)  4 mg IVP Q6 PRN


   PRN Reason: Nausea/Vomiting











- Labs


Labs: 


 





 04/27/18 08:02 





 04/27/18 08:02 





 











PT  13.6 SECONDS (9.7-12.2)  H  04/26/18  05:58    


 


INR  1.2   04/26/18  05:58    


 


APTT  37 SECONDS (21-34)  H  04/26/18  05:58    














- Head Exam


Head Exam: ATRAUMATIC





- Eye Exam


Eye Exam: Normal appearance





- ENT Exam


ENT Exam: Mucous Membranes Dry





- Respiratory Exam


Respiratory Exam: NORMAL BREATHING PATTERN





- Cardiovascular Exam


Cardiovascular Exam: +S1, +S2





- GI/Abdominal Exam


GI & Abdominal Exam: Normal Bowel Sounds





- Extremities Exam


Extremities Exam: Normal Inspection





Assessment and Plan


(1) Sickle cell pain crisis


Assessment & Plan: 


IV fluids, pain meds, 02 via NC, folic acid


no current transfusion requirement


Status: Acute   





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


Assessment & Plan: 


minimize transfusion support


outpatient chelation


Status: Acute   





(3) Leukocytosis


Assessment & Plan: 


may be reactive to sickle cell


Status: Acute   





(4) Sickle cell anemia


Assessment & Plan: 


outpatient folic acid


Status: Acute

## 2018-04-27 NOTE — CARD
--------------- APPROVED REPORT --------------





EKG Measurement

Heart [id number]

HI [id number]

[id number]

[id number]

[id number]



<Conclusion>

Normal sinus rhythm

Normal ECG

## 2018-04-27 NOTE — CP.PCM.PN
Subjective





- Date & Time of Evaluation


Date of Evaluation: 04/27/18


Time of Evaluation: 22:29





- Subjective


Subjective: 





AFEBRILE,


C/O LOWER ABDOMINAL PAIN AND GENERALIZED ACHES.





HAS HER PERIODS 


H/H 6.0/18.1   WBC 24.3





Objective





- Vital Signs/Intake and Output


Vital Signs (last 24 hours): 


 











Temp Pulse Resp BP Pulse Ox


 


 98.5 F   84   20   117/85   96 


 


 04/27/18 15:00  04/27/18 15:00  04/27/18 15:00  04/27/18 15:00  04/27/18 15:00








Intake and Output: 


 











 04/27/18 04/28/18





 18:59 06:59


 


Intake Total 900 300


 


Balance 900 300














- Medications


Medications: 


 Current Medications





Acetaminophen (Tylenol 325mg Tab)  650 mg PO Q6 PRN


   PRN Reason: Fever >100.4 F


Bisacodyl (Dulcolax)  10 mg [place] DAILY PRN


   PRN Reason: Constipation


Diphenhydramine HCl (Benadryl)  25 mg IVP Q3 PRN


   PRN Reason: Itching / Pruritus


   Last Admin: 04/27/18 22:05 Dose:  25 mg


Folic Acid (Folic Acid)  2 mg PO DAILY [facility]


   Last Admin: 04/27/18 10:36 Dose:  2 mg


Hydromorphone HCl (Dilaudid)  4 mg PO Q3H PRN


   PRN Reason: Pain, severe (8-10)


   Last Admin: 04/27/18 22:04 Dose:  4 mg


Sodium Chloride (Sodium Chloride 0.9%)  1,000 mls @ 150 mls/hr IV .Q6H40M [facility]


   Last Admin: 04/27/18 14:52 Dose:  Not Given


Ondansetron HCl (Zofran Inj)  4 mg IVP Q6 PRN


   PRN Reason: Nausea/Vomiting











- Labs


Labs: 


 





 04/27/18 08:02 





 04/27/18 08:02 





 











PT  13.6 SECONDS (9.7-12.2)  H  04/26/18  05:58    


 


INR  1.2   04/26/18  05:58    


 


APTT  37 SECONDS (21-34)  H  04/26/18  05:58    














- Constitutional


Appears: No Acute Distress





- Head Exam


Head Exam: NORMAL INSPECTION





- Eye Exam


Eye Exam: EOMI, PERRL, Scleral icterus





- ENT Exam


ENT Exam: Normal Oropharynx





- Neck Exam


Neck Exam: Normal Inspection





- Respiratory Exam


Respiratory Exam: Clear to Ausculation Bilateral, NORMAL BREATHING PATTERN





- Cardiovascular Exam


Cardiovascular Exam: REGULAR RHYTHM, +S1, +S2





- GI/Abdominal Exam


GI & Abdominal Exam: Soft, Tenderness (LOWER ABDOMINAL DICOMFORT), Normal Bowel 

Sounds





- Extremities Exam


Extremities Exam: Normal Capillary Refill.  absent: Calf Tenderness, Pedal Edema





- Neurological Exam


Neurological Exam: Awake, CN II-XII Intact, Oriented x3, Reflexes Normal





- Psychiatric Exam


Psychiatric exam: Normal Mood





- Skin


Skin Exam: Normal Color, Warm





Assessment and Plan


(1) Sickle cell anemia


Assessment & Plan: 


H/H 6.6/19.2


 ? BLOOD TRANSFUSION.


AS PER HEMATOLOGY.


Status: Acute   





(2) Leukocytosis


Assessment & Plan: 


LEUKOCYTOSIS -REACTIVE SEC TO SICKLE CELL.


CONTINUE TO OBSERVE,





URINE CULTURE -50,000- 100,000CFU/ML ? CONTAMINANT.


BLOOD CULTURES -VE.


Status: Acute   





(3) Anemia


Status: Acute   





(4) Neurogenic bladder


Status: Acute   





(5) Bilateral hydronephrosis


Status: Acute

## 2018-04-27 NOTE — CP.PCM.PN
Subjective





- Date & Time of Evaluation


Date of Evaluation: 04/27/18


Time of Evaluation: 08:20





- Subjective


Subjective: 


clinically same





Objective





- Vital Signs/Intake and Output


Vital Signs (last 24 hours): 


 











Temp Pulse Resp BP Pulse Ox


 


 98.5 F   84   20   117/85   96 


 


 04/27/18 15:00  04/27/18 15:00  04/27/18 15:00  04/27/18 15:00  04/27/18 15:00








Intake and Output: 


 











 04/27/18 04/27/18





 06:59 18:59


 


Intake Total  900


 


Balance  900














- Medications


Medications: 


 Current Medications





Acetaminophen (Tylenol 325mg Tab)  650 mg PO Q6 PRN


   PRN Reason: Fever >100.4 F


Bisacodyl (Dulcolax)  10 mg CT DAILY PRN


   PRN Reason: Constipation


Diphenhydramine HCl (Benadryl)  25 mg IVP Q3 PRN


   PRN Reason: Itching / Pruritus


   Last Admin: 04/27/18 17:17 Dose:  25 mg


Folic Acid (Folic Acid)  2 mg PO DAILY [facility]


   Last Admin: 04/27/18 10:36 Dose:  2 mg


Hydromorphone HCl (Dilaudid)  4 mg PO Q3H PRN


   PRN Reason: Pain, severe (8-10)


   Last Admin: 04/27/18 17:17 Dose:  4 mg


Sodium Chloride (Sodium Chloride 0.9%)  1,000 mls @ 150 mls/hr IV .Q6H40M [facility]


   Last Admin: 04/27/18 14:52 Dose:  Not Given


Ondansetron HCl (Zofran Inj)  4 mg IVP Q6 PRN


   PRN Reason: Nausea/Vomiting











- Labs


Labs: 


 





 04/27/18 08:02 





 04/27/18 08:02 





 











PT  13.6 SECONDS (9.7-12.2)  H  04/26/18  05:58    


 


INR  1.2   04/26/18  05:58    


 


APTT  37 SECONDS (21-34)  H  04/26/18  05:58    














- Constitutional


Appears: Well





- Head Exam


Head Exam: ATRAUMATIC, NORMAL INSPECTION, NORMOCEPHALIC





- Eye Exam


Eye Exam: EOMI, Normal appearance, PERRL


Pupil Exam: NORMAL ACCOMODATION, PERRL





- ENT Exam


ENT Exam: Mucous Membranes Moist, Normal Exam





- Neck Exam


Neck Exam: Full ROM, Normal Inspection.  absent: Lymphadenopathy





- Respiratory Exam


Respiratory Exam: Decreased Breath Sounds





- Cardiovascular Exam


Cardiovascular Exam: REGULAR RHYTHM, +S1, +S2





- GI/Abdominal Exam


GI & Abdominal Exam: Soft, Diminished Bowel Sounds





- Rectal Exam


Rectal Exam: Deferred

## 2018-04-28 LAB
ALBUMIN SERPL-MCNC: 3.4 G/DL (ref 3.5–5)
ALBUMIN/GLOB SERPL: 0.8 {RATIO} (ref 1–2.1)
ALT SERPL-CCNC: 88 U/L (ref 9–52)
AST SERPL-CCNC: 97 U/L (ref 14–36)
BASOPHILS # BLD AUTO: 0.2 K/UL (ref 0–0.2)
BASOPHILS NFR BLD: 1 % (ref 0–2)
BUN SERPL-MCNC: 20 MG/DL (ref 7–17)
CALCIUM SERPL-MCNC: 8.1 MG/DL (ref 8.6–10.4)
EOSINOPHIL # BLD AUTO: 0.9 K/UL (ref 0–0.7)
EOSINOPHIL NFR BLD: 4 % (ref 0–4)
ERYTHROCYTE [DISTWIDTH] IN BLOOD BY AUTOMATED COUNT: 18.2 % (ref 11.5–14.5)
GFR NON-AFRICAN AMERICAN: 56
HGB BLD-MCNC: 6 G/DL (ref 11–16)
LYMPHOCYTES # BLD AUTO: 4.8 K/UL (ref 1–4.3)
LYMPHOCYTES NFR BLD AUTO: 20.8 % (ref 20–40)
MCH RBC QN AUTO: 30.2 PG (ref 27–31)
MCHC RBC AUTO-ENTMCNC: 33.9 G/DL (ref 33–37)
MCV RBC AUTO: 89.3 FL (ref 81–99)
MONOCYTES # BLD: 2 K/UL (ref 0–0.8)
MONOCYTES NFR BLD: 8.7 % (ref 0–10)
NEUTROPHILS # BLD: 15.3 K/UL (ref 1.8–7)
NEUTROPHILS NFR BLD AUTO: 65.5 % (ref 50–75)
NRBC BLD AUTO-RTO: 0 % (ref 0–2)
PLATELET # BLD: 219 K/UL (ref 130–400)
PMV BLD AUTO: 9 FL (ref 7.2–11.7)
RBC # BLD AUTO: 1.99 MIL/UL (ref 3.8–5.2)
WBC # BLD AUTO: 23.3 K/UL (ref 4.8–10.8)

## 2018-04-28 RX ADMIN — DIPHENHYDRAMINE HYDROCHLORIDE PRN MG: 50 INJECTION INTRAMUSCULAR; INTRAVENOUS at 04:34

## 2018-04-28 RX ADMIN — DIPHENHYDRAMINE HYDROCHLORIDE PRN MG: 50 INJECTION INTRAMUSCULAR; INTRAVENOUS at 20:48

## 2018-04-28 RX ADMIN — DIPHENHYDRAMINE HYDROCHLORIDE PRN MG: 50 INJECTION INTRAMUSCULAR; INTRAVENOUS at 10:12

## 2018-04-28 RX ADMIN — DIPHENHYDRAMINE HYDROCHLORIDE PRN MG: 50 INJECTION INTRAMUSCULAR; INTRAVENOUS at 23:57

## 2018-04-28 RX ADMIN — DIPHENHYDRAMINE HYDROCHLORIDE PRN MG: 50 INJECTION INTRAMUSCULAR; INTRAVENOUS at 01:21

## 2018-04-28 RX ADMIN — DIPHENHYDRAMINE HYDROCHLORIDE PRN MG: 50 INJECTION INTRAMUSCULAR; INTRAVENOUS at 17:36

## 2018-04-28 RX ADMIN — DIPHENHYDRAMINE HYDROCHLORIDE PRN MG: 50 INJECTION INTRAMUSCULAR; INTRAVENOUS at 13:56

## 2018-04-28 NOTE — CP.PCM.PN
Subjective





- Date & Time of Evaluation


Date of Evaluation: 04/28/18


Time of Evaluation: 08:00





- Subjective


Subjective: 


clinically same





Objective





- Vital Signs/Intake and Output


Vital Signs (last 24 hours): 


 











Temp Pulse Resp BP Pulse Ox


 


 98.4 F   96 H  18   119/71   100 


 


 04/28/18 15:00  04/28/18 15:00  04/28/18 15:00  04/28/18 15:00  04/28/18 15:00








Intake and Output: 


 











 04/28/18 04/28/18





 06:59 18:59


 


Intake Total 500 200


 


Balance 500 200














- Medications


Medications: 


 Current Medications





Acetaminophen (Tylenol 325mg Tab)  650 mg PO Q6 PRN


   PRN Reason: Fever >100.4 F


Bisacodyl (Dulcolax)  10 mg [place] DAILY PRN


   PRN Reason: Constipation


Diphenhydramine HCl (Benadryl)  25 mg IVP Q3 PRN


   PRN Reason: Itching / Pruritus


   Last Admin: 04/28/18 17:36 Dose:  25 mg


Folic Acid (Folic Acid)  2 mg PO DAILY [facility]


   Last Admin: 04/28/18 10:12 Dose:  2 mg


Hydromorphone HCl (Dilaudid)  4 mg PO Q3H PRN


   PRN Reason: Pain, severe (8-10)


   Last Admin: 04/28/18 17:33 Dose:  4 mg


Sodium Chloride (Sodium Chloride 0.9%)  1,000 mls @ 150 mls/hr IV .[id number] [facility]


   Last Admin: 04/28/18 11:18 Dose:  Not Given


Ondansetron HCl (Zofran Inj)  4 mg IVP Q6 PRN


   PRN Reason: Nausea/Vomiting











- Labs


Labs: 


 





 04/28/18 06:53 





 04/28/18 06:53 





 











PT  13.6 SECONDS (9.7-12.2)  H  04/26/18  05:58    


 


INR  1.2   04/26/18  05:58    


 


APTT  37 SECONDS (21-34)  H  04/26/18  05:58    














- Constitutional


Appears: Well





- Head Exam


Head Exam: ATRAUMATIC, NORMAL INSPECTION, NORMOCEPHALIC





- Eye Exam


Eye Exam: EOMI, Normal appearance, PERRL


Pupil Exam: NORMAL ACCOMODATION, PERRL





- ENT Exam


ENT Exam: Mucous Membranes Moist, Normal Exam





- Neck Exam


Neck Exam: Full ROM, Normal Inspection.  absent: Lymphadenopathy





- Respiratory Exam


Respiratory Exam: Decreased Breath Sounds





- Cardiovascular Exam


Cardiovascular Exam: REGULAR RHYTHM, +S1, +S2





- GI/Abdominal Exam


GI & Abdominal Exam: Soft, Diminished Bowel Sounds





- Rectal Exam


Rectal Exam: Deferred

## 2018-04-29 LAB
ALBUMIN SERPL-MCNC: 3.5 G/DL (ref 3.5–5)
ALBUMIN/GLOB SERPL: 0.7 {RATIO} (ref 1–2.1)
ALT SERPL-CCNC: 87 U/L (ref 9–52)
AST SERPL-CCNC: 100 U/L (ref 14–36)
BASOPHILS # BLD AUTO: 0.3 K/UL (ref 0–0.2)
BASOPHILS NFR BLD: 1.2 % (ref 0–2)
BUN SERPL-MCNC: 24 MG/DL (ref 7–17)
CALCIUM SERPL-MCNC: 8.3 MG/DL (ref 8.6–10.4)
EOSINOPHIL # BLD AUTO: 0.9 K/UL (ref 0–0.7)
EOSINOPHIL NFR BLD: 4.2 % (ref 0–4)
ERYTHROCYTE [DISTWIDTH] IN BLOOD BY AUTOMATED COUNT: 18.5 % (ref 11.5–14.5)
GFR NON-AFRICAN AMERICAN: 46
HGB BLD-MCNC: 5.9 G/DL (ref 11–16)
LYMPHOCYTES # BLD AUTO: 5.2 K/UL (ref 1–4.3)
LYMPHOCYTES NFR BLD AUTO: 25.3 % (ref 20–40)
MCH RBC QN AUTO: 29.9 PG (ref 27–31)
MCHC RBC AUTO-ENTMCNC: 33.3 G/DL (ref 33–37)
MCV RBC AUTO: 89.8 FL (ref 81–99)
MONOCYTES # BLD: 1.8 K/UL (ref 0–0.8)
MONOCYTES NFR BLD: 8.9 % (ref 0–10)
NEUTROPHILS # BLD: 12.4 K/UL (ref 1.8–7)
NEUTROPHILS NFR BLD AUTO: 60.4 % (ref 50–75)
NRBC BLD AUTO-RTO: 0.2 % (ref 0–2)
PLATELET # BLD: 167 K/UL (ref 130–400)
PMV BLD AUTO: 9.3 FL (ref 7.2–11.7)
RBC # BLD AUTO: 1.99 MIL/UL (ref 3.8–5.2)
WBC # BLD AUTO: 20.6 K/UL (ref 4.8–10.8)

## 2018-04-29 RX ADMIN — DIPHENHYDRAMINE HYDROCHLORIDE PRN MG: 50 INJECTION INTRAMUSCULAR; INTRAVENOUS at 03:32

## 2018-04-29 RX ADMIN — DIPHENHYDRAMINE HYDROCHLORIDE PRN MG: 50 INJECTION INTRAMUSCULAR; INTRAVENOUS at 06:58

## 2018-04-29 RX ADMIN — DIPHENHYDRAMINE HYDROCHLORIDE PRN MG: 50 INJECTION INTRAMUSCULAR; INTRAVENOUS at 13:05

## 2018-04-29 RX ADMIN — DIPHENHYDRAMINE HYDROCHLORIDE PRN MG: 50 INJECTION INTRAMUSCULAR; INTRAVENOUS at 21:37

## 2018-04-29 RX ADMIN — DIPHENHYDRAMINE HYDROCHLORIDE PRN MG: 50 INJECTION INTRAMUSCULAR; INTRAVENOUS at 17:29

## 2018-04-29 RX ADMIN — DIPHENHYDRAMINE HYDROCHLORIDE PRN MG: 50 INJECTION INTRAMUSCULAR; INTRAVENOUS at 10:06

## 2018-04-29 NOTE — CP.PCM.PN
Subjective





- Date & Time of Evaluation


Date of Evaluation: 04/29/18


Time of Evaluation: 08:20





- Subjective


Subjective: 


clinically same





Objective





- Vital Signs/Intake and Output


Vital Signs (last 24 hours): 


 











Temp Pulse Resp BP Pulse Ox


 


 98.3 F   89   18   97/65 L  100 


 


 04/29/18 07:30  04/29/18 07:30  04/29/18 07:30  04/29/18 07:30  04/29/18 07:30








Intake and Output: 


 











 04/29/18 04/29/18





 06:59 18:59


 


Intake Total 200 630


 


Balance 200 630














- Medications


Medications: 


 Current Medications





Acetaminophen (Tylenol 325mg Tab)  650 mg PO Q6 PRN


   PRN Reason: Fever >100.4 F


Bisacodyl (Dulcolax)  10 mg [place] DAILY PRN


   PRN Reason: Constipation


Diphenhydramine HCl (Benadryl)  25 mg IVP Q3 PRN


   PRN Reason: Itching / Pruritus


   Last Admin: 04/29/18 13:05 Dose:  25 mg


Folic Acid (Folic Acid)  2 mg PO DAILY [facility]


   Last Admin: 04/29/18 10:04 Dose:  2 mg


Hydromorphone HCl (Dilaudid)  4 mg PO Q3H PRN


   PRN Reason: Pain, severe (8-10)


   Last Admin: 04/29/18 13:05 Dose:  4 mg


Ondansetron HCl (Zofran Inj)  4 mg IVP Q6 PRN


   PRN Reason: Nausea/Vomiting











- Labs


Labs: 


 





 04/29/18 07:44 





 04/29/18 07:44 





 











PT  13.6 SECONDS (9.7-12.2)  H  04/26/18  05:58    


 


INR  1.2   04/26/18  05:58    


 


APTT  37 SECONDS (21-34)  H  04/26/18  05:58    














- Constitutional


Appears: Well





- Head Exam


Head Exam: ATRAUMATIC, NORMAL INSPECTION, NORMOCEPHALIC





- Eye Exam


Eye Exam: EOMI, Normal appearance, PERRL


Pupil Exam: NORMAL ACCOMODATION, PERRL





- ENT Exam


ENT Exam: Mucous Membranes Moist, Normal Exam





- Neck Exam


Neck Exam: Full ROM, Normal Inspection.  absent: Lymphadenopathy





- Respiratory Exam


Respiratory Exam: Decreased Breath Sounds





- Cardiovascular Exam


Cardiovascular Exam: REGULAR RHYTHM, +S1, +S2





- GI/Abdominal Exam


GI & Abdominal Exam: Soft, Diminished Bowel Sounds





- Rectal Exam


Rectal Exam: Deferred

## 2018-04-29 NOTE — CP.PCM.PN
Subjective





- Date & Time of Evaluation


Date of Evaluation: 04/29/18


Time of Evaluation: 12:18





- Subjective


Subjective: 





AFEBRILE,


VSS,





C/O PAIN -GENERALIZED.





URINE REPEAT REPORTED +VE E.COLI +VE ESBL S- MEROPENEM/GENTAMICIN


? COLONIZATION (NEUROGENIC BLADDER )





CASE DISCUSSED W STAFF.


PT TO GET ABX PRIOR TO PROCEDURE WHEN SCHEDULED.








Objective





- Vital Signs/Intake and Output


Vital Signs (last 24 hours): 


 











Temp Pulse Resp BP Pulse Ox


 


 98.3 F   89   18   97/65 L  100 


 


 04/29/18 07:30  04/29/18 07:30  04/29/18 07:30  04/29/18 07:30  04/29/18 07:30








Intake and Output: 


 











 04/29/18 04/29/18





 06:59 18:59


 


Intake Total 200 


 


Balance 200 














- Medications


Medications: 


 Current Medications





Acetaminophen (Tylenol 325mg Tab)  650 mg PO Q6 PRN


   PRN Reason: Fever >100.4 F


Bisacodyl (Dulcolax)  10 mg [place] DAILY PRN


   PRN Reason: Constipation


Diphenhydramine HCl (Benadryl)  25 mg IVP Q3 PRN


   PRN Reason: Itching / Pruritus


   Last Admin: 04/29/18 10:06 Dose:  25 mg


Folic Acid (Folic Acid)  2 mg PO DAILY [facility]


   Last Admin: 04/29/18 10:04 Dose:  2 mg


Hydromorphone HCl (Dilaudid)  4 mg PO Q3H PRN


   PRN Reason: Pain, severe (8-10)


   Last Admin: 04/29/18 10:04 Dose:  4 mg


Ondansetron HCl (Zofran Inj)  4 mg IVP Q6 PRN


   PRN Reason: Nausea/Vomiting











- Labs


Labs: 


 





 04/29/18 07:44 





 04/29/18 07:44 





 











PT  13.6 SECONDS (9.7-12.2)  H  04/26/18  05:58    


 


INR  1.2   04/26/18  05:58    


 


APTT  37 SECONDS (21-34)  H  04/26/18  05:58    














- Constitutional


Appears: No Acute Distress





- Head Exam


Head Exam: NORMAL INSPECTION





- Eye Exam


Eye Exam: EOMI, PERRL, Scleral icterus





- ENT Exam


ENT Exam: Normal Oropharynx





- Neck Exam


Neck Exam: Normal Inspection





- Respiratory Exam


Respiratory Exam: Clear to Ausculation Bilateral





- Cardiovascular Exam


Cardiovascular Exam: REGULAR RHYTHM, +S1





- GI/Abdominal Exam


GI & Abdominal Exam: Soft, Normal Bowel Sounds





- Extremities Exam


Extremities Exam: Normal Capillary Refill.  absent: Calf Tenderness, Pedal Edema





- Neurological Exam


Neurological Exam: Awake, CN II-XII Intact, Oriented x3, Reflexes Normal





- Skin


Skin Exam: Normal Color, Pallor





Assessment and Plan


(1) Sickle cell anemia


Status: Acute   





(2) Leukocytosis


Assessment & Plan: 


REACTIVE SEC TO SICKLE CELL.


Status: Acute   





(3) Anemia


Assessment & Plan: 


H/H  LOW -AS PER HEMATOLOGY


Status: Acute   





(4) Neurogenic bladder


Assessment & Plan: 


COLONIZATION WITH +VE ESBL E. COLI/ VRE.


 NO ABX FOR NOW. 


Status: Acute   





(5) Bilateral hydronephrosis


Assessment & Plan: 


PT FOR CHANGE OF RT.  STENT ? TUESDAY AS PER .


LT STENT CHANGED 4/24/18.


Status: Acute

## 2018-04-29 NOTE — CP.PCM.PN
Subjective





- Date & Time of Evaluation


Date of Evaluation: 04/29/18


Time of Evaluation: 20:10





- Subjective


Subjective: 





Less pain, feels tired.





Objective





- Vital Signs/Intake and Output


Vital Signs (last 24 hours): 


 











Temp Pulse Resp BP Pulse Ox


 


 98.6 F   87   20   108/72   100 


 


 04/29/18 15:56  04/29/18 15:56  04/29/18 15:56  04/29/18 15:56  04/29/18 15:56








Intake and Output: 


 











 04/29/18 04/30/18





 18:59 06:59


 


Intake Total 630 


 


Balance 630 














- Medications


Medications: 


 Current Medications





Acetaminophen (Tylenol 325mg Tab)  650 mg PO Q6 PRN


   PRN Reason: Fever >100.4 F


Bisacodyl (Dulcolax)  10 mg [place] DAILY PRN


   PRN Reason: Constipation


Diphenhydramine HCl (Benadryl)  25 mg IVP Q3 PRN


   PRN Reason: Itching / Pruritus


   Last Admin: 04/29/18 17:29 Dose:  25 mg


Folic Acid (Folic Acid)  2 mg PO DAILY [facility]


   Last Admin: 04/29/18 10:04 Dose:  2 mg


Hydromorphone HCl (Dilaudid)  4 mg PO Q3H PRN


   PRN Reason: Pain, severe (8-10)


   Last Admin: 04/29/18 17:29 Dose:  4 mg


Ondansetron HCl (Zofran Inj)  4 mg IVP Q6 PRN


   PRN Reason: Nausea/Vomiting











- Labs


Labs: 


 





 04/29/18 07:44 





 04/29/18 07:44 





 











PT  13.6 SECONDS (9.7-12.2)  H  04/26/18  05:58    


 


INR  1.2   04/26/18  05:58    


 


APTT  37 SECONDS (21-34)  H  04/26/18  05:58    














- Head Exam


Head Exam: ATRAUMATIC





- Eye Exam


Eye Exam: Normal appearance





- ENT Exam


ENT Exam: Mucous Membranes Dry





- Respiratory Exam


Respiratory Exam: NORMAL BREATHING PATTERN





- Cardiovascular Exam


Cardiovascular Exam: +S1, +S2





- GI/Abdominal Exam


GI & Abdominal Exam: Normal Bowel Sounds





Assessment and Plan


(1) Sickle cell pain crisis


Assessment & Plan: 


IV fluids, pain meds, 02 via NC, folic acid


no current transfusion requirement


Status: Acute   





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


Assessment & Plan: 


minimize transfusion support


outpatient chelation


Status: Acute   





(3) Leukocytosis


Assessment & Plan: 


may be reactive to sickle cell


Status: Acute   





(4) Sickle cell anemia


Assessment & Plan: 


outpatient folic acid


Status: Acute

## 2018-04-30 LAB
% IRON SATURATION: 80 (ref 20–55)
ALBUMIN SERPL-MCNC: 3.6 G/DL (ref 3.5–5)
ALBUMIN/GLOB SERPL: 0.7 {RATIO} (ref 1–2.1)
ALT SERPL-CCNC: 85 U/L (ref 9–52)
AST SERPL-CCNC: 86 U/L (ref 14–36)
BASOPHILS # BLD AUTO: 0.2 K/UL (ref 0–0.2)
BASOPHILS NFR BLD: 0.9 % (ref 0–2)
BUN SERPL-MCNC: 25 MG/DL (ref 7–17)
CALCIUM SERPL-MCNC: 8.3 MG/DL (ref 8.6–10.4)
EOSINOPHIL # BLD AUTO: 1 K/UL (ref 0–0.7)
EOSINOPHIL NFR BLD: 4.2 % (ref 0–4)
ERYTHROCYTE [DISTWIDTH] IN BLOOD BY AUTOMATED COUNT: 18.4 % (ref 11.5–14.5)
FERRITIN SERPL-MCNC: (no result) NG/ML
GFR NON-AFRICAN AMERICAN: 36
HGB BLD-MCNC: 6.1 G/DL (ref 11–16)
IRON SERPL-MCNC: 151 UG/DL (ref 37–170)
LYMPHOCYTES # BLD AUTO: 4.4 K/UL (ref 1–4.3)
LYMPHOCYTES NFR BLD AUTO: 18.9 % (ref 20–40)
MCH RBC QN AUTO: 30.2 PG (ref 27–31)
MCHC RBC AUTO-ENTMCNC: 33.7 G/DL (ref 33–37)
MCV RBC AUTO: 89.7 FL (ref 81–99)
MONOCYTES # BLD: 2 K/UL (ref 0–0.8)
MONOCYTES NFR BLD: 8.9 % (ref 0–10)
NEUTROPHILS # BLD: 15.5 K/UL (ref 1.8–7)
NEUTROPHILS NFR BLD AUTO: 67.1 % (ref 50–75)
NRBC BLD AUTO-RTO: 0.2 % (ref 0–2)
PLATELET # BLD: 147 K/UL (ref 130–400)
PMV BLD AUTO: 9.1 FL (ref 7.2–11.7)
RBC # BLD AUTO: 2.04 MIL/UL (ref 3.8–5.2)
TIBC SERPL-MCNC: 189 UG/DL (ref 250–450)
WBC # BLD AUTO: 23.1 K/UL (ref 4.8–10.8)

## 2018-04-30 RX ADMIN — DEXTROSE AND SODIUM CHLORIDE SCH MLS/HR: 5; 450 INJECTION, SOLUTION INTRAVENOUS at 09:34

## 2018-04-30 RX ADMIN — DIPHENHYDRAMINE HYDROCHLORIDE PRN MG: 50 INJECTION INTRAMUSCULAR; INTRAVENOUS at 19:18

## 2018-04-30 RX ADMIN — DIPHENHYDRAMINE HYDROCHLORIDE PRN MG: 50 INJECTION INTRAMUSCULAR; INTRAVENOUS at 00:34

## 2018-04-30 RX ADMIN — DIPHENHYDRAMINE HYDROCHLORIDE PRN MG: 50 INJECTION INTRAMUSCULAR; INTRAVENOUS at 22:21

## 2018-04-30 RX ADMIN — DIPHENHYDRAMINE HYDROCHLORIDE PRN MG: 50 INJECTION INTRAMUSCULAR; INTRAVENOUS at 15:54

## 2018-04-30 RX ADMIN — DIPHENHYDRAMINE HYDROCHLORIDE PRN MG: 50 INJECTION INTRAMUSCULAR; INTRAVENOUS at 06:36

## 2018-04-30 RX ADMIN — DIPHENHYDRAMINE HYDROCHLORIDE PRN MG: 50 INJECTION INTRAMUSCULAR; INTRAVENOUS at 03:32

## 2018-04-30 RX ADMIN — DIPHENHYDRAMINE HYDROCHLORIDE PRN MG: 50 INJECTION INTRAMUSCULAR; INTRAVENOUS at 12:48

## 2018-04-30 RX ADMIN — DIPHENHYDRAMINE HYDROCHLORIDE PRN MG: 50 INJECTION INTRAMUSCULAR; INTRAVENOUS at 09:38

## 2018-04-30 RX ADMIN — DEXTROSE AND SODIUM CHLORIDE SCH: 5; 450 INJECTION, SOLUTION INTRAVENOUS at 22:06

## 2018-04-30 NOTE — CP.PCM.PN
Subjective





- Date & Time of Evaluation


Date of Evaluation: 04/30/18


Time of Evaluation: 20:15





- Subjective


Subjective: 





Pain improved





Objective





- Vital Signs/Intake and Output


Vital Signs (last 24 hours): 


 











Temp Pulse Resp BP Pulse Ox


 


 98.2 F   113 H  20   104/65   96 


 


 04/30/18 15:00  04/30/18 15:00  04/30/18 15:00  04/30/18 15:00  04/30/18 15:00











- Medications


Medications: 


 Current Medications





Acetaminophen (Tylenol 325mg Tab)  650 mg PO Q6 PRN


   PRN Reason: Fever >100.4 F


Bisacodyl (Dulcolax)  10 mg [place] DAILY PRN


   PRN Reason: Constipation


Diphenhydramine HCl (Benadryl)  25 mg IVP Q3 PRN


   PRN Reason: Itching / Pruritus


   Last Admin: 04/30/18 22:21 Dose:  25 mg


Folic Acid (Folic Acid)  2 mg PO DAILY [facility]


   Last Admin: 04/30/18 09:34 Dose:  2 mg


Hydromorphone HCl (Dilaudid)  4 mg PO Q3H PRN


   PRN Reason: Pain, severe (8-10)


   Last Admin: 04/30/18 22:19 Dose:  4 mg


Dextrose/Sodium Chloride (Dextrose 5%/0.45% Ns 1000 Ml)  1,000 mls @ 75 mls/hr 

IV .[id number] [facility]


   Last Admin: 04/30/18 22:06 Dose:  Not Given


Ondansetron HCl (Zofran Inj)  4 mg IVP Q6 PRN


   PRN Reason: Nausea/Vomiting











- Labs


Labs: 


 





 04/30/18 06:46 





 04/30/18 06:46 





 











PT  13.6 SECONDS (9.7-12.2)  H  04/26/18  05:58    


 


INR  1.2   04/26/18  05:58    


 


APTT  37 SECONDS (21-34)  H  04/26/18  05:58    














- Head Exam


Head Exam: ATRAUMATIC





- Eye Exam


Eye Exam: Scleral icterus





- ENT Exam


ENT Exam: Mucous Membranes Dry





- Respiratory Exam


Respiratory Exam: NORMAL BREATHING PATTERN





- Cardiovascular Exam


Cardiovascular Exam: +S1, +S2





- GI/Abdominal Exam


GI & Abdominal Exam: Normal Bowel Sounds





- Extremities Exam


Extremities Exam: Normal Inspection





Assessment and Plan


(1) Sickle cell pain crisis


Assessment & Plan: 


IV fluids, pain meds, 02 via NC, folic acid


no current transfusion indication


Status: Acute   





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


Assessment & Plan: 


outpatient chelation


minimize transfusion support


Status: Acute   





(3) Leukocytosis


Assessment & Plan: 


reactive component from sickle cell


Status: Acute   





(4) Sickle cell anemia


Assessment & Plan: 


outpatient folic acid and chelation








Status: Acute

## 2018-04-30 NOTE — CP.PCM.PN
<UvaldorimaSamaria - Last Filed: 04/30/18 16:53>





Subjective





- Date & Time of Evaluation


Date of Evaluation: 04/30/18


Time of Evaluation: 16:53





- Subjective


Subjective: 





PGY 2 progress note for Dr. Etienne





Pt seen and examined this morning.  She is seen walking around the hallway.  

Patient states diffuse pain has improved.  Denies having any CP, SOB, abd pain, 

N/v/D/C.  





Objective





- Vital Signs/Intake and Output


Vital Signs (last 24 hours): 


 











Temp Pulse Resp BP Pulse Ox


 


 98.2 F   113 H  20   104/65   96 


 


 04/30/18 15:00  04/30/18 15:00  04/30/18 15:00  04/30/18 15:00  04/30/18 15:00








Intake and Output: 


 











 04/30/18 04/30/18





 06:59 18:59


 


Intake Total 800 


 


Balance 800 














- Medications


Medications: 


 Current Medications





Acetaminophen (Tylenol 325mg Tab)  650 mg PO Q6 PRN


   PRN Reason: Fever >100.4 F


Bisacodyl (Dulcolax)  10 mg [place] DAILY PRN


   PRN Reason: Constipation


Diphenhydramine HCl (Benadryl)  25 mg IVP Q3 PRN


   PRN Reason: Itching / Pruritus


   Last Admin: 04/30/18 15:54 Dose:  25 mg


Folic Acid (Folic Acid)  2 mg PO DAILY [facility]


   Last Admin: 04/30/18 09:34 Dose:  2 mg


Hydromorphone HCl (Dilaudid)  4 mg PO Q3H PRN


   PRN Reason: Pain, severe (8-10)


   Last Admin: 04/30/18 15:52 Dose:  4 mg


Dextrose/Sodium Chloride (Dextrose 5%/0.45% Ns 1000 Ml)  1,000 mls @ 75 mls/hr 

IV .[id number] [facility]


   Last Admin: 04/30/18 09:34 Dose:  75 mls/hr


Ondansetron HCl (Zofran Inj)  4 mg IVP Q6 PRN


   PRN Reason: Nausea/Vomiting











- Labs


Labs: 


 





 04/30/18 06:46 





 04/30/18 06:46 





 











PT  13.6 SECONDS (9.7-12.2)  H  04/26/18  05:58    


 


INR  1.2   04/26/18  05:58    


 


APTT  37 SECONDS (21-34)  H  04/26/18  05:58    














- Constitutional


Appears: Non-toxic, No Acute Distress





- Head Exam


Head Exam: ATRAUMATIC





- ENT Exam


ENT Exam: Mucous Membranes Moist





- Respiratory Exam


Respiratory Exam: Clear to Ausculation Bilateral.  absent: Accessory Muscle Use

, Rales, Rhonchi, Wheezes, Respiratory Distress





- Cardiovascular Exam


Cardiovascular Exam: REGULAR RHYTHM, +S1, +S2.  absent: Gallop, Rubs, Murmur





- GI/Abdominal Exam


GI & Abdominal Exam: Soft, Normal Bowel Sounds.  absent: Distended, Firm, 

Guarding, Rigid, Tenderness, Organomegaly





- Extremities Exam


Extremities Exam: absent: Pedal Edema, Tenderness





- Neurological Exam


Neurological Exam: Alert, Awake, Oriented x3





- Psychiatric Exam


Psychiatric exam: Normal Affect, Normal Mood





- Skin


Skin Exam: Dry, Intact, Normal Color, Warm





Assessment and Plan





- Assessment and Plan (Free Text)


Assessment: 





Sickle Cell Crisis


- Hgb stable. Will continue to monitor.  Transfusion if Hgb below 5.0 per Heme/

onc recs. 


- Pain control with PO dilaudid


- folic acid


- D5/.45% NS at 75 cc


- Retic count 2.5 on admission.  Will recheck tomorrow morning 


- Dr. Olvera consulted help appreciated


- Zofran prn





Hx frequent UTIs


- f/u urine culture


- UA is abnormal but unchanged from other Ua


- patient is asymptomatic and afebrile


- Dr. Stanton consulted, help appreciated - will wait on urine culture before 

starting antibiotics





Renal stents


-Dr. Chacko, urology, help appreciated


- Left shunt replaced on 4/24.


- Per, Dr. Chacko pt will have right shunt replaced tomorrow.  NPO past 4 am. 

Per Dr. Gutiérrez, low risk for bleeding so no need to to transfuse.





Transaminitis


-hepatitis and HIV negative 2.5


- Trending down


- likely due to iron overload.  Patient takes deferasirox at home for 

chelation.  Patient asked ot bring her home medication in.  





Neurogenic bladder


- Straight cath prn 





Proph


-chemical DVT prophylaxis not indicated; will order SCD; OOB as tolerated


-GI prophylaxis not indicated 


-Benadryl prn





All management per Dr. MARIVEL Etienne. 














<Shahab Etienne - Last Filed: 05/04/18 11:35>





Objective





- Vital Signs/Intake and Output


Vital Signs (last 24 hours): 


 











Temp Pulse Resp BP Pulse Ox


 


 98.0 F   84   20   97/65 L  99 


 


 05/04/18 07:57  05/04/18 07:57  05/04/18 07:57  05/04/18 07:57  05/04/18 07:57











- Medications


Medications: 


 Current Medications





Acetaminophen (Tylenol 325mg Tab)  650 mg PO Q6 PRN


   PRN Reason: Fever >100.4 F


Bisacodyl (Dulcolax)  10 mg [place] DAILY PRN


   PRN Reason: Constipation


Diphenhydramine HCl (Benadryl)  25 mg IVP Q3 PRN


   PRN Reason: Itching / Pruritus


   Last Admin: 05/04/18 10:21 Dose:  25 mg


Folic Acid (Folic Acid)  2 mg PO DAILY [facility]


   Last Admin: 05/04/18 10:20 Dose:  2 mg


Hydromorphone HCl (Dilaudid)  4 mg PO Q3H PRN


   PRN Reason: Pain, severe (8-10)


   Last Admin: 05/04/18 10:21 Dose:  4 mg


Ondansetron HCl (Zofran Inj)  4 mg IVP Q6 PRN


   PRN Reason: Nausea/Vomiting











- Labs


Labs: 


 





 05/04/18 05:58 





 05/04/18 05:58 





 











PT  13.6 SECONDS (9.7-12.2)  H  04/26/18  05:58    


 


INR  1.2   04/26/18  05:58    


 


APTT  37 SECONDS (21-34)  H  04/26/18  05:58    














Attending/Attestation





- Attestation


I have personally seen and examined this patient.: Yes


I have fully participated in the care of the patient.: Yes


I have reviewed all pertinent clinical information, including history, physical 

exam and plan: Yes


Notes (Text): 





case seen and d.w staff and resident, concurred with finding and management..

## 2018-04-30 NOTE — CP.PCM.PN
Subjective





- Date & Time of Evaluation


Date of Evaluation: 04/30/18


Time of Evaluation: 22:11





- Subjective


Subjective: 





AFEBRILE,


AMBULATING.


DENIES ANY PAIN.





PT FOR OR IN AM AS PER RESIDENT.





IV AMIKACIN 750MG X 1 DOSE  IN OR PRIOR TO PROCEDURE





Objective





- Vital Signs/Intake and Output


Vital Signs (last 24 hours): 


 











Temp Pulse Resp BP Pulse Ox


 


 98.2 F   113 H  20   104/65   96 


 


 04/30/18 15:00  04/30/18 15:00  04/30/18 15:00  04/30/18 15:00  04/30/18 15:00











- Medications


Medications: 


 Current Medications





Acetaminophen (Tylenol 325mg Tab)  650 mg PO Q6 PRN


   PRN Reason: Fever >100.4 F


Bisacodyl (Dulcolax)  10 mg [place] DAILY PRN


   PRN Reason: Constipation


Diphenhydramine HCl (Benadryl)  25 mg IVP Q3 PRN


   PRN Reason: Itching / Pruritus


   Last Admin: 04/30/18 19:18 Dose:  25 mg


Folic Acid (Folic Acid)  2 mg PO DAILY [facility]


   Last Admin: 04/30/18 09:34 Dose:  2 mg


Hydromorphone HCl (Dilaudid)  4 mg PO Q3H PRN


   PRN Reason: Pain, severe (8-10)


   Last Admin: 04/30/18 19:14 Dose:  4 mg


Dextrose/Sodium Chloride (Dextrose 5%/0.45% Ns 1000 Ml)  1,000 mls @ 75 mls/hr 

IV .[id number] [facility]


   Last Admin: 04/30/18 22:06 Dose:  Not Given


Ondansetron HCl (Zofran Inj)  4 mg IVP Q6 PRN


   PRN Reason: Nausea/Vomiting











- Labs


Labs: 


 





 04/30/18 06:46 





 04/30/18 06:46 





 











PT  13.6 SECONDS (9.7-12.2)  H  04/26/18  05:58    


 


INR  1.2   04/26/18  05:58    


 


APTT  37 SECONDS (21-34)  H  04/26/18  05:58    














- Constitutional


Appears: No Acute Distress





- Head Exam


Head Exam: NORMAL INSPECTION





- Eye Exam


Eye Exam: EOMI, PERRL





- ENT Exam


ENT Exam: Normal Oropharynx





- Neck Exam


Neck Exam: Normal Inspection





- Respiratory Exam


Respiratory Exam: Clear to Ausculation Bilateral





- Cardiovascular Exam


Cardiovascular Exam: REGULAR RHYTHM, +S1, +S2





- GI/Abdominal Exam


GI & Abdominal Exam: Soft, Normal Bowel Sounds





- Extremities Exam


Extremities Exam: Normal Capillary Refill.  absent: Calf Tenderness, Pedal Edema





- Back Exam


Back Exam: absent: CVA tenderness (L), CVA tenderness (R)





- Neurological Exam


Neurological Exam: Alert, Awake, CN II-XII Intact





- Psychiatric Exam


Psychiatric exam: Normal Mood





- Skin


Skin Exam: Normal Color, Pallor





Assessment and Plan


(1) Sickle cell anemia


Status: Acute   





(2) Leukocytosis


Status: Acute   





(3) Anemia


Status: Acute   





(4) Neurogenic bladder


Assessment & Plan: 


URINE REPEAT REPORTED +VE E.COLI +VE ESBL S- MEROPENEM/GENTAMICIN


 CASE DISCUSSED WITH  [facility]  [facility]CTERIOLOGY .


REPORTS E. COLI S AMIKACIN LASHAWN 2.





CASE DISCUSSED WITH RESIDENT DR HELLER 


IV AMIKACIN 750MG IVPB JUST PRIORT TO PROCEDURE OR IN OR . IN AM 


Status: Acute   





(5) Bilateral hydronephrosis


Assessment & Plan: 


PT FOR CHANGE OF RT .STENT IN AM


Status: Acute

## 2018-04-30 NOTE — CP.PCM.PN
Subjective





- Date & Time of Evaluation


Date of Evaluation: 04/30/18


Time of Evaluation: 08:20





- Subjective


Subjective: 


clinically same





Objective





- Vital Signs/Intake and Output


Vital Signs (last 24 hours): 


 











Temp Pulse Resp BP Pulse Ox


 


 98.2 F   113 H  20   104/65   96 


 


 04/30/18 15:00  04/30/18 15:00  04/30/18 15:00  04/30/18 15:00  04/30/18 15:00








Intake and Output: 


 











 04/30/18 04/30/18





 06:59 18:59


 


Intake Total 800 


 


Balance 800 














- Medications


Medications: 


 Current Medications





Acetaminophen (Tylenol 325mg Tab)  650 mg PO Q6 PRN


   PRN Reason: Fever >100.4 F


Bisacodyl (Dulcolax)  10 mg [place] DAILY PRN


   PRN Reason: Constipation


Diphenhydramine HCl (Benadryl)  25 mg IVP Q3 PRN


   PRN Reason: Itching / Pruritus


   Last Admin: 04/30/18 15:54 Dose:  25 mg


Folic Acid (Folic Acid)  2 mg PO DAILY [facility]


   Last Admin: 04/30/18 09:34 Dose:  2 mg


Hydromorphone HCl (Dilaudid)  4 mg PO Q3H PRN


   PRN Reason: Pain, severe (8-10)


   Last Admin: 04/30/18 15:52 Dose:  4 mg


Dextrose/Sodium Chloride (Dextrose 5%/0.45% Ns 1000 Ml)  1,000 mls @ 75 mls/hr 

IV .[id number] [facility]


   Last Admin: 04/30/18 09:34 Dose:  75 mls/hr


Ondansetron HCl (Zofran Inj)  4 mg IVP Q6 PRN


   PRN Reason: Nausea/Vomiting











- Labs


Labs: 


 





 04/30/18 06:46 





 04/30/18 06:46 





 











PT  13.6 SECONDS (9.7-12.2)  H  04/26/18  05:58    


 


INR  1.2   04/26/18  05:58    


 


APTT  37 SECONDS (21-34)  H  04/26/18  05:58    














- Constitutional


Appears: Well





- Head Exam


Head Exam: ATRAUMATIC, NORMAL INSPECTION, NORMOCEPHALIC





- Eye Exam


Eye Exam: EOMI, Normal appearance, PERRL


Pupil Exam: NORMAL ACCOMODATION, PERRL





- ENT Exam


ENT Exam: Mucous Membranes Moist, Normal Exam





- Neck Exam


Neck Exam: Full ROM, Normal Inspection.  absent: Lymphadenopathy





- Respiratory Exam


Respiratory Exam: Decreased Breath Sounds





- Cardiovascular Exam


Cardiovascular Exam: REGULAR RHYTHM, +S1, +S2





- GI/Abdominal Exam


GI & Abdominal Exam: Soft, Diminished Bowel Sounds





- Rectal Exam


Rectal Exam: Deferred

## 2018-05-01 LAB
ALBUMIN SERPL-MCNC: 3.8 G/DL (ref 3.5–5)
ALBUMIN/GLOB SERPL: 0.7 {RATIO} (ref 1–2.1)
ALT SERPL-CCNC: 86 U/L (ref 9–52)
AST SERPL-CCNC: 95 U/L (ref 14–36)
BASOPHILS # BLD AUTO: 0.3 K/UL (ref 0–0.2)
BASOPHILS NFR BLD: 1 % (ref 0–2)
BUN SERPL-MCNC: 34 MG/DL (ref 7–17)
CALCIUM SERPL-MCNC: 8.1 MG/DL (ref 8.6–10.4)
EOSINOPHIL # BLD AUTO: 1 K/UL (ref 0–0.7)
EOSINOPHIL NFR BLD: 3.9 % (ref 0–4)
ERYTHROCYTE [DISTWIDTH] IN BLOOD BY AUTOMATED COUNT: 18.5 % (ref 11.5–14.5)
GFR NON-AFRICAN AMERICAN: 42
HGB BLD-MCNC: 6.1 G/DL (ref 11–16)
LYMPHOCYTES # BLD AUTO: 6.7 K/UL (ref 1–4.3)
LYMPHOCYTES NFR BLD AUTO: 25.3 % (ref 20–40)
MCH RBC QN AUTO: 30.2 PG (ref 27–31)
MCHC RBC AUTO-ENTMCNC: 34 G/DL (ref 33–37)
MCV RBC AUTO: 88.8 FL (ref 81–99)
MONOCYTES # BLD: 2.3 K/UL (ref 0–0.8)
MONOCYTES NFR BLD: 8.7 % (ref 0–10)
NEUTROPHILS # BLD: 16.2 K/UL (ref 1.8–7)
NEUTROPHILS NFR BLD AUTO: 61.1 % (ref 50–75)
NRBC BLD AUTO-RTO: 0.2 % (ref 0–2)
PLATELET # BLD: 190 K/UL (ref 130–400)
PMV BLD AUTO: 9.3 FL (ref 7.2–11.7)
RBC # BLD AUTO: 2.02 MIL/UL (ref 3.8–5.2)
WBC # BLD AUTO: 26.6 K/UL (ref 4.8–10.8)

## 2018-05-01 RX ADMIN — DIPHENHYDRAMINE HYDROCHLORIDE PRN MG: 50 INJECTION INTRAMUSCULAR; INTRAVENOUS at 01:28

## 2018-05-01 RX ADMIN — DIPHENHYDRAMINE HYDROCHLORIDE PRN MG: 50 INJECTION INTRAMUSCULAR; INTRAVENOUS at 21:17

## 2018-05-01 RX ADMIN — DIPHENHYDRAMINE HYDROCHLORIDE PRN MG: 50 INJECTION INTRAMUSCULAR; INTRAVENOUS at 04:25

## 2018-05-01 RX ADMIN — DIPHENHYDRAMINE HYDROCHLORIDE PRN MG: 50 INJECTION INTRAMUSCULAR; INTRAVENOUS at 07:43

## 2018-05-01 RX ADMIN — DIPHENHYDRAMINE HYDROCHLORIDE PRN MG: 50 INJECTION INTRAMUSCULAR; INTRAVENOUS at 14:41

## 2018-05-01 RX ADMIN — DIPHENHYDRAMINE HYDROCHLORIDE PRN MG: 50 INJECTION INTRAMUSCULAR; INTRAVENOUS at 18:13

## 2018-05-01 RX ADMIN — DEXTROSE AND SODIUM CHLORIDE SCH: 5; 450 INJECTION, SOLUTION INTRAVENOUS at 14:43

## 2018-05-01 RX ADMIN — DIPHENHYDRAMINE HYDROCHLORIDE PRN MG: 50 INJECTION INTRAMUSCULAR; INTRAVENOUS at 10:53

## 2018-05-01 NOTE — PCM.URO
Urology Progress Note





- Objective


Lab Studies: Reviewed (unfortunately due to scheduling conflicts  we need to 

reschedule)


Lab Results Last 24 Hours: 


 Laboratory Results - last 24 hr











  05/01/18 05/01/18 05/01/18





  06:54 06:54 13:08


 


WBC  26.6 H  


 


RBC  2.02 L  


 


Hgb  6.1 L*  


 


Hct  18.0 L  


 


MCV  88.8  


 


MCH  30.2  


 


MCHC  34.0  


 


RDW  18.5 H  


 


Plt Count  190  


 


MPV  9.3  


 


Neut % (Auto)  61.1  


 


Lymph % (Auto)  25.3  


 


Mono % (Auto)  8.7  


 


Eos % (Auto)  3.9  


 


Baso % (Auto)  1.0  


 


Neut # (Auto)  16.2 H  


 


Lymph # (Auto)  6.7 H  


 


Mono # (Auto)  2.3 H  


 


Eos # (Auto)  1.0 H  


 


Baso # (Auto)  0.3 H  


 


Sodium   141 


 


Potassium   5.3 H 


 


Chloride   108 H 


 


Carbon Dioxide   20 L 


 


Anion Gap   18 


 


BUN   34 H 


 


Creatinine   1.4 H 


 


Est GFR ( Amer)   51 


 


Est GFR (Non-Af Amer)   42 


 


Random Glucose   101 


 


Calcium   8.1 L 


 


Total Bilirubin   2.1 H 


 


AST   95 H 


 


ALT   86 H 


 


Alkaline Phosphatase   201 H 


 


Total Protein   8.9 H 


 


Albumin   3.8 


 


Globulin   5.1 H 


 


Albumin/Globulin Ratio   0.7 L 


 


Urine HCG, Qual    Negative


 


Blood Type   


 


Antibody Screen   














  05/01/18





  13:36


 


WBC 


 


RBC 


 


Hgb 


 


Hct 


 


MCV 


 


MCH 


 


MCHC 


 


RDW 


 


Plt Count 


 


MPV 


 


Neut % (Auto) 


 


Lymph % (Auto) 


 


Mono % (Auto) 


 


Eos % (Auto) 


 


Baso % (Auto) 


 


Neut # (Auto) 


 


Lymph # (Auto) 


 


Mono # (Auto) 


 


Eos # (Auto) 


 


Baso # (Auto) 


 


Sodium 


 


Potassium 


 


Chloride 


 


Carbon Dioxide 


 


Anion Gap 


 


BUN 


 


Creatinine 


 


Est GFR ( Amer) 


 


Est GFR (Non-Af Amer) 


 


Random Glucose 


 


Calcium 


 


Total Bilirubin 


 


AST 


 


ALT 


 


Alkaline Phosphatase 


 


Total Protein 


 


Albumin 


 


Globulin 


 


Albumin/Globulin Ratio 


 


Urine HCG, Qual 


 


Blood Type  O POSITIVE


 


Antibody Screen  Negative











Vital Signs: 


 Vital Signs - 24 hr











  04/30/18 05/01/18 05/01/18





  23:19 07:00 10:56


 


Temperature 98.5 F 98.5 F 


 


Pulse Rate 110 H 103 H 


 


Respiratory 20 18 





Rate   


 


Blood Pressure 112/73 96/61 L 103/70


 


O2 Sat by Pulse 97 98 





Oximetry

## 2018-05-01 NOTE — CP.PCM.PN
Subjective





- Date & Time of Evaluation


Date of Evaluation: 05/01/18


Time of Evaluation: 23:56





- Subjective


Subjective: 





AFEBRILE


C/O BACKPAIN /  FLANK PAIN








PT RESCHEDULED FOR OR ON THURSDAY AS PER 





IV AMIKACIN 750MG X 1 DOSE  IN OR PRIOR TO PROCEDURE


CASE DISCUSSED WITH STAFF.








Objective





- Vital Signs/Intake and Output


Vital Signs (last 24 hours): 


 











Temp Pulse Resp BP Pulse Ox


 


 98.6 F   112 H  20   107/58 L  98 


 


 05/01/18 23:00  05/01/18 23:00  05/01/18 23:00  05/01/18 23:00  05/01/18 23:00








Intake and Output: 


 











 05/01/18 05/02/18





 18:59 06:59


 


Intake Total  600


 


Balance  600














- Medications


Medications: 


 Current Medications





Acetaminophen (Tylenol 325mg Tab)  650 mg PO Q6 PRN


   PRN Reason: Fever >100.4 F


Bisacodyl (Dulcolax)  10 mg IN DAILY PRN


   PRN Reason: Constipation


Diphenhydramine HCl (Benadryl)  25 mg IVP Q3 PRN


   PRN Reason: Itching / Pruritus


   Last Admin: 05/01/18 21:17 Dose:  25 mg


Folic Acid (Folic Acid)  2 mg PO DAILY Pending sale to [facility]


   Last Admin: 05/01/18 10:55 Dose:  Not Given


Hydromorphone HCl (Dilaudid)  4 mg PO Q3H PRN


   PRN Reason: Pain, severe (8-10)


   Last Admin: 05/01/18 21:16 Dose:  4 mg


Dextrose/Sodium Chloride (Dextrose 5%/0.45% Ns 1000 Ml)  1,000 mls @ 75 mls/hr 

IV .[id number] Pending sale to [facility]


   Last Admin: 05/01/18 14:43 Dose:  Not Given


Amikacin Sulfate 750 mg/ (Sodium Chloride)  103 mls @ 100 mls/hr IV ONCE ONE


   PRN Reason: Protocol


   Stop: 05/03/18 10:01


Ondansetron HCl (Zofran Inj)  4 mg IVP Q6 PRN


   PRN Reason: Nausea/Vomiting











- Labs


Labs: 


 





 05/01/18 06:54 





 05/01/18 06:54 





 











PT  13.6 SECONDS (9.7-12.2)  H  04/26/18  05:58    


 


INR  1.2   04/26/18  05:58    


 


APTT  37 SECONDS (21-34)  H  04/26/18  05:58    














- Constitutional


Appears: No Acute Distress





- Head Exam


Head Exam: NORMAL INSPECTION





- Eye Exam


Eye Exam: PERRL, Scleral icterus





- ENT Exam


ENT Exam: Normal Oropharynx





- Neck Exam


Neck Exam: Normal Inspection





- Respiratory Exam


Respiratory Exam: Clear to Ausculation Bilateral





- Cardiovascular Exam


Cardiovascular Exam: Tachycardia, REGULAR RHYTHM, +S1, +S2





- GI/Abdominal Exam


GI & Abdominal Exam: Soft, Tenderness (LT FLANK/BACK/LEGS), Normal Bowel Sounds





- Extremities Exam


Extremities Exam: absent: Calf Tenderness, Pedal Edema





- Neurological Exam


Neurological Exam: Awake, CN II-XII Intact, Normal Gait, Oriented x3





- Psychiatric Exam


Psychiatric exam: Normal Mood





Assessment and Plan


(1) Sickle cell anemia


Status: Acute   





(2) Leukocytosis


Status: Acute   





(3) Anemia


Status: Acute   





(4) Neurogenic bladder


Status: Acute   





(5) Bilateral hydronephrosis


Assessment & Plan: 


PT FOR CHANGE OF RT STENT ON THURSDAY.


Status: Acute

## 2018-05-01 NOTE — CP.PCM.PN
Subjective





- Date & Time of Evaluation


Date of Evaluation: 05/01/18


Time of Evaluation: 08:00





- Subjective


Subjective: 


clinically same





Objective





- Vital Signs/Intake and Output


Vital Signs (last 24 hours): 


 











Temp Pulse Resp BP Pulse Ox


 


 98.5 F   103 H  18   103/70   98 


 


 05/01/18 07:00  05/01/18 07:00  05/01/18 07:00  05/01/18 10:56  05/01/18 07:00











- Medications


Medications: 


 Current Medications





Acetaminophen (Tylenol 325mg Tab)  650 mg PO Q6 PRN


   PRN Reason: Fever >100.4 F


Bisacodyl (Dulcolax)  10 mg [place] DAILY PRN


   PRN Reason: Constipation


Diphenhydramine HCl (Benadryl)  25 mg IVP Q3 PRN


   PRN Reason: Itching / Pruritus


   Last Admin: 05/01/18 14:41 Dose:  25 mg


Folic Acid (Folic Acid)  2 mg PO DAILY [facility]


   Last Admin: 05/01/18 10:55 Dose:  Not Given


Hydromorphone HCl (Dilaudid)  4 mg PO Q3H PRN


   PRN Reason: Pain, severe (8-10)


   Last Admin: 05/01/18 14:41 Dose:  4 mg


Dextrose/Sodium Chloride (Dextrose 5%/0.45% Ns 1000 Ml)  1,000 mls @ 75 mls/hr 

IV .[id number] [facility]


   Last Admin: 05/01/18 14:43 Dose:  Not Given


Ondansetron HCl (Zofran Inj)  4 mg IVP Q6 PRN


   PRN Reason: Nausea/Vomiting











- Labs


Labs: 


 





 05/01/18 06:54 





 05/01/18 06:54 





 











PT  13.6 SECONDS (9.7-12.2)  H  04/26/18  05:58    


 


INR  1.2   04/26/18  05:58    


 


APTT  37 SECONDS (21-34)  H  04/26/18  05:58    














- Constitutional


Appears: Well





- Head Exam


Head Exam: ATRAUMATIC, NORMAL INSPECTION, NORMOCEPHALIC





- Eye Exam


Eye Exam: EOMI, Normal appearance, PERRL


Pupil Exam: NORMAL ACCOMODATION, PERRL





- ENT Exam


ENT Exam: Mucous Membranes Moist, Normal Exam





- Neck Exam


Neck Exam: Full ROM, Normal Inspection.  absent: Lymphadenopathy





- Respiratory Exam


Respiratory Exam: Decreased Breath Sounds





- Cardiovascular Exam


Cardiovascular Exam: REGULAR RHYTHM, +S1, +S2





- GI/Abdominal Exam


GI & Abdominal Exam: Soft, Diminished Bowel Sounds





- Rectal Exam


Rectal Exam: Deferred

## 2018-05-01 NOTE — CP.PCM.PN
Subjective





- Date & Time of Evaluation


Date of Evaluation: 05/01/18


Time of Evaluation: 12:30





- Subjective


Subjective: 





Has some back pain, for urologic procedure.





Objective





- Vital Signs/Intake and Output


Vital Signs (last 24 hours): 


 











Temp Pulse Resp BP Pulse Ox


 


 98.5 F   103 H  18   103/70   98 


 


 05/01/18 07:00  05/01/18 07:00  05/01/18 07:00  05/01/18 10:56  05/01/18 07:00











- Medications


Medications: 


 Current Medications





Acetaminophen (Tylenol 325mg Tab)  650 mg PO Q6 PRN


   PRN Reason: Fever >100.4 F


Bisacodyl (Dulcolax)  10 mg [place] DAILY PRN


   PRN Reason: Constipation


Diphenhydramine HCl (Benadryl)  25 mg IVP Q3 PRN


   PRN Reason: Itching / Pruritus


   Last Admin: 05/01/18 10:53 Dose:  25 mg


Folic Acid (Folic Acid)  2 mg PO DAILY [facility]


   Last Admin: 05/01/18 10:55 Dose:  Not Given


Hydromorphone HCl (Dilaudid)  4 mg PO Q3H PRN


   PRN Reason: Pain, severe (8-10)


   Last Admin: 05/01/18 10:53 Dose:  4 mg


Dextrose/Sodium Chloride (Dextrose 5%/0.45% Ns 1000 Ml)  1,000 mls @ 75 mls/hr 

IV .[id number] [facility]


   Last Admin: 04/30/18 22:06 Dose:  Not Given


Ondansetron HCl (Zofran Inj)  4 mg IVP Q6 PRN


   PRN Reason: Nausea/Vomiting











- Labs


Labs: 


 





 05/01/18 06:54 





 05/01/18 06:54 





 











PT  13.6 SECONDS (9.7-12.2)  H  04/26/18  05:58    


 


INR  1.2   04/26/18  05:58    


 


APTT  37 SECONDS (21-34)  H  04/26/18  05:58    














- Head Exam


Head Exam: ATRAUMATIC





- Eye Exam


Eye Exam: Normal appearance





- ENT Exam


ENT Exam: Mucous Membranes Dry





- Respiratory Exam


Respiratory Exam: NORMAL BREATHING PATTERN





- Cardiovascular Exam


Cardiovascular Exam: +S1, +S2





- GI/Abdominal Exam


GI & Abdominal Exam: Normal Bowel Sounds





Assessment and Plan


(1) Sickle cell pain crisis


Assessment & Plan: 


IV fluids, pain meds, 02 via NC, folic acid


no current transfusion indication


Status: Acute   





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


Assessment & Plan: 


outpatient chelation


minimize transfusion support


Status: Acute   





(3) Leukocytosis


Assessment & Plan: 


reactive component from sickle cell


Status: Acute   





(4) Sickle cell anemia


Assessment & Plan: 


outpatient folic acid and chelation


Status: Acute

## 2018-05-02 LAB
ALBUMIN SERPL-MCNC: 3.6 G/DL (ref 3.5–5)
ALBUMIN/GLOB SERPL: 0.7 {RATIO} (ref 1–2.1)
ALT SERPL-CCNC: 71 U/L (ref 9–52)
AST SERPL-CCNC: 101 U/L (ref 14–36)
BASOPHILS # BLD AUTO: 0.3 K/UL (ref 0–0.2)
BASOPHILS NFR BLD: 1.2 % (ref 0–2)
BUN SERPL-MCNC: 27 MG/DL (ref 7–17)
CALCIUM SERPL-MCNC: 7.8 MG/DL (ref 8.6–10.4)
EOSINOPHIL # BLD AUTO: 1 K/UL (ref 0–0.7)
EOSINOPHIL NFR BLD: 4.5 % (ref 0–4)
ERYTHROCYTE [DISTWIDTH] IN BLOOD BY AUTOMATED COUNT: 18.4 % (ref 11.5–14.5)
GFR NON-AFRICAN AMERICAN: 46
HGB BLD-MCNC: 5.8 G/DL (ref 11–16)
LYMPHOCYTES # BLD AUTO: 5.4 K/UL (ref 1–4.3)
LYMPHOCYTES NFR BLD AUTO: 23.3 % (ref 20–40)
MCH RBC QN AUTO: 30.8 PG (ref 27–31)
MCHC RBC AUTO-ENTMCNC: 34.7 G/DL (ref 33–37)
MCV RBC AUTO: 88.8 FL (ref 81–99)
MONOCYTES # BLD: 2 K/UL (ref 0–0.8)
MONOCYTES NFR BLD: 8.8 % (ref 0–10)
NEUTROPHILS # BLD: 14.5 K/UL (ref 1.8–7)
NEUTROPHILS NFR BLD AUTO: 62.2 % (ref 50–75)
NRBC BLD AUTO-RTO: 0.4 % (ref 0–2)
PLATELET # BLD: 165 K/UL (ref 130–400)
PMV BLD AUTO: 9.7 FL (ref 7.2–11.7)
RBC # BLD AUTO: 1.87 MIL/UL (ref 3.8–5.2)
WBC # BLD AUTO: 23.3 K/UL (ref 4.8–10.8)

## 2018-05-02 RX ADMIN — DIPHENHYDRAMINE HYDROCHLORIDE PRN MG: 50 INJECTION INTRAMUSCULAR; INTRAVENOUS at 03:37

## 2018-05-02 RX ADMIN — DIPHENHYDRAMINE HYDROCHLORIDE PRN MG: 50 INJECTION INTRAMUSCULAR; INTRAVENOUS at 06:34

## 2018-05-02 RX ADMIN — DIPHENHYDRAMINE HYDROCHLORIDE PRN MG: 50 INJECTION INTRAMUSCULAR; INTRAVENOUS at 00:29

## 2018-05-02 RX ADMIN — DIPHENHYDRAMINE HYDROCHLORIDE PRN MG: 50 INJECTION INTRAMUSCULAR; INTRAVENOUS at 19:40

## 2018-05-02 RX ADMIN — DIPHENHYDRAMINE HYDROCHLORIDE PRN MG: 50 INJECTION INTRAMUSCULAR; INTRAVENOUS at 16:29

## 2018-05-02 RX ADMIN — DIPHENHYDRAMINE HYDROCHLORIDE PRN MG: 50 INJECTION INTRAMUSCULAR; INTRAVENOUS at 23:02

## 2018-05-02 RX ADMIN — DIPHENHYDRAMINE HYDROCHLORIDE PRN MG: 50 INJECTION INTRAMUSCULAR; INTRAVENOUS at 09:38

## 2018-05-02 RX ADMIN — DEXTROSE AND SODIUM CHLORIDE SCH: 5; 450 INJECTION, SOLUTION INTRAVENOUS at 03:30

## 2018-05-02 RX ADMIN — DIPHENHYDRAMINE HYDROCHLORIDE PRN MG: 50 INJECTION INTRAMUSCULAR; INTRAVENOUS at 12:46

## 2018-05-02 NOTE — PCM.URO
Urology Progress Note





- General


General: No Complaints, Tolerating Diet





- Subjective


Voiding Well: Yes (Pt voids between self-cath. Residual estimated to be approx 

500cc acc to pt)


Dysuria: No


Hematuria: No


Dsypnea: No


Chest Pain: No


Fever & Chills: No





- Objective


Lab Results Last 24 Hours: 


 Laboratory Results - last 24 hr











  05/01/18 05/01/18 05/02/18





  13:08 13:36 06:28


 


WBC    23.3 H


 


RBC    1.87 L


 


Hgb    5.8 L*


 


Hct    16.6 L


 


MCV    88.8


 


MCH    30.8


 


MCHC    34.7


 


RDW    18.4 H


 


Plt Count    165


 


MPV    9.7


 


Neut % (Auto)    62.2


 


Lymph % (Auto)    23.3


 


Mono % (Auto)    8.8


 


Eos % (Auto)    4.5 H


 


Baso % (Auto)    1.2


 


Neut # (Auto)    14.5 H


 


Lymph # (Auto)    5.4 H


 


Mono # (Auto)    2.0 H


 


Eos # (Auto)    1.0 H


 


Baso # (Auto)    0.3 H


 


Sodium   


 


Potassium   


 


Chloride   


 


Carbon Dioxide   


 


Anion Gap   


 


BUN   


 


Creatinine   


 


Est GFR ( Amer)   


 


Est GFR (Non-Af Amer)   


 


Random Glucose   


 


Calcium   


 


Total Bilirubin   


 


AST   


 


ALT   


 


Alkaline Phosphatase   


 


Total Protein   


 


Albumin   


 


Globulin   


 


Albumin/Globulin Ratio   


 


Urine HCG, Qual  Negative  


 


Blood Type   O POSITIVE 


 


Antibody Screen   Negative 














  05/02/18





  06:28


 


WBC 


 


RBC 


 


Hgb 


 


Hct 


 


MCV 


 


MCH 


 


MCHC 


 


RDW 


 


Plt Count 


 


MPV 


 


Neut % (Auto) 


 


Lymph % (Auto) 


 


Mono % (Auto) 


 


Eos % (Auto) 


 


Baso % (Auto) 


 


Neut # (Auto) 


 


Lymph # (Auto) 


 


Mono # (Auto) 


 


Eos # (Auto) 


 


Baso # (Auto) 


 


Sodium  141


 


Potassium  4.7


 


Chloride  109 H


 


Carbon Dioxide  20 L


 


Anion Gap  17


 


BUN  27 H


 


Creatinine  1.3 H


 


Est GFR ( Amer)  55


 


Est GFR (Non-Af Amer)  46


 


Random Glucose  97


 


Calcium  7.8 L


 


Total Bilirubin  2.2 H


 


AST  101 H


 


ALT  71 H


 


Alkaline Phosphatase  218 H


 


Total Protein  8.4 H


 


Albumin  3.6


 


Globulin  4.8 H


 


Albumin/Globulin Ratio  0.7 L


 


Urine HCG, Qual 


 


Blood Type 


 


Antibody Screen 











Intake & Output: 


 Intake & Output











 05/01/18 05/02/18 05/02/18





 18:59 06:59 18:59


 


Intake Total  600 


 


Balance  600 


 


Intake:   


 


  Oral  600 


 


Other:   


 


  # Voids   


 


    Straight  1 


 


    Urine, Voided  2 


 


  # Bowel Movements  0 











Vital Signs: 


 Vital Signs - 24 hr











  05/01/18 05/01/18 05/02/18





  16:04 23:00 07:00


 


Temperature 98.1 F 98.6 F 98.2 F


 


Pulse Rate 108 H 112 H 93 H


 


Respiratory 20 20 18





Rate   


 


Blood Pressure 121/66 107/58 L 94/61 L


 


O2 Sat by Pulse 100 98 99





Oximetry   














- Physical Exam


Abdominal Exam: Soft, Non-Tender, Non-Distended


Back: No CVA Tenderness





- Plan


Additional Information: Imp:  UTI.  Bilat stents in place.  Chronic retention, 

poss neurogenic bladder.  P:  For cysto, stent exchange.  Antibiotics as per ID





- Date & Time of Note


Date: 05/02/18


Time: 11:30

## 2018-05-02 NOTE — CP.PCM.PN
Subjective





- Date & Time of Evaluation


Date of Evaluation: 05/02/18


Time of Evaluation: 13:00





- Subjective


Subjective: 





Has some back pain.





Objective





- Vital Signs/Intake and Output


Vital Signs (last 24 hours): 


 











Temp Pulse Resp BP Pulse Ox


 


 98.6 F   115 H  18   102/64   99 


 


 05/02/18 15:00  05/02/18 15:00  05/02/18 15:00  05/02/18 15:00  05/02/18 15:00











- Medications


Medications: 


 Current Medications





Acetaminophen (Tylenol 325mg Tab)  650 mg PO Q6 PRN


   PRN Reason: Fever >100.4 F


Bisacodyl (Dulcolax)  10 mg [place] DAILY PRN


   PRN Reason: Constipation


Diphenhydramine HCl (Benadryl)  25 mg IVP Q3 PRN


   PRN Reason: Itching / Pruritus


   Last Admin: 05/02/18 19:40 Dose:  25 mg


Folic Acid (Folic Acid)  2 mg PO DAILY [facility]


   Last Admin: 05/02/18 09:25 Dose:  2 mg


Hydromorphone HCl (Dilaudid)  4 mg PO Q3H PRN


   PRN Reason: Pain, severe (8-10)


   Last Admin: 05/02/18 19:39 Dose:  4 mg


Dextrose/Sodium Chloride (Dextrose 5%/0.45% Ns 1000 Ml)  1,000 mls @ 75 mls/hr 

IV .[id number] [facility]


   Last Admin: 05/02/18 03:30 Dose:  Not Given


Amikacin Sulfate 750 mg/ (Sodium Chloride)  103 mls @ 100 mls/hr IV ONCE ONE


   PRN Reason: Protocol


   Stop: 05/03/18 10:01


Ondansetron HCl (Zofran Inj)  4 mg IVP Q6 PRN


   PRN Reason: Nausea/Vomiting











- Labs


Labs: 


 





 05/02/18 06:28 





 05/02/18 06:28 





 











PT  13.6 SECONDS (9.7-12.2)  H  04/26/18  05:58    


 


INR  1.2   04/26/18  05:58    


 


APTT  37 SECONDS (21-34)  H  04/26/18  05:58    














- Head Exam


Head Exam: ATRAUMATIC





- Eye Exam


Eye Exam: Normal appearance





- ENT Exam


ENT Exam: Mucous Membranes Dry





- Respiratory Exam


Respiratory Exam: NORMAL BREATHING PATTERN





- Cardiovascular Exam


Cardiovascular Exam: +S1, +S2





- GI/Abdominal Exam


GI & Abdominal Exam: Normal Bowel Sounds





Assessment and Plan


(1) Sickle cell pain crisis


Assessment & Plan: 


IV fluids, pain meds, 02 via NC, folic acid


no current transfusion indication


Status: Acute   





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


Assessment & Plan: 


outpatient chelation


minimize transfusion support


Status: Acute   





(3) Leukocytosis


Assessment & Plan: 


reactive component from sickle cell


Status: Acute   





(4) Sickle cell anemia


Assessment & Plan: 


outpatient folic acid and chelation


Status: Acute

## 2018-05-02 NOTE — CP.PCM.PN
Subjective





- Date & Time of Evaluation


Date of Evaluation: 05/02/18


Time of Evaluation: 08:00





- Subjective


Subjective: 


clinically same





Objective





- Vital Signs/Intake and Output


Vital Signs (last 24 hours): 


 











Temp Pulse Resp BP Pulse Ox


 


 98.2 F   93 H  18   94/61 L  99 


 


 05/02/18 07:00  05/02/18 07:00  05/02/18 07:00  05/02/18 07:00  05/02/18 07:00








Intake and Output: 


 











 05/02/18 05/02/18





 06:59 18:59


 


Intake Total 600 


 


Balance 600 














- Medications


Medications: 


 Current Medications





Acetaminophen (Tylenol 325mg Tab)  650 mg PO Q6 PRN


   PRN Reason: Fever >100.4 F


Bisacodyl (Dulcolax)  10 mg [place] DAILY PRN


   PRN Reason: Constipation


Diphenhydramine HCl (Benadryl)  25 mg IVP Q3 PRN


   PRN Reason: Itching / Pruritus


   Last Admin: 05/02/18 12:46 Dose:  25 mg


Folic Acid (Folic Acid)  2 mg PO DAILY [facility]


   Last Admin: 05/02/18 09:25 Dose:  2 mg


Hydromorphone HCl (Dilaudid)  4 mg PO Q3H PRN


   PRN Reason: Pain, severe (8-10)


   Last Admin: 05/02/18 12:47 Dose:  4 mg


Dextrose/Sodium Chloride (Dextrose 5%/0.45% Ns 1000 Ml)  1,000 mls @ 75 mls/hr 

IV .[id number] [facility]


   Last Admin: 05/02/18 03:30 Dose:  Not Given


Amikacin Sulfate 750 mg/ (Sodium Chloride)  103 mls @ 100 mls/hr IV ONCE ONE


   PRN Reason: Protocol


   Stop: 05/03/18 10:01


Ondansetron HCl (Zofran Inj)  4 mg IVP Q6 PRN


   PRN Reason: Nausea/Vomiting











- Labs


Labs: 


 





 05/02/18 06:28 





 05/02/18 06:28 





 











PT  13.6 SECONDS (9.7-12.2)  H  04/26/18  05:58    


 


INR  1.2   04/26/18  05:58    


 


APTT  37 SECONDS (21-34)  H  04/26/18  05:58    














- Constitutional


Appears: Well





- Head Exam


Head Exam: ATRAUMATIC, NORMAL INSPECTION, NORMOCEPHALIC





- Eye Exam


Eye Exam: EOMI, Normal appearance, PERRL


Pupil Exam: NORMAL ACCOMODATION, PERRL





- ENT Exam


ENT Exam: Mucous Membranes Moist, Normal Exam





- Neck Exam


Neck Exam: Full ROM, Normal Inspection.  absent: Lymphadenopathy





- Respiratory Exam


Respiratory Exam: Decreased Breath Sounds





- Cardiovascular Exam


Cardiovascular Exam: REGULAR RHYTHM, +S1, +S2





- GI/Abdominal Exam


GI & Abdominal Exam: Soft, Diminished Bowel Sounds





- Rectal Exam


Rectal Exam: Deferred

## 2018-05-02 NOTE — CP.PCM.PN
Subjective





- Date & Time of Evaluation


Date of Evaluation: 05/02/18


Time of Evaluation: 23:40





- Subjective


Subjective: 





AFEBRILE


C/O BACKPAIN /  FLANK PAIN








PT  FOR OR ON THURSDAY AS PER 





IV AMIKACIN 750MG X 1 DOSE  IN OR PRIOR TO PROCEDURE


CASE DISCUSSED WITH STAFF.








Objective





- Vital Signs/Intake and Output


Vital Signs (last 24 hours): 


 











Temp Pulse Resp BP Pulse Ox


 


 98.6 F   115 H  18   102/64   99 


 


 05/02/18 15:00  05/02/18 15:00  05/02/18 15:00  05/02/18 15:00  05/02/18 15:00








Intake and Output: 


 











 05/02/18 05/03/18





 18:59 06:59


 


Intake Total  600


 


Balance  600














- Medications


Medications: 


 Current Medications





Acetaminophen (Tylenol 325mg Tab)  650 mg PO Q6 PRN


   PRN Reason: Fever >100.4 F


Bisacodyl (Dulcolax)  10 mg [place] DAILY PRN


   PRN Reason: Constipation


Diphenhydramine HCl (Benadryl)  25 mg IVP Q3 PRN


   PRN Reason: Itching / Pruritus


   Last Admin: 05/02/18 23:02 Dose:  25 mg


Folic Acid (Folic Acid)  2 mg PO DAILY [facility]


   Last Admin: 05/02/18 09:25 Dose:  2 mg


Hydromorphone HCl (Dilaudid)  4 mg PO Q3H PRN


   PRN Reason: Pain, severe (8-10)


   Last Admin: 05/02/18 23:01 Dose:  4 mg


Dextrose/Sodium Chloride (Dextrose 5%/0.45% Ns 1000 Ml)  1,000 mls @ 75 mls/hr 

IV .[id number] [facility]


   Last Admin: 05/02/18 03:30 Dose:  Not Given


Amikacin Sulfate 750 mg/ (Sodium Chloride)  103 mls @ 100 mls/hr IV ONCE ONE


   PRN Reason: Protocol


   Stop: 05/03/18 10:01


Ondansetron HCl (Zofran Inj)  4 mg IVP Q6 PRN


   PRN Reason: Nausea/Vomiting











- Labs


Labs: 


 





 05/02/18 06:28 





 05/02/18 06:28 





 











PT  13.6 SECONDS (9.7-12.2)  H  04/26/18  05:58    


 


INR  1.2   04/26/18  05:58    


 


APTT  37 SECONDS (21-34)  H  04/26/18  05:58    














- Constitutional


Appears: No Acute Distress





- Head Exam


Head Exam: NORMAL INSPECTION





- Eye Exam


Eye Exam: EOMI, PERRL





- ENT Exam


ENT Exam: Normal Oropharynx





- Neck Exam


Neck Exam: Normal Inspection





- Respiratory Exam


Respiratory Exam: Clear to Ausculation Bilateral





- Cardiovascular Exam


Cardiovascular Exam: Tachycardia, +S1, +S2





- GI/Abdominal Exam


GI & Abdominal Exam: Soft, Normal Bowel Sounds





- Extremities Exam


Extremities Exam: absent: Calf Tenderness, Pedal Edema





- Back Exam


Back Exam: CVA tenderness (L)





- Neurological Exam


Neurological Exam: Awake, CN II-XII Intact, Normal Gait, Oriented x3





- Skin


Skin Exam: Normal Color, Warm





Assessment and Plan


(1) Sickle cell anemia


Status: Acute   





(2) Leukocytosis


Assessment & Plan: 


MOST LIKELY REACTIVE 2NDRY TO SSC.


H/H LOW , AS PER HEMATOLOGY.


Status: Acute   





(3) Anemia


Status: Acute   





(4) Neurogenic bladder


Status: Acute   





(5) Bilateral hydronephrosis


Assessment & Plan: 


FOR OR DOROTHY.


CYSTO/AND STENT CHANGE AS PER .


Status: Acute

## 2018-05-03 LAB
ALBUMIN SERPL-MCNC: 3.1 G/DL (ref 3.5–5)
ALBUMIN/GLOB SERPL: 0.7 {RATIO} (ref 1–2.1)
ALT SERPL-CCNC: 64 U/L (ref 9–52)
AST SERPL-CCNC: 87 U/L (ref 14–36)
BASOPHILS # BLD AUTO: 0.2 K/UL (ref 0–0.2)
BASOPHILS NFR BLD: 1 % (ref 0–2)
BUN SERPL-MCNC: 28 MG/DL (ref 7–17)
CALCIUM SERPL-MCNC: 7.9 MG/DL (ref 8.6–10.4)
EOSINOPHIL # BLD AUTO: 0.8 K/UL (ref 0–0.7)
EOSINOPHIL NFR BLD: 3.5 % (ref 0–4)
ERYTHROCYTE [DISTWIDTH] IN BLOOD BY AUTOMATED COUNT: 18.4 % (ref 11.5–14.5)
GFR NON-AFRICAN AMERICAN: 42
HGB BLD-MCNC: 5.1 G/DL (ref 11–16)
LYMPHOCYTES # BLD AUTO: 5.2 K/UL (ref 1–4.3)
LYMPHOCYTES NFR BLD AUTO: 22.6 % (ref 20–40)
MCH RBC QN AUTO: 30.4 PG (ref 27–31)
MCHC RBC AUTO-ENTMCNC: 34.2 G/DL (ref 33–37)
MCV RBC AUTO: 88.9 FL (ref 81–99)
MONOCYTES # BLD: 2 K/UL (ref 0–0.8)
MONOCYTES NFR BLD: 8.7 % (ref 0–10)
NEUTROPHILS # BLD: 14.8 K/UL (ref 1.8–7)
NEUTROPHILS NFR BLD AUTO: 64.2 % (ref 50–75)
NRBC BLD AUTO-RTO: 0.6 % (ref 0–2)
PLATELET # BLD: 154 K/UL (ref 130–400)
PMV BLD AUTO: 9 FL (ref 7.2–11.7)
RBC # BLD AUTO: 1.67 MIL/UL (ref 3.8–5.2)
WBC # BLD AUTO: 23 K/UL (ref 4.8–10.8)

## 2018-05-03 PROCEDURE — 30233N1 TRANSFUSION OF NONAUTOLOGOUS RED BLOOD CELLS INTO PERIPHERAL VEIN, PERCUTANEOUS APPROACH: ICD-10-PCS

## 2018-05-03 RX ADMIN — DIPHENHYDRAMINE HYDROCHLORIDE PRN MG: 50 INJECTION INTRAMUSCULAR; INTRAVENOUS at 18:49

## 2018-05-03 RX ADMIN — DIPHENHYDRAMINE HYDROCHLORIDE PRN MG: 50 INJECTION INTRAMUSCULAR; INTRAVENOUS at 10:59

## 2018-05-03 RX ADMIN — DIPHENHYDRAMINE HYDROCHLORIDE PRN MG: 50 INJECTION INTRAMUSCULAR; INTRAVENOUS at 08:01

## 2018-05-03 RX ADMIN — DIPHENHYDRAMINE HYDROCHLORIDE PRN MG: 50 INJECTION INTRAMUSCULAR; INTRAVENOUS at 03:57

## 2018-05-03 RX ADMIN — DIPHENHYDRAMINE HYDROCHLORIDE PRN MG: 50 INJECTION INTRAMUSCULAR; INTRAVENOUS at 22:49

## 2018-05-03 NOTE — CP.PCM.PN
Subjective





- Date & Time of Evaluation


Date of Evaluation: 05/03/18


Time of Evaluation: 08:20





- Subjective


Subjective: 


clinically same





Objective





- Vital Signs/Intake and Output


Vital Signs (last 24 hours): 


 











Temp Pulse Resp BP Pulse Ox


 


 98.5 F   89   17   100/64   100 


 


 05/03/18 16:25  05/03/18 16:25  05/03/18 16:25  05/03/18 16:25  05/03/18 16:25








Intake and Output: 


 











 05/03/18 05/04/18





 18:59 06:59


 


Intake Total 828 


 


Balance 828 














- Medications


Medications: 


 Current Medications





Acetaminophen (Tylenol 325mg Tab)  650 mg PO Q6 PRN


   PRN Reason: Fever >100.4 F


Bisacodyl (Dulcolax)  10 mg [place] DAILY PRN


   PRN Reason: Constipation


Diphenhydramine HCl (Benadryl)  25 mg IVP Q3 PRN


   PRN Reason: Itching / Pruritus


   Last Admin: 05/03/18 18:49 Dose:  25 mg


Folic Acid (Folic Acid)  2 mg PO DAILY [facility]


   Last Admin: 05/03/18 10:24 Dose:  2 mg


Hydromorphone HCl (Dilaudid)  4 mg PO Q3H PRN


   PRN Reason: Pain, severe (8-10)


   Last Admin: 05/03/18 18:49 Dose:  4 mg


Ondansetron HCl (Zofran Inj)  4 mg IVP Q6 PRN


   PRN Reason: Nausea/Vomiting











- Labs


Labs: 


 





 05/03/18 07:13 





 05/03/18 07:13 





 











PT  13.6 SECONDS (9.7-12.2)  H  04/26/18  05:58    


 


INR  1.2   04/26/18  05:58    


 


APTT  37 SECONDS (21-34)  H  04/26/18  05:58    














- Constitutional


Appears: Well





- Head Exam


Head Exam: ATRAUMATIC, NORMAL INSPECTION, NORMOCEPHALIC





- Eye Exam


Eye Exam: EOMI, Normal appearance, PERRL


Pupil Exam: NORMAL ACCOMODATION, PERRL





- ENT Exam


ENT Exam: Mucous Membranes Moist, Normal Exam





- Neck Exam


Neck Exam: Full ROM, Normal Inspection.  absent: Lymphadenopathy





- Respiratory Exam


Respiratory Exam: Decreased Breath Sounds





- Cardiovascular Exam


Cardiovascular Exam: REGULAR RHYTHM, +S1, +S2





- GI/Abdominal Exam


GI & Abdominal Exam: Soft, Diminished Bowel Sounds





- Rectal Exam


Rectal Exam: Deferred

## 2018-05-03 NOTE — CP.PCM.PN
Subjective





- Date & Time of Evaluation


Date of Evaluation: 05/03/18


Time of Evaluation: 11:00





- Subjective


Subjective: 





Feels tired


For 1U PRBC today








Objective





- Vital Signs/Intake and Output


Vital Signs (last 24 hours): 


 











Temp Pulse Resp BP Pulse Ox


 


 98.3 F   96 H  18   115/76   97 


 


 05/03/18 12:52  05/03/18 12:52  05/03/18 12:52  05/03/18 12:52  05/03/18 08:00








Intake and Output: 


 











 05/03/18 05/03/18





 06:59 18:59


 


Intake Total 600 325


 


Balance 600 325














- Medications


Medications: 


 Current Medications





Acetaminophen (Tylenol 325mg Tab)  650 mg PO Q6 PRN


   PRN Reason: Fever >100.4 F


Bisacodyl (Dulcolax)  10 mg [place] DAILY PRN


   PRN Reason: Constipation


Diphenhydramine HCl (Benadryl)  25 mg IVP Q3 PRN


   PRN Reason: Itching / Pruritus


   Last Admin: 05/03/18 10:59 Dose:  25 mg


Folic Acid (Folic Acid)  2 mg PO DAILY [facility]


   Last Admin: 05/03/18 10:24 Dose:  2 mg


Hydromorphone HCl (Dilaudid)  4 mg PO Q3H PRN


   PRN Reason: Pain, severe (8-10)


   Last Admin: 05/03/18 10:59 Dose:  4 mg


Ondansetron HCl (Zofran Inj)  4 mg IVP Q6 PRN


   PRN Reason: Nausea/Vomiting











- Labs


Labs: 


 





 05/03/18 07:13 





 05/03/18 07:13 





 











PT  13.6 SECONDS (9.7-12.2)  H  04/26/18  05:58    


 


INR  1.2   04/26/18  05:58    


 


APTT  37 SECONDS (21-34)  H  04/26/18  05:58    














- Head Exam


Head Exam: ATRAUMATIC





- Eye Exam


Eye Exam: Normal appearance





- ENT Exam


ENT Exam: Mucous Membranes Dry





- Respiratory Exam


Respiratory Exam: NORMAL BREATHING PATTERN





- Cardiovascular Exam


Cardiovascular Exam: +S1, +S2





- GI/Abdominal Exam


GI & Abdominal Exam: Normal Bowel Sounds





Assessment and Plan


(1) Sickle cell pain crisis


Assessment & Plan: 


IV fluids, pain meds, 02 via NC, folic acid


1U PRBC today


Status: Acute   





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


Assessment & Plan: 


outpatient chelation


minimize transfusion support


Status: Acute   





(3) Leukocytosis


Assessment & Plan: 


reactive component from sickle cell


Status: Acute   





(4) Sickle cell anemia


Assessment & Plan: 


outpatient folic acid and chelation


Status: Acute

## 2018-05-03 NOTE — RAD
PROCEDURE:  Intraoperative fluoroscopy



HISTORY:

BI STENT REMOVAL



COMPARISON:

Not available



TECHNIQUE:

Intraoperative fluoroscopy was provided for stent removal. Total time 

of fluoroscopy was 6.1 seconds.



FINDINGS:

Multiple fluoroscopic spot films are submitted.



IMPRESSION:

Fluoroscopy provided.

## 2018-05-03 NOTE — CP.PCM.PN
Subjective





- Date & Time of Evaluation


Date of Evaluation: 05/03/18


Time of Evaluation: 22:39





- Subjective


Subjective: 





afebrile,


S/P OR  -CYSTO/AND  RT. STENT CHANGED .


LT. STENT REMOVED AS  PER  RN.





FEELS TIRED /AND WEAK.


FOR BLOOD TRANSFUSION  I UNIT AS PER HEMATOLOGY.





Objective





- Vital Signs/Intake and Output


Vital Signs (last 24 hours): 


 











Temp Pulse Resp BP Pulse Ox


 


 98.5 F   89   17   100/64   100 


 


 05/03/18 16:25  05/03/18 16:25  05/03/18 16:25  05/03/18 16:25  05/03/18 16:25








Intake and Output: 


 











 05/03/18 05/04/18





 18:59 06:59


 


Intake Total 828 


 


Balance 828 














- Medications


Medications: 


 Current Medications





Acetaminophen (Tylenol 325mg Tab)  650 mg PO Q6 PRN


   PRN Reason: Fever >100.4 F


Bisacodyl (Dulcolax)  10 mg [place] DAILY PRN


   PRN Reason: Constipation


Diphenhydramine HCl (Benadryl)  25 mg IVP Q3 PRN


   PRN Reason: Itching / Pruritus


   Last Admin: 05/03/18 18:49 Dose:  25 mg


Folic Acid (Folic Acid)  2 mg PO DAILY CHENG


   Last Admin: 05/03/18 10:24 Dose:  2 mg


Hydromorphone HCl (Dilaudid)  4 mg PO Q3H PRN


   PRN Reason: Pain, severe (8-10)


   Last Admin: 05/03/18 18:49 Dose:  4 mg


Ondansetron HCl (Zofran Inj)  4 mg IVP Q6 PRN


   PRN Reason: Nausea/Vomiting











- Labs


Labs: 


 





 05/03/18 07:13 





 05/03/18 07:13 





 











PT  13.6 SECONDS (9.7-12.2)  H  04/26/18  05:58    


 


INR  1.2   04/26/18  05:58    


 


APTT  37 SECONDS (21-34)  H  04/26/18  05:58    














- Constitutional


Appears: No Acute Distress





- Head Exam


Head Exam: NORMAL INSPECTION





- Eye Exam


Eye Exam: EOMI, PERRL





- ENT Exam


ENT Exam: Normal Oropharynx





- Neck Exam


Neck Exam: Normal Inspection





- Respiratory Exam


Respiratory Exam: Clear to Ausculation Bilateral





- Cardiovascular Exam


Cardiovascular Exam: REGULAR RHYTHM, +S1, +S2





- GI/Abdominal Exam


GI & Abdominal Exam: Soft, Normal Bowel Sounds





- Extremities Exam


Extremities Exam: Normal Capillary Refill.  absent: Calf Tenderness, Pedal Edema





- Neurological Exam


Neurological Exam: Awake, CN II-XII Intact, Oriented x3





- Psychiatric Exam


Psychiatric exam: Normal Mood





- Skin


Skin Exam: Pallor, Warm





Assessment and Plan


(1) Sickle cell anemia


Status: Acute   





(2) Leukocytosis


Status: Acute   





(3) Anemia


Assessment & Plan: 


AS PER HEMATOLOGY .


I UNIT PRBC TODAY


Status: Acute   





(4) Neurogenic bladder


Status: Acute   





(5) Bilateral hydronephrosis


Assessment & Plan: 


S/P OR TODAY AND EXCHANGE OF RT. STENT AND LEFT STENT REMOVED.


 PT RECEIVED 1 DOSE AMIKACIN 750MG  X .1 TODAY PRE -OPT.


Status: Acute

## 2018-05-03 NOTE — RAD
HISTORY:

GILDA STENT REMOVAL  



COMPARISON:

No prior.



FINDINGS:



BOWEL:

An initial film is labeled . This demonstrates an unremarkable 

bowel gas pattern.  There is mild retained feces.  Bilateral ureteral 

stents are identified. 



A 2nd film is labeled last film.  This demonstrates a ureteral stent 

in the bladder possibly extending into the distal right ureter. 



BONES:

Normal.



OTHER FINDINGS:

None.



IMPRESSION:

Right ureteral stent possibly within bladder or extending into distal 

right ureter. Left ureteral stent has been removed.

## 2018-05-04 LAB
ALBUMIN SERPL-MCNC: 3.2 G/DL (ref 3.5–5)
ALBUMIN/GLOB SERPL: 0.7 {RATIO} (ref 1–2.1)
ALT SERPL-CCNC: 63 U/L (ref 9–52)
AST SERPL-CCNC: 82 U/L (ref 14–36)
BASOPHILS # BLD AUTO: 0.1 K/UL (ref 0–0.2)
BASOPHILS NFR BLD: 0.7 % (ref 0–2)
BUN SERPL-MCNC: 21 MG/DL (ref 7–17)
CALCIUM SERPL-MCNC: 7.7 MG/DL (ref 8.6–10.4)
EOSINOPHIL # BLD AUTO: 0.8 K/UL (ref 0–0.7)
EOSINOPHIL NFR BLD: 3.4 % (ref 0–4)
ERYTHROCYTE [DISTWIDTH] IN BLOOD BY AUTOMATED COUNT: 18.3 % (ref 11.5–14.5)
GFR NON-AFRICAN AMERICAN: 46
HGB BLD-MCNC: 6.1 G/DL (ref 11–16)
LYMPHOCYTES # BLD AUTO: 4.1 K/UL (ref 1–4.3)
LYMPHOCYTES NFR BLD AUTO: 18 % (ref 20–40)
MCH RBC QN AUTO: 29.7 PG (ref 27–31)
MCHC RBC AUTO-ENTMCNC: 33.3 G/DL (ref 33–37)
MCV RBC AUTO: 89.3 FL (ref 81–99)
MONOCYTES # BLD: 2.1 K/UL (ref 0–0.8)
MONOCYTES NFR BLD: 9.4 % (ref 0–10)
NEUTROPHILS # BLD: 15.7 K/UL (ref 1.8–7)
NEUTROPHILS NFR BLD AUTO: 68.5 % (ref 50–75)
NRBC BLD AUTO-RTO: 0.3 % (ref 0–2)
PLATELET # BLD: 202 K/UL (ref 130–400)
PMV BLD AUTO: 7.9 FL (ref 7.2–11.7)
RBC # BLD AUTO: 2.04 MIL/UL (ref 3.8–5.2)
WBC # BLD AUTO: 22.8 K/UL (ref 4.8–10.8)

## 2018-05-04 PROCEDURE — 0T768DZ DILATION OF RIGHT URETER WITH INTRALUMINAL DEVICE, VIA NATURAL OR ARTIFICIAL OPENING ENDOSCOPIC: ICD-10-PCS | Performed by: UROLOGY

## 2018-05-04 PROCEDURE — 0TP98DZ REMOVAL OF INTRALUMINAL DEVICE FROM URETER, VIA NATURAL OR ARTIFICIAL OPENING ENDOSCOPIC: ICD-10-PCS | Performed by: UROLOGY

## 2018-05-04 RX ADMIN — DIPHENHYDRAMINE HYDROCHLORIDE PRN MG: 50 INJECTION INTRAMUSCULAR; INTRAVENOUS at 10:21

## 2018-05-04 RX ADMIN — DIPHENHYDRAMINE HYDROCHLORIDE PRN MG: 50 INJECTION INTRAMUSCULAR; INTRAVENOUS at 02:06

## 2018-05-04 RX ADMIN — DIPHENHYDRAMINE HYDROCHLORIDE PRN MG: 50 INJECTION INTRAMUSCULAR; INTRAVENOUS at 05:38

## 2018-05-04 RX ADMIN — DIPHENHYDRAMINE HYDROCHLORIDE PRN MG: 50 INJECTION INTRAMUSCULAR; INTRAVENOUS at 14:00

## 2018-05-04 NOTE — CP.PCM.PN
Subjective





- Date & Time of Evaluation


Date of Evaluation: 05/04/18


Time of Evaluation: 22:49





Objective





- Vital Signs/Intake and Output


Vital Signs (last 24 hours): 


 











Temp Pulse Resp BP Pulse Ox


 


 97.9 F   95 H  18   117/79   100 


 


 05/04/18 15:00  05/04/18 15:00  05/04/18 15:00  05/04/18 15:00  05/04/18 15:00











- Medications


Medications: 


 Current Medications





Acetaminophen (Tylenol 325mg Tab)  650 mg PO Q6 PRN


   PRN Reason: Fever >100.4 F


Bisacodyl (Dulcolax)  10 mg WA DAILY PRN


   PRN Reason: Constipation


Diphenhydramine HCl (Benadryl)  25 mg IVP Q3 PRN


   PRN Reason: Itching / Pruritus


   Last Admin: 05/04/18 14:00 Dose:  25 mg


Folic Acid (Folic Acid)  2 mg PO DAILY CHENG


   Last Admin: 05/04/18 10:20 Dose:  2 mg


Hydromorphone HCl (Dilaudid)  4 mg PO Q3H PRN


   PRN Reason: Pain, severe (8-10)


   Last Admin: 05/04/18 14:01 Dose:  4 mg


Ondansetron HCl (Zofran Inj)  4 mg IVP Q6 PRN


   PRN Reason: Nausea/Vomiting











- Labs


Labs: 


 





 05/04/18 05:58 





 05/04/18 05:58 





 











PT  13.6 SECONDS (9.7-12.2)  H  04/26/18  05:58    


 


INR  1.2   04/26/18  05:58    


 


APTT  37 SECONDS (21-34)  H  04/26/18  05:58    














Assessment and Plan


(1) Sickle cell anemia


Status: Acute   





(2) Leukocytosis


Status: Acute   





(3) Anemia


Status: Acute   





(4) Neurogenic bladder


Status: Acute   





(5) Bilateral hydronephrosis


Status: Acute

## 2018-05-04 NOTE — PCM.URO
Urology Progress Note





- General


General: No Complaints





- Subjective


Abdominal Pain: No


Flank Pain: No


Nausea: No


Vomiting: No


Voiding Well: Yes (and on self cath)


Dysuria: No


Hematuria: No


Dsypnea: No


Chest Pain: No


Fever & Chills: No


Other: feeling well





- Objective


Lab Results Last 24 Hours: 


 Laboratory Results - last 24 hr











  05/04/18 05/04/18





  05:58 05:58


 


WBC  22.8 H 


 


RBC  2.04 L 


 


Hgb  6.1 L* 


 


Hct  18.3 L 


 


MCV  89.3 


 


MCH  29.7 


 


MCHC  33.3 


 


RDW  18.3 H 


 


Plt Count  202 


 


MPV  7.9 


 


Neut % (Auto)  68.5 


 


Lymph % (Auto)  18.0 L 


 


Mono % (Auto)  9.4 


 


Eos % (Auto)  3.4 


 


Baso % (Auto)  0.7 


 


Neut # (Auto)  15.7 H 


 


Lymph # (Auto)  4.1 


 


Mono # (Auto)  2.1 H 


 


Eos # (Auto)  0.8 H 


 


Baso # (Auto)  0.1 


 


Sodium   141


 


Potassium   4.5


 


Chloride   110 H


 


Carbon Dioxide   21 L


 


Anion Gap   14


 


BUN   21 H


 


Creatinine   1.3 H


 


Est GFR ( Amer)   55


 


Est GFR (Non-Af Amer)   46


 


Random Glucose   93


 


Calcium   7.7 L


 


Total Bilirubin   1.9 H


 


AST   82 H


 


ALT   63 H


 


Alkaline Phosphatase   180 H


 


Total Protein   7.6


 


Albumin   3.2 L


 


Globulin   4.4 H


 


Albumin/Globulin Ratio   0.7 L











Intake & Output: 


 Intake & Output











 05/03/18 05/04/18 05/04/18





 18:59 06:59 18:59


 


Intake Total 828  


 


Balance 828  


 


Intake:   


 


    


 


  Blood Product 325  


 


    Red Blood Cells Cpd As1 325  





    Lr  Unit [id number]   











Vital Signs: 


 Vital Signs - 24 hr











  05/03/18 05/04/18 05/04/18





  23:35 07:57 08:30


 


Temperature 98.6 F 98.0 F 


 


Pulse Rate 104 H 84 84


 


Respiratory 20 20 





Rate   


 


Blood Pressure 106/70 97/65 L 


 


O2 Sat by Pulse 95 99 





Oximetry   














  05/04/18





  15:00


 


Temperature 97.9 F


 


Pulse Rate 95 H


 


Respiratory 18





Rate 


 


Blood Pressure 117/79


 


O2 Sat by Pulse 100





Oximetry 














- Physical Exam


Abdominal Exam: Soft, Non-Tender, Non-Distended


Back: No CVA Tenderness





- Plan


Additional Information: Imp:  stable p cysto, stent remvoal and stent exchange





- Date & Time of Note


Date: 05/04/18


Time: 12:15

## 2018-05-04 NOTE — CP.PCM.PN
Subjective





- Date & Time of Evaluation


Date of Evaluation: 05/04/18


Time of Evaluation: 08:00





- Subjective


Subjective: 


clinically same





Objective





- Vital Signs/Intake and Output


Vital Signs (last 24 hours): 


 











Temp Pulse Resp BP Pulse Ox


 


 97.9 F   95 H  18   117/79   100 


 


 05/04/18 15:00  05/04/18 15:00  05/04/18 15:00  05/04/18 15:00  05/04/18 15:00











- Medications


Medications: 


 Current Medications





Acetaminophen (Tylenol 325mg Tab)  650 mg PO Q6 PRN


   PRN Reason: Fever >100.4 F


Bisacodyl (Dulcolax)  10 mg NE DAILY PRN


   PRN Reason: Constipation


Diphenhydramine HCl (Benadryl)  25 mg IVP Q3 PRN


   PRN Reason: Itching / Pruritus


   Last Admin: 05/04/18 14:00 Dose:  25 mg


Folic Acid (Folic Acid)  2 mg PO DAILY CHENG


   Last Admin: 05/04/18 10:20 Dose:  2 mg


Hydromorphone HCl (Dilaudid)  4 mg PO Q3H PRN


   PRN Reason: Pain, severe (8-10)


   Last Admin: 05/04/18 14:01 Dose:  4 mg


Ondansetron HCl (Zofran Inj)  4 mg IVP Q6 PRN


   PRN Reason: Nausea/Vomiting











- Labs


Labs: 


 





 05/04/18 05:58 





 05/04/18 05:58 





 











PT  13.6 SECONDS (9.7-12.2)  H  04/26/18  05:58    


 


INR  1.2   04/26/18  05:58    


 


APTT  37 SECONDS (21-34)  H  04/26/18  05:58    














- Constitutional


Appears: Well





- Head Exam


Head Exam: ATRAUMATIC, NORMAL INSPECTION, NORMOCEPHALIC





- Eye Exam


Eye Exam: EOMI, Normal appearance, PERRL


Pupil Exam: NORMAL ACCOMODATION, PERRL





- ENT Exam


ENT Exam: Mucous Membranes Moist, Normal Exam





- Neck Exam


Neck Exam: Full ROM, Normal Inspection.  absent: Lymphadenopathy





- Respiratory Exam


Respiratory Exam: Decreased Breath Sounds





- Cardiovascular Exam


Cardiovascular Exam: REGULAR RHYTHM, +S1, +S2





- GI/Abdominal Exam


GI & Abdominal Exam: Soft, Diminished Bowel Sounds





- Rectal Exam


Rectal Exam: Deferred

## 2018-05-04 NOTE — CP.PCM.PN
Subjective





- Date & Time of Evaluation


Date of Evaluation: 05/04/18


Time of Evaluation: 12:45





- Subjective


Subjective: 





Feels tired





Objective





- Vital Signs/Intake and Output


Vital Signs (last 24 hours): 


 











Temp Pulse Resp BP Pulse Ox


 


 97.9 F   95 H  18   117/79   100 


 


 05/04/18 15:00  05/04/18 15:00  05/04/18 15:00  05/04/18 15:00  05/04/18 15:00











- Medications


Medications: 


 Current Medications





Acetaminophen (Tylenol 325mg Tab)  650 mg PO Q6 PRN


   PRN Reason: Fever >100.4 F


Bisacodyl (Dulcolax)  10 mg CO DAILY PRN


   PRN Reason: Constipation


Diphenhydramine HCl (Benadryl)  25 mg IVP Q3 PRN


   PRN Reason: Itching / Pruritus


   Last Admin: 05/04/18 14:00 Dose:  25 mg


Folic Acid (Folic Acid)  2 mg PO DAILY CHENG


   Last Admin: 05/04/18 10:20 Dose:  2 mg


Hydromorphone HCl (Dilaudid)  4 mg PO Q3H PRN


   PRN Reason: Pain, severe (8-10)


   Last Admin: 05/04/18 14:01 Dose:  4 mg


Ondansetron HCl (Zofran Inj)  4 mg IVP Q6 PRN


   PRN Reason: Nausea/Vomiting











- Labs


Labs: 


 





 05/04/18 05:58 





 05/04/18 05:58 





 











PT  13.6 SECONDS (9.7-12.2)  H  04/26/18  05:58    


 


INR  1.2   04/26/18  05:58    


 


APTT  37 SECONDS (21-34)  H  04/26/18  05:58    














- Head Exam


Head Exam: ATRAUMATIC





- Eye Exam


Eye Exam: Normal appearance





- ENT Exam


ENT Exam: Mucous Membranes Dry





- Respiratory Exam


Respiratory Exam: NORMAL BREATHING PATTERN





- Cardiovascular Exam


Cardiovascular Exam: +S1, +S2





- GI/Abdominal Exam


GI & Abdominal Exam: Normal Bowel Sounds





Assessment and Plan


(1) Sickle cell pain crisis


Assessment & Plan: 


Iv fluids, pain meds, 02 via NC, folic acid


s/p PRBC transfusion


Status: Acute   





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


Assessment & Plan: 


minimize transfusion support


outpatient chelation


Status: Acute   





(3) Leukocytosis


Assessment & Plan: 


likely reactive to sickle cell


Status: Acute   





(4) Sickle cell anemia


Assessment & Plan: 


outpatient folic acid and chelation


Status: Acute

## 2018-05-04 NOTE — CP.PCM.PN
Subjective





- Date & Time of Evaluation


Date of Evaluation: 05/04/18


Time of Evaluation: 11:17





- Subjective


Subjective: 





PGY2 progress note for Dr. Etienne





Pt seen and examined at bedside.  No acute events overnight.  Pt is resting.  

Complaining of feeling tired and lower extremity pains.  Denies having any CP, 

SOB, abd pain, N/V/D/C, F/C.  Pt tolerating diet well. Pt still doing straight 

caths prn.     





Objective





- Vital Signs/Intake and Output


Vital Signs (last 24 hours): 


 











Temp Pulse Resp BP Pulse Ox


 


 98.0 F   84   20   97/65 L  99 


 


 05/04/18 07:57  05/04/18 07:57  05/04/18 07:57  05/04/18 07:57  05/04/18 07:57











- Medications


Medications: 


 Current Medications





Acetaminophen (Tylenol 325mg Tab)  650 mg PO Q6 PRN


   PRN Reason: Fever >100.4 F


Bisacodyl (Dulcolax)  10 mg AL DAILY PRN


   PRN Reason: Constipation


Diphenhydramine HCl (Benadryl)  25 mg IVP Q3 PRN


   PRN Reason: Itching / Pruritus


   Last Admin: 05/04/18 10:21 Dose:  25 mg


Folic Acid (Folic Acid)  2 mg PO DAILY CHENG


   Last Admin: 05/04/18 10:20 Dose:  2 mg


Hydromorphone HCl (Dilaudid)  4 mg PO Q3H PRN


   PRN Reason: Pain, severe (8-10)


   Last Admin: 05/04/18 10:21 Dose:  4 mg


Ondansetron HCl (Zofran Inj)  4 mg IVP Q6 PRN


   PRN Reason: Nausea/Vomiting











- Labs


Labs: 


 





 05/04/18 05:58 





 05/04/18 05:58 





 











PT  13.6 SECONDS (9.7-12.2)  H  04/26/18  05:58    


 


INR  1.2   04/26/18  05:58    


 


APTT  37 SECONDS (21-34)  H  04/26/18  05:58    














- Constitutional


Appears: Non-toxic, No Acute Distress





- Head Exam


Head Exam: ATRAUMATIC





- ENT Exam


ENT Exam: Mucous Membranes Moist





- Respiratory Exam


Respiratory Exam: Clear to Ausculation Bilateral.  absent: Accessory Muscle Use

, Rales, Rhonchi, Wheezes, Respiratory Distress





- Cardiovascular Exam


Cardiovascular Exam: REGULAR RHYTHM, +S1, +S2.  absent: Diastolic murmur, Gallop

, Rubs, Murmur





- GI/Abdominal Exam


GI & Abdominal Exam: Soft, Normal Bowel Sounds.  absent: Distended, Firm, 

Guarding, Rigid, Tenderness, Organomegaly





- Extremities Exam


Extremities Exam: absent: Pedal Edema, Tenderness





- Neurological Exam


Neurological Exam: Alert, Awake, Oriented x3





- Psychiatric Exam


Psychiatric exam: Normal Affect, Normal Mood





- Skin


Skin Exam: Dry, Intact, Normal Color, Warm





Assessment and Plan





- Assessment and Plan (Free Text)


Assessment: 





Sickle Cell Crisis


- S/P 1 unit transfused 5/3/18.  Hgb improved this morning 


- Pain control with PO dilaudid


- folic acid


- Dr. Olvera consulted help appreciated


- Zofran prn





Hx frequent UTIs


- patient is asymptomatic and afebrile


- Dr. Stanton consulted, help appreciated





Renal stents


- S/P cystoscopy removal of left ureteral stent (intact).  Right ureteroscopy 

removal of retained right ureteral stent. Insertion of right ureteral with 

string attached POD #1 


- Dr. Chacko, urology, help appreciated


- Left shunt replaced on 4/24.


- Per, Dr. Chacko pt will have right shunt replaced tomorrow.  NPO past 4 am. 

Per Dr. Gutiérrez, low risk for bleeding so no need to to transfuse.





Transaminitis


-hepatitis and HIV negative 2.5


- Trending down


- likely due to iron overload.  Patient takes deferasirox at home for 

chelation.  





Neurogenic bladder


- Straight cath prn 





Proph


-chemical DVT prophylaxis not indicated; will order SCD; OOB as tolerated


-GI prophylaxis not indicated 


-Benadryl prn


-Dulcolax prn 





All management per Dr. MARIVEL Etienne.

## 2018-05-05 VITALS — DIASTOLIC BLOOD PRESSURE: 83 MMHG | SYSTOLIC BLOOD PRESSURE: 115 MMHG | HEART RATE: 94 BPM | TEMPERATURE: 98.4 F

## 2018-05-05 VITALS — OXYGEN SATURATION: 100 % | RESPIRATION RATE: 18 BRPM

## 2018-05-05 LAB
ALBUMIN SERPL-MCNC: 3.4 G/DL (ref 3.5–5)
ALBUMIN/GLOB SERPL: 0.7 {RATIO} (ref 1–2.1)
ALT SERPL-CCNC: 63 U/L (ref 9–52)
AST SERPL-CCNC: 101 U/L (ref 14–36)
BASOPHILS # BLD AUTO: 0.2 K/UL (ref 0–0.2)
BASOPHILS NFR BLD: 1.1 % (ref 0–2)
BUN SERPL-MCNC: 18 MG/DL (ref 7–17)
CALCIUM SERPL-MCNC: 8.5 MG/DL (ref 8.6–10.4)
EOSINOPHIL # BLD AUTO: 0.6 K/UL (ref 0–0.7)
EOSINOPHIL NFR BLD: 3.6 % (ref 0–4)
ERYTHROCYTE [DISTWIDTH] IN BLOOD BY AUTOMATED COUNT: 18.9 % (ref 11.5–14.5)
GFR NON-AFRICAN AMERICAN: 56
HGB BLD-MCNC: 7.3 G/DL (ref 11–16)
LYMPHOCYTES # BLD AUTO: 3.7 K/UL (ref 1–4.3)
LYMPHOCYTES NFR BLD AUTO: 20.5 % (ref 20–40)
MCH RBC QN AUTO: 30.4 PG (ref 27–31)
MCHC RBC AUTO-ENTMCNC: 33.7 G/DL (ref 33–37)
MCV RBC AUTO: 90.1 FL (ref 81–99)
MONOCYTES # BLD: 1.4 K/UL (ref 0–0.8)
MONOCYTES NFR BLD: 7.7 % (ref 0–10)
NEUTROPHILS # BLD: 12 K/UL (ref 1.8–7)
NEUTROPHILS NFR BLD AUTO: 67.1 % (ref 50–75)
NRBC BLD AUTO-RTO: 0.1 % (ref 0–2)
PLATELET # BLD: 192 K/UL (ref 130–400)
PMV BLD AUTO: 8.9 FL (ref 7.2–11.7)
RBC # BLD AUTO: 2.41 MIL/UL (ref 3.8–5.2)
WBC # BLD AUTO: 17.9 K/UL (ref 4.8–10.8)

## 2018-05-05 NOTE — CP.PCM.PN
Subjective





- Date & Time of Evaluation


Date of Evaluation: 05/05/18


Time of Evaluation: 11:00





- Subjective


Subjective: 





ALERT, ORIENTEDX3, NO ACUTE PAIN, NO DISTRESS.





Objective





- Vital Signs/Intake and Output


Vital Signs (last 24 hours): 


 











Temp Pulse Resp BP Pulse Ox


 


 98.4 F   94 H  18   115/83   100 


 


 05/05/18 15:44  05/05/18 15:44  05/05/18 15:44  05/05/18 15:44  05/05/18 15:44








Intake and Output: 


 











 05/05/18 05/05/18





 06:59 18:59


 


Intake Total  400


 


Balance  400














- Labs


Labs: 


 





 05/05/18 07:00 





 05/05/18 07:00 





 











PT  13.6 SECONDS (9.7-12.2)  H  04/26/18  05:58    


 


INR  1.2   04/26/18  05:58    


 


APTT  37 SECONDS (21-34)  H  04/26/18  05:58    














Assessment and Plan





- Assessment and Plan (Free Text)


Assessment: 





Patient is seen and examined. Alert and orientdx3, denies acute pain. Has 

chronic UTI possibly secondary to neurogenic bladder and frequent self 

catheterizations at home. Discussed with DR MARIVEL Etienne, plan to discharge home 

today. Advised to follow up in the office in 1 week.

## 2018-05-06 NOTE — CP.PCM.PN
Subjective





- Date & Time of Evaluation


Date of Evaluation: 05/05/18


Time of Evaluation: 16:00





- Subjective


Subjective: 





Feeling better





Objective





- Vital Signs/Intake and Output


Vital Signs (last 24 hours): 


 











Temp Pulse Resp BP Pulse Ox


 


 98.4 F   94 H  18   115/83   100 


 


 05/05/18 15:44  05/05/18 15:44  05/05/18 15:44  05/05/18 15:44  05/05/18 15:44








Intake and Output: 


 











 05/05/18 05/06/18





 18:59 06:59


 


Intake Total 400 


 


Balance 400 














- Labs


Labs: 


 





 05/05/18 07:00 





 05/05/18 07:00 





 











PT  13.6 SECONDS (9.7-12.2)  H  04/26/18  05:58    


 


INR  1.2   04/26/18  05:58    


 


APTT  37 SECONDS (21-34)  H  04/26/18  05:58    














- Head Exam


Head Exam: ATRAUMATIC





- Eye Exam


Eye Exam: Normal appearance





- ENT Exam


ENT Exam: Mucous Membranes Dry





- Respiratory Exam


Respiratory Exam: NORMAL BREATHING PATTERN





- Cardiovascular Exam


Cardiovascular Exam: +S1, +S2





- GI/Abdominal Exam


GI & Abdominal Exam: Normal Bowel Sounds





Assessment and Plan


(1) Sickle cell pain crisis


Assessment & Plan: 


IV fluids, pain meds, folic acid, 02 via NC


s/p PRBC transfusion


Status: Acute   





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


Assessment & Plan: 


minimize transfusion support


outpatient chelation


Status: Acute   





(3) Leukocytosis


Assessment & Plan: 


reactive to sickle cell


Status: Acute   





(4) Sickle cell anemia


Assessment & Plan: 


folic acid


outpatient chelation


Status: Acute

## 2018-05-24 NOTE — OP
PROCEDURE DATE:  04/24/2018



PREOPERATIVE DIAGNOSES:  Bilateral hydronephrosis of unclear etiology,

retained stents bilaterally, hematuria, voiding dysfunction, perhaps a

component of neurogenic bladder or perhaps a component of reflux.



POSTOPERATIVE DIAGNOSES:  Bilateral hydronephrosis of unclear etiology,

retained stents bilaterally, hematuria, voiding dysfunction, perhaps a

component of neurogenic bladder or perhaps a component of reflux.



PROCEDURE PERFORMED:  Cystoscopy, removal of a left ureteral stent, and

insertion of a left ureteral stent.



COMPLICATIONS:  There were no complications.



SURGEON:  Justin Chacko MD



INDICATIONS:  See history and physical for further details and

consultations.  See previous chart notes from previous admissions.  As well

see multiple notes on this patient.  In brief, this is a pleasant lady who

has underlying multiple medical issues.  Again, this is all outlined

through the chart, see previous notes.



I met the patient now.  She had stents inserted, which she says is from

08/2016.



She is not sure why they were inserted, but she had bilateral stents in

place.



OPERATIVE FINDINGS:  The right double-J stent was in the kidney and goes

down to about the very distal ureter, but just a little proximal to the

right UVJ to the point that we could not identify it in the bladder itself.

See the plan listed below and I think we are going to come back for

ureteroscopy based on the findings on the left side.  As all outlined right

now, on the left side, we were able to remove and insert a new one, but it

is somewhat encrusted.



I want to be more prepared with the patient and give her what _____

possible dystrophy that we may find, but on the right side, the stent we

could see it in place but we cannot get to it today _____.



On the left side, we reached out to the stent.  We were able to remove it. 

It is somewhat encrusted, but not incredibly encrusted to the point that we

cannot put a wire up, see the below notes, but we are able to remove it and

put a new one back in, which we did.



Further, upon questioning the patient, she is not exactly sure why she had

bilateral stents inserted.  It was inserted by another doctor elsewhere.



Trying to piece together the story, the patient is not exactly sure, and

either way, she is now here for the above procedures of the cystoscopy,

removal of left double-J stent, insertion of a new one.



DESCRIPTION OF PROCEDURE:  After obtaining informed consent and providing

antibiotic prophylaxis, time-out was called to confirm the patient

positioning.



We introduced the cystoscope via the urethra.



We identified the left double-J stent.



A KUB revealed the right double-J stent _____ all the way down to the

distal bladder.



At this point, we removed the old double-J stent and it is actually is a

little encrusted, but _____ in place but it does slide down reasonably

okay.  At this point, we cannot put a wire through it, but I am able to go

back _____ scope and put a wire adjacent to it, just left to the kidney. 

Once I do this, I am confident that I have a wire _____ kidney by

fluoroscopic imaging.  I now try to pull the stent out the rest of the way

and it does come out without too much difficulty at all and it is again a

little encrusted, but not overly encrusted.



Now on further examination _____ of the double J stent, we did grab the

wire and placed an open-ended over it and injected contrast for retrograde

pyelogram just to outline the kidney.  We put a new wire back in through

the double-J stent in.



_____, the kidney drains well, especially given the fact that the patient

is going to get back to the right side and I will take left side out at

that time.  Meanwhile, I am working on it.  The patient tolerated the

procedure well without complications.





__________________________________________

Justin Chacko MD





DD:  05/23/2018 6:23:39

DT:  05/23/2018 13:16:34

Job # [id number]

## 2018-08-11 ENCOUNTER — HOSPITAL ENCOUNTER (INPATIENT)
Dept: HOSPITAL 31 - C.ER | Age: 38
LOS: 6 days | Discharge: HOME | DRG: 812 | End: 2018-08-17
Attending: INTERNAL MEDICINE | Admitting: INTERNAL MEDICINE
Payer: MEDICARE

## 2018-08-11 VITALS — BODY MASS INDEX: 22.4 KG/M2

## 2018-08-11 DIAGNOSIS — N31.9: ICD-10-CM

## 2018-08-11 DIAGNOSIS — D57.00: Primary | ICD-10-CM

## 2018-08-11 DIAGNOSIS — N10: ICD-10-CM

## 2018-08-11 LAB
ALBUMIN SERPL-MCNC: 2.7 G/DL (ref 3.5–5)
ALBUMIN/GLOB SERPL: 0.7 {RATIO} (ref 1–2.1)
ALT SERPL-CCNC: 89 U/L (ref 9–52)
APTT BLD: 40 SECONDS (ref 21–34)
AST SERPL-CCNC: 122 U/L (ref 14–36)
BACTERIA #/AREA URNS HPF: (no result) /[HPF]
BASE EXCESS BLDV CALC-SCNC: -8.6 MMOL/L (ref 0–2)
BASE EXCESS BLDV CALC-SCNC: -9 MMOL/L (ref 0–2)
BASOPHILS # BLD AUTO: 0.1 K/UL (ref 0–0.2)
BASOPHILS NFR BLD: 0.3 % (ref 0–2)
BILIRUB UR-MCNC: NEGATIVE MG/DL
BUN SERPL-MCNC: 23 MG/DL (ref 7–17)
CALCIUM SERPL-MCNC: 7.5 MG/DL (ref 8.6–10.4)
EOSINOPHIL # BLD AUTO: 0.1 K/UL (ref 0–0.7)
EOSINOPHIL NFR BLD: 0.5 % (ref 0–4)
EOSINOPHIL NFR BLD: 2 % (ref 0–4)
ERYTHROCYTE [DISTWIDTH] IN BLOOD BY AUTOMATED COUNT: 15.6 % (ref 11.5–14.5)
GFR NON-AFRICAN AMERICAN: 46
GLUCOSE UR STRIP-MCNC: NORMAL MG/DL
HCG,QUALITATIVE URINE: NEGATIVE
HGB BLD-MCNC: 9.3 G/DL (ref 11–16)
INR PPP: 1.6
LEUKOCYTE ESTERASE UR-ACNC: (no result) LEU/UL
LYMPHOCYTE: 3 % (ref 20–40)
LYMPHOCYTES # BLD AUTO: 0.5 K/UL (ref 1–4.3)
LYMPHOCYTES NFR BLD AUTO: 1.9 % (ref 20–40)
MCH RBC QN AUTO: 29 PG (ref 27–31)
MCHC RBC AUTO-ENTMCNC: 33.8 G/DL (ref 33–37)
MCV RBC AUTO: 85.8 FL (ref 81–99)
MONOCYTE: 2 % (ref 0–10)
MONOCYTES # BLD: 0.6 K/UL (ref 0–0.8)
MONOCYTES NFR BLD: 2.2 % (ref 0–10)
NEUTROPHILS # BLD: 27.6 K/UL (ref 1.8–7)
NEUTROPHILS NFR BLD AUTO: 87 % (ref 50–75)
NEUTROPHILS NFR BLD AUTO: 95.1 % (ref 50–75)
NEUTS BAND NFR BLD: 6 % (ref 0–2)
NRBC BLD AUTO-RTO: 0 % (ref 0–2)
PCO2 BLDV: 22 MMHG (ref 40–60)
PCO2 BLDV: 32 MMHG (ref 40–60)
PH BLDV: 7.31 [PH] (ref 7.32–7.43)
PH BLDV: 7.41 [PH] (ref 7.32–7.43)
PH UR STRIP: 7 [PH] (ref 5–8)
PLATELET # BLD EST: NORMAL 10*3/UL
PLATELET # BLD: 131 K/UL (ref 130–400)
PMV BLD AUTO: 9.4 FL (ref 7.2–11.7)
PROT UR STRIP-MCNC: (no result) MG/DL
PROTHROMBIN TIME: 17 SECONDS (ref 9.7–12.2)
RBC # BLD AUTO: 3.22 MIL/UL (ref 3.8–5.2)
RBC # UR STRIP: (no result) /UL
SP GR UR STRIP: 1.01 (ref 1–1.03)
SQUAMOUS EPITHIAL: 2 /HPF (ref 0–5)
TOTAL CELLS COUNTED BLD: 100
UROBILINOGEN UR-MCNC: NORMAL MG/DL (ref 0.2–1)
VENOUS BLOOD GAS PO2: 105 MM/HG (ref 30–55)
VENOUS BLOOD GAS PO2: 46 MM/HG (ref 30–55)
WBC # BLD AUTO: 29 K/UL (ref 4.8–10.8)
WBC CLUMPS # UR AUTO: (no result) /HPF

## 2018-08-11 RX ADMIN — MAGNESIUM SULFATE IN DEXTROSE SCH MLS/HR: 10 INJECTION, SOLUTION INTRAVENOUS at 20:00

## 2018-08-11 RX ADMIN — DIPHENHYDRAMINE HYDROCHLORIDE PRN MG: 50 INJECTION INTRAMUSCULAR; INTRAVENOUS at 16:58

## 2018-08-11 RX ADMIN — DIPHENHYDRAMINE HYDROCHLORIDE PRN MG: 50 INJECTION INTRAMUSCULAR; INTRAVENOUS at 20:09

## 2018-08-11 RX ADMIN — TAZOBACTAM SODIUM AND PIPERACILLIN SODIUM SCH MLS/HR: 375; 3 INJECTION, SOLUTION INTRAVENOUS at 17:22

## 2018-08-11 RX ADMIN — MAGNESIUM SULFATE IN DEXTROSE SCH MLS/HR: 10 INJECTION, SOLUTION INTRAVENOUS at 21:47

## 2018-08-11 RX ADMIN — DIPHENHYDRAMINE HYDROCHLORIDE PRN MG: 50 INJECTION INTRAMUSCULAR; INTRAVENOUS at 20:50

## 2018-08-11 NOTE — RAD
Date of service: 



08/11/2018



HISTORY:

Sepsis Patient  



COMPARISON:

Comparison is made with 03/19/2018 



FINDINGS:



LUNGS:

No evidence of new infiltrate or consolidation in the lungs.



PLEURA:

No significant pleural effusion identified, no pneumothorax apparent.



CARDIOVASCULAR:

The heart is normal in size.  Right-sided Infusaport is seen in place.



OSSEOUS STRUCTURES:

The patient is again status post hardware insertion at the thoracic 

and upper lumbar spine.



VISUALIZED UPPER ABDOMEN:

Normal.



OTHER FINDINGS:

None.



IMPRESSION:

No active disease. A significant interval change noted.

## 2018-08-11 NOTE — CP.PCM.CON
History of Present Illness





- History of Present Illness


History of Present Illness: 





INFECTIOUS DISEASE CONSULT;


HPI





37-year-old female, with history of sickle cell anemia and multiple sickle cell 

crisis who comes to the ER for evaluation of fever chills and generalized body 

aches which she states she developed late today.  Patient also complains of 

some dry cough.


Patient denies any sore throat, dysphagia , dyspnea, shortness of breath.  

Patient denies any chest pains.


Patient denies any diarrhea, nausea vomiting or dysuria.  Patient has history 

of neurogenic bladder and history of bilateral ureteral stents with history off 

hydronephrosis bilaterally.  She often has history off recurrent UTIs and 

catheterizes herself 2 or 3 times a day.


Patient urinalysis was found to be turbid and foul-smelling with WBCs off more 

than 4000 with 3+ leukocytes.  Also noted that patient had leukocytosis of 29.0.


Patient had a fever of 103 on admission and was having shaking chills.


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


INFECTIOUS DISEASE CONSULT REQUESTED BY PMD FOR acute pyelonephritis and 

leukocytosis.


patient denies any recent travel or any known sick contacts.





PMH: Anemia, Migraine, Sickle Cell Disease


   


Surgical History: Cholecystectomy (2004), B/L INTERNAL STENTS FOR B/L  

HYDRONEPHROSIS .


   Denies: Pacemaker


Allergy; droperidol, tramadol. 








Social History


Hx Tobacco Use: No


Hx Alcohol Use: No


Hx Substance Use: No





- Immunization History


Hx Tetanus Toxoid Vaccination: Yes


Hx Influenza Vaccination: Yes


Hx Pneumococcal Vaccination: Yes











Review of Systems





- Constitutional


Constitutional: Chills, Fatigue, Fever





- EENT


Eyes: absent: Blurred Vision


Ears: absent: Dizziness


Nose/Mouth/Throat: absent: Mouth Lesions, Odynophagia, Sore Throat





- Cardiovascular


Cardiovascular: absent: Chest Pain, Dyspnea





- Respiratory


Respiratory: Cough.  absent: Dyspnea, Hemoptysis





- Gastrointestinal


Gastrointestinal: absent: Abdominal Pain, Nausea, Vomiting





- Genitourinary


Genitourinary: Difficulty Urinating, Pyuria, Urinary Hesitance, Freq UTI, Hx /

Renal Surgery





- Reproductive: Female


Reproductive:Female: Menses 1-7 Days, Menses Variable.  absent: Vaginal 

Discharge





- Musculoskeletal


Musculoskeletal: Back Pain, Loss of Height, Myalgias





- Neurological


Neurological: absent: Headaches





- Hematologic/Lymphatic


Hematologic: As Per HPI.  absent: Lymphadenopathy





Past Patient History





- Infectious Disease


Hx of Infectious Diseases: None





- Past Medical History & Family History


Past Medical History?: Yes





- Past Social History


Smoking Status: Never Smoked





- CARDIAC


Hx Atrial Fibrillation: No





- NEUROLOGICAL


Hx Migraine: Yes





- RENAL


Hx Chronic Kidney Disease: No





- ENDOCRINE/METABOLIC


Hx Hyperthyroidism: No





- HEMATOLOGICAL/ONCOLOGICAL


Hx Anemia: Yes


Hx Sickle Cell Disease: Yes





- INTEGUMENTARY


Hx Dermatological Problems: No





- MUSCULOSKELETAL/RHEUMATOLOGICAL


Hx Musculoskeletal Disorders: No


Hx Falls: No





- GASTROINTESTINAL


Hx Gastrointestinal Disorders: No





- GENITOURINARY/GYNECOLOGICAL


Hx Genitourinary Disorders: Yes


Hx Urinary Tract Infection: Yes


Other/Comment: ureter stent 04/25/18





- PSYCHIATRIC


Hx Substance Use: No





- SURGICAL HISTORY


Hx Cholecystectomy: Yes (2004)





- ANESTHESIA


Hx Anesthesia: Yes


Hx Anesthesia Reactions: No


Hx Malignant Hyperthermia: No





Meds


Allergies/Adverse Reactions: 


 Allergies











Allergy/AdvReac Type Severity Reaction Status Date / Time


 


droperidol Allergy   Verified 03/11/18 17:54


 


tramadol Allergy   Verified 03/11/18 17:54


 


inapsine Allergy  RASH Uncoded 03/11/18 17:54














- Medications


Medications: 


 Current Medications





Diphenhydramine HCl (Benadryl)  50 mg IVP Q3H PRN


   PRN Reason: Allergy symptoms


   Last Admin: 08/11/18 16:58 Dose:  50 mg


Piperacillin Sod/Tazobactam Sod (Zosyn 3.375 Gm Iv Premix)  3.375 gm in 50 mls 

@ 100 mls/hr IVPB Q8H [facility]


   PRN Reason: Protocol


   Last Admin: 08/11/18 17:22 Dose:  100 mls/hr


Vancomycin HCl 1 gm/ Sodium (Chloride)  200 mls @ 166.7 mls/hr IVPB Q24H CHENG


   PRN Reason: Protocol


   Last Admin: 08/11/18 18:53 Dose:  166.7 mls/hr


Sodium Chloride (Sodium Chloride 0.9%)  1,000 mls @ 75 mls/hr IV .[id number] [facility]


Magnesium Sulfate/Dextrose (Magnesium Sulfate 1 Gm/100 Ml D5w)  1 gm in 100 mls 

@ 300 mls/hr IVPB Q30M [facility]


   Stop: 08/11/18 20:37


Magnesium Sulfate/Dextrose (Magnesium Sulfate 1 Gm/100 Ml D5w)  1 gm in 100 mls 

@ 300 mls/hr IVPB Q30M [facility]


   Stop: 08/12/18 10:49


Morphine Sulfate (Morphine)  4 mg IVP Q3H PRN


   PRN Reason: Pain, severe (8-10)


   Last Admin: 08/11/18 16:57 Dose:  4 mg











Physical Exam





- Constitutional


Appears: No Acute Distress





- Head Exam


Head Exam: NORMAL INSPECTION





- Eye Exam


Eye Exam: EOMI, PERRL





- ENT Exam


ENT Exam: Normal Oropharynx





- Neck Exam


Neck exam: Positive for: Normal Inspection.  Negative for: Meningismus





- Respiratory Exam


Respiratory Exam: Clear to Auscultation Bilateral, NORMAL BREATHING PATTERN





- Cardiovascular Exam


Cardiovascular Exam: Tachycardia, REGULAR RHYTHM, +S1, +S2





- GI/Abdominal Exam


GI & Abdominal Exam: Normal Bowel Sounds, Soft





- Extremities Exam


Extremities exam: Positive for: normal capillary refill, pedal edema, pedal 

pulses present.  Negative for: calf tenderness





- Neurological Exam


Neurological exam: Alert, CN II-XII Intact, Oriented x3, Reflexes Normal





- Psychiatric Exam


Psychiatric exam: Depressed





- Skin


Skin Exam: Normal Color, Warm





Results





- Vital Signs


Recent Vital Signs: 


 Last Vital Signs











Temp  99.2 F   08/11/18 16:58


 


Pulse  104 H  08/11/18 09:20


 


Resp  20   08/11/18 09:20


 


BP  101/65   08/11/18 12:11


 


Pulse Ox  98   08/11/18 12:11














- Labs


Result Diagrams: 


 08/11/18 04:42





 08/11/18 07:33


Labs: 


 Laboratory Results - last 24 hr











  08/11/18 08/11/18 08/11/18





  04:00 04:00 04:23


 


WBC   Cancelled 


 


RBC   Cancelled 


 


Hgb   Cancelled 


 


Hct   Cancelled 


 


MCV   Cancelled 


 


MCH   Cancelled 


 


MCHC   Cancelled 


 


RDW   Cancelled 


 


Plt Count   Cancelled 


 


MPV   Cancelled 


 


Neut % (Auto)   Cancelled 


 


Lymph % (Auto)   Cancelled 


 


Mono % (Auto)   Cancelled 


 


Eos % (Auto)   Cancelled 


 


Baso % (Auto)   Cancelled 


 


Neut # (Auto)   Cancelled 


 


Lymph # (Auto)   Cancelled 


 


Mono # (Auto)   Cancelled 


 


Eos # (Auto)   Cancelled 


 


Baso # (Auto)   Cancelled 


 


Neutrophils % (Manual)   Cancelled 


 


Band Neutrophils %   Cancelled 


 


Lymphocytes % (Manual)   Cancelled 


 


Reactive Lymphs %   Cancelled 


 


Monocytes % (Manual)   Cancelled 


 


Eosinophils % (Manual)   Cancelled 


 


Basophils % (Manual)   Cancelled 


 


Metamyelocytes %   Cancelled 


 


Myelocytes %   Cancelled 


 


Promyelocytes %   Cancelled 


 


Blast Cells %   Cancelled 


 


Plasma Cell % (Manual)   Cancelled 


 


Nucleated RBC %   Cancelled 


 


Hypersegmented Polys   Cancelled 


 


Smudge Cells   Cancelled 


 


Toxic Granulation   Cancelled 


 


Dohle Bodies   Cancelled 


 


Bang Rods   Cancelled 


 


Platelet Estimate   Cancelled 


 


Plt Clumps, EDTA   Cancelled 


 


Large Platelets   Cancelled 


 


Giant Platelets   Cancelled 


 


RBC Morphology   Cancelled 


 


Polychromasia   Cancelled 


 


Hypochromasia (manual)   Cancelled 


 


Poikilocytosis (manual   Cancelled 


 


Basophilic Stippling   Cancelled 


 


Anisocytosis (manual)   Cancelled 


 


Microcytosis (manual)   Cancelled 


 


Macrocytosis (manual)   Cancelled 


 


Spherocytes   Cancelled 


 


Sickle Cells   Cancelled 


 


Target Cells   Cancelled 


 


Tear Drop Cells   Cancelled 


 


Ovalocytes   Cancelled 


 


Stomatocytes   Cancelled 


 


Helmet Cells   Cancelled 


 


Barros-Jolly Bodies   Cancelled 


 


Casandra Cells   Cancelled 


 


Acanthocytes (Spur)   Cancelled 


 


Rouleaux   Cancelled 


 


Schistocytes   Cancelled 


 


Retic Count   


 


PT    17.0 H


 


INR    1.6


 


APTT    40 H


 


pO2  105 H  


 


VBG pH  7.41  


 


VBG pCO2  22 L  


 


VBG HCO3  18.2  


 


VBG Total CO2  14.6 L  


 


VBG O2 Sat (Calc)  100.5 H  


 


VBG Base Excess  -8.6 L  


 


VBG Potassium  3.5 L  


 


Sodium  141.0  


 


Chloride  114.0 H  


 


Glucose  75  


 


Lactate  1.5  


 


Potassium   


 


Carbon Dioxide   


 


Anion Gap   


 


BUN   


 


Creatinine   


 


Est GFR ( Amer)   


 


Est GFR (Non-Af Amer)   


 


Random Glucose   


 


Calcium   


 


Phosphorus   


 


Magnesium   


 


Total Bilirubin   


 


AST   


 


ALT   


 


Alkaline Phosphatase   


 


Total Protein   


 


Albumin   


 


Globulin   


 


Albumin/Globulin Ratio   


 


Venous Blood Potassium  3.5 L  


 


Urine Color   


 


Urine Clarity   


 


Urine pH   


 


Ur Specific Gravity   


 


Urine Protein   


 


Urine Glucose (UA)   


 


Urine Ketones   


 


Urine Blood   


 


Urine Nitrate   


 


Urine Bilirubin   


 


Urine Urobilinogen   


 


Ur Leukocyte Esterase   


 


Urine WBC (Auto)   


 


Urine RBC (Auto)   


 


Urine WBC Clumps (Auto)   


 


Ur Squamous Epith Cells   


 


Urine Bacteria   


 


Urine HCG, Qual   














  08/11/18 08/11/18 08/11/18





  04:35 04:40 04:42


 


WBC    29.0 H D


 


RBC    3.22 L


 


Hgb    9.3 L D


 


Hct    27.6 L


 


MCV    85.8  D


 


MCH    29.0


 


MCHC    33.8


 


RDW    15.6 H


 


Plt Count    131


 


MPV    9.4


 


Neut % (Auto)    95.1 H


 


Lymph % (Auto)    1.9 L


 


Mono % (Auto)    2.2


 


Eos % (Auto)    0.5


 


Baso % (Auto)    0.3


 


Neut # (Auto)    27.6 H


 


Lymph # (Auto)    0.5 L


 


Mono # (Auto)    0.6


 


Eos # (Auto)    0.1


 


Baso # (Auto)    0.1


 


Neutrophils % (Manual)    87 H


 


Band Neutrophils %    6 H


 


Lymphocytes % (Manual)    3 L


 


Reactive Lymphs %   


 


Monocytes % (Manual)    2


 


Eosinophils % (Manual)    2


 


Basophils % (Manual)   


 


Metamyelocytes %   


 


Myelocytes %   


 


Promyelocytes %   


 


Blast Cells %   


 


Plasma Cell % (Manual)   


 


Nucleated RBC %   


 


Hypersegmented Polys   


 


Smudge Cells   


 


Toxic Granulation   


 


Dohle Bodies   


 


Bang Rods   


 


Platelet Estimate    Normal


 


Plt Clumps, EDTA   


 


Large Platelets   


 


Giant Platelets   


 


RBC Morphology   


 


Polychromasia   


 


Hypochromasia (manual)   


 


Poikilocytosis (manual   


 


Basophilic Stippling   


 


Anisocytosis (manual)   


 


Microcytosis (manual)   


 


Macrocytosis (manual)   


 


Spherocytes   


 


Sickle Cells   


 


Target Cells   


 


Tear Drop Cells   


 


Ovalocytes   


 


Stomatocytes   


 


Helmet Cells   


 


Barros-Jolly Bodies   


 


Casandra Cells   


 


Acanthocytes (Spur)   


 


Rouleaux   


 


Schistocytes   


 


Retic Count  1.0  D  


 


PT   


 


INR   


 


APTT   


 


pO2   


 


VBG pH   


 


VBG pCO2   


 


VBG HCO3   


 


VBG Total CO2   


 


VBG O2 Sat (Calc)   


 


VBG Base Excess   


 


VBG Potassium   


 


Sodium   


 


Chloride   


 


Glucose   


 


Lactate   


 


Potassium   


 


Carbon Dioxide   


 


Anion Gap   


 


BUN   


 


Creatinine   


 


Est GFR ( Amer)   


 


Est GFR (Non-Af Amer)   


 


Random Glucose   


 


Calcium   


 


Phosphorus   


 


Magnesium   


 


Total Bilirubin   


 


AST   


 


ALT   


 


Alkaline Phosphatase   


 


Total Protein   


 


Albumin   


 


Globulin   


 


Albumin/Globulin Ratio   


 


Venous Blood Potassium   


 


Urine Color   Yellow 


 


Urine Clarity   Turbid 


 


Urine pH   7.0 


 


Ur Specific Gravity   1.008 


 


Urine Protein   1+ H 


 


Urine Glucose (UA)   Normal 


 


Urine Ketones   Negative 


 


Urine Blood   2+ H 


 


Urine Nitrate   Negative 


 


Urine Bilirubin   Negative 


 


Urine Urobilinogen   Normal 


 


Ur Leukocyte Esterase   3+ H 


 


Urine WBC (Auto)   4412 H 


 


Urine RBC (Auto)   8 H 


 


Urine WBC Clumps (Auto)   Many H 


 


Ur Squamous Epith Cells   2 


 


Urine Bacteria   Mod H 


 


Urine HCG, Qual   Negative 














  08/11/18 08/11/18 08/11/18





  06:08 07:33 07:33


 


WBC   


 


RBC   


 


Hgb   


 


Hct   


 


MCV   


 


MCH   


 


MCHC   


 


RDW   


 


Plt Count   


 


MPV   


 


Neut % (Auto)   


 


Lymph % (Auto)   


 


Mono % (Auto)   


 


Eos % (Auto)   


 


Baso % (Auto)   


 


Neut # (Auto)   


 


Lymph # (Auto)   


 


Mono # (Auto)   


 


Eos # (Auto)   


 


Baso # (Auto)   


 


Neutrophils % (Manual)   


 


Band Neutrophils %   


 


Lymphocytes % (Manual)   


 


Reactive Lymphs %   


 


Monocytes % (Manual)   


 


Eosinophils % (Manual)   


 


Basophils % (Manual)   


 


Metamyelocytes %   


 


Myelocytes %   


 


Promyelocytes %   


 


Blast Cells %   


 


Plasma Cell % (Manual)   


 


Nucleated RBC %   


 


Hypersegmented Polys   


 


Smudge Cells   


 


Toxic Granulation   


 


Dohle Bodies   


 


Bang Rods   


 


Platelet Estimate   


 


Plt Clumps, EDTA   


 


Large Platelets   


 


Giant Platelets   


 


RBC Morphology   


 


Polychromasia   


 


Hypochromasia (manual)   


 


Poikilocytosis (manual   


 


Basophilic Stippling   


 


Anisocytosis (manual)   


 


Microcytosis (manual)   


 


Macrocytosis (manual)   


 


Spherocytes   


 


Sickle Cells   


 


Target Cells   


 


Tear Drop Cells   


 


Ovalocytes   


 


Stomatocytes   


 


Helmet Cells   


 


Barros-Jolly Bodies   


 


[place] Cells   


 


Acanthocytes (Spur)   


 


Rouleaux   


 


Schistocytes   


 


Retic Count   


 


PT   


 


INR   


 


APTT   


 


pO2  46  


 


VBG pH  7.31 L  


 


VBG pCO2  32 L  


 


VBG HCO3  17.4  


 


VBG Total CO2  17.1 L  


 


VBG O2 Sat (Calc)  88.6 H  


 


VBG Base Excess  -9.0 L  


 


VBG Potassium  3.4 L  


 


Sodium  138.0   139


 


Chloride  114.0 H   112 H


 


Glucose  87  


 


Lactate  1.0  


 


Potassium    4.2


 


Carbon Dioxide    18 L


 


Anion Gap    14


 


BUN    23 H


 


Creatinine    1.3 H


 


Est GFR ( Amer)    55


 


Est GFR (Non-Af Amer)    46


 


Random Glucose    112 H


 


Calcium    7.5 L


 


Phosphorus   2.4 L 


 


Magnesium   1.4 L 


 


Total Bilirubin    2.3 H


 


AST    122 H D


 


ALT    89 H D


 


Alkaline Phosphatase    173 H


 


Total Protein    6.5


 


Albumin    2.7 L D


 


Globulin    3.8


 


Albumin/Globulin Ratio    0.7 L


 


Venous Blood Potassium  3.4 L  


 


Urine Color   


 


Urine Clarity   


 


Urine pH   


 


Ur Specific Gravity   


 


Urine Protein   


 


Urine Glucose (UA)   


 


Urine Ketones   


 


Urine Blood   


 


Urine Nitrate   


 


Urine Bilirubin   


 


Urine Urobilinogen   


 


Ur Leukocyte Esterase   


 


Urine WBC (Auto)   


 


Urine RBC (Auto)   


 


Urine WBC Clumps (Auto)   


 


Ur Squamous Epith Cells   


 


Urine Bacteria   


 


Urine HCG, Qual   














- Imaging and Cardiology


  ** Chest x-ray


Status: Report reviewed by me (NAD.)





Assessment & Plan


(1) Fever


Status: Acute   





(2) Pyelonephritis


Status: Acute   





(3) Leukocytosis


Status: Acute   





(4) Sickle cell pain crisis


Status: Acute   





(5) Neurogenic bladder


Status: Acute   





- Assessment and Plan (Free Text)


Plan: 





PLAN


PANCULTURES.


UA


URINE CULTURE.


AGREE WITH iv ZOSYN 3.375 EVERY 8 HOURLY FOR [facility] C[employer] FOR UTI. 8/ 11/18


ADD IV GENTAMICIN 120MG IVPB X1 DOSE TODAY AS PATIENT HAS HISTORY OF ESBL 

ECOLI. 8/11/18


DC IV VANCOMYCIN.


START iv ZYVOX 600 MG iv PIGGYBACK EVERY 12 HOURLY AS PATIENT HAS HISTORY OFF 

VRE ALSO.8/11/18





CONTACT PRECAUTIONS.


fOLLOW-UP CULTURES TO ADJUST ANTIBIOTICS.


FOLLOW-UP RENAL FUNCTIONS CLOSELY.





WILL FOLLOW ALONG WITH YOU AND MAKE RECOMMENDATIONS AS NEEDED.

## 2018-08-11 NOTE — CP.PCM.HP
Past Patient History





- Infectious Disease


Hx of Infectious Diseases: None





- Past Medical History & Family History


Past Medical History?: Yes





- Past Social History


Smoking Status: Never Smoked





- CARDIAC


Hx Atrial Fibrillation: No





- NEUROLOGICAL


Hx Migraine: Yes





- RENAL


Hx Chronic Kidney Disease: No





- ENDOCRINE/METABOLIC


Hx Hyperthyroidism: No





- HEMATOLOGICAL/ONCOLOGICAL


Hx Anemia: Yes


Hx Sickle Cell Disease: Yes





- INTEGUMENTARY


Hx Dermatological Problems: No





- MUSCULOSKELETAL/RHEUMATOLOGICAL


Hx Musculoskeletal Disorders: No


Hx Falls: No





- GASTROINTESTINAL


Hx Gastrointestinal Disorders: No





- GENITOURINARY/GYNECOLOGICAL


Hx Genitourinary Disorders: Yes


Hx Urinary Tract Infection: Yes


Other/Comment: ureter stent 04/25/18





- PSYCHIATRIC


Hx Substance Use: No





- SURGICAL HISTORY


Hx Cholecystectomy: Yes (2004)





- ANESTHESIA


Hx Anesthesia: Yes


Hx Anesthesia Reactions: No


Hx Malignant Hyperthermia: No





Meds


Allergies/Adverse Reactions: 


 Allergies











Allergy/AdvReac Type Severity Reaction Status Date / Time


 


droperidol Allergy   Verified 03/11/18 17:54


 


tramadol Allergy   Verified 03/11/18 17:54


 


inapsine Allergy  RASH Uncoded 03/11/18 17:54














Physical Exam





- Constitutional


Appears: Well





- Head Exam


Head Exam: ATRAUMATIC, NORMAL INSPECTION, NORMOCEPHALIC





- Eye Exam


Eye Exam: EOMI, Normal appearance, PERRL


Pupil Exam: NORMAL ACCOMODATION, PERRL





- ENT Exam


ENT Exam: Mucous Membranes Moist, Normal Exam





- Neck Exam


Neck exam: Positive for: Normal Inspection





- Respiratory Exam


Respiratory Exam: Decreased Breath Sounds





- Cardiovascular Exam


Cardiovascular Exam: REGULAR RHYTHM, +S1, +S2





- GI/Abdominal Exam


GI & Abdominal Exam: Diminished Bowel Sounds, Soft





- Rectal Exam


Rectal Exam: Deferred





Results





- Vital Signs


Recent Vital Signs: 





 Last Vital Signs











Temp  98.3 F   08/11/18 09:20


 


Pulse  104 H  08/11/18 09:20


 


Resp  20   08/11/18 09:20


 


BP  101/65   08/11/18 12:11


 


Pulse Ox  98   08/11/18 12:11














- Labs


Result Diagrams: 


 08/11/18 04:42





 08/11/18 07:33


Labs: 





 Laboratory Results - last 24 hr











  08/11/18 08/11/18 08/11/18





  04:00 04:00 04:23


 


WBC   Cancelled 


 


RBC   Cancelled 


 


Hgb   Cancelled 


 


Hct   Cancelled 


 


MCV   Cancelled 


 


MCH   Cancelled 


 


MCHC   Cancelled 


 


RDW   Cancelled 


 


Plt Count   Cancelled 


 


MPV   Cancelled 


 


Neut % (Auto)   Cancelled 


 


Lymph % (Auto)   Cancelled 


 


Mono % (Auto)   Cancelled 


 


Eos % (Auto)   Cancelled 


 


Baso % (Auto)   Cancelled 


 


Neut # (Auto)   Cancelled 


 


Lymph # (Auto)   Cancelled 


 


Mono # (Auto)   Cancelled 


 


Eos # (Auto)   Cancelled 


 


Baso # (Auto)   Cancelled 


 


Neutrophils % (Manual)   Cancelled 


 


Band Neutrophils %   Cancelled 


 


Lymphocytes % (Manual)   Cancelled 


 


Reactive Lymphs %   Cancelled 


 


Monocytes % (Manual)   Cancelled 


 


Eosinophils % (Manual)   Cancelled 


 


Basophils % (Manual)   Cancelled 


 


Metamyelocytes %   Cancelled 


 


Myelocytes %   Cancelled 


 


Promyelocytes %   Cancelled 


 


Blast Cells %   Cancelled 


 


Plasma Cell % (Manual)   Cancelled 


 


Nucleated RBC %   Cancelled 


 


Hypersegmented Polys   Cancelled 


 


Smudge Cells   Cancelled 


 


Toxic Granulation   Cancelled 


 


Dohle Bodies   Cancelled 


 


Bang Rods   Cancelled 


 


Platelet Estimate   Cancelled 


 


Plt Clumps, EDTA   Cancelled 


 


Large Platelets   Cancelled 


 


Giant Platelets   Cancelled 


 


RBC Morphology   Cancelled 


 


Polychromasia   Cancelled 


 


Hypochromasia (manual)   Cancelled 


 


Poikilocytosis (manual   Cancelled 


 


Basophilic Stippling   Cancelled 


 


Anisocytosis (manual)   Cancelled 


 


Microcytosis (manual)   Cancelled 


 


Macrocytosis (manual)   Cancelled 


 


Spherocytes   Cancelled 


 


Sickle Cells   Cancelled 


 


Target Cells   Cancelled 


 


Tear Drop Cells   Cancelled 


 


Ovalocytes   Cancelled 


 


Stomatocytes   Cancelled 


 


Helmet Cells   Cancelled 


 


Barros-Jolly Bodies   Cancelled 


 


Casandra Cells   Cancelled 


 


Acanthocytes (Spur)   Cancelled 


 


Rouleaux   Cancelled 


 


Schistocytes   Cancelled 


 


Retic Count   


 


PT    17.0 H


 


INR    1.6


 


APTT    40 H


 


pO2  105 H  


 


VBG pH  7.41  


 


VBG pCO2  22 L  


 


VBG HCO3  18.2  


 


VBG Total CO2  14.6 L  


 


VBG O2 Sat (Calc)  100.5 H  


 


VBG Base Excess  -8.6 L  


 


VBG Potassium  3.5 L  


 


Sodium  141.0  


 


Chloride  114.0 H  


 


Glucose  75  


 


Lactate  1.5  


 


Potassium   


 


Carbon Dioxide   


 


Anion Gap   


 


BUN   


 


Creatinine   


 


Est GFR ( Amer)   


 


Est GFR (Non-Af Amer)   


 


Random Glucose   


 


Calcium   


 


Phosphorus   


 


Magnesium   


 


Total Bilirubin   


 


AST   


 


ALT   


 


Alkaline Phosphatase   


 


Total Protein   


 


Albumin   


 


Globulin   


 


Albumin/Globulin Ratio   


 


Venous Blood Potassium  3.5 L  


 


Urine Color   


 


Urine Clarity   


 


Urine pH   


 


Ur Specific Gravity   


 


Urine Protein   


 


Urine Glucose (UA)   


 


Urine Ketones   


 


Urine Blood   


 


Urine Nitrate   


 


Urine Bilirubin   


 


Urine Urobilinogen   


 


Ur Leukocyte Esterase   


 


Urine WBC (Auto)   


 


Urine RBC (Auto)   


 


Urine WBC Clumps (Auto)   


 


Ur Squamous Epith Cells   


 


Urine Bacteria   


 


Urine HCG, Qual   














  08/11/18 08/11/18 08/11/18





  04:35 04:40 04:42


 


WBC    29.0 H D


 


RBC    3.22 L


 


Hgb    9.3 L D


 


Hct    27.6 L


 


MCV    85.8  D


 


MCH    29.0


 


MCHC    33.8


 


RDW    15.6 H


 


Plt Count    131


 


MPV    9.4


 


Neut % (Auto)    95.1 H


 


Lymph % (Auto)    1.9 L


 


Mono % (Auto)    2.2


 


Eos % (Auto)    0.5


 


Baso % (Auto)    0.3


 


Neut # (Auto)    27.6 H


 


Lymph # (Auto)    0.5 L


 


Mono # (Auto)    0.6


 


Eos # (Auto)    0.1


 


Baso # (Auto)    0.1


 


Neutrophils % (Manual)    87 H


 


Band Neutrophils %    6 H


 


Lymphocytes % (Manual)    3 L


 


Reactive Lymphs %   


 


Monocytes % (Manual)    2


 


Eosinophils % (Manual)    2


 


Basophils % (Manual)   


 


Metamyelocytes %   


 


Myelocytes %   


 


Promyelocytes %   


 


Blast Cells %   


 


Plasma Cell % (Manual)   


 


Nucleated RBC %   


 


Hypersegmented Polys   


 


Smudge Cells   


 


Toxic Granulation   


 


Dohle Bodies   


 


Bang Rods   


 


Platelet Estimate    Normal


 


Plt Clumps, EDTA   


 


Large Platelets   


 


Giant Platelets   


 


RBC Morphology   


 


Polychromasia   


 


Hypochromasia (manual)   


 


Poikilocytosis (manual   


 


Basophilic Stippling   


 


Anisocytosis (manual)   


 


Microcytosis (manual)   


 


Macrocytosis (manual)   


 


Spherocytes   


 


Sickle Cells   


 


Target Cells   


 


Tear Drop Cells   


 


Ovalocytes   


 


Stomatocytes   


 


Helmet Cells   


 


Barros-Jolly Bodies   


 


Arrey Cells   


 


Acanthocytes (Spur)   


 


Rouleaux   


 


Schistocytes   


 


Retic Count  1.0  D  


 


PT   


 


INR   


 


APTT   


 


pO2   


 


VBG pH   


 


VBG pCO2   


 


VBG HCO3   


 


VBG Total CO2   


 


VBG O2 Sat (Calc)   


 


VBG Base Excess   


 


VBG Potassium   


 


Sodium   


 


Chloride   


 


Glucose   


 


Lactate   


 


Potassium   


 


Carbon Dioxide   


 


Anion Gap   


 


BUN   


 


Creatinine   


 


Est GFR ( Amer)   


 


Est GFR (Non-Af Amer)   


 


Random Glucose   


 


Calcium   


 


Phosphorus   


 


Magnesium   


 


Total Bilirubin   


 


AST   


 


ALT   


 


Alkaline Phosphatase   


 


Total Protein   


 


Albumin   


 


Globulin   


 


Albumin/Globulin Ratio   


 


Venous Blood Potassium   


 


Urine Color   Yellow 


 


Urine Clarity   Turbid 


 


Urine pH   7.0 


 


Ur Specific Gravity   1.008 


 


Urine Protein   1+ H 


 


Urine Glucose (UA)   Normal 


 


Urine Ketones   Negative 


 


Urine Blood   2+ H 


 


Urine Nitrate   Negative 


 


Urine Bilirubin   Negative 


 


Urine Urobilinogen   Normal 


 


Ur Leukocyte Esterase   3+ H 


 


Urine WBC (Auto)   4412 H 


 


Urine RBC (Auto)   8 H 


 


Urine WBC Clumps (Auto)   Many H 


 


Ur Squamous Epith Cells   2 


 


Urine Bacteria   Mod H 


 


Urine HCG, Qual   Negative 














  08/11/18 08/11/18 08/11/18





  06:08 07:33 07:33


 


WBC   


 


RBC   


 


Hgb   


 


Hct   


 


MCV   


 


MCH   


 


MCHC   


 


RDW   


 


Plt Count   


 


MPV   


 


Neut % (Auto)   


 


Lymph % (Auto)   


 


Mono % (Auto)   


 


Eos % (Auto)   


 


Baso % (Auto)   


 


Neut # (Auto)   


 


Lymph # (Auto)   


 


Mono # (Auto)   


 


Eos # (Auto)   


 


Baso # (Auto)   


 


Neutrophils % (Manual)   


 


Band Neutrophils %   


 


Lymphocytes % (Manual)   


 


Reactive Lymphs %   


 


Monocytes % (Manual)   


 


Eosinophils % (Manual)   


 


Basophils % (Manual)   


 


Metamyelocytes %   


 


Myelocytes %   


 


Promyelocytes %   


 


Blast Cells %   


 


Plasma Cell % (Manual)   


 


Nucleated RBC %   


 


Hypersegmented Polys   


 


Smudge Cells   


 


Toxic Granulation   


 


Dohle Bodies   


 


Bang Rods   


 


Platelet Estimate   


 


Plt Clumps, EDTA   


 


Large Platelets   


 


Giant Platelets   


 


RBC Morphology   


 


Polychromasia   


 


Hypochromasia (manual)   


 


Poikilocytosis (manual   


 


Basophilic Stippling   


 


Anisocytosis (manual)   


 


Microcytosis (manual)   


 


Macrocytosis (manual)   


 


Spherocytes   


 


Sickle Cells   


 


Target Cells   


 


Tear Drop Cells   


 


Ovalocytes   


 


Stomatocytes   


 


Helmet Cells   


 


Barros-Jolly Bodies   


 


Casandra Cells   


 


Acanthocytes (Spur)   


 


Rouleaux   


 


Schistocytes   


 


Retic Count   


 


PT   


 


INR   


 


APTT   


 


pO2  46  


 


VBG pH  7.31 L  


 


VBG pCO2  32 L  


 


VBG HCO3  17.4  


 


VBG Total CO2  17.1 L  


 


VBG O2 Sat (Calc)  88.6 H  


 


VBG Base Excess  -9.0 L  


 


VBG Potassium  3.4 L  


 


Sodium  138.0   139


 


Chloride  114.0 H   112 H


 


Glucose  87  


 


Lactate  1.0  


 


Potassium    4.2


 


Carbon Dioxide    18 L


 


Anion Gap    14


 


BUN    23 H


 


Creatinine    1.3 H


 


Est GFR ( Amer)    55


 


Est GFR (Non-Af Amer)    46


 


Random Glucose    112 H


 


Calcium    7.5 L


 


Phosphorus   2.4 L 


 


Magnesium   1.4 L 


 


Total Bilirubin    2.3 H


 


AST    122 H D


 


ALT    89 H D


 


Alkaline Phosphatase    173 H


 


Total Protein    6.5


 


Albumin    2.7 L D


 


Globulin    3.8


 


Albumin/Globulin Ratio    0.7 L


 


Venous Blood Potassium  3.4 L  


 


Urine Color   


 


Urine Clarity   


 


Urine pH   


 


Ur Specific Gravity   


 


Urine Protein   


 


Urine Glucose (UA)   


 


Urine Ketones   


 


Urine Blood   


 


Urine Nitrate   


 


Urine Bilirubin   


 


Urine Urobilinogen   


 


Ur Leukocyte Esterase   


 


Urine WBC (Auto)   


 


Urine RBC (Auto)   


 


Urine WBC Clumps (Auto)   


 


Ur Squamous Epith Cells   


 


Urine Bacteria   


 


Urine HCG, Qual

## 2018-08-11 NOTE — C.PDOC
History Of Present Illness





<WarrenElytorey - Last Filed: 08/11/18 04:07>





<DeborahkyGaby - Last Filed: 08/11/18 05:29>





36 yo female w/PMhx of SSA come in for evaluation of fever, chills, gradually 

developed for past 24 hrs associated with diffuse bodyaches. Pt sts, late today 

developed some dry cough. Otherwise, pt denies headache, leathrgy, sore throat, 

drooling, dysphagia, dyspnea, SOB, wheezing, V/D, UTI sx. denies recent travel 

or known sick contact. AT the time of evaluation, appears shaking, in pain. (

Gaby Negron)





<JadakeithElytorey - Last Filed: 08/11/18 04:07>


History Per: Patient





<Diogoberhtaky,Gaby - Last Filed: 08/11/18 05:29>


Time Seen by Provider: 08/11/18 03:11


Chief Complaint (Nursing): Back Pain





Past Medical History


Reviewed: Historical Data, Nursing Documentation, Vital Signs





- Medical History


PMH: Anemia, Migraine, Sickle Cell Disease


   Denies: Atrial Fibrillation, Hyperthyroidism, Chronic Kidney Disease


Surgical History: Cholecystectomy (2004)


Family History: States: Unknown Family Hx





- Social History


Hx Tobacco Use: No


Hx Alcohol Use: No


Hx Substance Use: No





- Immunization History


Hx Tetanus Toxoid Vaccination: Yes


Hx Influenza Vaccination: Yes


Hx Pneumococcal Vaccination: Yes





<JamilCecileGaby - Last Filed: 08/11/18 05:29>


Vital Signs: 


 Last Vital Signs











Temp  103.1 F H  08/11/18 03:16


 


Pulse  152 H  08/11/18 03:16


 


Resp  22   08/11/18 03:16


 


BP  87/63 L  08/11/18 03:16


 


Pulse Ox  99   08/11/18 03:34














- [facility] Procedures








DILATION OF RIGHT URETER WITH INTRALUMINAL DEVICE, ENDO (04/26/18)


PACKED CELL TRANSFUSION (06/04/13)


REMOVAL OF INTRALUMINAL DEVICE FROM URETER, ENDO (04/26/18)


TETANUS TOXOID ADMINIST (09/23/13)


TRANSFUSE NONAUT RED BLOOD CELLS IN PERIPH VEIN, PERC (04/26/18)











Review Of Systems


Except As Marked, All Systems Reviewed And Found Negative.


Constitutional: Positive for: Fever, Chills, Malaise


Eyes: Negative for: Vision Change


ENT: Negative for: Ear Discharge, Nose Discharge, Nose Congestion, Throat Pain


Cardiovascular: Negative for: Chest Pain, Palpitations, Orthopnea, Edema, Light 

Headedness


Respiratory: Positive for: Cough.  Negative for: Shortness of Breath, Wheezing


Gastrointestinal: Positive for: Abdominal Pain.  Negative for: Nausea, Vomiting

, Diarrhea


Genitourinary: Negative for: Dysuria, Frequency, Incontinence


Musculoskeletal: Negative for: Neck Pain


Neurological: Negative for: Altered Mental Status, Headache





<Gaby Negron - Last Filed: 08/11/18 05:29>





Physical Exam





- Physical Exam


Appears: Well, Non-toxic, No Acute Distress


Skin: Normal Color, Warm, No Rash


Head: Normacephalic


Eye(s): bilateral: PERRL


Ear(s): Bilateral: Normal


Nose: No Flaring, No Discharge


Oral Mucosa: Moist, No Drooling


Tongue: Normal Appearing


Lips: Normal Appearing


Throat: No Erythema, No Drooling


Neck: Trachea Midline, Supple, Other ((-) meningeal sign)


Chest: Symmetrical


Cardiovascular: Rhythm Regular (tachy), No Murmur, No JVD


Respiratory: No Decreased Breath Sounds, No Accessory Muscle Use, No Stridor, 

No Wheezing


Gastrointestinal/Abdominal: Soft, Tenderness (diffuse), No Organomegaly, No 

Distention, No Guarding, No Rebound


Back: No CVA Tenderness


Extremity: Normal ROM, No Pedal Edema, No Deformity, No Swelling


Neurological/Psych: Oriented x3, Normal Speech





<Gaby Negron - Last Filed: 08/11/18 05:29>





ED Course And Treatment


ECG: Interpreted By Me, Viewed By Me


ECG Rhythm: Sinus Tachycardia (147), Nonspecific Changes


Pulse Ox Interpretation: Normal





- Radiology


CXR Interpretation: Yes: No Acute Disease, Mediastinum, Other (port r chrst, 

spinal rods, unchanged from 3/18).  No: Infiltrates, Fracture, Pnemothorax





<Maira Kelley - Last Filed: 08/11/18 04:07>





- Laboratory Results


Result Diagrams: 


 08/11/18 04:42





Lab Interpretation: Abnormal


Urine Pregnancy POC: Negative


O2 Sat by Pulse Oximetry: 99


Progress Note: Pt was placed card. monitor, hydration with IVF, oxygen.  Pt 

remained significantly unchanged, still c/o diffuse abdominal pain.  Blood owrk 

review, acute leukocytosis with left shift, H/H- baseline.  UA (+) WBC, (+) RBC 

c/w UTI.  Pt has clinical findings c/w fever, UTI r/o acute pyelonephritis, SC 

crisis.  case discussed with pt's PMD  and admission arranged to tele.





<Gaby Negron - Last Filed: 08/11/18 05:29>





Critical Care Time





- Critical Care Note


Total Time (in mins): 40


Documented critical care: time excludes all time spent performing seperately 

billable procedures.





<Gaby Negron - Last Filed: 08/11/18 05:29>





Disposition





<Maira Kelley - Last Filed: 08/11/18 04:07>





- Disposition


Disposition Time: 05:23





<Gaby Negron - Last Filed: 08/11/18 05:29>





- Disposition


Disposition: HOSPITALIZED


Condition: FAIR


Forms:  [facility] (English)





- Clinical Impression


Clinical Impression: 


 Sickle cell pain crisis, Pyelonephritis

## 2018-08-12 LAB
ALBUMIN SERPL-MCNC: 2.7 G/DL (ref 3.5–5)
ALBUMIN/GLOB SERPL: 0.7 {RATIO} (ref 1–2.1)
ALT SERPL-CCNC: 114 U/L (ref 9–52)
AST SERPL-CCNC: 129 U/L (ref 14–36)
BASOPHILS # BLD AUTO: 0.2 K/UL (ref 0–0.2)
BASOPHILS NFR BLD: 0.8 % (ref 0–2)
BUN SERPL-MCNC: 25 MG/DL (ref 7–17)
CALCIUM SERPL-MCNC: 7.7 MG/DL (ref 8.6–10.4)
EOSINOPHIL # BLD AUTO: 0.7 K/UL (ref 0–0.7)
EOSINOPHIL NFR BLD: 3.2 % (ref 0–4)
ERYTHROCYTE [DISTWIDTH] IN BLOOD BY AUTOMATED COUNT: 16.2 % (ref 11.5–14.5)
GFR NON-AFRICAN AMERICAN: 36
HGB BLD-MCNC: 7.4 G/DL (ref 11–16)
LYMPHOCYTES # BLD AUTO: 3.4 K/UL (ref 1–4.3)
LYMPHOCYTES NFR BLD AUTO: 15 % (ref 20–40)
MCH RBC QN AUTO: 29 PG (ref 27–31)
MCHC RBC AUTO-ENTMCNC: 33.6 G/DL (ref 33–37)
MCV RBC AUTO: 86.4 FL (ref 81–99)
MONOCYTES # BLD: 1.6 K/UL (ref 0–0.8)
MONOCYTES NFR BLD: 7 % (ref 0–10)
NEUTROPHILS # BLD: 17 K/UL (ref 1.8–7)
NEUTROPHILS NFR BLD AUTO: 74 % (ref 50–75)
NRBC BLD AUTO-RTO: 0 % (ref 0–2)
PLATELET # BLD: 125 K/UL (ref 130–400)
PMV BLD AUTO: 10.2 FL (ref 7.2–11.7)
RBC # BLD AUTO: 2.56 MIL/UL (ref 3.8–5.2)
WBC # BLD AUTO: 22.9 K/UL (ref 4.8–10.8)

## 2018-08-12 RX ADMIN — DIPHENHYDRAMINE HYDROCHLORIDE PRN MG: 50 INJECTION INTRAMUSCULAR; INTRAVENOUS at 23:14

## 2018-08-12 RX ADMIN — DIPHENHYDRAMINE HYDROCHLORIDE PRN MG: 50 INJECTION INTRAMUSCULAR; INTRAVENOUS at 16:38

## 2018-08-12 RX ADMIN — LINEZOLID SCH MLS/HR: 600 INJECTION, SOLUTION INTRAVENOUS at 02:00

## 2018-08-12 RX ADMIN — LINEZOLID SCH MLS/HR: 600 INJECTION, SOLUTION INTRAVENOUS at 12:21

## 2018-08-12 RX ADMIN — DIPHENHYDRAMINE HYDROCHLORIDE PRN MG: 50 INJECTION INTRAMUSCULAR; INTRAVENOUS at 03:19

## 2018-08-12 RX ADMIN — MAGNESIUM SULFATE IN DEXTROSE SCH MLS/HR: 10 INJECTION, SOLUTION INTRAVENOUS at 10:33

## 2018-08-12 RX ADMIN — DIPHENHYDRAMINE HYDROCHLORIDE PRN MG: 50 INJECTION INTRAMUSCULAR; INTRAVENOUS at 12:48

## 2018-08-12 RX ADMIN — DIPHENHYDRAMINE HYDROCHLORIDE PRN MG: 50 INJECTION INTRAMUSCULAR; INTRAVENOUS at 19:59

## 2018-08-12 RX ADMIN — DIPHENHYDRAMINE HYDROCHLORIDE PRN MG: 50 INJECTION INTRAMUSCULAR; INTRAVENOUS at 00:03

## 2018-08-12 RX ADMIN — TAZOBACTAM SODIUM AND PIPERACILLIN SODIUM SCH MLS/HR: 375; 3 INJECTION, SOLUTION INTRAVENOUS at 02:00

## 2018-08-12 RX ADMIN — DIPHENHYDRAMINE HYDROCHLORIDE PRN MG: 50 INJECTION INTRAMUSCULAR; INTRAVENOUS at 09:20

## 2018-08-12 RX ADMIN — TAZOBACTAM SODIUM AND PIPERACILLIN SODIUM SCH MLS/HR: 375; 3 INJECTION, SOLUTION INTRAVENOUS at 16:40

## 2018-08-12 RX ADMIN — MAGNESIUM SULFATE IN DEXTROSE SCH MLS/HR: 10 INJECTION, SOLUTION INTRAVENOUS at 11:16

## 2018-08-12 RX ADMIN — DIPHENHYDRAMINE HYDROCHLORIDE PRN MG: 50 INJECTION INTRAMUSCULAR; INTRAVENOUS at 06:14

## 2018-08-12 RX ADMIN — LINEZOLID SCH MLS/HR: 600 INJECTION, SOLUTION INTRAVENOUS at 22:27

## 2018-08-12 RX ADMIN — TAZOBACTAM SODIUM AND PIPERACILLIN SODIUM SCH MLS/HR: 375; 3 INJECTION, SOLUTION INTRAVENOUS at 09:20

## 2018-08-12 NOTE — CP.PCM.PN
Subjective





- Date & Time of Evaluation


Date of Evaluation: 08/12/18


Time of Evaluation: 10:45





- Subjective


Subjective: 


clinically same





Objective





- Vital Signs/Intake and Output


Vital Signs (last 24 hours): 


 











Temp Pulse Resp BP Pulse Ox


 


 98.2 F   95 H  20   107/64   98 


 


 08/12/18 07:00  08/12/18 12:47  08/12/18 07:00  08/12/18 12:47  08/12/18 07:00








Intake and Output: 


 











 08/12/18 08/12/18





 06:59 18:59


 


Intake Total 900 


 


Output Total 1200 


 


Balance -300 














- Medications


Medications: 


 Current Medications





Acetaminophen (Tylenol 325mg Tab)  650 mg PO Q6 PRN


   PRN Reason: Fever >100.4 F


   Last Admin: 08/11/18 22:44 Dose:  650 mg


Diphenhydramine HCl (Benadryl)  50 mg IVP Q3H PRN


   PRN Reason: Allergy symptoms


   Last Admin: 08/12/18 12:48 Dose:  50 mg


Piperacillin Sod/Tazobactam Sod (Zosyn 3.375 Gm Iv Premix)  3.375 gm in 50 mls 

@ 100 mls/hr IVPB Q8H CHENG


   PRN Reason: Protocol


   Last Admin: 08/12/18 09:20 Dose:  100 mls/hr


Sodium Chloride (Sodium Chloride 0.9%)  1,000 mls @ 75 mls/hr IV .[id number] CHENG


   Last Admin: 08/12/18 09:28 Dose:  Not Given


Linezolid (Zyvox 600mg/300ml D5w)  600 mg in 300 mls @ 200 mls/hr IVPB Q12H CHENG


   PRN Reason: Protocol


   Last Admin: 08/12/18 12:21 Dose:  200 mls/hr


Morphine Sulfate (Morphine)  4 mg IVP Q3H PRN


   PRN Reason: Pain, severe (8-10)


   Last Admin: 08/12/18 12:48 Dose:  4 mg


Ondansetron HCl (Zofran Inj)  4 mg IVP Q8H PRN


   PRN Reason: Nausea/Vomiting


   Last Admin: 08/12/18 10:34 Dose:  4 mg











- Labs


Labs: 


 





 08/12/18 08:28 





 08/12/18 08:28 





 











PT  17.0 SECONDS (9.7-12.2)  H  08/11/18  04:23    


 


INR  1.6   08/11/18  04:23    


 


APTT  40 SECONDS (21-34)  H  08/11/18  04:23    














- Constitutional


Appears: Well





- Head Exam


Head Exam: ATRAUMATIC, NORMAL INSPECTION, NORMOCEPHALIC





- Eye Exam


Eye Exam: EOMI, Normal appearance, PERRL


Pupil Exam: NORMAL ACCOMODATION, PERRL





- ENT Exam


ENT Exam: Mucous Membranes Moist, Normal Exam





- Neck Exam


Neck Exam: Full ROM, Normal Inspection.  absent: Lymphadenopathy





- Respiratory Exam


Respiratory Exam: Decreased Breath Sounds





- Cardiovascular Exam


Cardiovascular Exam: REGULAR RHYTHM, +S1, +S2





- GI/Abdominal Exam


GI & Abdominal Exam: Soft, Diminished Bowel Sounds





- Rectal Exam


Rectal Exam: Deferred

## 2018-08-13 RX ADMIN — LINEZOLID SCH MLS/HR: 600 INJECTION, SOLUTION INTRAVENOUS at 22:17

## 2018-08-13 RX ADMIN — LINEZOLID SCH MLS/HR: 600 INJECTION, SOLUTION INTRAVENOUS at 12:00

## 2018-08-13 RX ADMIN — DIPHENHYDRAMINE HYDROCHLORIDE PRN MG: 50 INJECTION INTRAMUSCULAR; INTRAVENOUS at 18:03

## 2018-08-13 RX ADMIN — DIPHENHYDRAMINE HYDROCHLORIDE PRN MG: 50 INJECTION INTRAMUSCULAR; INTRAVENOUS at 14:30

## 2018-08-13 RX ADMIN — DIPHENHYDRAMINE HYDROCHLORIDE PRN MG: 50 INJECTION INTRAMUSCULAR; INTRAVENOUS at 11:31

## 2018-08-13 RX ADMIN — TAZOBACTAM SODIUM AND PIPERACILLIN SODIUM SCH MLS/HR: 375; 3 INJECTION, SOLUTION INTRAVENOUS at 18:00

## 2018-08-13 RX ADMIN — DIPHENHYDRAMINE HYDROCHLORIDE PRN MG: 50 INJECTION INTRAMUSCULAR; INTRAVENOUS at 05:16

## 2018-08-13 RX ADMIN — DIPHENHYDRAMINE HYDROCHLORIDE PRN MG: 50 INJECTION INTRAMUSCULAR; INTRAVENOUS at 08:23

## 2018-08-13 RX ADMIN — TAZOBACTAM SODIUM AND PIPERACILLIN SODIUM SCH MLS/HR: 375; 3 INJECTION, SOLUTION INTRAVENOUS at 08:19

## 2018-08-13 RX ADMIN — DIPHENHYDRAMINE HYDROCHLORIDE PRN MG: 50 INJECTION INTRAMUSCULAR; INTRAVENOUS at 21:10

## 2018-08-13 RX ADMIN — DIPHENHYDRAMINE HYDROCHLORIDE PRN MG: 50 INJECTION INTRAMUSCULAR; INTRAVENOUS at 02:23

## 2018-08-13 RX ADMIN — TAZOBACTAM SODIUM AND PIPERACILLIN SODIUM SCH MLS/HR: 375; 3 INJECTION, SOLUTION INTRAVENOUS at 01:01

## 2018-08-13 NOTE — CP.PCM.PN
Subjective





- Date & Time of Evaluation


Date of Evaluation: 08/13/18


Time of Evaluation: 20:40





- Subjective


Subjective: 





afebrile .


c/o lower abdominal pain .








lABS REVIEWED


8/11/18 Urine culture- E coli-ve ESBL / ENTEROCOCCUS  FAECIUM 


wbc 22.9


H/H 7.4/22.1





cREATININE 1.6/bun 25


ELEVATED TRANSAMINITIS /?  hEMOLYSIS








Objective





- Vital Signs/Intake and Output


Vital Signs (last 24 hours): 


 











Temp Pulse Resp BP Pulse Ox


 


 98.5 F   89   20   119/83   100 


 


 08/13/18 16:00  08/13/18 16:00  08/13/18 16:00  08/13/18 16:00  08/13/18 16:00











- Medications


Medications: 


 Current Medications





Acetaminophen (Tylenol 325mg Tab)  650 mg PO Q6 PRN


   PRN Reason: Fever >100.4 F


   Last Admin: 08/11/18 22:44 Dose:  650 mg


Diphenhydramine HCl (Benadryl)  50 mg IVP Q3H PRN


   PRN Reason: Allergy symptoms


   Last Admin: 08/13/18 18:03 Dose:  50 mg


Piperacillin Sod/Tazobactam Sod (Zosyn 3.375 Gm Iv Premix)  3.375 gm in 50 mls 

@ 100 mls/hr IVPB Q8H CHENG


   PRN Reason: Protocol


   Last Admin: 08/13/18 18:00 Dose:  100 mls/hr


Sodium Chloride (Sodium Chloride 0.9%)  1,000 mls @ 75 mls/hr IV .[id number] CHENG


   Last Admin: 08/13/18 10:45 Dose:  75 mls/hr


Linezolid (Zyvox 600mg/300ml D5w)  600 mg in 300 mls @ 200 mls/hr IVPB Q12H CHENG


   PRN Reason: Protocol


   Last Admin: 08/13/18 12:00 Dose:  200 mls/hr


Morphine Sulfate (Morphine)  4 mg IVP Q3H PRN


   PRN Reason: Pain, severe (8-10)


   Last Admin: 08/13/18 18:03 Dose:  4 mg


Ondansetron HCl (Zofran Inj)  4 mg IVP Q8H PRN


   PRN Reason: Nausea/Vomiting


   Last Admin: 08/13/18 14:25 Dose:  4 mg











- Labs


Labs: 


 





 08/12/18 08:28 





 08/12/18 08:28 





 











PT  17.0 SECONDS (9.7-12.2)  H  08/11/18  04:23    


 


INR  1.6   08/11/18  04:23    


 


APTT  40 SECONDS (21-34)  H  08/11/18  04:23    














- Constitutional


Appears: No Acute Distress





- Head Exam


Head Exam: NORMAL INSPECTION





- Eye Exam


Eye Exam: EOMI, Scleral icterus





- ENT Exam


ENT Exam: Normal Oropharynx





- Neck Exam


Neck Exam: Normal Inspection





- Respiratory Exam


Respiratory Exam: NORMAL BREATHING PATTERN





- Cardiovascular Exam


Cardiovascular Exam: REGULAR RHYTHM, +S1, +S2





- GI/Abdominal Exam


GI & Abdominal Exam: Soft, Tenderness (lower abdomen quadrants)





- Extremities Exam


Extremities Exam: Normal Inspection.  absent: Calf Tenderness, Tenderness





- Neurological Exam


Neurological Exam: Awake, CN II-XII Intact, Oriented x3





- Psychiatric Exam


Psychiatric exam: Normal Mood





- Skin


Skin Exam: Normal Color





Assessment and Plan


(1) Fever


Status: Acute   





(2) Pyelonephritis


Status: Acute   





(3) Leukocytosis


Status: Acute   





(4) Sickle cell pain crisis


Status: Acute   





(5) Neurogenic bladder


Status: Acute   





- Assessment and Plan (Free Text)


Plan: 





Continue iv ZOSYN 3.375 EVERY 8 HOURLY FOR [facility]-[employer] C[employer]AGE FOR UTI. 8/11 /18





Continue iv ZYVOX 600 MG iv PIGGYBACK EVERY 12 HOURLY .8/11/18





FOLLOW-UP RENAL FUNCTIONS CLOSELY.


-eVAL








 Repeat UA and urine culture clean-catch. 8/14/18.

## 2018-08-13 NOTE — CP.PCM.PN
Subjective





- Date & Time of Evaluation


Date of Evaluation: 08/13/18


Time of Evaluation: 10:30





- Subjective


Subjective: 


clinically same





Objective





- Vital Signs/Intake and Output


Vital Signs (last 24 hours): 


 











Temp Pulse Resp BP Pulse Ox


 


 98.5 F   89   20   119/83   100 


 


 08/13/18 16:00  08/13/18 16:00  08/13/18 16:00  08/13/18 16:00  08/13/18 16:00








Intake and Output: 


 











 08/13/18 08/13/18





 06:59 18:59


 


Intake Total 2290 


 


Output Total 2100 


 


Balance 190 














- Medications


Medications: 


 Current Medications





Acetaminophen (Tylenol 325mg Tab)  650 mg PO Q6 PRN


   PRN Reason: Fever >100.4 F


   Last Admin: 08/11/18 22:44 Dose:  650 mg


Diphenhydramine HCl (Benadryl)  50 mg IVP Q3H PRN


   PRN Reason: Allergy symptoms


   Last Admin: 08/13/18 18:03 Dose:  50 mg


Piperacillin Sod/Tazobactam Sod (Zosyn 3.375 Gm Iv Premix)  3.375 gm in 50 mls 

@ 100 mls/hr IVPB Q8H CHENG


   PRN Reason: Protocol


   Last Admin: 08/13/18 18:00 Dose:  100 mls/hr


Sodium Chloride (Sodium Chloride 0.9%)  1,000 mls @ 75 mls/hr IV .[id number] CHENG


   Last Admin: 08/13/18 10:45 Dose:  75 mls/hr


Linezolid (Zyvox 600mg/300ml D5w)  600 mg in 300 mls @ 200 mls/hr IVPB Q12H CHENG


   PRN Reason: Protocol


   Last Admin: 08/13/18 12:00 Dose:  200 mls/hr


Morphine Sulfate (Morphine)  4 mg IVP Q3H PRN


   PRN Reason: Pain, severe (8-10)


   Last Admin: 08/13/18 18:03 Dose:  4 mg


Ondansetron HCl (Zofran Inj)  4 mg IVP Q8H PRN


   PRN Reason: Nausea/Vomiting


   Last Admin: 08/13/18 14:25 Dose:  4 mg











- Labs


Labs: 


 





 08/12/18 08:28 





 08/12/18 08:28 





 











PT  17.0 SECONDS (9.7-12.2)  H  08/11/18  04:23    


 


INR  1.6   08/11/18  04:23    


 


APTT  40 SECONDS (21-34)  H  08/11/18  04:23    














- Constitutional


Appears: Well





- Head Exam


Head Exam: ATRAUMATIC, NORMAL INSPECTION, NORMOCEPHALIC





- Eye Exam


Eye Exam: EOMI, Normal appearance, PERRL


Pupil Exam: NORMAL ACCOMODATION, PERRL





- ENT Exam


ENT Exam: Mucous Membranes Moist, Normal Exam





- Neck Exam


Neck Exam: Full ROM, Normal Inspection.  absent: Lymphadenopathy





- Respiratory Exam


Respiratory Exam: Decreased Breath Sounds





- Cardiovascular Exam


Cardiovascular Exam: REGULAR RHYTHM, +S1, +S2





- GI/Abdominal Exam


GI & Abdominal Exam: Soft, Diminished Bowel Sounds





- Rectal Exam


Rectal Exam: Deferred

## 2018-08-14 LAB
BASOPHILS # BLD AUTO: 0.2 K/UL (ref 0–0.2)
BASOPHILS NFR BLD: 1.2 % (ref 0–2)
BILIRUB UR-MCNC: NEGATIVE MG/DL
BUN SERPL-MCNC: 20 MG/DL (ref 7–17)
CALCIUM SERPL-MCNC: 8.1 MG/DL (ref 8.6–10.4)
EOSINOPHIL # BLD AUTO: 1 K/UL (ref 0–0.7)
EOSINOPHIL NFR BLD: 6.4 % (ref 0–4)
ERYTHROCYTE [DISTWIDTH] IN BLOOD BY AUTOMATED COUNT: 16.2 % (ref 11.5–14.5)
GFR NON-AFRICAN AMERICAN: 33
GLUCOSE UR STRIP-MCNC: NORMAL MG/DL
HGB BLD-MCNC: 7.8 G/DL (ref 11–16)
LEUKOCYTE ESTERASE UR-ACNC: (no result) LEU/UL
LYMPHOCYTES # BLD AUTO: 3.4 K/UL (ref 1–4.3)
LYMPHOCYTES NFR BLD AUTO: 22.7 % (ref 20–40)
MCH RBC QN AUTO: 28.7 PG (ref 27–31)
MCHC RBC AUTO-ENTMCNC: 33.5 G/DL (ref 33–37)
MCV RBC AUTO: 85.7 FL (ref 81–99)
MONOCYTES # BLD: 1.1 K/UL (ref 0–0.8)
MONOCYTES NFR BLD: 7.2 % (ref 0–10)
NEUTROPHILS # BLD: 9.4 K/UL (ref 1.8–7)
NEUTROPHILS NFR BLD AUTO: 62.5 % (ref 50–75)
NRBC BLD AUTO-RTO: 0 % (ref 0–2)
PH UR STRIP: 6 [PH] (ref 5–8)
PLATELET # BLD: 206 K/UL (ref 130–400)
PMV BLD AUTO: 9.2 FL (ref 7.2–11.7)
PROT UR STRIP-MCNC: NEGATIVE MG/DL
RBC # BLD AUTO: 2.72 MIL/UL (ref 3.8–5.2)
RBC # UR STRIP: (no result) /UL
SP GR UR STRIP: 1.01 (ref 1–1.03)
SQUAMOUS EPITHIAL: < 1 /HPF (ref 0–5)
UROBILINOGEN UR-MCNC: NORMAL MG/DL (ref 0.2–1)
WBC # BLD AUTO: 15 K/UL (ref 4.8–10.8)

## 2018-08-14 RX ADMIN — LINEZOLID SCH MLS/HR: 600 INJECTION, SOLUTION INTRAVENOUS at 11:02

## 2018-08-14 RX ADMIN — DIPHENHYDRAMINE HYDROCHLORIDE PRN MG: 50 INJECTION INTRAMUSCULAR; INTRAVENOUS at 07:04

## 2018-08-14 RX ADMIN — TAZOBACTAM SODIUM AND PIPERACILLIN SODIUM SCH MLS/HR: 375; 3 INJECTION, SOLUTION INTRAVENOUS at 09:00

## 2018-08-14 RX ADMIN — DIPHENHYDRAMINE HYDROCHLORIDE PRN MG: 50 INJECTION INTRAMUSCULAR; INTRAVENOUS at 18:01

## 2018-08-14 RX ADMIN — TAZOBACTAM SODIUM AND PIPERACILLIN SODIUM SCH MLS/HR: 375; 3 INJECTION, SOLUTION INTRAVENOUS at 00:46

## 2018-08-14 RX ADMIN — DIPHENHYDRAMINE HYDROCHLORIDE PRN MG: 50 INJECTION INTRAMUSCULAR; INTRAVENOUS at 00:13

## 2018-08-14 RX ADMIN — TAZOBACTAM SODIUM AND PIPERACILLIN SODIUM SCH MLS/HR: 375; 3 INJECTION, SOLUTION INTRAVENOUS at 16:36

## 2018-08-14 RX ADMIN — DIPHENHYDRAMINE HYDROCHLORIDE PRN MG: 50 INJECTION INTRAMUSCULAR; INTRAVENOUS at 14:36

## 2018-08-14 RX ADMIN — DIPHENHYDRAMINE HYDROCHLORIDE PRN MG: 50 INJECTION INTRAMUSCULAR; INTRAVENOUS at 11:13

## 2018-08-14 RX ADMIN — LINEZOLID SCH MLS/HR: 600 INJECTION, SOLUTION INTRAVENOUS at 22:30

## 2018-08-14 RX ADMIN — DIPHENHYDRAMINE HYDROCHLORIDE PRN MG: 50 INJECTION INTRAMUSCULAR; INTRAVENOUS at 03:30

## 2018-08-14 RX ADMIN — DIPHENHYDRAMINE HYDROCHLORIDE PRN MG: 50 INJECTION INTRAMUSCULAR; INTRAVENOUS at 21:14

## 2018-08-14 NOTE — CP.PCM.PN
Subjective





- Date & Time of Evaluation


Date of Evaluation: 08/14/18


Time of Evaluation: 22:28





- Subjective


Subjective: 





afebrile .


c/o lower abdominal pain .


DENIES DYSURIA








lABS REVIEWED


8/11/18 Urine culture- E coli-ve ESBL / ENTEROCOCCUS  FAECIUM 


wbc 15.0 IMPROVING


H/H 7.8/23.3





cREATININE 1.7/bun 20





ELEVATED TRANSAMINITIS /?  hEMOLYSIS





Objective





- Vital Signs/Intake and Output


Vital Signs (last 24 hours): 


 











Temp Pulse Resp BP Pulse Ox


 


 98.2 F   90   20   125/77   96 


 


 08/14/18 17:25  08/14/18 21:10  08/14/18 21:10  08/14/18 21:10  08/14/18 17:25








Intake and Output: 


 











 08/14/18 08/15/18





 18:59 06:59


 


Intake Total 600 


 


Output Total 1500 


 


Balance -900 














- Medications


Medications: 


 Current Medications





Acetaminophen (Tylenol 325mg Tab)  650 mg PO Q6 PRN


   PRN Reason: Fever >100.4 F


   Last Admin: 08/11/18 22:44 Dose:  650 mg


Diphenhydramine HCl (Benadryl)  50 mg IVP Q3H PRN


   PRN Reason: Allergy symptoms


   Last Admin: 08/14/18 21:14 Dose:  50 mg


Piperacillin Sod/Tazobactam Sod (Zosyn 3.375 Gm Iv Premix)  3.375 gm in 50 mls 

@ 100 mls/hr IVPB Q8H [facility]


   PRN Reason: Protocol


   Last Admin: 08/14/18 16:36 Dose:  100 mls/hr


Sodium Chloride (Sodium Chloride 0.9%)  1,000 mls @ 75 mls/hr IV .[id number] [facility]ENG


   Last Admin: 08/14/18 14:37 Dose:  75 mls/hr


Linezolid (Zyvox 600mg/300ml D5w)  600 mg in 300 mls @ 200 mls/hr IVPB Q12H [facility]


   PRN Reason: Protocol


   Last Admin: 08/14/18 11:02 Dose:  200 mls/hr


Morphine Sulfate (Morphine)  4 mg IVP Q3H PRN


   PRN Reason: Pain, severe (8-10)


   Last Admin: 08/14/18 21:08 Dose:  4 mg


Ondansetron HCl (Zofran Inj)  4 mg IVP Q8H PRN


   PRN Reason: Nausea/Vomiting


   Last Admin: 08/14/18 14:54 Dose:  4 mg











- Labs


Labs: 


 





 08/14/18 06:36 





 08/14/18 06:36 





 











PT  17.0 SECONDS (9.7-12.2)  H  08/11/18  04:23    


 


INR  1.6   08/11/18  04:23    


 


APTT  40 SECONDS (21-34)  H  08/11/18  04:23    














- Constitutional


Appears: No Acute Distress





- Head Exam


Head Exam: NORMAL INSPECTION





- Eye Exam


Eye Exam: EOMI, PERRL, Scleral icterus





- ENT Exam


ENT Exam: Normal Oropharynx





- Neck Exam


Neck Exam: Normal Inspection





- Respiratory Exam


Respiratory Exam: Clear to Ausculation Bilateral





- Cardiovascular Exam


Cardiovascular Exam: REGULAR RHYTHM, +S1, +S2





- GI/Abdominal Exam


GI & Abdominal Exam: Soft, Tenderness (LOWER ABDOMEN.), Normal Bowel Sounds





- Extremities Exam


Extremities Exam: absent: Calf Tenderness, Pedal Edema





- Neurological Exam


Neurological Exam: Awake, CN II-XII Intact, Oriented x3, Reflexes Normal





- Psychiatric Exam


Psychiatric exam: Normal Mood





- Skin


Skin Exam: Normal Color, Warm





Assessment and Plan


(1) Fever


Status: Acute   





(2) Pyelonephritis


Status: Acute   





(3) Leukocytosis


Status: Acute   





(4) Sickle cell pain crisis


Status: Acute   





(5) Neurogenic bladder


Status: Acute   





- Assessment and Plan (Free Text)


Plan: 





Continue iv ZOSYN 3.375 EVERY 8 HOURLY FOR BROAD-[employer] COVERAGE FOR UTI. 8/11 /18


Continue iv ZYVOX 600 MG iv PIGGYBACK EVERY 12 HOURLY .8/11/18





FOLLOW-UP RENAL FUNCTIONS CLOSELY.


-eVAL








 Repeat UA and urine culture clean-catch. 8/14/18.-PENDING

## 2018-08-14 NOTE — CP.PCM.PN
Subjective





- Date & Time of Evaluation


Date of Evaluation: 08/14/18


Time of Evaluation: 10:15





- Subjective


Subjective: 


clinically same





Objective





- Vital Signs/Intake and Output


Vital Signs (last 24 hours): 


 











Temp Pulse Resp BP Pulse Ox


 


 98.2 F   93 H  20   119/86   96 


 


 08/14/18 17:25  08/14/18 17:25  08/14/18 17:25  08/14/18 17:25  08/14/18 17:25








Intake and Output: 


 











 08/14/18 08/15/18





 18:59 06:59


 


Intake Total 600 


 


Output Total 1500 


 


Balance -900 














- Medications


Medications: 


 Current Medications





Acetaminophen (Tylenol 325mg Tab)  650 mg PO Q6 PRN


   PRN Reason: Fever >100.4 F


   Last Admin: 08/11/18 22:44 Dose:  650 mg


Diphenhydramine HCl (Benadryl)  50 mg IVP Q3H PRN


   PRN Reason: Allergy symptoms


   Last Admin: 08/14/18 18:01 Dose:  50 mg


Piperacillin Sod/Tazobactam Sod (Zosyn 3.375 Gm Iv Premix)  3.375 gm in 50 mls 

@ 100 mls/hr IVPB Q8H [facility]


   PRN Reason: Protocol


   Last Admin: 08/14/18 16:36 Dose:  100 mls/hr


Sodium Chloride (Sodium Chloride 0.9%)  1,000 mls @ 75 mls/hr IV .[id number] [facility]ENG


   Last Admin: 08/14/18 14:37 Dose:  75 mls/hr


Linezolid (Zyvox 600mg/300ml D5w)  600 mg in 300 mls @ 200 mls/hr IVPB Q12H [facility]


   PRN Reason: Protocol


   Last Admin: 08/14/18 11:02 Dose:  200 mls/hr


Morphine Sulfate (Morphine)  4 mg IVP Q3H PRN


   PRN Reason: Pain, severe (8-10)


   Last Admin: 08/14/18 17:55 Dose:  4 mg


Ondansetron HCl (Zofran Inj)  4 mg IVP Q8H PRN


   PRN Reason: Nausea/Vomiting


   Last Admin: 08/14/18 14:54 Dose:  4 mg











- Labs


Labs: 


 





 08/14/18 06:36 





 08/14/18 06:36 





 











PT  17.0 SECONDS (9.7-12.2)  H  08/11/18  04:23    


 


INR  1.6   08/11/18  04:23    


 


APTT  40 SECONDS (21-34)  H  08/11/18  04:23    














- Constitutional


Appears: Well





- Head Exam


Head Exam: ATRAUMATIC, NORMAL INSPECTION, NORMOCEPHALIC





- Eye Exam


Eye Exam: EOMI, Normal appearance, PERRL


Pupil Exam: NORMAL ACCOMODATION, PERRL





- ENT Exam


ENT Exam: Mucous Membranes Moist, Normal Exam





- Neck Exam


Neck Exam: Full ROM, Normal Inspection.  absent: Lymphadenopathy





- Respiratory Exam


Respiratory Exam: Decreased Breath Sounds





- Cardiovascular Exam


Cardiovascular Exam: REGULAR RHYTHM, +S1, +S2





- GI/Abdominal Exam


GI & Abdominal Exam: Soft, Diminished Bowel Sounds





- Rectal Exam


Rectal Exam: Deferred

## 2018-08-15 LAB
BUN SERPL-MCNC: 21 MG/DL (ref 7–17)
CALCIUM SERPL-MCNC: 7.9 MG/DL (ref 8.6–10.4)
ERYTHROCYTE [DISTWIDTH] IN BLOOD BY AUTOMATED COUNT: 16.5 % (ref 11.5–14.5)
GFR NON-AFRICAN AMERICAN: 36
HGB BLD-MCNC: 7.8 G/DL (ref 11–16)
MCH RBC QN AUTO: 29.2 PG (ref 27–31)
MCHC RBC AUTO-ENTMCNC: 33.9 G/DL (ref 33–37)
MCV RBC AUTO: 86.2 FL (ref 81–99)
PLATELET # BLD: 229 K/UL (ref 130–400)
PMV BLD AUTO: 8.8 FL (ref 7.2–11.7)
RBC # BLD AUTO: 2.67 MIL/UL (ref 3.8–5.2)
WBC # BLD AUTO: 16.3 K/UL (ref 4.8–10.8)

## 2018-08-15 RX ADMIN — DIPHENHYDRAMINE HYDROCHLORIDE PRN MG: 50 INJECTION INTRAMUSCULAR; INTRAVENOUS at 18:28

## 2018-08-15 RX ADMIN — LINEZOLID SCH MLS/HR: 600 INJECTION, SOLUTION INTRAVENOUS at 22:28

## 2018-08-15 RX ADMIN — TAZOBACTAM SODIUM AND PIPERACILLIN SODIUM SCH MLS/HR: 375; 3 INJECTION, SOLUTION INTRAVENOUS at 08:37

## 2018-08-15 RX ADMIN — DIPHENHYDRAMINE HYDROCHLORIDE PRN MG: 50 INJECTION INTRAMUSCULAR; INTRAVENOUS at 03:47

## 2018-08-15 RX ADMIN — DIPHENHYDRAMINE HYDROCHLORIDE PRN MG: 50 INJECTION INTRAMUSCULAR; INTRAVENOUS at 00:25

## 2018-08-15 RX ADMIN — DIPHENHYDRAMINE HYDROCHLORIDE PRN MG: 50 INJECTION INTRAMUSCULAR; INTRAVENOUS at 07:25

## 2018-08-15 RX ADMIN — DIPHENHYDRAMINE HYDROCHLORIDE PRN MG: 50 INJECTION INTRAMUSCULAR; INTRAVENOUS at 14:36

## 2018-08-15 RX ADMIN — LINEZOLID SCH MLS/HR: 600 INJECTION, SOLUTION INTRAVENOUS at 10:40

## 2018-08-15 RX ADMIN — TAZOBACTAM SODIUM AND PIPERACILLIN SODIUM SCH MLS/HR: 375; 3 INJECTION, SOLUTION INTRAVENOUS at 00:30

## 2018-08-15 RX ADMIN — DIPHENHYDRAMINE HYDROCHLORIDE PRN MG: 50 INJECTION INTRAMUSCULAR; INTRAVENOUS at 22:26

## 2018-08-15 RX ADMIN — TAZOBACTAM SODIUM AND PIPERACILLIN SODIUM SCH MLS/HR: 375; 3 INJECTION, SOLUTION INTRAVENOUS at 18:19

## 2018-08-15 RX ADMIN — DIPHENHYDRAMINE HYDROCHLORIDE PRN MG: 50 INJECTION INTRAMUSCULAR; INTRAVENOUS at 10:37

## 2018-08-15 NOTE — CP.PCM.PN
Subjective





- Date & Time of Evaluation


Date of Evaluation: 08/15/18


Time of Evaluation: 10:00





- Subjective


Subjective: 


clinically same





Objective





- Vital Signs/Intake and Output


Vital Signs (last 24 hours): 


 











Temp Pulse Resp BP Pulse Ox


 


 97.9 F   100 H  20   125/84   97 


 


 08/15/18 15:12  08/15/18 15:12  08/15/18 15:12  08/15/18 15:12  08/15/18 15:12








Intake and Output: 


 











 08/15/18 08/15/18





 06:59 18:59


 


Intake Total 1080 600


 


Output Total 1000 


 


Balance 80 600














- Medications


Medications: 


 Current Medications





Acetaminophen (Tylenol 325mg Tab)  650 mg PO Q6 PRN


   PRN Reason: Fever >100.4 F


   Last Admin: 08/11/18 22:44 Dose:  650 mg


Diphenhydramine HCl (Benadryl)  50 mg IVP Q3H PRN


   PRN Reason: Allergy symptoms


   Last Admin: 08/15/18 14:36 Dose:  50 mg


Piperacillin Sod/Tazobactam Sod (Zosyn 3.375 Gm Iv Premix)  3.375 gm in 50 mls 

@ 100 mls/hr IVPB Q8H [facility]


   PRN Reason: Protocol


   Last Admin: 08/15/18 08:37 Dose:  100 mls/hr


Sodium Chloride (Sodium Chloride 0.9%)  1,000 mls @ 75 mls/hr IV .[id number] CHENG


   Last Admin: 08/15/18 06:01 Dose:  75 mls/hr


Linezolid (Zyvox 600mg/300ml D5w)  600 mg in 300 mls @ 200 mls/hr IVPB Q12H [facility]


   PRN Reason: Protocol


   Last Admin: 08/15/18 10:40 Dose:  200 mls/hr


Morphine Sulfate (Morphine)  4 mg IVP Q3H PRN


   PRN Reason: Pain, severe (8-10)


   Last Admin: 08/15/18 14:36 Dose:  4 mg


Ondansetron HCl (Zofran Inj)  4 mg IVP Q8H PRN


   PRN Reason: Nausea/Vomiting


   Last Admin: 08/15/18 10:36 Dose:  4 mg











- Labs


Labs: 


 





 08/15/18 08:02 





 08/15/18 08:02 





 











PT  17.0 SECONDS (9.7-12.2)  H  08/11/18  04:23    


 


INR  1.6   08/11/18  04:23    


 


APTT  40 SECONDS (21-34)  H  08/11/18  04:23    














- Constitutional


Appears: Well





- Head Exam


Head Exam: ATRAUMATIC, NORMAL INSPECTION, NORMOCEPHALIC





- Eye Exam


Eye Exam: EOMI, Normal appearance, PERRL


Pupil Exam: NORMAL ACCOMODATION, PERRL





- ENT Exam


ENT Exam: Mucous Membranes Moist, Normal Exam





- Neck Exam


Neck Exam: Full ROM, Normal Inspection.  absent: Lymphadenopathy





- Respiratory Exam


Respiratory Exam: Decreased Breath Sounds





- Cardiovascular Exam


Cardiovascular Exam: REGULAR RHYTHM, +S1, +S2





- GI/Abdominal Exam


GI & Abdominal Exam: Soft, Diminished Bowel Sounds





- Rectal Exam


Rectal Exam: Deferred

## 2018-08-15 NOTE — CP.PCM.PN
Subjective





- Date & Time of Evaluation


Date of Evaluation: 08/15/18


Time of Evaluation: 21:44





- Subjective


Subjective: 





afebrile .


feeling better


DENIES DYSURIA








lABS REVIEWED


8/14/18 repeat clean catch catheter urine culture -ve growth


8/11/18 Urine culture- E coli-ve ESBL / ENTEROCOCCUS  FAECIUM 


wbc 16.3


H/H 7.8/23.3


plt 229





cREATININE 1.6 /bun 21





ELEVATED TRANSAMINITIS /?  hEMOLYSIS





Objective





- Vital Signs/Intake and Output


Vital Signs (last 24 hours): 


 











Temp Pulse Resp BP Pulse Ox


 


 97.9 F   100 H  20   125/84   97 


 


 08/15/18 15:12  08/15/18 15:12  08/15/18 15:12  08/15/18 15:12  08/15/18 15:12








Intake and Output: 


 











 08/15/18 08/16/18





 18:59 06:59


 


Intake Total 600 


 


Balance 600 














- Medications


Medications: 


 Current Medications





Acetaminophen (Tylenol 325mg Tab)  650 mg PO Q6 PRN


   PRN Reason: Fever >100.4 F


   Last Admin: 08/11/18 22:44 Dose:  650 mg


Diphenhydramine HCl (Benadryl)  50 mg IVP Q3H PRN


   PRN Reason: Allergy symptoms


   Last Admin: 08/15/18 18:28 Dose:  50 mg


Piperacillin Sod/Tazobactam Sod (Zosyn 3.375 Gm Iv Premix)  3.375 gm in 50 mls 

@ 100 mls/hr IVPB Q8H [facility]ENG


   PRN Reason: Protocol


   Last Admin: 08/15/18 18:19 Dose:  100 mls/hr


Sodium Chloride (Sodium Chloride 0.9%)  1,000 mls @ 75 mls/hr IV .[id number] [facility]


   Last Admin: 08/15/18 06:01 Dose:  75 mls/hr


Linezolid (Zyvox 600mg/300ml D5w)  600 mg in 300 mls @ 200 mls/hr IVPB Q12H CHENG


   PRN Reason: Protocol


   Last Admin: 08/15/18 10:40 Dose:  200 mls/hr


Morphine Sulfate (Morphine)  4 mg IVP Q3H PRN


   PRN Reason: Pain, severe (8-10)


   Last Admin: 08/15/18 18:27 Dose:  4 mg


Ondansetron HCl (Zofran Inj)  4 mg IVP Q8H PRN


   PRN Reason: Nausea/Vomiting


   Last Admin: 08/15/18 18:41 Dose:  4 mg











- Labs


Labs: 


 





 08/15/18 08:02 





 08/15/18 08:02 





 











PT  17.0 SECONDS (9.7-12.2)  H  08/11/18  04:23    


 


INR  1.6   08/11/18  04:23    


 


APTT  40 SECONDS (21-34)  H  08/11/18  04:23    














- Constitutional


Appears: No Acute Distress





- Head Exam


Head Exam: NORMAL INSPECTION





- Eye Exam


Eye Exam: EOMI, PERRL, Scleral icterus





- ENT Exam


ENT Exam: Normal Oropharynx





- Neck Exam


Neck Exam: Normal Inspection





- Respiratory Exam


Respiratory Exam: Clear to Ausculation Bilateral





- Cardiovascular Exam


Cardiovascular Exam: REGULAR RHYTHM, +S1, +S2





- GI/Abdominal Exam


GI & Abdominal Exam: Soft, Normal Bowel Sounds.  absent: Tenderness





- Extremities Exam


Extremities Exam: absent: Calf Tenderness, Pedal Edema





- Back Exam


Back Exam: absent: CVA tenderness (L), CVA tenderness (R)





- Neurological Exam


Neurological Exam: Awake, CN II-XII Intact, Oriented x3, Reflexes Normal





- Psychiatric Exam


Psychiatric exam: Normal Mood





- Skin


Skin Exam: Warm





Assessment and Plan


(1) Fever


Assessment & Plan: 


resolved.


Status: Acute   





(2) Pyelonephritis


Status: Acute   





(3) Leukocytosis


Status: Acute   





(4) Sickle cell pain crisis


Status: Acute   





(5) Neurogenic bladder


Status: Acute   





- Assessment and Plan (Free Text)


Plan: 





Continue iv ZOSYN 3.375 EVERY 8 HOURLY FOR BROAD-[employer] COVERAGE FOR UTI. 8/11 /18


DC iv ZYVOX 600 MG iv PIGGYBACK EVERY 12 HOURLY .8/11/18- 8/15/18 TONIGHT





FOLLOW-UP RENAL FUNCTIONS CLOSELY.


F/U LFTS IN AM.

## 2018-08-16 LAB
ALBUMIN SERPL-MCNC: 3.5 G/DL (ref 3.5–5)
ALBUMIN/GLOB SERPL: 0.8 {RATIO} (ref 1–2.1)
ALT SERPL-CCNC: 58 U/L (ref 9–52)
AST SERPL-CCNC: 60 U/L (ref 14–36)
BILIRUB DIRECT SERPL-MCNC: 0.6 MG/DL (ref 0–0.4)
BUN SERPL-MCNC: 18 MG/DL (ref 7–17)
CALCIUM SERPL-MCNC: 7.2 MG/DL (ref 8.6–10.4)
ERYTHROCYTE [DISTWIDTH] IN BLOOD BY AUTOMATED COUNT: 16.5 % (ref 11.5–14.5)
GFR NON-AFRICAN AMERICAN: 42
HGB BLD-MCNC: 7.4 G/DL (ref 11–16)
MCH RBC QN AUTO: 28.7 PG (ref 27–31)
MCHC RBC AUTO-ENTMCNC: 33.1 G/DL (ref 33–37)
MCV RBC AUTO: 86.7 FL (ref 81–99)
PLATELET # BLD: 213 K/UL (ref 130–400)
PMV BLD AUTO: 9.2 FL (ref 7.2–11.7)
RBC # BLD AUTO: 2.59 MIL/UL (ref 3.8–5.2)
WBC # BLD AUTO: 17.4 K/UL (ref 4.8–10.8)

## 2018-08-16 RX ADMIN — TAZOBACTAM SODIUM AND PIPERACILLIN SODIUM SCH MLS/HR: 375; 3 INJECTION, SOLUTION INTRAVENOUS at 09:22

## 2018-08-16 RX ADMIN — DIPHENHYDRAMINE HYDROCHLORIDE PRN MG: 50 INJECTION INTRAMUSCULAR; INTRAVENOUS at 20:37

## 2018-08-16 RX ADMIN — TAZOBACTAM SODIUM AND PIPERACILLIN SODIUM SCH MLS/HR: 375; 3 INJECTION, SOLUTION INTRAVENOUS at 17:16

## 2018-08-16 RX ADMIN — TAZOBACTAM SODIUM AND PIPERACILLIN SODIUM SCH MLS/HR: 375; 3 INJECTION, SOLUTION INTRAVENOUS at 00:26

## 2018-08-16 RX ADMIN — DIPHENHYDRAMINE HYDROCHLORIDE PRN MG: 50 INJECTION INTRAMUSCULAR; INTRAVENOUS at 02:15

## 2018-08-16 RX ADMIN — DIPHENHYDRAMINE HYDROCHLORIDE PRN MG: 50 INJECTION INTRAMUSCULAR; INTRAVENOUS at 17:03

## 2018-08-16 RX ADMIN — DIPHENHYDRAMINE HYDROCHLORIDE PRN MG: 50 INJECTION INTRAMUSCULAR; INTRAVENOUS at 13:40

## 2018-08-16 RX ADMIN — DIPHENHYDRAMINE HYDROCHLORIDE PRN MG: 50 INJECTION INTRAMUSCULAR; INTRAVENOUS at 05:12

## 2018-08-16 RX ADMIN — DIPHENHYDRAMINE HYDROCHLORIDE PRN MG: 50 INJECTION INTRAMUSCULAR; INTRAVENOUS at 09:20

## 2018-08-16 NOTE — CP.PCM.PN
Subjective





- Date & Time of Evaluation


Date of Evaluation: 08/16/18


Time of Evaluation: 08:30





- Subjective


Subjective: 


clinically same





Objective





- Vital Signs/Intake and Output


Vital Signs (last 24 hours): 


 











Temp Pulse Resp BP Pulse Ox


 


 98 F   106 H  20   119/82   96 


 


 08/16/18 17:22  08/16/18 17:22  08/16/18 17:22  08/16/18 17:22  08/16/18 17:22








Intake and Output: 


 











 08/16/18 08/17/18





 18:59 06:59


 


Intake Total 600 


 


Balance 600 














- Medications


Medications: 


 Current Medications





Acetaminophen (Tylenol 325mg Tab)  650 mg PO Q6 PRN


   PRN Reason: Fever >100.4 F


   Last Admin: 08/11/18 22:44 Dose:  650 mg


Diphenhydramine HCl (Benadryl)  50 mg IVP Q3H PRN


   PRN Reason: Allergy symptoms


   Last Admin: 08/16/18 17:03 Dose:  50 mg


Piperacillin Sod/Tazobactam Sod (Zosyn 3.375 Gm Iv Premix)  3.375 gm in 50 mls 

@ 100 mls/hr IVPB Q8H [facility]ENG


   PRN Reason: Protocol


   Last Admin: 08/16/18 17:16 Dose:  100 mls/hr


Sodium Chloride (Sodium Chloride 0.9%)  1,000 mls @ 75 mls/hr IV .[id number] [facility]ENG


   Last Admin: 08/16/18 03:46 Dose:  75 mls/hr


Morphine Sulfate (Morphine)  4 mg IVP Q3H PRN


   PRN Reason: Pain, severe (8-10)


   Last Admin: 08/16/18 17:02 Dose:  4 mg


Ondansetron HCl (Zofran Inj)  4 mg IVP Q8H PRN


   PRN Reason: Nausea/Vomiting


   Last Admin: 08/16/18 05:11 Dose:  4 mg











- Labs


Labs: 


 





 08/16/18 07:09 





 08/16/18 07:09 





 











PT  17.0 SECONDS (9.7-12.2)  H  08/11/18  04:23    


 


INR  1.6   08/11/18  04:23    


 


APTT  40 SECONDS (21-34)  H  08/11/18  04:23    














- Constitutional


Appears: Well





- Head Exam


Head Exam: ATRAUMATIC, NORMAL INSPECTION, NORMOCEPHALIC





- Eye Exam


Eye Exam: EOMI, Normal appearance, PERRL


Pupil Exam: NORMAL ACCOMODATION, PERRL





- ENT Exam


ENT Exam: Mucous Membranes Moist, Normal Exam





- Neck Exam


Neck Exam: Full ROM, Normal Inspection.  absent: Lymphadenopathy





- Respiratory Exam


Respiratory Exam: Decreased Breath Sounds





- Cardiovascular Exam


Cardiovascular Exam: REGULAR RHYTHM, +S1, +S2





- GI/Abdominal Exam


GI & Abdominal Exam: Soft, Diminished Bowel Sounds





- Rectal Exam


Rectal Exam: Deferred

## 2018-08-16 NOTE — CP.PCM.PN
Subjective





- Date & Time of Evaluation


Date of Evaluation: 08/16/18


Time of Evaluation: 19:50





- Subjective


Subjective: 





afebrile .


feeling better


DENIES DYSURIA


AMBULATING








lABS REVIEWED


8/14/18 repeat clean catch catheter urine culture -ve growth


8/11/18 Urine culture- E coli-ve ESBL / ENTEROCOCCUS  FAECIUM 


wbc 16.3--> 17.4


H/H 7.8/23.3-->7.4


plt 229--> 213





cREATININE 1.4 /bun 18 - IMPROVING





 TRANSAMINITIS - IMPROVING.








Objective





- Vital Signs/Intake and Output


Vital Signs (last 24 hours): 


 











Temp Pulse Resp BP Pulse Ox


 


 98 F   106 H  20   119/82   96 


 


 08/16/18 17:22  08/16/18 17:22  08/16/18 17:22  08/16/18 17:22  08/16/18 17:22








Intake and Output: 


 











 08/16/18 08/17/18





 18:59 06:59


 


Intake Total 600 


 


Balance 600 














- Medications


Medications: 


 Current Medications





Acetaminophen (Tylenol 325mg Tab)  650 mg PO Q6 PRN


   PRN Reason: Fever >100.4 F


   Last Admin: 08/11/18 22:44 Dose:  650 mg


Diphenhydramine HCl (Benadryl)  50 mg IVP Q3H PRN


   PRN Reason: Allergy symptoms


   Last Admin: 08/16/18 17:03 Dose:  50 mg


Piperacillin Sod/Tazobactam Sod (Zosyn 3.375 Gm Iv Premix)  3.375 gm in 50 mls 

@ 100 mls/hr IVPB Q8H [facility]


   PRN Reason: Protocol


   Last Admin: 08/16/18 17:16 Dose:  100 mls/hr


Sodium Chloride (Sodium Chloride 0.9%)  1,000 mls @ 75 mls/hr IV .[id number] [facility]ENG


   Last Admin: 08/16/18 03:46 Dose:  75 mls/hr


Morphine Sulfate (Morphine)  4 mg IVP Q3H PRN


   PRN Reason: Pain, severe (8-10)


   Last Admin: 08/16/18 17:02 Dose:  4 mg


Ondansetron HCl (Zofran Inj)  4 mg IVP Q8H PRN


   PRN Reason: Nausea/Vomiting


   Last Admin: 08/16/18 05:11 Dose:  4 mg











- Labs


Labs: 


 





 08/16/18 07:09 





 08/16/18 07:09 





 











PT  17.0 SECONDS (9.7-12.2)  H  08/11/18  04:23    


 


INR  1.6   08/11/18  04:23    


 


APTT  40 SECONDS (21-34)  H  08/11/18  04:23    














- Constitutional


Appears: No Acute Distress





- Head Exam


Head Exam: NORMAL INSPECTION





- Eye Exam


Eye Exam: EOMI, PERRL





- ENT Exam


ENT Exam: Normal Oropharynx





- Neck Exam


Neck Exam: Normal Inspection





- Respiratory Exam


Respiratory Exam: Clear to Ausculation Bilateral





- Cardiovascular Exam


Cardiovascular Exam: REGULAR RHYTHM, +S1





- GI/Abdominal Exam


GI & Abdominal Exam: Soft, Normal Bowel Sounds.  absent: Tenderness





- Extremities Exam


Extremities Exam: Normal Capillary Refill.  absent: Calf Tenderness, Pedal Edema





- Neurological Exam


Neurological Exam: Awake, CN II-XII Intact, Oriented x3, Reflexes Normal





- Skin


Skin Exam: Warm





Assessment and Plan


(1) Fever


Status: Acute   





(2) Pyelonephritis


Status: Acute   





(3) Leukocytosis


Status: Acute   





(4) Sickle cell pain crisis


Status: Acute   





(5) Neurogenic bladder


Status: Acute   





- Assessment and Plan (Free Text)


Plan: 





Continue iv ZOSYN 3.375 EVERY 8 HOURLY FOR BROAD-[employer] COVERAGE FOR UTI. 8/11 /18.


WHEN CLEARED BY PMD PT CAN BE PLACED ON ORAL AUGMENTIN 500MG PO BID X  5 DAYS





FOLLOW-UP RENAL FUNCTIONS CLOSELY.


F/U LFTS IN AM.

## 2018-08-17 VITALS
DIASTOLIC BLOOD PRESSURE: 73 MMHG | TEMPERATURE: 98.8 F | OXYGEN SATURATION: 20 % | RESPIRATION RATE: 97 BRPM | HEART RATE: 74 BPM | SYSTOLIC BLOOD PRESSURE: 123 MMHG

## 2018-08-17 LAB
ALBUMIN SERPL-MCNC: 3.7 G/DL (ref 3.5–5)
ALBUMIN/GLOB SERPL: 0.8 {RATIO} (ref 1–2.1)
ALT SERPL-CCNC: 54 U/L (ref 9–52)
AST SERPL-CCNC: 80 U/L (ref 14–36)
BUN SERPL-MCNC: 17 MG/DL (ref 7–17)
CALCIUM SERPL-MCNC: 8 MG/DL (ref 8.6–10.4)
ERYTHROCYTE [DISTWIDTH] IN BLOOD BY AUTOMATED COUNT: 15.7 % (ref 11.5–14.5)
GFR NON-AFRICAN AMERICAN: 39
HGB BLD-MCNC: 7.8 G/DL (ref 11–16)
MCH RBC QN AUTO: 29.3 PG (ref 27–31)
MCHC RBC AUTO-ENTMCNC: 34 G/DL (ref 33–37)
MCV RBC AUTO: 86.1 FL (ref 81–99)
PLATELET # BLD: 261 K/UL (ref 130–400)
PMV BLD AUTO: 9.1 FL (ref 7.2–11.7)
RBC # BLD AUTO: 2.68 MIL/UL (ref 3.8–5.2)
WBC # BLD AUTO: 21.8 K/UL (ref 4.8–10.8)

## 2018-08-17 RX ADMIN — DIPHENHYDRAMINE HYDROCHLORIDE PRN MG: 50 INJECTION INTRAMUSCULAR; INTRAVENOUS at 03:54

## 2018-08-17 RX ADMIN — DIPHENHYDRAMINE HYDROCHLORIDE PRN MG: 50 INJECTION INTRAMUSCULAR; INTRAVENOUS at 08:31

## 2018-08-17 RX ADMIN — DIPHENHYDRAMINE HYDROCHLORIDE PRN MG: 50 INJECTION INTRAMUSCULAR; INTRAVENOUS at 00:50

## 2018-08-17 RX ADMIN — TAZOBACTAM SODIUM AND PIPERACILLIN SODIUM SCH MLS/HR: 375; 3 INJECTION, SOLUTION INTRAVENOUS at 12:13

## 2018-08-17 RX ADMIN — DIPHENHYDRAMINE HYDROCHLORIDE PRN MG: 50 INJECTION INTRAMUSCULAR; INTRAVENOUS at 14:27

## 2018-08-17 RX ADMIN — TAZOBACTAM SODIUM AND PIPERACILLIN SODIUM SCH MLS/HR: 375; 3 INJECTION, SOLUTION INTRAVENOUS at 00:50

## 2018-08-17 RX ADMIN — DIPHENHYDRAMINE HYDROCHLORIDE PRN MG: 50 INJECTION INTRAMUSCULAR; INTRAVENOUS at 11:59

## 2018-08-17 NOTE — CP.PCM.PN
Subjective





- Date & Time of Evaluation


Date of Evaluation: 08/17/18


Time of Evaluation: 12:49





- Subjective


Subjective: 





PT CLEARED FOR D/C HOME TODAY PER DR. MARIVEL FORBES AND DR. MARK ROSENTHAL.  PER DR. ROSENTHAL, 

RX AUGMENTIN 500 MG PO BID X5 DAYS.  RX SENT TO PT'S PHARMACY.  DISCUSSED ALL D/

C INFORMATION AND F/U WITH PT.  NO FURTHER ORDERS. 








-FOLLOW UP WITH DR. CAROL FORBES IN THE OFFICE WITHIN 5-7 DAYS---CALL THE OFFICE TO 

MAKE AN APPOINTMENT.


-FOLLOW UP WITH DR. MIKE ROSENTHAL (INFECTION DOCTOR) IN THE OFFICE WITHIN 7-10 

DAYS---CALL THE OFFICE TO MAKE AN APPOINTMENT.


-CONTINUE HOME MEDICATIONS AS USUAL.


-YOU HAVE BEEN PRESCRIBED AN ANTIBIOTIC PER DR. ROSENTHAL: 


 1) AUGMENTIN 500 MG---TAKE 1 TABLET BY MOUTH TWICE A DAY (MORNING AND EVENING) 

FOR 5 DAYS.


-FOR FURTHER QUESTIONS, CONTACT DR. MARIVEL FORBES OR DR. ROSENTHAL.





Objective





- Vital Signs/Intake and Output


Vital Signs (last 24 hours): 


 











Temp Pulse Resp BP Pulse Ox


 


 98.8 F   74   97 H  123/73   20 L


 


 08/17/18 07:30  08/17/18 07:30  08/17/18 07:30  08/17/18 07:30  08/17/18 07:30








Intake and Output: 


 











 08/17/18 08/17/18





 06:59 18:59


 


Intake Total 1080 


 


Output Total 2600 


 


Balance -1520 














- Medications


Medications: 


 Current Medications





Acetaminophen (Tylenol 325mg Tab)  650 mg PO Q6 PRN


   PRN Reason: Fever >100.4 F


   Last Admin: 08/11/18 22:44 Dose:  650 mg


Diphenhydramine HCl (Benadryl)  50 mg IVP Q3H PRN


   PRN Reason: Allergy symptoms


   Last Admin: 08/17/18 11:59 Dose:  50 mg


Piperacillin Sod/Tazobactam Sod (Zosyn 3.375 Gm Iv Premix)  3.375 gm in 50 mls 

@ 100 mls/hr IVPB Q8H [facility]


   PRN Reason: Protocol


   Last Admin: 08/17/18 12:13 Dose:  100 mls/hr


Sodium Chloride (Sodium Chloride 0.9%)  1,000 mls @ 75 mls/hr IV .[id number] [facility]


   Last Admin: 08/16/18 19:45 Dose:  Not Given


Morphine Sulfate (Morphine)  4 mg IVP Q3H PRN


   PRN Reason: Pain, severe (8-10)


   Last Admin: 08/17/18 11:59 Dose:  4 mg


Ondansetron HCl (Zofran Inj)  4 mg IVP Q8H PRN


   PRN Reason: Nausea/Vomiting


   Last Admin: 08/17/18 00:50 Dose:  4 mg











- Labs


Labs: 


 





 08/17/18 07:26 





 08/17/18 07:26 





 











PT  17.0 SECONDS (9.7-12.2)  H  08/11/18  04:23    


 


INR  1.6   08/11/18  04:23    


 


APTT  40 SECONDS (21-34)  H  08/11/18  04:23

## 2018-08-20 NOTE — CARD
--------------- APPROVED REPORT --------------





Date of service: 08/11/2018



EKG Measurement

Heart [id number]

NV [id number]

[id number]

[id number]

[id number]



<Conclusion>

Sinus tachycardia

Nonspecific ST abnormality

Abnormal ECG

## 2019-02-09 ENCOUNTER — HOSPITAL ENCOUNTER (EMERGENCY)
Dept: HOSPITAL 31 - C.ER | Age: 39
Discharge: HOME | End: 2019-02-09
Payer: MEDICARE

## 2019-02-09 VITALS — OXYGEN SATURATION: 100 %

## 2019-02-09 VITALS
RESPIRATION RATE: 18 BRPM | DIASTOLIC BLOOD PRESSURE: 70 MMHG | HEART RATE: 108 BPM | SYSTOLIC BLOOD PRESSURE: 110 MMHG | TEMPERATURE: 98.2 F

## 2019-02-09 VITALS — BODY MASS INDEX: 22.4 KG/M2

## 2019-02-09 DIAGNOSIS — D57.1: Primary | ICD-10-CM

## 2019-02-09 NOTE — C.PDOC
History Of Present Illness





38 year old female with a PMHx of sickle cell presents to the ED for evaluation 

of sickle cell related pain for 2 days with intermittent runny nose. The patient

reports only taking folic acid for sickle cell dx, notes she is compliant with 

medication. States regular visits to hematologist Dr. VINCE matt, PMD Dr. CAROL Matt. Known allergies to medication include: Ketorolac, Droperidol, Tramadol, 

and Inapsine. She denies fever, chills, nausea, vomiting, recent travel, recent 

changes in activity, numbness, tingling, and any other associated symptoms. 





Time Seen by Provider: 02/09/19 12:02


Chief Complaint (Nursing): Pain, Chronic


History Per: Patient


History/Exam Limitations: no limitations


Onset/Duration Of Symptoms: Days (x2)


Current Symptoms Are (Timing): Still Present


Recent travel outside of the [place]: No





Past Medical History


Reviewed: Historical Data, Nursing Documentation, Vital Signs


Vital Signs: 





                                Last Vital Signs











Temp  98.6 F   02/09/19 11:10


 


Pulse  121 H  02/09/19 11:10


 


Resp  18   02/09/19 11:10


 


BP  131/85   02/09/19 11:10


 


Pulse Ox  100   02/09/19 11:10














- Medical History


PMH: Anemia, Migraine, Sickle Cell Disease


   Denies: Atrial Fibrillation, Hyperthyroidism, Chronic Kidney Disease


Surgical History: Cholecystectomy (2004)





- [facility] Procedures











DILATION OF RIGHT URETER WITH INTRALUMINAL DEVICE, ENDO (04/26/18)


PACKED CELL TRANSFUSION (06/04/13)


REMOVAL OF INTRALUMINAL DEVICE FROM URETER, ENDO (04/26/18)


TETANUS TOXOID ADMINIST (09/23/13)


TRANSFUSE NONAUT RED BLOOD CELLS IN PERIPH VEIN, PERC (04/26/18)








Family History: States: Unknown Family Hx





- Social History


Hx Tobacco Use: No


Hx Alcohol Use: No


Hx Substance Use: No





- Immunization History


Hx Tetanus Toxoid Vaccination: Yes


Hx Influenza Vaccination: Yes


Hx Pneumococcal Vaccination: Yes





Review Of Systems


Except As Marked, All Systems Reviewed And Found Negative.


Constitutional: Negative for: Fever, Chills, Other ((-) recent changes in 

activity. )


Gastrointestinal: Negative for: Nausea, Vomiting


Musculoskeletal: Positive for: Other ((+) sickle cell related pains. )


Neurological: Negative for: Weakness, Numbness, Incoordination





Physical Exam





- Physical Exam


Appears: Well, Non-toxic, No Acute Distress, Other (resting comfortably. )


Skin: Warm, Dry


Head: Atraumatic, Normacephalic


Eye(s): bilateral: Normal Inspection


Oral Mucosa: Moist


Neck: Normal ROM, Supple


Chest: Symmetrical


Cardiovascular: Rhythm Regular, No Murmur


Respiratory: Normal Breath Sounds, No Rales, No Rhonchi, No Wheezing


Gastrointestinal/Abdominal: Normal Exam, Soft, No Tenderness


Back: No CVA Tenderness, Other ((+) scoliosis. (+) cifosis.)


Extremity: Normal ROM (x4)


Neurological/Psych: Oriented x3, Normal Speech, Normal Cognition





ED Course And Treatment


O2 Sat by Pulse Oximetry: 100 (RA)


Pulse Ox Interpretation: Normal





Medical Decision Making


Medical Decision Making: 





Initial plan: 


-Benadryl 


-Dilaudid 





Progress/Update: 


Patient appears well. 


PO protocol for sickle cell disease [facility]. 


Given Dilaudid and Tylenol. 


Patient states feeling better. 


Stable for discharge home. 


Advised to follow up with hematologist. On re-exam, the patient reports 

improvement of symptoms. Lungs are CTA, heart is RRR, abdomen is soft, non-

tender and the patient is tolerating PO well. Follow up with the medical doctor 

within 1-2 days. Return if worsened. 








Disposition





- Disposition


Referrals: 


Shahab Matt MD [Staff Provider] - 


Disposition: HOME/ ROUTINE


Disposition Time: 15:51


Condition: STABLE


Additional Instructions: 


Follow up with the medical doctor/clinic within 1-2 days. Return if worsened. 


Instructions:  Sickle Cell Disease


Forms:  [employer] [facility] (English)





- Clinical Impression


Clinical Impression: 


 Sickle cell anemia








- PA / NP / Resident Statement


MD/DO has reviewed & agrees with the documentation as recorded.





- Scribe Statement


The provider has reviewed the documentation as recorded by the Scribe (Shelby Guajardo)





All medical record entries made by the Scribe were at my direction and 

personally dictated by me. I have reviewed the chart and agree that the record 

accurately reflects my personal performance of the history, physical exam, 

medical decision making, and the department course for this patient. I have also

personally directed, reviewed, and agree with the discharge instructions and 

disposition.

## 2019-04-10 NOTE — C.PDOC
History Of Present Illness


39 y/o female,w/PMhx of sickle cell disease, presents to the ER complaining of 

generalized body aches which have been present for the past several days. 

Patient states that the symptoms are typical of her symptoms with history of sic

kle cell disease. Patient reports that she was admitted in [facility] from 

the end of February 2019 to March 2019.At the time of her admission, she was 

treated with pain medications. Denies having associated fever,chills, CP, SOB, 

nausea, vomiting, and abdominal pain.


Time Seen by Provider: 04/10/19 16:29


Chief Complaint (Nursing): Pain, Chronic


History Per: Patient


History/Exam Limitations: no limitations


Onset/Duration Of Symptoms: Days


Current Symptoms Are (Timing): Still Present


Severity: Moderate





Past Medical History


Reviewed: Historical Data, Nursing Documentation, Vital Signs


Vital Signs: 





                                Last Vital Signs











Temp  98.7 F   04/10/19 16:07


 


Pulse  106 H  04/10/19 16:07


 


Resp  20   04/10/19 16:07


 


BP  123/81   04/10/19 16:07


 


Pulse Ox  100   04/10/19 16:07














- Medical History


PMH: Anemia, Arthritis (BACK HX OF SCOLIOSIS W/ RODS SURG), Migraine, Sickle 

Cell Disease


   Denies: Atrial Fibrillation, Hyperthyroidism, Chronic Kidney Disease


Surgical History: Back Surgery (scoliosis), Cholecystectomy (2004)





- [facility] Procedures











DILATION OF RIGHT URETER WITH INTRALUMINAL DEVICE, ENDO (04/26/18)


PACKED CELL TRANSFUSION (06/04/13)


REMOVAL OF INTRALUMINAL DEVICE FROM URETER, ENDO (04/26/18)


TETANUS TOXOID ADMINIST (09/23/13)


TRANSFUSE NONAUT RED BLOOD CELLS IN PERIPH VEIN, PERC (02/26/19)








Family History: States: No Known Family Hx





- Social History


Hx Tobacco Use: No


Hx Alcohol Use: No


Hx Substance Use: No





- Immunization History


Hx Tetanus Toxoid Vaccination: Yes


Hx Influenza Vaccination: Yes


Hx Pneumococcal Vaccination: Yes





Review Of Systems


Constitutional: Positive for: Malaise.  Negative for: Fever, Chills


Cardiovascular: Negative for: Chest Pain


Respiratory: Negative for: Cough, Shortness of Breath


Gastrointestinal: Negative for: Nausea, Vomiting, Abdominal Pain





Physical Exam





- Physical Exam


Appears: Non-toxic, No Acute Distress


Skin: Warm, Dry, Pale


Head: Atraumatic, Normacephalic


Eye(s): bilateral: Normal Inspection


Nose: Normal


Oral Mucosa: Moist


Neck: Supple


Chest: Symmetrical


Cardiovascular: Rhythm Regular


Respiratory: Normal Breath Sounds, No Rales, No Rhonchi, No Wheezing


Gastrointestinal/Abdominal: Normal Exam, Soft, No Tenderness, No Guarding, No 

Rebound


Neurological/Psych: Oriented x3, Normal Speech





ED Course And Treatment





- Laboratory Results


Result Diagrams: 


                                 04/10/19 17:28





                                 04/10/19 17:28


Lab Interpretation: Abnormal (WBC 18.2, Hgb 7.7, HCO3 19, UA , nitrite 

positive with many bacteria.)


O2 Sat by Pulse Oximetry: 100 (RA)


Pulse Ox Interpretation: Normal


Progress Note: 18:20 Patient states no change after initial dose of pain 

medication according to protocol although she appears comfortable and is eating 

chips and cookies. Second dose of Dilaudid po is ordered.  19:30 Patient again 

stating that she has no relief from the pain medication. She is standing at the 

side of the bed and appears comfortable.  Third does of po Dilaudid ordered 

according to protocol.


Reevaluation Time: 20:29


Reassessment Condition: Improved (Patient no nodding off and requesting 

discharge. States she is still in pain but requesting discharge.)





Medical Decision Making


Medical Decision Making: 


Plan:


--Labs


--UA


--Benadryl PO


--Dilaudid PO


--Motrin PO


--Reglan PO


--IV Fluids





Disposition


Counseled Patient/Family Regarding: Studies Performed, Diagnosis, Need For 

Followup





- Disposition


Referrals: 


Shahab Etienne MD [Staff Provider] - 


Disposition: HOME/ ROUTINE


Disposition Time: 20:30


Condition: IMPROVED


Instructions:  Sickle Cell Disease


Forms:  [employer] (English)





- Clinical Impression


Clinical Impression: 


 Sickle cell anemia, Sickle cell pain crisis








- Scribe Statement


The provider has reviewed the documentation as recorded by the Coretta Galicia


Provider Attestation: 





All medical record entries made by the Scribe were at my direction and 

personally dictated by me. I have reviewed the chart and agree that the record 

accurately reflects my personal performance of the history, physical exam, 

medical decision making, and the department course for this patient. I have also

personally directed, reviewed, and agree with the discharge instructions and 

disposition.

## 2022-06-30 NOTE — CP.PCM.PN
Subjective





- Date & Time of Evaluation


Date of Evaluation: 03/15/18


Time of Evaluation: 08:20





- Subjective


Subjective: 


clinically same





Objective





- Vital Signs/Intake and Output


Vital Signs (last 24 hours): 


 











Temp Pulse Resp BP Pulse Ox


 


 98.2 F   102 H  18   119/74   98 


 


 03/15/18 08:15  03/15/18 08:15  03/15/18 08:15  03/15/18 08:15  03/15/18 08:15








Intake and Output: 


 











 03/15/18 03/15/18





 06:59 18:59


 


Intake Total  400


 


Balance  400














- Medications


Medications: 


 Current Medications





Diphenhydramine HCl (Benadryl)  50 mg IVP Q3 [facility]


   Last Admin: 03/15/18 11:17 Dose:  50 mg


Enoxaparin Sodium (Lovenox)  40 mg SC DAILY [facility]


   Last Admin: 03/15/18 09:45 Dose:  Not Given


Folic Acid (Folic Acid)  1 mg PO DAILY [facility]


   Last Admin: 03/15/18 09:45 Dose:  1 mg


Hydromorphone HCl (Dilaudid)  3 mg IVP Q3H [facility]


   Last Admin: 03/15/18 11:18 Dose:  3 mg


Linezolid (Zyvox)  600 mg PO Q12H [facility]


   Last Admin: 03/15/18 06:03 Dose:  600 mg


Pantoprazole Sodium (Protonix Ec Tab)  40 mg PO DAILY [facility]


   Last Admin: 03/15/18 09:45 Dose:  40 mg











- Labs


Labs: 


 





 03/15/18 10:27 





 03/15/18 10:27 





 











PT  15.3 SECONDS (9.7-12.2)  H  03/11/18  19:46    


 


INR  1.4   03/11/18  19:46    


 


APTT  41 SECONDS (21-34)  H  03/11/18  19:46    














Assessment and Plan





- Assessment and Plan (Free Text)


Plan: 





Sickle cell Crisis


-folic acid 1mg PO daily


-dilaudid 3mg IV q3hrs prn 


-lovenox 40mg sc daily


-HGB 7.5, s/p 1 unit PRBC 





Urinary tract infection


-ID consult. Dr. Sagar Stanton. recs appreciated. 


-Zyvox 600mg PO q12hr, will continue for 3 more days


-Uro consult. Dr. JAZZY Chacko. Recs appreciated.


-Bladder u/s- B/L ureteral stents with moderate hydronephrosis R>L


-Ucx (3/7/18)- vancomycin resistant E. faecium


-Blood cx- no growth


-Patient will follow up with Dr. Chacko as outpatient [Dear  ___] : Dear  [unfilled], [Consult Letter:] : I had the pleasure of evaluating your patient, [unfilled]. [Please see my note below.] : Please see my note below. [Consult Closing:] : Thank you very much for allowing me to participate in the care of this patient.  If you have any questions, please do not hesitate to contact me. [Sincerely,] : Sincerely, [FreeTextEntry2] : Dr. Bahman Moran [FreeTextEntry3] : Vj Domingo MD\par \par